# Patient Record
Sex: MALE | Race: WHITE | NOT HISPANIC OR LATINO | Employment: OTHER
[De-identification: names, ages, dates, MRNs, and addresses within clinical notes are randomized per-mention and may not be internally consistent; named-entity substitution may affect disease eponyms.]

---

## 2023-10-05 ENCOUNTER — TRANSCRIBE ORDERS (OUTPATIENT)
Dept: OTHER | Age: 78
End: 2023-10-05

## 2023-10-05 DIAGNOSIS — Z95.1 S/P CORONARY ARTERY BYPASS GRAFT X 3: Primary | ICD-10-CM

## 2023-10-20 ENCOUNTER — HOSPITAL ENCOUNTER (OUTPATIENT)
Dept: CARDIAC REHAB | Facility: HOSPITAL | Age: 78
Discharge: HOME | End: 2023-10-20
Attending: SURGERY
Payer: COMMERCIAL

## 2023-10-20 DIAGNOSIS — Z95.1 S/P CORONARY ARTERY BYPASS GRAFT X 3: ICD-10-CM

## 2023-10-20 PROCEDURE — 93797 PHYS/QHP OP CAR RHAB WO ECG: CPT

## 2023-10-20 PROCEDURE — 93798 PHYS/QHP OP CAR RHAB W/ECG: CPT

## 2023-11-02 ENCOUNTER — MEDICAL CORRESPONDENCE (OUTPATIENT)
Dept: CARDIAC REHAB | Facility: HOSPITAL | Age: 78
End: 2023-11-02

## 2023-11-02 ENCOUNTER — APPOINTMENT (OUTPATIENT)
Dept: CARDIOLOGY | Facility: HOSPITAL | Age: 78
DRG: 856 | End: 2023-11-02
Attending: INTERNAL MEDICINE
Payer: COMMERCIAL

## 2023-11-02 ENCOUNTER — APPOINTMENT (OUTPATIENT)
Dept: CT IMAGING | Facility: HOSPITAL | Age: 78
DRG: 856 | End: 2023-11-02
Attending: EMERGENCY MEDICINE
Payer: COMMERCIAL

## 2023-11-02 ENCOUNTER — APPOINTMENT (OUTPATIENT)
Dept: RADIOLOGY | Facility: HOSPITAL | Age: 78
DRG: 856 | End: 2023-11-02
Attending: EMERGENCY MEDICINE
Payer: COMMERCIAL

## 2023-11-02 ENCOUNTER — HOSPITAL ENCOUNTER (INPATIENT)
Facility: HOSPITAL | Age: 78
LOS: 12 days | Discharge: NURSING, REHAB OR LONG-TERM CARE | DRG: 856 | End: 2023-11-14
Attending: EMERGENCY MEDICINE | Admitting: INTERNAL MEDICINE
Payer: COMMERCIAL

## 2023-11-02 DIAGNOSIS — R65.20 SEVERE SEPSIS (H): ICD-10-CM

## 2023-11-02 DIAGNOSIS — I31.39 PERICARDIAL EFFUSION: ICD-10-CM

## 2023-11-02 DIAGNOSIS — E87.5 HYPERKALEMIA: ICD-10-CM

## 2023-11-02 DIAGNOSIS — I48.0 PAROXYSMAL ATRIAL FIBRILLATION (H): ICD-10-CM

## 2023-11-02 DIAGNOSIS — Z98.890 S/P PERICARDIAL WINDOW CREATION: Primary | ICD-10-CM

## 2023-11-02 DIAGNOSIS — D64.9 ANEMIA, UNSPECIFIED TYPE: ICD-10-CM

## 2023-11-02 DIAGNOSIS — F41.9 ANXIETY: ICD-10-CM

## 2023-11-02 DIAGNOSIS — N17.9 ACUTE RENAL FAILURE, UNSPECIFIED ACUTE RENAL FAILURE TYPE (H): ICD-10-CM

## 2023-11-02 DIAGNOSIS — J90 BILATERAL PLEURAL EFFUSION: ICD-10-CM

## 2023-11-02 DIAGNOSIS — A41.9 SEVERE SEPSIS (H): ICD-10-CM

## 2023-11-02 DIAGNOSIS — L03.116 LEFT LEG CELLULITIS: ICD-10-CM

## 2023-11-02 LAB
ABO/RH(D): NORMAL
ALBUMIN SERPL BCG-MCNC: 3.1 G/DL (ref 3.5–5.2)
ALBUMIN SERPL BCG-MCNC: 3.2 G/DL (ref 3.5–5.2)
ALBUMIN UR-MCNC: 300 MG/DL
ALP SERPL-CCNC: 42 U/L (ref 40–129)
ALT SERPL W P-5'-P-CCNC: 15 U/L (ref 0–70)
ANION GAP SERPL CALCULATED.3IONS-SCNC: 23 MMOL/L (ref 7–15)
ANION GAP SERPL CALCULATED.3IONS-SCNC: 24 MMOL/L (ref 7–15)
ANION GAP SERPL CALCULATED.3IONS-SCNC: 27 MMOL/L (ref 7–15)
ANTIBODY SCREEN: NEGATIVE
APPEARANCE UR: ABNORMAL
APTT PPP: 43 SECONDS (ref 22–38)
AST SERPL W P-5'-P-CCNC: 7 U/L (ref 0–45)
ATRIAL RATE - MUSE: 58 BPM
BACTERIA #/AREA URNS HPF: ABNORMAL /HPF
BASE EXCESS BLDA CALC-SCNC: -7.4 MMOL/L
BASE EXCESS BLDV CALC-SCNC: -5 MMOL/L
BASE EXCESS BLDV CALC-SCNC: -5.6 MMOL/L
BASE EXCESS BLDV CALC-SCNC: -7.9 MMOL/L
BASOPHILS # BLD AUTO: 0.1 10E3/UL (ref 0–0.2)
BASOPHILS NFR BLD AUTO: 1 %
BILIRUB SERPL-MCNC: 0.3 MG/DL
BILIRUB UR QL STRIP: NEGATIVE
BUN SERPL-MCNC: 102.6 MG/DL (ref 8–23)
BUN SERPL-MCNC: 96.6 MG/DL (ref 8–23)
BUN SERPL-MCNC: 97.2 MG/DL (ref 8–23)
BUN SERPL-MCNC: 97.6 MG/DL (ref 8–23)
BUN SERPL-MCNC: 97.6 MG/DL (ref 8–23)
CALCIUM SERPL-MCNC: 7.9 MG/DL (ref 8.8–10.2)
CALCIUM SERPL-MCNC: 8.8 MG/DL (ref 8.8–10.2)
CALCIUM SERPL-MCNC: 8.8 MG/DL (ref 8.8–10.2)
CALCIUM SERPL-MCNC: 8.9 MG/DL (ref 8.8–10.2)
CALCIUM SERPL-MCNC: 9.3 MG/DL (ref 8.8–10.2)
CALCIUM, IONIZED MEASURED: 1.06 MMOL/L (ref 1.11–1.3)
CHLORIDE SERPL-SCNC: 89 MMOL/L (ref 98–107)
CHLORIDE SERPL-SCNC: 91 MMOL/L (ref 98–107)
CHLORIDE SERPL-SCNC: 92 MMOL/L (ref 98–107)
CHLORIDE SERPL-SCNC: 93 MMOL/L (ref 98–107)
CHLORIDE SERPL-SCNC: 93 MMOL/L (ref 98–107)
COHGB MFR BLD: 97.2 % (ref 95–96)
COLOR UR AUTO: YELLOW
CREAT SERPL-MCNC: 10.02 MG/DL (ref 0.67–1.17)
CREAT SERPL-MCNC: 10.02 MG/DL (ref 0.67–1.17)
CREAT SERPL-MCNC: 10.42 MG/DL (ref 0.67–1.17)
CREAT SERPL-MCNC: 10.58 MG/DL (ref 0.67–1.17)
CREAT SERPL-MCNC: 8.4 MG/DL (ref 0.67–1.17)
DEPRECATED HCO3 PLAS-SCNC: 14 MMOL/L (ref 22–29)
DEPRECATED HCO3 PLAS-SCNC: 19 MMOL/L (ref 22–29)
DEPRECATED HCO3 PLAS-SCNC: 20 MMOL/L (ref 22–29)
DEPRECATED HCO3 PLAS-SCNC: 20 MMOL/L (ref 22–29)
DEPRECATED HCO3 PLAS-SCNC: 21 MMOL/L (ref 22–29)
DIASTOLIC BLOOD PRESSURE - MUSE: 49 MMHG
EGFRCR SERPLBLD CKD-EPI 2021: 5 ML/MIN/1.73M2
EGFRCR SERPLBLD CKD-EPI 2021: 6 ML/MIN/1.73M2
EOSINOPHIL # BLD AUTO: 0.1 10E3/UL (ref 0–0.7)
EOSINOPHIL NFR BLD AUTO: 1 %
ERYTHROCYTE [DISTWIDTH] IN BLOOD BY AUTOMATED COUNT: 14.9 % (ref 10–15)
ERYTHROCYTE [DISTWIDTH] IN BLOOD BY AUTOMATED COUNT: 15 % (ref 10–15)
FIBRINOGEN PPP-MCNC: 389 MG/DL (ref 170–490)
FLUAV RNA SPEC QL NAA+PROBE: NEGATIVE
FLUBV RNA RESP QL NAA+PROBE: NEGATIVE
GLUCOSE BLDC GLUCOMTR-MCNC: 119 MG/DL (ref 70–99)
GLUCOSE BLDC GLUCOMTR-MCNC: 146 MG/DL (ref 70–99)
GLUCOSE BLDC GLUCOMTR-MCNC: 147 MG/DL (ref 70–99)
GLUCOSE BLDC GLUCOMTR-MCNC: 154 MG/DL (ref 70–99)
GLUCOSE BLDC GLUCOMTR-MCNC: 182 MG/DL (ref 70–99)
GLUCOSE BLDC GLUCOMTR-MCNC: 203 MG/DL (ref 70–99)
GLUCOSE BLDC GLUCOMTR-MCNC: 207 MG/DL (ref 70–99)
GLUCOSE BLDC GLUCOMTR-MCNC: 231 MG/DL (ref 70–99)
GLUCOSE BLDC GLUCOMTR-MCNC: 254 MG/DL (ref 70–99)
GLUCOSE SERPL-MCNC: 128 MG/DL (ref 70–99)
GLUCOSE SERPL-MCNC: 159 MG/DL (ref 70–99)
GLUCOSE SERPL-MCNC: 188 MG/DL (ref 70–99)
GLUCOSE SERPL-MCNC: 188 MG/DL (ref 70–99)
GLUCOSE SERPL-MCNC: 304 MG/DL (ref 70–99)
GLUCOSE UR STRIP-MCNC: 30 MG/DL
HBA1C MFR BLD: 6.7 %
HCO3 BLD-SCNC: 19 MMOL/L (ref 23–29)
HCO3 BLDV-SCNC: 19 MMOL/L (ref 24–30)
HCO3 BLDV-SCNC: 20 MMOL/L (ref 24–30)
HCO3 BLDV-SCNC: 21 MMOL/L (ref 24–30)
HCT VFR BLD AUTO: 23.6 % (ref 40–53)
HCT VFR BLD AUTO: 24.1 % (ref 40–53)
HCT VFR BLD AUTO: 24.5 % (ref 40–53)
HCT VFR BLD AUTO: 27.2 % (ref 40–53)
HGB BLD-MCNC: 7.3 G/DL (ref 13.3–17.7)
HGB BLD-MCNC: 7.6 G/DL (ref 13.3–17.7)
HGB BLD-MCNC: 7.7 G/DL (ref 13.3–17.7)
HGB BLD-MCNC: 8.3 G/DL (ref 13.3–17.7)
HGB UR QL STRIP: ABNORMAL
HOLD SPECIMEN: NORMAL
IMM GRANULOCYTES # BLD: 0.1 10E3/UL
IMM GRANULOCYTES NFR BLD: 1 %
INR PPP: 2.83 (ref 0.85–1.15)
INR PPP: 2.9 (ref 0.85–1.15)
INTERPRETATION ECG - MUSE: NORMAL
ION CA PH 7.4: 0.97 MMOL/L (ref 1.11–1.3)
KETONES UR STRIP-MCNC: 10 MG/DL
LACTATE SERPL-SCNC: 1.9 MMOL/L (ref 0.7–2)
LACTATE SERPL-SCNC: 2.7 MMOL/L (ref 0.7–2)
LACTATE SERPL-SCNC: 2.9 MMOL/L (ref 0.7–2)
LACTATE SERPL-SCNC: 3.3 MMOL/L (ref 0.7–2)
LEUKOCYTE ESTERASE UR QL STRIP: NEGATIVE
LVEF ECHO: NORMAL
LYMPHOCYTES # BLD AUTO: 1.1 10E3/UL (ref 0.8–5.3)
LYMPHOCYTES NFR BLD AUTO: 12 %
MAGNESIUM SERPL-MCNC: 2.1 MG/DL (ref 1.7–2.3)
MAGNESIUM SERPL-MCNC: 2.3 MG/DL (ref 1.7–2.3)
MCH RBC QN AUTO: 28.7 PG (ref 26.5–33)
MCH RBC QN AUTO: 29.4 PG (ref 26.5–33)
MCHC RBC AUTO-ENTMCNC: 30.5 G/DL (ref 31.5–36.5)
MCHC RBC AUTO-ENTMCNC: 30.9 G/DL (ref 31.5–36.5)
MCV RBC AUTO: 93 FL (ref 78–100)
MCV RBC AUTO: 97 FL (ref 78–100)
MONOCYTES # BLD AUTO: 0.7 10E3/UL (ref 0–1.3)
MONOCYTES NFR BLD AUTO: 8 %
NEUTROPHILS # BLD AUTO: 6.9 10E3/UL (ref 1.6–8.3)
NEUTROPHILS NFR BLD AUTO: 77 %
NITRATE UR QL: NEGATIVE
NRBC # BLD AUTO: 0 10E3/UL
NRBC BLD AUTO-RTO: 0 /100
NT-PROBNP SERPL-MCNC: ABNORMAL PG/ML (ref 0–1800)
OXYHGB MFR BLD: 94.9 % (ref 95–96)
OXYHGB MFR BLDV: 53.4 % (ref 70–75)
OXYHGB MFR BLDV: 54.6 % (ref 70–75)
P AXIS - MUSE: NORMAL DEGREES
PCO2 BLD: 40 MM HG (ref 35–45)
PCO2 BLDV: 43 MM HG (ref 35–50)
PCO2 BLDV: 46 MM HG (ref 35–50)
PCO2 BLDV: 47 MM HG (ref 35–50)
PH BLD: 7.29 [PH] (ref 7.37–7.44)
PH BLDV: 7.26 [PH] (ref 7.35–7.45)
PH BLDV: 7.27 [PH] (ref 7.35–7.45)
PH BLDV: 7.28 [PH] (ref 7.35–7.45)
PH UR STRIP: 5.5 [PH] (ref 5–7)
PH: 7.25 (ref 7.35–7.45)
PHOSPHATE SERPL-MCNC: 10.9 MG/DL (ref 2.5–4.5)
PLATELET # BLD AUTO: 382 10E3/UL (ref 150–450)
PLATELET # BLD AUTO: 399 10E3/UL (ref 150–450)
PO2 BLD: 88 MM HG (ref 75–85)
PO2 BLDV: 34 MM HG (ref 25–47)
PO2 BLDV: 35 MM HG (ref 25–47)
PO2 BLDV: 36 MM HG (ref 25–47)
POTASSIUM SERPL-SCNC: 4.7 MMOL/L (ref 3.4–5.3)
POTASSIUM SERPL-SCNC: 5.1 MMOL/L (ref 3.4–5.3)
POTASSIUM SERPL-SCNC: 5.3 MMOL/L (ref 3.4–5.3)
POTASSIUM SERPL-SCNC: 5.4 MMOL/L (ref 3.4–5.3)
POTASSIUM SERPL-SCNC: 5.4 MMOL/L (ref 3.4–5.3)
POTASSIUM SERPL-SCNC: 5.9 MMOL/L (ref 3.4–5.3)
PR INTERVAL - MUSE: NORMAL MS
PROCALCITONIN SERPL IA-MCNC: 0.18 NG/ML
PROT SERPL-MCNC: 5.6 G/DL (ref 6.4–8.3)
QRS DURATION - MUSE: 92 MS
QT - MUSE: 464 MS
QTC - MUSE: 474 MS
R AXIS - MUSE: 14 DEGREES
RBC # BLD AUTO: 2.54 10E6/UL (ref 4.4–5.9)
RBC # BLD AUTO: 2.82 10E6/UL (ref 4.4–5.9)
RBC URINE: 40 /HPF
RSV RNA SPEC NAA+PROBE: NEGATIVE
SAO2 % BLDV: 54.5 % (ref 70–75)
SAO2 % BLDV: 55.5 % (ref 70–75)
SARS-COV-2 RNA RESP QL NAA+PROBE: NEGATIVE
SATV LHE POCT: 55.2 % (ref 70–75)
SODIUM SERPL-SCNC: 130 MMOL/L (ref 135–145)
SODIUM SERPL-SCNC: 134 MMOL/L (ref 135–145)
SODIUM SERPL-SCNC: 136 MMOL/L (ref 135–145)
SP GR UR STRIP: 1.02 (ref 1–1.03)
SPECIMEN EXPIRATION DATE: NORMAL
SPERM #/AREA URNS HPF: PRESENT /HPF
SYSTOLIC BLOOD PRESSURE - MUSE: 86 MMHG
T AXIS - MUSE: 86 DEGREES
TEMPERATURE: 37 DEGREES C
TROPONIN T SERPL HS-MCNC: 79 NG/L
TROPONIN T SERPL HS-MCNC: 88 NG/L
TROPONIN T SERPL HS-MCNC: 92 NG/L
TROPONIN T SERPL HS-MCNC: 98 NG/L
TSH SERPL DL<=0.005 MIU/L-ACNC: 2.85 UIU/ML (ref 0.3–4.2)
UROBILINOGEN UR STRIP-MCNC: 4 MG/DL
VENTRICULAR RATE- MUSE: 63 BPM
WBC # BLD AUTO: 8.9 10E3/UL (ref 4–11)
WBC # BLD AUTO: 9.3 10E3/UL (ref 4–11)
WBC URINE: 4 /HPF

## 2023-11-02 PROCEDURE — 87077 CULTURE AEROBIC IDENTIFY: CPT | Performed by: EMERGENCY MEDICINE

## 2023-11-02 PROCEDURE — 84132 ASSAY OF SERUM POTASSIUM: CPT | Performed by: EMERGENCY MEDICINE

## 2023-11-02 PROCEDURE — 83605 ASSAY OF LACTIC ACID: CPT | Performed by: EMERGENCY MEDICINE

## 2023-11-02 PROCEDURE — 96361 HYDRATE IV INFUSION ADD-ON: CPT

## 2023-11-02 PROCEDURE — 96368 THER/DIAG CONCURRENT INF: CPT

## 2023-11-02 PROCEDURE — 96375 TX/PRO/DX INJ NEW DRUG ADDON: CPT

## 2023-11-02 PROCEDURE — 84132 ASSAY OF SERUM POTASSIUM: CPT | Performed by: INTERNAL MEDICINE

## 2023-11-02 PROCEDURE — 93451 RIGHT HEART CATH: CPT | Performed by: INTERNAL MEDICINE

## 2023-11-02 PROCEDURE — 250N000009 HC RX 250: Performed by: INTERNAL MEDICINE

## 2023-11-02 PROCEDURE — 86901 BLOOD TYPING SEROLOGIC RH(D): CPT | Performed by: EMERGENCY MEDICINE

## 2023-11-02 PROCEDURE — 85610 PROTHROMBIN TIME: CPT | Performed by: EMERGENCY MEDICINE

## 2023-11-02 PROCEDURE — 36600 WITHDRAWAL OF ARTERIAL BLOOD: CPT

## 2023-11-02 PROCEDURE — C9113 INJ PANTOPRAZOLE SODIUM, VIA: HCPCS | Mod: JZ | Performed by: INTERNAL MEDICINE

## 2023-11-02 PROCEDURE — 82805 BLOOD GASES W/O2 SATURATION: CPT | Performed by: EMERGENCY MEDICINE

## 2023-11-02 PROCEDURE — 258N000001 HC RX 258: Performed by: EMERGENCY MEDICINE

## 2023-11-02 PROCEDURE — 4A133B3 MONITORING OF ARTERIAL PRESSURE, PULMONARY, PERCUTANEOUS APPROACH: ICD-10-PCS | Performed by: INTERNAL MEDICINE

## 2023-11-02 PROCEDURE — 99223 1ST HOSP IP/OBS HIGH 75: CPT | Performed by: INTERNAL MEDICINE

## 2023-11-02 PROCEDURE — 99152 MOD SED SAME PHYS/QHP 5/>YRS: CPT | Performed by: INTERNAL MEDICINE

## 2023-11-02 PROCEDURE — 93306 TTE W/DOPPLER COMPLETE: CPT | Mod: 26 | Performed by: INTERNAL MEDICINE

## 2023-11-02 PROCEDURE — 250N000011 HC RX IP 250 OP 636: Mod: JZ | Performed by: INTERNAL MEDICINE

## 2023-11-02 PROCEDURE — 272N000452 HC KIT SHRLOCK 5FR POWER PICC TRIPLE LUMEN

## 2023-11-02 PROCEDURE — 99222 1ST HOSP IP/OBS MODERATE 55: CPT | Performed by: SURGERY

## 2023-11-02 PROCEDURE — 71250 CT THORAX DX C-: CPT

## 2023-11-02 PROCEDURE — 272N000001 HC OR GENERAL SUPPLY STERILE: Performed by: INTERNAL MEDICINE

## 2023-11-02 PROCEDURE — 96365 THER/PROPH/DIAG IV INF INIT: CPT

## 2023-11-02 PROCEDURE — 84484 ASSAY OF TROPONIN QUANT: CPT | Performed by: EMERGENCY MEDICINE

## 2023-11-02 PROCEDURE — 82330 ASSAY OF CALCIUM: CPT | Performed by: INTERNAL MEDICINE

## 2023-11-02 PROCEDURE — 84145 PROCALCITONIN (PCT): CPT | Performed by: EMERGENCY MEDICINE

## 2023-11-02 PROCEDURE — 250N000011 HC RX IP 250 OP 636: Performed by: INTERNAL MEDICINE

## 2023-11-02 PROCEDURE — 85610 PROTHROMBIN TIME: CPT | Performed by: INTERNAL MEDICINE

## 2023-11-02 PROCEDURE — 99291 CRITICAL CARE FIRST HOUR: CPT | Mod: 25

## 2023-11-02 PROCEDURE — 84484 ASSAY OF TROPONIN QUANT: CPT | Performed by: INTERNAL MEDICINE

## 2023-11-02 PROCEDURE — 87637 SARSCOV2&INF A&B&RSV AMP PRB: CPT | Performed by: INTERNAL MEDICINE

## 2023-11-02 PROCEDURE — 70450 CT HEAD/BRAIN W/O DYE: CPT

## 2023-11-02 PROCEDURE — 93503 INSERT/PLACE HEART CATHETER: CPT | Mod: 59 | Performed by: INTERNAL MEDICINE

## 2023-11-02 PROCEDURE — 93306 TTE W/DOPPLER COMPLETE: CPT

## 2023-11-02 PROCEDURE — 82310 ASSAY OF CALCIUM: CPT | Performed by: INTERNAL MEDICINE

## 2023-11-02 PROCEDURE — 83605 ASSAY OF LACTIC ACID: CPT | Performed by: INTERNAL MEDICINE

## 2023-11-02 PROCEDURE — 83735 ASSAY OF MAGNESIUM: CPT | Performed by: INTERNAL MEDICINE

## 2023-11-02 PROCEDURE — 86850 RBC ANTIBODY SCREEN: CPT | Performed by: EMERGENCY MEDICINE

## 2023-11-02 PROCEDURE — 82805 BLOOD GASES W/O2 SATURATION: CPT

## 2023-11-02 PROCEDURE — 258N000003 HC RX IP 258 OP 636: Performed by: EMERGENCY MEDICINE

## 2023-11-02 PROCEDURE — 83036 HEMOGLOBIN GLYCOSYLATED A1C: CPT | Performed by: INTERNAL MEDICINE

## 2023-11-02 PROCEDURE — 83735 ASSAY OF MAGNESIUM: CPT | Performed by: EMERGENCY MEDICINE

## 2023-11-02 PROCEDURE — 250N000009 HC RX 250: Performed by: EMERGENCY MEDICINE

## 2023-11-02 PROCEDURE — 86923 COMPATIBILITY TEST ELECTRIC: CPT | Performed by: ANESTHESIOLOGY

## 2023-11-02 PROCEDURE — 87040 BLOOD CULTURE FOR BACTERIA: CPT | Performed by: EMERGENCY MEDICINE

## 2023-11-02 PROCEDURE — 86923 COMPATIBILITY TEST ELECTRIC: CPT | Performed by: INTERNAL MEDICINE

## 2023-11-02 PROCEDURE — 4A1239Z MONITORING OF CARDIAC OUTPUT, PERCUTANEOUS APPROACH: ICD-10-PCS | Performed by: INTERNAL MEDICINE

## 2023-11-02 PROCEDURE — 96366 THER/PROPH/DIAG IV INF ADDON: CPT

## 2023-11-02 PROCEDURE — 85014 HEMATOCRIT: CPT | Performed by: INTERNAL MEDICINE

## 2023-11-02 PROCEDURE — 999N000157 HC STATISTIC RCP TIME EA 10 MIN

## 2023-11-02 PROCEDURE — 36415 COLL VENOUS BLD VENIPUNCTURE: CPT | Performed by: EMERGENCY MEDICINE

## 2023-11-02 PROCEDURE — 84443 ASSAY THYROID STIM HORMONE: CPT | Performed by: EMERGENCY MEDICINE

## 2023-11-02 PROCEDURE — 81001 URINALYSIS AUTO W/SCOPE: CPT | Performed by: EMERGENCY MEDICINE

## 2023-11-02 PROCEDURE — 85025 COMPLETE CBC W/AUTO DIFF WBC: CPT | Performed by: EMERGENCY MEDICINE

## 2023-11-02 PROCEDURE — 3E043XZ INTRODUCTION OF VASOPRESSOR INTO CENTRAL VEIN, PERCUTANEOUS APPROACH: ICD-10-PCS | Performed by: INTERNAL MEDICINE

## 2023-11-02 PROCEDURE — 0H9LXZZ DRAINAGE OF LEFT LOWER LEG SKIN, EXTERNAL APPROACH: ICD-10-PCS | Performed by: SURGERY

## 2023-11-02 PROCEDURE — 82805 BLOOD GASES W/O2 SATURATION: CPT | Performed by: INTERNAL MEDICINE

## 2023-11-02 PROCEDURE — 99222 1ST HOSP IP/OBS MODERATE 55: CPT | Performed by: INTERNAL MEDICINE

## 2023-11-02 PROCEDURE — 51702 INSERT TEMP BLADDER CATH: CPT

## 2023-11-02 PROCEDURE — 82962 GLUCOSE BLOOD TEST: CPT

## 2023-11-02 PROCEDURE — 96374 THER/PROPH/DIAG INJ IV PUSH: CPT

## 2023-11-02 PROCEDURE — 250N000012 HC RX MED GY IP 250 OP 636 PS 637: Performed by: INTERNAL MEDICINE

## 2023-11-02 PROCEDURE — 99292 CRITICAL CARE ADDL 30 MIN: CPT

## 2023-11-02 PROCEDURE — 99207 PR NO BILLABLE SERVICE THIS VISIT: CPT | Performed by: INTERNAL MEDICINE

## 2023-11-02 PROCEDURE — 250N000011 HC RX IP 250 OP 636: Mod: JZ | Performed by: EMERGENCY MEDICINE

## 2023-11-02 PROCEDURE — 93451 RIGHT HEART CATH: CPT | Mod: 26 | Performed by: INTERNAL MEDICINE

## 2023-11-02 PROCEDURE — 83880 ASSAY OF NATRIURETIC PEPTIDE: CPT | Performed by: EMERGENCY MEDICINE

## 2023-11-02 PROCEDURE — 999N000065 XR CHEST PORT 1 VIEW

## 2023-11-02 PROCEDURE — 85384 FIBRINOGEN ACTIVITY: CPT | Performed by: INTERNAL MEDICINE

## 2023-11-02 PROCEDURE — C1751 CATH, INF, PER/CENT/MIDLINE: HCPCS | Performed by: INTERNAL MEDICINE

## 2023-11-02 PROCEDURE — 87070 CULTURE OTHR SPECIMN AEROBIC: CPT | Performed by: EMERGENCY MEDICINE

## 2023-11-02 PROCEDURE — C1894 INTRO/SHEATH, NON-LASER: HCPCS | Performed by: INTERNAL MEDICINE

## 2023-11-02 PROCEDURE — 93005 ELECTROCARDIOGRAM TRACING: CPT | Performed by: EMERGENCY MEDICINE

## 2023-11-02 PROCEDURE — 02HP32Z INSERTION OF MONITORING DEVICE INTO PULMONARY TRUNK, PERCUTANEOUS APPROACH: ICD-10-PCS | Performed by: INTERNAL MEDICINE

## 2023-11-02 PROCEDURE — 80053 COMPREHEN METABOLIC PANEL: CPT | Performed by: EMERGENCY MEDICINE

## 2023-11-02 PROCEDURE — 86923 COMPATIBILITY TEST ELECTRIC: CPT | Performed by: NURSE ANESTHETIST, CERTIFIED REGISTERED

## 2023-11-02 PROCEDURE — 200N000001 HC R&B ICU

## 2023-11-02 PROCEDURE — 85027 COMPLETE CBC AUTOMATED: CPT | Performed by: INTERNAL MEDICINE

## 2023-11-02 PROCEDURE — 4A023N6 MEASUREMENT OF CARDIAC SAMPLING AND PRESSURE, RIGHT HEART, PERCUTANEOUS APPROACH: ICD-10-PCS | Performed by: INTERNAL MEDICINE

## 2023-11-02 PROCEDURE — 99291 CRITICAL CARE FIRST HOUR: CPT | Performed by: INTERNAL MEDICINE

## 2023-11-02 PROCEDURE — 250N000012 HC RX MED GY IP 250 OP 636 PS 637: Performed by: EMERGENCY MEDICINE

## 2023-11-02 PROCEDURE — 96367 TX/PROPH/DG ADDL SEQ IV INF: CPT

## 2023-11-02 PROCEDURE — 36569 INSJ PICC 5 YR+ W/O IMAGING: CPT

## 2023-11-02 PROCEDURE — 85730 THROMBOPLASTIN TIME PARTIAL: CPT | Performed by: EMERGENCY MEDICINE

## 2023-11-02 PROCEDURE — 85018 HEMOGLOBIN: CPT | Performed by: INTERNAL MEDICINE

## 2023-11-02 RX ORDER — DEXTROSE MONOHYDRATE 25 G/50ML
25 INJECTION, SOLUTION INTRAVENOUS ONCE
Status: COMPLETED | OUTPATIENT
Start: 2023-11-02 | End: 2023-11-02

## 2023-11-02 RX ORDER — NICOTINE POLACRILEX 4 MG
15-30 LOZENGE BUCCAL
Status: DISCONTINUED | OUTPATIENT
Start: 2023-11-02 | End: 2023-11-05

## 2023-11-02 RX ORDER — FENTANYL CITRATE 50 UG/ML
INJECTION, SOLUTION INTRAMUSCULAR; INTRAVENOUS
Status: DISCONTINUED | OUTPATIENT
Start: 2023-11-02 | End: 2023-11-02 | Stop reason: HOSPADM

## 2023-11-02 RX ORDER — CALCIUM GLUCONATE 94 MG/ML
1 INJECTION, SOLUTION INTRAVENOUS ONCE
Status: COMPLETED | OUTPATIENT
Start: 2023-11-02 | End: 2023-11-02

## 2023-11-02 RX ORDER — ONDANSETRON 2 MG/ML
4 INJECTION INTRAMUSCULAR; INTRAVENOUS ONCE
Status: COMPLETED | OUTPATIENT
Start: 2023-11-02 | End: 2023-11-02

## 2023-11-02 RX ORDER — AMLODIPINE AND BENAZEPRIL HYDROCHLORIDE 5; 20 MG/1; MG/1
1 CAPSULE ORAL DAILY
Status: ON HOLD | COMMUNITY
Start: 2022-12-22 | End: 2023-11-14

## 2023-11-02 RX ORDER — PIPERACILLIN SODIUM, TAZOBACTAM SODIUM 3; .375 G/15ML; G/15ML
3.38 INJECTION, POWDER, LYOPHILIZED, FOR SOLUTION INTRAVENOUS EVERY 12 HOURS
Status: DISCONTINUED | OUTPATIENT
Start: 2023-11-02 | End: 2023-11-03

## 2023-11-02 RX ORDER — LORAZEPAM 0.5 MG/1
0.5 TABLET ORAL
COMMUNITY
Start: 2023-10-09 | End: 2023-11-14

## 2023-11-02 RX ORDER — LIDOCAINE 40 MG/G
CREAM TOPICAL
Status: DISCONTINUED | OUTPATIENT
Start: 2023-11-02 | End: 2023-11-02 | Stop reason: HOSPADM

## 2023-11-02 RX ORDER — NALOXONE HYDROCHLORIDE 0.4 MG/ML
0.4 INJECTION, SOLUTION INTRAMUSCULAR; INTRAVENOUS; SUBCUTANEOUS
Status: ACTIVE | OUTPATIENT
Start: 2023-11-02 | End: 2023-11-02

## 2023-11-02 RX ORDER — AMOXICILLIN 250 MG
2 CAPSULE ORAL 2 TIMES DAILY PRN
Status: DISCONTINUED | OUTPATIENT
Start: 2023-11-02 | End: 2023-11-14 | Stop reason: HOSPADM

## 2023-11-02 RX ORDER — BISACODYL 10 MG
10 SUPPOSITORY, RECTAL RECTAL DAILY PRN
Status: DISCONTINUED | OUTPATIENT
Start: 2023-11-02 | End: 2023-11-05

## 2023-11-02 RX ORDER — ATORVASTATIN CALCIUM 80 MG/1
1 TABLET, FILM COATED ORAL AT BEDTIME
Status: ON HOLD | COMMUNITY
Start: 2023-10-24 | End: 2023-11-14

## 2023-11-02 RX ORDER — FENTANYL CITRATE 50 UG/ML
25 INJECTION, SOLUTION INTRAMUSCULAR; INTRAVENOUS
Status: COMPLETED | OUTPATIENT
Start: 2023-11-02 | End: 2023-11-03

## 2023-11-02 RX ORDER — TORSEMIDE 20 MG/1
1 TABLET ORAL DAILY
COMMUNITY
Start: 2023-10-11 | End: 2024-01-25

## 2023-11-02 RX ORDER — METFORMIN HCL 500 MG
1000 TABLET, EXTENDED RELEASE 24 HR ORAL 2 TIMES DAILY WITH MEALS
COMMUNITY
Start: 2023-08-17 | End: 2023-11-21

## 2023-11-02 RX ORDER — NOREPINEPHRINE BITARTRATE 0.02 MG/ML
.01-.6 INJECTION, SOLUTION INTRAVENOUS CONTINUOUS
Status: DISCONTINUED | OUTPATIENT
Start: 2023-11-02 | End: 2023-11-04

## 2023-11-02 RX ORDER — PROCHLORPERAZINE MALEATE 5 MG
5 TABLET ORAL EVERY 6 HOURS PRN
Status: DISCONTINUED | OUTPATIENT
Start: 2023-11-02 | End: 2023-11-05

## 2023-11-02 RX ORDER — ATROPINE SULFATE 0.1 MG/ML
0.5 INJECTION INTRAVENOUS
Status: ACTIVE | OUTPATIENT
Start: 2023-11-02 | End: 2023-11-02

## 2023-11-02 RX ORDER — DEXTROSE MONOHYDRATE 25 G/50ML
25-50 INJECTION, SOLUTION INTRAVENOUS
Status: DISCONTINUED | OUTPATIENT
Start: 2023-11-02 | End: 2023-11-02

## 2023-11-02 RX ORDER — FLUMAZENIL 0.1 MG/ML
0.2 INJECTION, SOLUTION INTRAVENOUS
Status: ACTIVE | OUTPATIENT
Start: 2023-11-02 | End: 2023-11-02

## 2023-11-02 RX ORDER — CEFAZOLIN SODIUM 1 G/50ML
25 SOLUTION INTRAVENOUS ONCE
Status: COMPLETED | OUTPATIENT
Start: 2023-11-02 | End: 2023-11-02

## 2023-11-02 RX ORDER — ACETAMINOPHEN 325 MG/1
650 TABLET ORAL EVERY 4 HOURS PRN
Status: DISCONTINUED | OUTPATIENT
Start: 2023-11-02 | End: 2023-11-05

## 2023-11-02 RX ORDER — SERTRALINE HYDROCHLORIDE 25 MG/1
1 TABLET, FILM COATED ORAL DAILY
COMMUNITY
Start: 2023-10-25 | End: 2023-11-21

## 2023-11-02 RX ORDER — NOREPINEPHRINE BITARTRATE 0.02 MG/ML
.01-.6 INJECTION, SOLUTION INTRAVENOUS CONTINUOUS
Status: DISCONTINUED | OUTPATIENT
Start: 2023-11-02 | End: 2023-11-02

## 2023-11-02 RX ORDER — SODIUM CHLORIDE 9 MG/ML
75 INJECTION, SOLUTION INTRAVENOUS CONTINUOUS
Status: ACTIVE | OUTPATIENT
Start: 2023-11-02 | End: 2023-11-02

## 2023-11-02 RX ORDER — CEFAZOLIN SODIUM 2 G/100ML
2 INJECTION, SOLUTION INTRAVENOUS
Status: CANCELLED | OUTPATIENT
Start: 2023-11-02

## 2023-11-02 RX ORDER — ONDANSETRON 4 MG/1
4 TABLET, ORALLY DISINTEGRATING ORAL EVERY 6 HOURS PRN
Status: DISCONTINUED | OUTPATIENT
Start: 2023-11-02 | End: 2023-11-05

## 2023-11-02 RX ORDER — DOBUTAMINE HYDROCHLORIDE 200 MG/100ML
2.5 INJECTION INTRAVENOUS CONTINUOUS
Status: DISCONTINUED | OUTPATIENT
Start: 2023-11-02 | End: 2023-11-08

## 2023-11-02 RX ORDER — PROCHLORPERAZINE 25 MG
12.5 SUPPOSITORY, RECTAL RECTAL EVERY 12 HOURS PRN
Status: DISCONTINUED | OUTPATIENT
Start: 2023-11-02 | End: 2023-11-05

## 2023-11-02 RX ORDER — DEXTROSE MONOHYDRATE 25 G/50ML
25-50 INJECTION, SOLUTION INTRAVENOUS
Status: DISCONTINUED | OUTPATIENT
Start: 2023-11-02 | End: 2023-11-05

## 2023-11-02 RX ORDER — SODIUM CHLORIDE 9 MG/ML
INJECTION, SOLUTION INTRAVENOUS CONTINUOUS
Status: CANCELLED | OUTPATIENT
Start: 2023-11-02

## 2023-11-02 RX ORDER — OXYCODONE HYDROCHLORIDE 5 MG/1
10 TABLET ORAL EVERY 4 HOURS PRN
Status: DISCONTINUED | OUTPATIENT
Start: 2023-11-02 | End: 2023-11-05

## 2023-11-02 RX ORDER — CALCIUM GLUCONATE 20 MG/ML
2 INJECTION, SOLUTION INTRAVENOUS ONCE
Status: COMPLETED | OUTPATIENT
Start: 2023-11-02 | End: 2023-11-02

## 2023-11-02 RX ORDER — NALOXONE HYDROCHLORIDE 0.4 MG/ML
0.2 INJECTION, SOLUTION INTRAMUSCULAR; INTRAVENOUS; SUBCUTANEOUS
Status: ACTIVE | OUTPATIENT
Start: 2023-11-02 | End: 2023-11-02

## 2023-11-02 RX ORDER — POLYETHYLENE GLYCOL 3350 17 G/17G
17 POWDER, FOR SOLUTION ORAL DAILY PRN
Status: DISCONTINUED | OUTPATIENT
Start: 2023-11-02 | End: 2023-11-14 | Stop reason: HOSPADM

## 2023-11-02 RX ORDER — CARVEDILOL 25 MG/1
25 TABLET ORAL 2 TIMES DAILY WITH MEALS
COMMUNITY
Start: 2022-12-22 | End: 2023-11-21

## 2023-11-02 RX ORDER — AMOXICILLIN 250 MG
1 CAPSULE ORAL 2 TIMES DAILY PRN
Status: DISCONTINUED | OUTPATIENT
Start: 2023-11-02 | End: 2023-11-14 | Stop reason: HOSPADM

## 2023-11-02 RX ORDER — ONDANSETRON 2 MG/ML
4 INJECTION INTRAMUSCULAR; INTRAVENOUS EVERY 6 HOURS PRN
Status: DISCONTINUED | OUTPATIENT
Start: 2023-11-02 | End: 2023-11-05

## 2023-11-02 RX ORDER — PIPERACILLIN SODIUM, TAZOBACTAM SODIUM 3; .375 G/15ML; G/15ML
3.38 INJECTION, POWDER, LYOPHILIZED, FOR SOLUTION INTRAVENOUS ONCE
Status: COMPLETED | OUTPATIENT
Start: 2023-11-02 | End: 2023-11-02

## 2023-11-02 RX ORDER — NICOTINE POLACRILEX 4 MG
15-30 LOZENGE BUCCAL
Status: DISCONTINUED | OUTPATIENT
Start: 2023-11-02 | End: 2023-11-02

## 2023-11-02 RX ORDER — ACETAMINOPHEN 500 MG
1000 TABLET ORAL EVERY 6 HOURS PRN
COMMUNITY
Start: 2023-09-30

## 2023-11-02 RX ORDER — HYDRALAZINE HYDROCHLORIDE 20 MG/ML
10 INJECTION INTRAMUSCULAR; INTRAVENOUS
Status: DISCONTINUED | OUTPATIENT
Start: 2023-11-02 | End: 2023-11-05

## 2023-11-02 RX ORDER — ASPIRIN 81 MG/1
81 TABLET ORAL DAILY
COMMUNITY
Start: 2022-12-22 | End: 2023-11-21

## 2023-11-02 RX ORDER — OXYCODONE HYDROCHLORIDE 5 MG/1
5 TABLET ORAL EVERY 4 HOURS PRN
Status: DISCONTINUED | OUTPATIENT
Start: 2023-11-02 | End: 2023-11-05

## 2023-11-02 RX ORDER — LIDOCAINE 40 MG/G
CREAM TOPICAL
Status: DISCONTINUED | OUTPATIENT
Start: 2023-11-02 | End: 2023-11-05

## 2023-11-02 RX ADMIN — SODIUM BICARBONATE 50 MEQ: 84 INJECTION, SOLUTION INTRAVENOUS at 12:28

## 2023-11-02 RX ADMIN — PANTOPRAZOLE SODIUM 40 MG: 40 INJECTION, POWDER, FOR SOLUTION INTRAVENOUS at 11:25

## 2023-11-02 RX ADMIN — SODIUM CHLORIDE 250 ML: 9 INJECTION, SOLUTION INTRAVENOUS at 02:21

## 2023-11-02 RX ADMIN — DOBUTAMINE HYDROCHLORIDE 2.5 MCG/KG/MIN: 200 INJECTION INTRAVENOUS at 02:04

## 2023-11-02 RX ADMIN — SODIUM CHLORIDE 500 ML: 9 INJECTION, SOLUTION INTRAVENOUS at 05:31

## 2023-11-02 RX ADMIN — SODIUM CHLORIDE 250 ML: 9 INJECTION, SOLUTION INTRAVENOUS at 02:00

## 2023-11-02 RX ADMIN — NOREPINEPHRINE BITARTRATE 0.15 MCG/KG/MIN: 0.02 INJECTION, SOLUTION INTRAVENOUS at 19:30

## 2023-11-02 RX ADMIN — INSULIN ASPART 2 UNITS: 100 INJECTION, SOLUTION INTRAVENOUS; SUBCUTANEOUS at 12:02

## 2023-11-02 RX ADMIN — ONDANSETRON 4 MG: 2 INJECTION INTRAMUSCULAR; INTRAVENOUS at 03:40

## 2023-11-02 RX ADMIN — SODIUM BICARBONATE: 84 INJECTION INTRAVENOUS at 04:23

## 2023-11-02 RX ADMIN — NOREPINEPHRINE BITARTRATE 0.12 MCG/KG/MIN: 0.02 INJECTION, SOLUTION INTRAVENOUS at 11:41

## 2023-11-02 RX ADMIN — CALCIUM GLUCONATE 1 G: 98 INJECTION, SOLUTION INTRAVENOUS at 04:07

## 2023-11-02 RX ADMIN — SODIUM BICARBONATE 50 MEQ: 84 INJECTION, SOLUTION INTRAVENOUS at 04:20

## 2023-11-02 RX ADMIN — SODIUM CHLORIDE 250 ML: 9 INJECTION, SOLUTION INTRAVENOUS at 03:35

## 2023-11-02 RX ADMIN — ONDANSETRON 4 MG: 2 INJECTION INTRAMUSCULAR; INTRAVENOUS at 08:36

## 2023-11-02 RX ADMIN — DEXTROSE MONOHYDRATE 25 G: 25 INJECTION, SOLUTION INTRAVENOUS at 04:14

## 2023-11-02 RX ADMIN — PIPERACILLIN AND TAZOBACTAM 3.38 G: 3; .375 INJECTION, POWDER, LYOPHILIZED, FOR SOLUTION INTRAVENOUS at 21:30

## 2023-11-02 RX ADMIN — NOREPINEPHRINE BITARTRATE 0.16 MCG/KG/MIN: 0.02 INJECTION, SOLUTION INTRAVENOUS at 15:05

## 2023-11-02 RX ADMIN — INSULIN ASPART 1 UNITS: 100 INJECTION, SOLUTION INTRAVENOUS; SUBCUTANEOUS at 15:58

## 2023-11-02 RX ADMIN — LIDOCAINE HYDROCHLORIDE 2 ML: 10 INJECTION, SOLUTION EPIDURAL; INFILTRATION; INTRACAUDAL; PERINEURAL at 06:42

## 2023-11-02 RX ADMIN — PANTOPRAZOLE SODIUM 40 MG: 40 INJECTION, POWDER, FOR SOLUTION INTRAVENOUS at 21:31

## 2023-11-02 RX ADMIN — DEXTROSE MONOHYDRATE 300 ML: 100 INJECTION, SOLUTION INTRAVENOUS at 04:25

## 2023-11-02 RX ADMIN — VANCOMYCIN HYDROCHLORIDE 2500 MG: 500 INJECTION, POWDER, LYOPHILIZED, FOR SOLUTION INTRAVENOUS at 02:52

## 2023-11-02 RX ADMIN — SODIUM BICARBONATE 100 MEQ: 84 INJECTION, SOLUTION INTRAVENOUS at 04:35

## 2023-11-02 RX ADMIN — PIPERACILLIN AND TAZOBACTAM 3.38 G: 3; .375 INJECTION, POWDER, LYOPHILIZED, FOR SOLUTION INTRAVENOUS at 09:23

## 2023-11-02 RX ADMIN — NOREPINEPHRINE BITARTRATE 0.03 MCG/KG/MIN: 0.02 INJECTION, SOLUTION INTRAVENOUS at 04:45

## 2023-11-02 RX ADMIN — PIPERACILLIN AND TAZOBACTAM 3.38 G: 3; .375 INJECTION, POWDER, LYOPHILIZED, FOR SOLUTION INTRAVENOUS at 02:06

## 2023-11-02 RX ADMIN — CALCIUM GLUCONATE 2 G: 20 INJECTION, SOLUTION INTRAVENOUS at 09:35

## 2023-11-02 RX ADMIN — SODIUM BICARBONATE 50 MEQ: 84 INJECTION, SOLUTION INTRAVENOUS at 19:30

## 2023-11-02 RX ADMIN — SODIUM CHLORIDE 9.8 UNITS: 9 INJECTION, SOLUTION INTRAVENOUS at 04:14

## 2023-11-02 RX ADMIN — INSULIN ASPART 1 UNITS: 100 INJECTION, SOLUTION INTRAVENOUS; SUBCUTANEOUS at 19:37

## 2023-11-02 RX ADMIN — BUMETANIDE 0.25 MG/HR: 0.25 INJECTION INTRAMUSCULAR; INTRAVENOUS at 14:46

## 2023-11-02 ASSESSMENT — ENCOUNTER SYMPTOMS
TROUBLE SWALLOWING: 0
DIARRHEA: 0
SHORTNESS OF BREATH: 0
WOUND: 1
DYSURIA: 0
VOMITING: 0
COUGH: 0
NAUSEA: 0
FEVER: 0
CONFUSION: 0
ABDOMINAL PAIN: 0
FATIGUE: 1

## 2023-11-02 ASSESSMENT — ACTIVITIES OF DAILY LIVING (ADL)
ADLS_ACUITY_SCORE: 35
ADLS_ACUITY_SCORE: 37
ADLS_ACUITY_SCORE: 37
ADLS_ACUITY_SCORE: 35
ADLS_ACUITY_SCORE: 37
ADLS_ACUITY_SCORE: 20
ADLS_ACUITY_SCORE: 37

## 2023-11-02 NOTE — CONSULTS
Cardiology Consult Note    Thank you, Dr. Hill, for asking the [facility] team to see Gerardo Al in consultation at [facility] ICU to evaluate pericardial effusion.      Assessment:   1.  Pericardial effusion, predominantly posterior in location and moderate in size.  Stat echocardiogram this morning demonstrates no clear evidence of tamponade physiology although it may be contributing mildly to his hypotension.  LV and RV systolic function normal and there is no evidence of significant valve disease.  We will need to monitor closely for any hemodynamic compromise if dialysis needed to treat his acute renal failure.  Unfortunately, we will not be able to reach it with pericardiocentesis and he would need surgical evacuation of the effusion.  2.  Hypotension, possibly due to sepsis from cellulitis at his leg incision.  May be some contribution from the pericardial effusion although no evidence of RV or RA collapse.  Doubt that his recurrent atrial fibrillation is contributing as heart rates appear adequately controlled.  Patient with reasonable blood pressure support on current vasopressors.  3.  Acute renal failure, etiology unclear but may be related to hypotension.  Agree with nephrology consultation and recommendations.  4.  Recurrent atrial fibrillation, rate controlled.  Patient has been on Eliquis anticoagulation as an outpatient.  This is being held now due to his acute renal failure.     Plan:   1.  Continue to hold medications and use vasopressors as needed to maintain a MAP greater than 70  2.  No need for emergent drainage of pericardial effusion; however, if dialysis needed to treat renal failure, we will need to be very cautious with volume removal as it may precipitate tamponade physiology.  In that case, it may be prudent to drain effusion.    Primary cardiologist: [facility] cardiology     Current History:   Mr. Gerardo Al is a 78 year old male with  history of essential hypertension, hyperlipidemia, peripheral arterial disease, coronary artery disease status post CABG times 3 September 2023, paroxysmal atrial fibrillation with recurrence following surgery who presented to the ED yesterday with 1 week history of feeling chilled and malaise along with recent onset of nausea and vomiting.  Was found to be hypotensive with systolic blood pressures in the mid 70s.  Given 3 L of IV fluid with minimal improvement in blood pressure and subsequently started on vasopressors.  Examination significant for some erythema at the superior aspect of his left vein graft harvest site and concern was for possible septic shock.  Did have laboratory studies drawn demonstrating evidence of acute renal failure and a CT showing evidence of small bilateral pleural effusions.  Was subsequently admitted to the intensive care unit for further treatment.  This morning, upon review review of the chest CT, it appears that the patient has bilateral pericardial effusions along the left ventricle and right ventricle.  Stat echocardiogram ordered along with stat cardiac consult.  Patient reports no significant residual chest discomfort.  Denies any recent orthopnea or PND.  Minimal lower extremity edema.    Past Medical History:     Past Medical History:   Diagnosis Date    Hx of CABG     Sept 2023 at [facility]       Past Surgical History:     Past Surgical History:   Procedure Laterality Date    CARDIAC SURGERY      PICC TRIPLE LUMEN PLACEMENT  11/2/2023       Family History:   No family history of early CAD    Social History:    No current tobacco use    Meds:     Current Facility-Administered Medications:     bisacodyl (DULCOLAX) suppository 10 mg, 10 mg, Rectal, Daily PRN, Rona Melendrez MD    calcium gluconate 2 g in  mL intermittent infusion, 2 g, Intravenous, Once, Johny Guevara MD, 2 g at 11/02/23 0935    glucose gel 15-30 g, 15-30 g, Oral, Q15 Min PRN **OR**  dextrose 50 % injection 25-50 mL, 25-50 mL, Intravenous, Q15 Min PRN **OR** glucagon injection 1 mg, 1 mg, Subcutaneous, Q15 Min PRN, Rona Melendrez MD    DOBUTamine (DOBUTREX) 500 mg in D5W 250 mL infusion (adult std conc), 2.5 mcg/kg/min, Intravenous, Continuous, Brianna Lucio MD, Last Rate: 7.3 mL/hr at 11/02/23 0803, 2.5 mcg/kg/min at 11/02/23 0803    lidocaine (LMX4) cream, , Topical, Q1H PRN, Brianna Lucio MD    lidocaine 1 % 0.1-5 mL, 0.1-5 mL, Other, Q1H PRN, Brianna Lucio MD, 2 mL at 11/02/23 0642    norepinephrine (LEVOPHED) 4 mg in  mL infusion PREMIX, 0.01-0.6 mcg/kg/min, Intravenous, Continuous, Rona Melendrez MD, Last Rate: 36.7 mL/hr at 11/02/23 0759, 0.1 mcg/kg/min at 11/02/23 0759    ondansetron (ZOFRAN ODT) ODT tab 4 mg, 4 mg, Oral, Q6H PRN **OR** ondansetron (ZOFRAN) injection 4 mg, 4 mg, Intravenous, Q6H PRN, Rona Melendrez MD, 4 mg at 11/02/23 0836    pantoprazole (PROTONIX) IV push injection 40 mg, 40 mg, Intravenous, Q12H, Stef Hill MD    Patient is already receiving anticoagulation with heparin, enoxaparin (LOVENOX), warfarin (COUMADIN)  or other anticoagulant medication, , Does not apply, Continuous PRN, Rona Melendrez MD    piperacillin-tazobactam (ZOSYN) 3.375 g vial to attach to  mL bag, 3.375 g, Intravenous, Q12H, Rona Melendrez MD, 3.375 g at 11/02/23 0923    polyethylene glycol (MIRALAX) Packet 17 g, 17 g, Oral, Daily PRN, Rona Melendrez MD    prochlorperazine (COMPAZINE) injection 5 mg, 5 mg, Intravenous, Q6H PRN **OR** prochlorperazine (COMPAZINE) tablet 5 mg, 5 mg, Oral, Q6H PRN **OR** prochlorperazine (COMPAZINE) suppository 12.5 mg, 12.5 mg, Rectal, Q12H PRN, Rona Melendrez MD    senna-docusate (SENOKOT-S/PERICOLACE) 8.6-50 MG per tablet 1 tablet, 1 tablet, Oral, BID PRN **OR** senna-docusate (SENOKOT-S/PERICOLACE) 8.6-50 MG per tablet 2 tablet, 2 tablet, Oral, BID PRN,  "Rona Melendrez MD    sodium bicarbonate 150 mEq in D5W 1,000 mL infusion, , Intravenous, Continuous, Brianna Lucio MD, Last Rate: 125 mL/hr at 11/02/23 0803, Rate Verify at 11/02/23 0803    sodium chloride (PF) 0.9% PF flush 10-20 mL, 10-20 mL, Intracatheter, q1 min prn, Brianna Lucio MD    sodium chloride (PF) 0.9% PF flush 10-40 mL, 10-40 mL, Intracatheter, Once PRN, Brianna Lucio MD    sodium chloride (PF) 0.9% PF flush 10-40 mL, 10-40 mL, Intracatheter, Q7 Days, Brianna Lucio MD, 10 mL at 11/02/23 0759    sodium chloride (PF) 0.9% PF flush 10-40 mL, 10-40 mL, Intracatheter, Q1H PRN, Brianna Lucio MD    vancomycin place call - receiving intermittent dosing, 1 each, Intravenous, See Admin Instructions, Stef Hill MD    vasopressin (VASOSTRICT) 20 Units in sodium chloride 0.9 % 100 mL standard conc infusion, 2.4 Units/hr, Intravenous, Continuous, Rona Melendrez MD   calcium gluconate  2 g Intravenous Once    pantoprazole  40 mg Intravenous Q12H    piperacillin-tazobactam  3.375 g Intravenous Q12H    sodium chloride (PF)  10-40 mL Intracatheter Q7 Days    vancomycin place call - receiving intermittent dosing  1 each Intravenous See Admin Instructions       Allergies:   Patient has no known allergies.    Review of Systems:   A 12 point comprehensive review of systems was negative except as noted in the current history.    Objective:      Physical Exam  Body mass index is 29.27 kg/m .  BP 92/50   Pulse 77   Temp 97.6  F (36.4  C) (Oral)   Resp 19   Ht 1.829 m (6' 0.01\")   Wt 97.9 kg (215 lb 13.3 oz)   SpO2 95%   BMI 29.27 kg/m      General Appearance:   Well-developed, well-nourished, pale appearing elderly male who is in no acute distress   HEENT:  Normocephalic, atraumatic.  Sclera nonicteric.  Mucous membranes moist   Neck: No jugular venous distention or carotid bruits   Chest: Symmetric with normal AP diameter   Lungs:  " " Diminished breath sounds at both bases with the mid and upper lung fields clear   Cardiovascular:   Irregularly irregular rhythm.  S1, S2 normal.  No murmur or gallop   Abdomen:  Obese, soft, nondistended, nontender.  No organomegaly or mass   Extremities: 1+ pitting lower extremity edema.  There is an area of erythema at the superior aspect of his left leg incision.   Skin: No rash or lesions   Neurologic: Alert and oriented x3.  No gross focal deficit.             Cardiographics (personally reviewed)  ECG demonstrates atrial fibrillation, poor R wave progression.  Cannot exclude anterior infarct of unknown age.  Nonspecific ST-T wave abnormality    Imaging (personally reviewed)    Procedure  Complete Echo Adult. Adequate quality two-dimensional was performed and  interpreted. No hemodynamically significant valvular abnormalities on 2D or  color flow imaging. There is no comparison study available.  ______________________________________________________________________________  Interpretation Summary     The left ventricle is normal in size with moderate concentric left ventricular  hypertrophy.  Left ventricular function is normal.The ejection fraction is 60-65%.  Normal right ventricle size and systolic function.  The left atrium is mildly dilated.  No hemodynamically significant valvular abnormalities on 2D or color flow  imaging.  There is a moderate posterior pericardial effusion with no echocardiographic  indications of cardiac tamponade.  IVC diameter >2.1 cm collapsing <50% with sniff suggests a high RA pressure  estimated at 15 mmHg or greater.     There is no comparison study available.    Lab Review (personally reviewed all results)  No results for input(s): \"CHOL\", \"HDL\", \"LDL\", \"TRIG\", \"CHOLHDLRATIO\" in the last 50598 hours.  No results for input(s): \"LDL\" in the last 36829 hours.  Recent Labs   Lab Test 11/02/23  0816 11/02/23  0747   NA  --  134*   POTASSIUM  --  5.1   CHLORIDE  --  91*   CO2  --  " "19*   * 304*   BUN  --  96.6*   CR  --  8.40*   GFRESTIMATED  --  6*   RIKA  --  7.9*     Recent Labs   Lab Test 11/02/23 0747 11/02/23 0139   CR 8.40* 10.58*     No results for input(s): \"A1C\" in the last 70572 hours.       Recent Labs   Lab Test 11/02/23 0747   WBC 9.3   HGB 7.3*   HCT 23.6*   MCV 93        Recent Labs   Lab Test 11/02/23 0747 11/02/23 0139   HGB 7.3* 8.3*    No results for input(s): \"TROPONINI\" in the last 48429 hours.  Recent Labs   Lab Test 11/02/23 0139   NTBNPI 11,250*     Recent Labs   Lab Test 11/02/23 0139   TSH 2.85     Recent Labs   Lab Test 11/02/23 0747 11/02/23 0139   INR 2.90* 2.83*                 "

## 2023-11-02 NOTE — H&P
[facility]:  CRITICAL CARE Admission Note     Date / Time of Admission:  11/2/2023  1:27 AM            Assessment/Plan:   He was found to have hypotension felt to be related to severe sepsis possibly from left lower extremities abscess at the sign of his and acute renal failure with acidemia, hyperkalemia, anemia and bilateral pleural effusion.  Cardiology was consulted in the ED and it was felt that this was most likely a septic presentation and not cardiogenic shock . In ED due to hypotension required pressors.  Bedside ultrasound and CT sent with pericardial effusion which appeared to be localized and on both side of the heart is fixed dilated IVC concerning for tamponade, required pressors and 4 L O2 nasal cannula    The plan is as follow     Neurologic:   No acute issue.     Pulmonary:   Acute respiratory failure likely secondary to bilateral pleural effusion the setting of either heart failure or failure and atelectasis and or consolidation of the left base  -6 metabolic and respiratory acidosis (CO2 is 40 and inadequate for the metabolic acidosis)   -O2 as needed  -She is already on antibiotics      Cardiovascular:   #CAD status post CABG  #History hypertension only hypotensive on pressors Rolly from sepsis but cardiogenic cause not entirely excluded given cardiomegaly on chest x-ray.  Echo pending.  BNP elevated  #History of A-fib on Eliquis last dose yesterday  # On pocus and CT 2 large loculated appear to be pericardial effusion to be causing Vinayak tamponade physiology.  -Elevated BNP 77440  -EXTR repeat through PICC line which is as reliable as from central line  -stat formal echo to confirm effusion and assess for tamponade physiology.   - cardiology and for CVT surgery consulted  -As needed currently norepinephrine and vasopressin maintain MAP of 65  -Monitor perfusion  -Central venous O2 at which might help differentiate cardiogenic from sepsis  -EKG and troponin   -holding his  cardiac and hypertensive medication, discontinue his Eliquis    GI/:   n.p.o. except for medication    Renal:   CRISTAL likely in setting of his hypotension cardiorenal component  Monitor I/O's.  Electrolyte repletion PRN.  Avoid/limit nephrotoxic agents.  -Renal consult for status for need for dialysis renal failure has been going for a while and there is a pretty high likelihood that we will not turnaround immediately  -Hyperkalemia to be monitored and treated as needed  -bicarbonate as needed    Heme/Onc:   Mild anemia no evidence of bleeding no need for transfusion, related to acute illness and renal failure  on Eliquis in the setting of renal failure so may be over anticoagulated  Monitor CBC H&H  Monitor Coag and hold Eliquis    Endocrine:   DM type 2  -With insulin protocol per ICU  -Holding oral antidiabetics    Infectious diseases:   Sepsis possibly related to lower leg infection at the site of vein harvesting.  Patient seems to be however localized  -Follow culture results  -Continue with Zosyn and vancomycin  -We will consult gen surgery to discuss the need for an I&D    Prophylaxis:  #GI: PI  #DVT: Patient was on Eliquis being held pending possible need for procedure and CRISTAL    Other:    Risk Factors Present on Admission:  Clinically Significant Risk Factors Present on Admission        # Hyperkalemia: Highest K = 5.9 mmol/L in last 2 days, will monitor as appropriate        # Anion Gap Metabolic Acidosis: Highest Anion Gap = 27 mmol/L in last 2 days, will monitor and treat as appropriate   # Drug Induced Coagulation Defect: home medication list includes an anticoagulant medication    # Drug Induced Platelet Defect: home medication list includes an antiplatelet medication       # Hypertension: Home medication list includes antihypertensive(s)     # Circulatory Shock: currently requiring pressors for blood pressure support  # Acute Respiratory Failure: Documented O2 saturation < 91%.  Continue supplemental  "oxygen as needed     # Overweight: Estimated body mass index is 29.27 kg/m  as calculated from the following:    Height as of this encounter: 1.829 m (6' 0.01\").    Weight as of this encounter: 97.9 kg (215 lb 13.3 oz).                 Code Status:  Full Code     This patient is considered critically ill and requires ICU level of care due to hypotension, pericardial effusion, likely abscess left lower leg at the site of venous harvest, CRISTAL   Total Critical Care time, not including separate billable procedure time:90 minutes.    Stef Hill MD  [facility] Pulmonary/Critical Care   11/02/2023   7:24 AM          Chief Complaint    Nausea vomiting and fatigue        HPI:   ID: Gerardo Al is a 78 year old male with  history of CABG in Sept 2023 at [facility], hypertension, diabetes, elevated cholesterol, CAD, who presents to the ER with complaints of fatigue, nausea and vomiting.      He was found to have hypotension felt to be related to severe sepsis possibly from left lower extremities abscess at the sign of his and acute renal failure with acidemia, hyperkalemia, anemia and bilateral pleural effusion.  Cardiology was consulted in the ED and it was felt that this was most likely a septic presentation and not cardiogenic shock . In ED due to hypotension required pressors.  Bedside ultrasound and CT sent with pericardial effusion which appeared to be localized and on both side of the heart is fixed dilated IVC concerning for tamponade.    Patient has been feeling unwell for at least 3 weeks with worsening over the last maybe 3 days.  Finished yesterday course of antibiotic for the leg infection.  Nausea vomiting were his main symptoms on admission.  Denies any increase shortness of breath chest pain.  No abdominal pain.  Loose stool for about 3 days.  No Urgency frequency and actually decreased urine output.  History of fever has been feeling cold and was found to be slightly hypothermic.  No " coughing.  I have any recent blood pressure measurement unclear how long he might have been hypotensive and taking his cardiac medication and hypertension blood pressure medication as prescribed.           Review of Systems:   As above        Medical/Surgical History:     Past Medical History:   Diagnosis Date    Hx of CABG     Sept 2023 at [facility] [place]      Past Surgical History:   Procedure Laterality Date    CARDIAC SURGERY      PICC TRIPLE LUMEN PLACEMENT  11/2/2023            Allergies/Medications:   Allergies:   No Known Allergies    OUTPATIENT Medications:  Prior to Admission medications    Medication Sig Start Date End Date Taking? Authorizing Provider   acetaminophen (TYLENOL) 500 MG tablet Take 1,000 mg by mouth every 6 hours as needed for mild pain or pain 9/30/23  Yes Reported, Patient   amLODIPine-benazepril (LOTREL) 5-20 MG capsule Take 1 capsule by mouth daily 12/22/22  Yes Reported, Patient   apixaban ANTICOAGULANT (ELIQUIS) 5 MG tablet Take 5 mg by mouth 2 times daily 10/9/23  Yes Reported, Patient   aspirin 81 MG EC tablet Take 81 mg by mouth daily 12/22/22  Yes Reported, Patient   atorvastatin (LIPITOR) 80 MG tablet Take 1 tablet by mouth at bedtime 10/24/23  Yes Reported, Patient   carvedilol (COREG) 25 MG tablet Take 25 mg by mouth 2 times daily (with meals) 12/22/22  Yes Reported, Patient   LORazepam (ATIVAN) 0.5 MG tablet Take 0.5 mg by mouth nightly as needed for anxiety or muscle spasms 10/9/23  Yes Reported, Patient   metFORMIN (GLUCOPHAGE XR) 500 MG 24 hr tablet Take 1,000 mg by mouth 2 times daily (with meals) 8/17/23  Yes Reported, Patient   sertraline (ZOLOFT) 25 MG tablet Take 1 tablet by mouth daily 10/25/23  Yes Reported, Patient   torsemide (DEMADEX) 20 MG tablet Take 1 tablet by mouth daily 10/11/23  Yes Reported, Patient        INPATIENT Medications: Continuous Infusions:   DOBUTamine 7.5 mcg/kg/min (11/02/23 0656)    norepinephrine 0.1 mcg/kg/min (11/02/23 0657)     "sodium bicarbonate 150 mEq in D5W 1,000 mL infusion 125 mL/hr at 11/02/23 0657       INPATIENT Medications: Scheduled Medications:   dextrose 10%  300 mL Intravenous Once    sodium chloride (PF)  10-40 mL Intracatheter Q7 Days               Objective:   Vitals:  BP 94/52   Pulse 91   Temp (!) 96.3  F (35.7  C)   Resp 18   Ht 1.829 m (6' 0.01\")   Wt 97.9 kg (215 lb 13.3 oz)   SpO2 97%   BMI 29.27 kg/m    Vent:   Resp: 18    GEN: No acute distress  HEENT: Normocephalic, atraumatic.  Extraoccular eye movements intact, anicteric sclera. No sinus tenderness to palpation.  Moist mucous membranes  NECK: Supple.  Abdomen distended  PULM: Non-labored breathing.  No use of accessory muscles.  Clear to ausculation bilaterally.  With decreased breath sounds at the bases  CVS: A-fib normal S1, S2.  No rubs, murmurs, or gallops.    ABDOMEN: Normoactive bowel sounds.  Non-tender to palpation.  Non-distended.    EXTREMITES:  No clubbing, cyanosis, lower extremity edema.  Below the left knee at the site of vein harvesting the patient has a wound with possible localized welling and redness.  No surrounding cellulitis  NEURO:  Awake.  Oriented to person, place, time and situation.  Cranial nerves 2-12 grossly intact.  Moving all extremities.      Intake/Output: I/O last 3 completed shifts:  In: -   Out: 50 [Urine:50]        Pertinent Studies:   ------------------------- PAST 24 HR DATA REVIEWED -----------------------------------------------    I have personally reviewed the following data over the past 24 hrs:    8.9  \   8.3 (L)   / 399     130 (L) 89 (L) 102.6 (H) /  231 (H)   4.7 14 (L) 10.58 (H) \     Trop: 79 (H) BNP: 11,250 (H)     TSH: 2.85 T4: N/A A1C: N/A     Procal: 0.18 (H) CRP: N/A Lactic Acid: 2.7 (H)       INR:  2.83 (H) PTT:  43 (H)   D-dimer:  N/A Fibrinogen:  N/A       Imaging results reviewed over the past 24 hrs:   Recent Results (from the past 24 hour(s))   CT Chest Abdomen Pelvis w/o Contrast    Narrative "    EXAM: CT CHEST ABDOMEN PELVIS W/O CONTRAST  LOCATION: [facility]  DATE: 11/2/2023    INDICATION: hypotension, recent CABG, Cr 7 yesterday  COMPARISON: None.  TECHNIQUE: CT scan of the chest, abdomen, and pelvis was performed without IV contrast. Multiplanar reformats were obtained. Dose reduction techniques were used.   CONTRAST: None.    FINDINGS:   LUNGS AND PLEURA: Small bilateral pleural effusions, larger on the left. Associated dependent and basilar atelectasis bilaterally. Mild atelectasis also in the left upper lobe.    MEDIASTINUM/AXILLAE: Expected postoperative changes of sternotomy and coronary artery bypass grafting. Enlarged heart. No lymphadenopathy.    CORONARY ARTERY CALCIFICATION: Previous intervention (stents or CABG).    HEPATOBILIARY: Trace perihepatic ascites. Otherwise normal.    PANCREAS: Normal.    SPLEEN: Normal.    ADRENAL GLANDS: Normal.    KIDNEYS/BLADDER: 3 mm and 1 mm nonobstructing right intrarenal stones. Questionable mild wall thickening of the urinary bladder.    BOWEL: Colonic diverticulosis. No bowel obstruction or inflammation.    LYMPH NODES: Normal.    VASCULATURE: Moderately advanced aortoiliac atherosclerosis.    PELVIC ORGANS: Post right inguinal herniorrhaphy.    MUSCULOSKELETAL: Degenerative changes of the spine.      Impression    IMPRESSION:  1.  Expected postoperative changes of sternotomy and coronary artery bypass grafting.  2.  Enlarged heart.  3.  Small bilateral pleural effusions, larger on the left.  4.  Trace perihepatic ascites.  5.  Questionable mild wall thickening of the urinary bladder. Clinical correlation for cystitis suggested.  6.  Nonobstructive nephrolithiasis on the right.  7.  Colonic diverticulosis.   Head CT w/o contrast    Narrative    EXAM: CT HEAD W/O CONTRAST  LOCATION: [facility]  DATE: 11/2/2023    INDICATION: right hand tingling  COMPARISON: None.  TECHNIQUE: Routine CT Head without IV  contrast. Multiplanar reformats. Dose reduction techniques were used.    FINDINGS:  INTRACRANIAL CONTENTS: No intracranial hemorrhage, extraaxial collection, or mass effect.  No CT evidence of acute infarct. Mild presumed chronic small vessel ischemic changes. Mild generalized volume loss. No hydrocephalus.     VISUALIZED ORBITS/SINUSES/MASTOIDS: Prior bilateral cataract surgery. Chronic deformities of the medial wall and floor of the left orbital. Trace mucosal thickening along the floors of the maxillary sinuses. Trace left mastoid effusion.    BONES/SOFT TISSUES: No acute abnormality.      Impression    IMPRESSION:  1.  No CT evidence for acute intracranial process.  2.  Brain atrophy and presumed chronic microvascular ischemic changes as above.   XR Chest Port 1 View    Narrative    EXAM: XR CHEST PORT 1 VIEW  LOCATION: [facility]  DATE: 11/2/2023    INDICATION: RN placed PICC   verify tip placement  COMPARISON: CT from 11/02/2023      Impression    IMPRESSION: Cardiac silhouette is enlarged. Status post CABG. Right upper extremity PICC tip at cavoatrial junction. Left basilar atelectasis and adjacent pleural effusion. No visible pneumothorax.     Recent Labs   Lab 11/02/23  0714 11/02/23  0604 11/02/23  0517 11/02/23  0457 11/02/23  0143 11/02/23 0139   WBC  --   --   --   --   --  8.9   HGB  --   --   --   --   --  8.3*   MCV  --   --   --   --   --  97   PLT  --   --   --   --   --  399   INR  --   --   --   --   --  2.83*   NA  --   --   --   --   --  130*   POTASSIUM  --   --  4.7  --   --  5.9*   CHLORIDE  --   --   --   --   --  89*   CO2  --   --   --   --   --  14*   BUN  --   --   --   --   --  102.6*   CR  --   --   --   --   --  10.58*   ANIONGAP  --   --   --   --   --  27*   RIKA  --   --   --   --   --  9.3   * 182*  --  203*   < > 128*    < > = values in this interval not displayed.       Most Recent 3 CBC's:  Recent Labs   Lab Test 11/02/23 0139   WBC 8.9   HGB  8.3*   MCV 97        Most Recent 3 BMP's:  Recent Labs   Lab Test 11/02/23  0714 11/02/23  0604 11/02/23  0517 11/02/23 0457 11/02/23 0143 11/02/23 0139   NA  --   --   --   --   --  130*   POTASSIUM  --   --  4.7  --   --  5.9*   CHLORIDE  --   --   --   --   --  89*   CO2  --   --   --   --   --  14*   BUN  --   --   --   --   --  102.6*   CR  --   --   --   --   --  10.58*   ANIONGAP  --   --   --   --   --  27*   RIKA  --   --   --   --   --  9.3   * 182*  --  203*   < > 128*    < > = values in this interval not displayed.     Most Recent 2 LFT's:No lab results found.  Most Recent 3 INR's:  Recent Labs   Lab Test 11/02/23 0139   INR 2.83*     Most Recent 3 Creatinines:  Recent Labs   Lab Test 11/02/23 0139   CR 10.58*     Most Recent D-dimer:No lab results found.  Most Recent 6 Bacteria Isolates From Any Culture (See EPIC Reports for Culture Details):No lab results found.  Most Recent Urinalysis:  Recent Labs   Lab Test 11/02/23 0519   COLOR Yellow   APPEARANCE Turbid*   URINEGLC 30*   URINEBILI Negative   URINEKETONE 10*   SG 1.024   UBLD >1.0 mg/dL*   URINEPH 5.5   PROTEIN 300*   NITRITE Negative   LEUKEST Negative   RBCU 40*   WBCU 4     Most Recent ABG:  Recent Labs   Lab Test 11/02/23 0458   PH 7.29*   PO2 88*   PCO2 40   HCO3 19*   EDWIN -7.4     Most Recent ESR & CRP:No lab results found.  None    Stef Hill MD

## 2023-11-02 NOTE — PROGRESS NOTES
Care Management Follow Up    Length of Stay (days): 0    Expected Discharge Date: 11/08/2023     Concerns to be Addressed:     Care Progression  Patient plan of care discussed at interdisciplinary rounds: Yes    Anticipated Discharge Disposition:  TBD     Anticipated Discharge Services:  NA  Anticipated Discharge DME:  NA    Patient/family educated on Medicare website which has current facility and service quality ratings:  NA  Education Provided on the Discharge Plan:  NA  Patient/Family in Agreement with the Plan:  NA    Referrals Placed by CM/SW:  NA  Private pay costs discussed: Not applicable    Additional Information:  Discussed patient in ICU rounds. Plan for Echo, surgery consult. IV abx for abscess at graft site.     Social HX: Patient from home with spouse, independent. Recent surgery.     CM will continue to follow care progression and aide in discharge planning as needed.     Nancy Orozco RN

## 2023-11-02 NOTE — PRE-PROCEDURE
GENERAL PRE-PROCEDURE:   Procedure:  Tunneled hemodialysis catheter placement  Date/Time:  11/2/2023 3:20 PM    Written consent obtained?: Yes    Risks and benefits: Risks, benefits and alternatives were discussed    Consent given by:  Spouse  Patient states understanding of procedure being performed: Yes (Also discussed with patient's wife, Francine due to recent administration of moderate sedation medications for swan placement)    Patient's understanding of procedure matches consent: Yes    Procedure consent matches procedure scheduled: Yes    Expected level of sedation:  Moderate  Appropriately NPO:  Yes  ASA Class:  3  Mallampati  :  Grade 2- soft palate, base of uvula, tonsillar pillars, and portion of posterior pharyngeal wall visible  Lungs:  Lungs clear with good breath sounds bilaterally  Heart:  Normal heart sounds and rate  History & Physical reviewed:  History and physical reviewed and no updates needed  Statement of review:  I have reviewed the lab findings, diagnostic data, medications, and the plan for sedation

## 2023-11-02 NOTE — ED PROVIDER NOTES
EMERGENCY DEPARTMENT ENCOUNTER      NAME: Gerardo Al  AGE: 78 year old male  YOB: 1945  MRN: [id number]  EVALUATION DATE & TIME: 11/2/2023  1:27 AM    PCP: No Ref-Primary, Physician    ED PROVIDER: Brianna Lucio M.D.        Chief Complaint   Patient presents with    Fatigue    Nausea & Vomiting         FINAL IMPRESSION:    1. Acute renal failure, unspecified acute renal failure type (H24)    2. Hyperkalemia    3. Anemia, unspecified type    4. Left leg cellulitis    5. Severe sepsis (H)    6. 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  Chart review shows that he had a creatinine greater than 7 yesterday but patient was not made aware of this.  He has a creatinine greater than 10 here in the ER consistent with acute renal failure.  Unclear if this is due to his diuretics or other etiologies.  I think this is a huge part of why he is so critically ill today.  He also has cellulitis to the left vein harvest site.  He has been on antibiotics for several days but the wife states initially was getting much better but now seems to be slightly worsened again.  He does have purulent drainage.  I do feel that this is likely also contributing to his sepsis picture.  He certainly has extensive cardiac history though at this time I am not sure that the genic shock is the primary cause of his symptoms and cardiology agrees.  He has been on dobutamine without much improvement but just tiny amounts of norepinephrine seem to be helping significantly with his blood pressure.  Chest imaging does not show any signs for pericardial effusion, hemoglobin is stable compared to a hemoglobin of 9.0 back on September 30.  Patient also has mildly elevated potassium and this is being treated with the  hyperkalemia protocol.  He is quite acidotic likely all stemming from his renal failure.  Bicarb drip was ordered under the direction of nephrology and increased bicarb boluses by the intensivist.  Patient has been accepted to the ICU by the intensivist.  Just awaiting PICC line placement at this time.  Patient is aware of this plan.  Wife present at bedside.  He is on 4 L nasal cannula oxygen but at this time respiratory status does not appear to be worsening.  No indication for emergent intubation at this time.      ED COURSE:  1:35 AM  I met with the patient to gather history and perform my exam. ED course and treatment discussed. Dobutamine will be ordered due to risk of of cardiogenic shock given his history.  We will use IV fluids slowly and judiciously given his history.    3:55 AM  Spoke with nephrology who recommends starting him on a bicarb drip at 125 cc/h.  Confirmed with pharmacist that this has been ordered correctly.  Hyperkalemia protocol has also been ordered.     4:06 AM  Pt has gotten 1750cc of IVF so far per RN. Pt had complained of some right hand tingling and so CT head was ordered. I reviewed the CT head and I do not see any obvious large intracranial hemorrhage or large infarct.  Awaiting radiologist official reading.  Pt states that this tingling has resolved. Patient has been consented for a PICC line.  I explained the risks including vessel, cardiac, and pulmonary potential injury as well as the benefits.  He gave verbal consent and wife signed the consent on his behalf.  At this time I feel that he is appropriate to wait for a formal PICC line and set up emergent central line placement as we are still given IV fluids and that we can titrate his pressors at this time peripherally.  If we get to the point where he is on significant levels of pressors or his condition worsens and more emergent central line placement can be done at that time.  Patient is on anticoagulation.    4:29 AM  Spoke  with the intensivist and she is recommending 2 more amps of bicarb (this will have pt getting 1 amp HCO3 with hyperkalemia protocol, bicarb gtt, and now 2 more amps HCO3), check ABG, and agrees with talking with cardiology.  She is requesting that a Cano be placed.     4:50 AM  Spoke with Dr. Kiser of cardiology who agrees with adding another pressors at this time.  He is not convinced that this is a primary cardiac etiology and suspects more of a sepsis and renal failure issue. At this time I feel that we can temporize with peripheral pressors until PICC available. We are still giving IVF as he has not yet had his 30cc/kg.      4:52 AM  Pt is now on just a wiff of norepi at 0.03 mcg/kg/min and BP now 103 systolic.     5:40 AM  Pt continues to have a MAP >65 on low dose norepi (0.03 mcg/kg/min). Temp is 89.4 per RN. She has him on a raven hugger. Spoke with patient and he states that his breathing is stable. He feels better with the raven hugger. Alert and oriented x 3. Wife at bedside.  Since the patient is on pressors he will be admitted to the intensivist service and not the hospitalist service.    6:26 AM  PICC is calling now. They report that they had not gotten the pages. They will come now.    I do not think that this represents acute ACS, PE, ruptured AAA, aortic dissection, bowel obstruction, bowel ischemia, cholecystitis, pancreatitis, appendicitis, diverticulitis, kidney stone, pyelonephritis, incarcerated or strangulated hernia, testicular torsion,  viscus perforation, perforated GI ulcer,  CVA, TIA, SAH, glaucoma, temporal arteritis, sinus thrombosis, vascular dissection, pseudotumor cerebri, meningitis, enchephalitis, hypertensive urgency or emergency, or other such etiologies.    At the conclusion of the encounter I discussed the results of all of the tests and the disposition. Their questions were answered. The patient (and any family present) acknowledged understanding and were agreeable with the  care plan.      CRITICAL CARE TIME   Total critical care time: 120 minutes  Critical care time was exclusive of separately billable procedures and treating other patients.  Critical care was necessary to treat or prevent imminent or life-threatening deterioration of the following conditions: severe sepsis, acute renal failure, on pressors, discussions with multiple specialists  Critical care was time spent personally by me on the following activities: development of treatment plan with patient or surrogate, discussions with consultants, examination of patient, evaluation of patient's response to treatment, obtaining history from patient or surrogate, ordering and performing treatments and interventions, ordering and review of laboratory studies, ordering and review of radiographic studies and re-evaluation of patient's condition, this excludes any separately billable procedures.      CONSULTANTS:  Hospital pharmacist - Martine  [facility] - Dr. Claros  Intensivist - Dr. Melendrez  Cardiology - Dr. Feliciano       MEDICATIONS GIVEN IN THE EMERGENCY:  Medications   DOBUTamine (DOBUTREX) 500 mg in D5W 250 mL infusion (adult std conc) (7.5 mcg/kg/min × 97.9 kg Intravenous Rate/Dose Verify 11/2/23 0522)   glucose gel 15-30 g (has no administration in time range)     Or   dextrose 50 % injection 25-50 mL (has no administration in time range)     Or   glucagon injection 1 mg (has no administration in time range)   dextrose 10% BOLUS 300 mL ( Intravenous Rate/Dose Verify 11/2/23 0522)   lidocaine 1 % 0.1-5 mL (has no administration in time range)   lidocaine (LMX4) cream (has no administration in time range)   sodium chloride (PF) 0.9% PF flush 10-40 mL (has no administration in time range)   sodium bicarbonate 150 mEq in D5W 1,000 mL infusion ( Intravenous Rate/Dose Verify 11/2/23 0522)   norepinephrine (LEVOPHED) 4 mg in  mL infusion PREMIX (0.1 mcg/kg/min × 97.9 kg Intravenous Rate/Dose Change 11/2/23 0610)   sodium  "chloride 0.9% BOLUS 250 mL (0 mLs Intravenous Stopped 11/2/23 0243)   piperacillin-tazobactam (ZOSYN) 3.375 g vial to attach to  mL bag (0 g Intravenous Stopped 11/2/23 0313)   vancomycin (VANCOCIN) 2,500 mg in sodium chloride 0.9 % 500 mL intermittent infusion (0 mg Intravenous Stopped 11/2/23 0522)   sodium chloride 0.9% BOLUS 250 mL (0 mLs Intravenous Stopped 11/2/23 0221)   sodium chloride 0.9% BOLUS 250 mL (0 mLs Intravenous Stopped 11/2/23 0400)   ondansetron (ZOFRAN) injection 4 mg (4 mg Intravenous $Given 11/2/23 0340)   sodium bicarbonate 8.4 % injection 50 mEq (50 mEq Intravenous $Given 11/2/23 0420)   calcium gluconate 10 % injection 1 g (0 g Intravenous Stopped 11/2/23 0415)   dextrose 50 % injection 25 g (25 g Intravenous $Given 11/2/23 0414)   insulin regular 1 unit/mL injection 9.8 Units (9.8 Units Intravenous $Given 11/2/23 0414)   sodium chloride 0.9% BOLUS 500 mL (500 mLs Intravenous $New Bag 11/2/23 0531)   sodium bicarbonate 8.4 % injection 100 mEq (100 mEq Intravenous $Given 11/2/23 0435)           NEW PRESCRIPTIONS STARTED AT TODAY'S ER VISIT     Medication List      There are no discharge medications for this visit.             CONDITION:  critical        DISPOSITION:  ICU as accepted by Dr. Melendrez, intensivist         =================================================================  =================================================================  TRIAGE ASSESSMENT:  Pt arrives via E/MS for c/o N/V and lethargy.  Pt had CABG about 5 wks ago reportedly.  Pt initially had B/P 80/50, EMS gave 300ml Ivfluids.  Pt found to be in A-fib HR 50-60's.       ED Triage Vitals   Enc Vitals Group      BP 11/02/23 0141 (!) 74/53      Pulse 11/02/23 0141 65      Resp 11/02/23 0141 23      Temp 11/02/23 0141 97.5  F (36.4  C)      Temp src 11/02/23 0141 Oral      SpO2 11/02/23 0141 (!) 86 %      Weight 11/02/23 0145 97.9 kg (215 lb 13.3 oz)      Height 11/02/23 0145 1.829 m (6' 0.01\") "         ================================================================  ================================================================    HPI    Patient information was obtained from: patient and wife    Use of Intrepreter: N/A     Gerardo Al is a 78 year old male with 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    Just over 1 month ago patient had CABG at [facility].  He states he has been doing well without any complaints other than increasing fatigue over the past 1 week.  Denies actual fevers, cough, chest pain, shortness of breath, abdominal pain, vomiting, diarrhea, dysuria, hematuria, black or bloody stools.    Chart review shows that he has been on Keflex for the past couple of days for some redness at the vein harvest site.  Patient does agree with this and states he finished his last pill earlier today.  Though feels like the erythema has significantly improved.  They do admit to some drainage from this area on the left leg.    Chart review shows that he had a creatinine greater than 7 yesterday.  Patient denies being on dialysis.  He was told to stop his diuretics.  Creatinine was 1.35 on October 10, 2023 through [facility].  Globin yesterday was 8.4 and was 9.4 back on October 9, 2023.      REVIEW OF SYSTEMS  Review of Systems   Constitutional:  Positive for fatigue. Negative for fever.   HENT:  Negative for trouble swallowing.    Respiratory:  Negative for cough and shortness of breath.    Cardiovascular:  Negative for chest pain.   Gastrointestinal:  Negative for abdominal pain, diarrhea, nausea and vomiting.   Genitourinary:  Negative for dysuria.   Skin:  Positive for wound.        +left leg vein harvest site red and draining   Psychiatric/Behavioral:  Negative for confusion.    All other systems reviewed and are negative.        PAST MEDICAL HISTORY:  Past Medical  History:   Diagnosis Date    Hx of CABG     Sept 2023 at [facility]         PAST SURGICAL HISTORY:  Past Surgical History:   Procedure Laterality Date    CARDIAC SURGERY           CURRENT MEDICATIONS:    Prior to Admission medications    Not on File         ALLERGIES:  No Known Allergies      FAMILY HISTORY:  History reviewed. No pertinent family history.      SOCIAL HISTORY:  Social History     Socioeconomic History    Marital status:          VITALS:  Patient Vitals for the past 24 hrs:   BP Temp Temp src Pulse Resp SpO2 Height Weight   11/02/23 0625 91/50 (!) 95.9  F (35.5  C) -- 80 20 98 % -- --   11/02/23 0620 92/53 (!) 95.9  F (35.5  C) -- 80 23 98 % -- --   11/02/23 0615 (!) 83/46 (!) 95.7  F (35.4  C) -- 83 28 98 % -- --   11/02/23 0610 (!) 87/50 (!) 95.7  F (35.4  C) -- 82 18 98 % -- --   11/02/23 0605 (!) 75/44 (!) 95.7  F (35.4  C) -- 78 24 98 % -- --   11/02/23 0600 (!) 71/46 (!) 95.7  F (35.4  C) (P) Bladder 78 16 98 % -- --   11/02/23 0555 (!) 84/47 (!) 95.5  F (35.3  C) -- 81 19 98 % -- --   11/02/23 0550 (!) 79/44 (!) 95.5  F (35.3  C) -- 80 19 97 % -- --   11/02/23 0545 91/53 (!) 95.4  F (35.2  C) -- 91 (!) 33 93 % -- --   11/02/23 0540 98/51 (!) 95.4  F (35.2  C) -- 84 23 95 % -- --   11/02/23 0535 97/51 (!) 95  F (35  C) -- 85 22 94 % -- --   11/02/23 0530 98/53 (!) 94.8  F (34.9  C) -- 82 28 96 % -- --   11/02/23 0525 (!) 89/54 (!) 94.5  F (34.7  C) -- 83 (!) 37 97 % -- --   11/02/23 0520 98/55 (!) 93.9  F (34.4  C) -- 83 (!) 37 97 % -- --   11/02/23 0515 104/57 (!) 93  F (33.9  C) -- 80 (!) 35 97 % -- --   11/02/23 0510 105/58 (!) 89.4  F (31.9  C) -- 84 (!) 38 96 % -- --   11/02/23 0505 100/51 -- -- 82 22 96 % -- --   11/02/23 0500 100/55 -- -- 80 19 96 % -- --   11/02/23 0455 100/55 -- -- 81 (!) 32 96 % -- --   11/02/23 0450 103/63 -- -- 85 19 97 % -- --   11/02/23 0445 106/63 -- -- 73 20 98 % -- --   11/02/23 0440 -- -- -- 69 19 97 % -- --   11/02/23 0435 (!) 83/44 -- --  "69 19 97 % -- --   11/02/23 0430 -- -- -- 71 21 98 % -- --   11/02/23 0425 (!) 81/52 -- -- 70 17 97 % -- --   11/02/23 0420 (!) 74/42 -- -- 66 21 96 % -- --   11/02/23 0415 -- -- -- 76 18 96 % -- --   11/02/23 0410 -- -- -- 65 18 97 % -- --   11/02/23 0409 -- -- -- -- -- 94 % -- --   11/02/23 0405 -- -- -- 67 18 96 % -- --   11/02/23 0400 -- -- -- 65 21 96 % -- --   11/02/23 0355 (!) 79/50 -- -- 62 18 95 % -- --   11/02/23 0350 -- -- -- 69 13 -- -- --   11/02/23 0345 -- -- -- 67 21 -- -- --   11/02/23 0340 -- -- -- 76 13 -- -- --   11/02/23 0335 -- -- -- 74 22 -- -- --   11/02/23 0330 (!) 85/48 -- -- 65 19 -- -- --   11/02/23 0329 (!) 78/49 -- -- 66 21 -- -- --   11/02/23 0325 -- -- -- 62 18 -- -- --   11/02/23 0320 -- -- -- 66 18 -- -- --   11/02/23 0315 -- -- -- 64 21 -- -- --   11/02/23 0310 -- -- -- 67 19 -- -- --   11/02/23 0305 -- -- -- 69 20 -- -- --   11/02/23 0300 -- -- -- 68 18 -- -- --   11/02/23 0255 -- -- -- 60 20 -- -- --   11/02/23 0250 -- -- -- 57 18 -- -- --   11/02/23 0245 -- -- -- 64 21 -- -- --   11/02/23 0240 -- -- -- 59 21 -- -- --   11/02/23 0235 92/54 -- -- 66 18 -- -- --   11/02/23 0230 -- -- -- 57 19 -- -- --   11/02/23 0225 (!) 79/50 -- -- 72 21 -- -- --   11/02/23 0220 -- -- -- 65 20 -- -- --   11/02/23 0215 (!) 81/52 -- -- 69 20 -- -- --   11/02/23 0210 96/50 -- -- 67 20 96 % -- --   11/02/23 0205 (!) 88/52 -- -- 69 22 96 % -- --   11/02/23 0200 (!) 75/48 -- -- 59 19 95 % -- --   11/02/23 0155 (!) 74/51 -- -- 61 19 92 % -- --   11/02/23 0150 -- -- -- 63 21 92 % -- --   11/02/23 0145 (!) 84/50 -- -- 65 30 92 % 1.829 m (6' 0.01\") 97.9 kg (215 lb 13.3 oz)   11/02/23 0141 (!) 74/53 97.5  F (36.4  C) Oral 65 23 (!) 86 % -- --   11/02/23 0140 -- -- -- 65 20 (!) 84 % -- --   11/02/23 0135 -- -- -- 66 24 (!) 81 % -- --   11/02/23 0130 (!) 86/49 -- -- 106 26 -- -- --       Wt Readings from Last 3 Encounters:   11/02/23 97.9 kg (215 lb 13.3 oz)       Estimated Creatinine Clearance: 7 mL/min (A) " (based on SCr of 10.58 mg/dL (H)).    PHYSICAL EXAM    Constitutional:  Well developed, Well nourished, NAD, GCS 15  HENT:  Normocephalic, Atraumatic, Bilateral external ears normal, Nose normal. Neck- Supple, No stridor.   Eyes:  PERRL, EOMI, Conjunctiva normal, No discharge.  Respiratory:  Normal breath sounds, No respiratory distress, No wheezing, Speaks full sentences easily. No cough.   Cardiovascular:  Normal heart rate, Regular rhythm, No murmurs, No rubs, No gallops. Chest wall nontender. Midline surgical site with dermabond in place and is clean and dry without erythema.  GI:  +obesity.  Bowel sounds normal, Soft, No tenderness, No masses, No flank tenderness. No rebound or guarding.   : deferred  Musculoskeletal: 1+ DP pulses.+trace BLE edema.  No cyanosis, No clubbing. Good range of motion in all major joints. No tmajor deformities noted.   Integument:  Warm, Dry, pale. +left medial leg (just proximal to knee) with purulent drainage (wound has been swabbed by RN)  Neurologic:  Alert & oriented x 3  Psychiatric:  Affect normal, Cooperative         LAB:  All pertinent labs reviewed and interpreted.  Recent Results (from the past 24 hour(s))   Lactic acid whole blood    Collection Time: 11/02/23  1:39 AM   Result Value Ref Range    Lactic Acid 3.3 (H) 0.7 - 2.0 mmol/L   Procalcitonin    Collection Time: 11/02/23  1:39 AM   Result Value Ref Range    Procalcitonin 0.18 (H) <0.05 ng/mL   Basic metabolic panel    Collection Time: 11/02/23  1:39 AM   Result Value Ref Range    Sodium 130 (L) 135 - 145 mmol/L    Potassium 5.9 (H) 3.4 - 5.3 mmol/L    Chloride 89 (L) 98 - 107 mmol/L    Carbon Dioxide (CO2) 14 (L) 22 - 29 mmol/L    Anion Gap 27 (H) 7 - 15 mmol/L    Urea Nitrogen 102.6 (H) 8.0 - 23.0 mg/dL    Creatinine 10.58 (H) 0.67 - 1.17 mg/dL    GFR Estimate 5 (L) >60 mL/min/1.73m2    Calcium 9.3 8.8 - 10.2 mg/dL    Glucose 128 (H) 70 - 99 mg/dL   Troponin T, High Sensitivity (now)    Collection Time: 11/02/23   1:39 AM   Result Value Ref Range    Troponin T, High Sensitivity 92 (H) <=22 ng/L   Nt probnp inpatient    Collection Time: 11/02/23  1:39 AM   Result Value Ref Range    N terminal Pro BNP Inpatient 11,250 (H) 0 - 1,800 pg/mL   Extra Blue Top Tube    Collection Time: 11/02/23  1:39 AM   Result Value Ref Range    Hold Specimen JI    CBC with platelets and differential    Collection Time: 11/02/23  1:39 AM   Result Value Ref Range    WBC Count 8.9 4.0 - 11.0 10e3/uL    RBC Count 2.82 (L) 4.40 - 5.90 10e6/uL    Hemoglobin 8.3 (L) 13.3 - 17.7 g/dL    Hematocrit 27.2 (L) 40.0 - 53.0 %    MCV 97 78 - 100 fL    MCH 29.4 26.5 - 33.0 pg    MCHC 30.5 (L) 31.5 - 36.5 g/dL    RDW 15.0 10.0 - 15.0 %    Platelet Count 399 150 - 450 10e3/uL    % Neutrophils 77 %    % Lymphocytes 12 %    % Monocytes 8 %    % Eosinophils 1 %    % Basophils 1 %    % Immature Granulocytes 1 %    NRBCs per 100 WBC 0 <1 /100    Absolute Neutrophils 6.9 1.6 - 8.3 10e3/uL    Absolute Lymphocytes 1.1 0.8 - 5.3 10e3/uL    Absolute Monocytes 0.7 0.0 - 1.3 10e3/uL    Absolute Eosinophils 0.1 0.0 - 0.7 10e3/uL    Absolute Basophils 0.1 0.0 - 0.2 10e3/uL    Absolute Immature Granulocytes 0.1 <=0.4 10e3/uL    Absolute NRBCs 0.0 10e3/uL   INR    Collection Time: 11/02/23  1:39 AM   Result Value Ref Range    INR 2.83 (H) 0.85 - 1.15   PTT    Collection Time: 11/02/23  1:39 AM   Result Value Ref Range    aPTT 43 (H) 22 - 38 Seconds   Magnesium    Collection Time: 11/02/23  1:39 AM   Result Value Ref Range    Magnesium 2.3 1.7 - 2.3 mg/dL   TSH with free T4 reflex    Collection Time: 11/02/23  1:39 AM   Result Value Ref Range    TSH 2.85 0.30 - 4.20 uIU/mL   Glucose by meter    Collection Time: 11/02/23  1:43 AM   Result Value Ref Range    GLUCOSE BY METER POCT 119 (H) 70 - 99 mg/dL   Extra Red Top Tube    Collection Time: 11/02/23  1:47 AM   Result Value Ref Range    Hold Specimen [facility]    Adult Type and Screen    Collection Time: 11/02/23  1:47 AM   Result Value  Ref Range    ABO/RH(D) O POS     Antibody Screen Negative Negative    SPECIMEN EXPIRATION DATE [id number]    Glucose by meter    Collection Time: 11/02/23  4:13 AM   Result Value Ref Range    GLUCOSE BY METER POCT 147 (H) 70 - 99 mg/dL   Lactic acid whole blood    Collection Time: 11/02/23  4:19 AM   Result Value Ref Range    Lactic Acid 2.7 (H) 0.7 - 2.0 mmol/L   Glucose by meter    Collection Time: 11/02/23  4:57 AM   Result Value Ref Range    GLUCOSE BY METER POCT 203 (H) 70 - 99 mg/dL   Blood gas arterial    Collection Time: 11/02/23  4:58 AM   Result Value Ref Range    pH Arterial 7.29 (L) 7.37 - 7.44    pCO2 Arterial 40 35 - 45 mm Hg    pO2 Arterial 88 (H) 75 - 85 mm Hg    Bicarbonate Arterial 19 (L) 23 - 29 mmol/L    O2 Sat, Arterial 97.2 (H) 95.0 - 96.0 %    Oxyhemoglobin 94.9 (L) 95.0 - 96.0 %    Base Excess/Deficit -7.4   mmol/L    Sample Stabilized Temperature 37.0 degrees C   Potassium    Collection Time: 11/02/23  5:17 AM   Result Value Ref Range    Potassium 4.7 3.4 - 5.3 mmol/L   Troponin T, High Sensitivity (now)    Collection Time: 11/02/23  5:17 AM   Result Value Ref Range    Troponin T, High Sensitivity 79 (H) <=22 ng/L   UA with Microscopic reflex to Culture    Collection Time: 11/02/23  5:19 AM    Specimen: Urine, Cano Catheter   Result Value Ref Range    Color Urine Yellow Colorless, Straw, Light Yellow, Yellow    Appearance Urine Turbid (A) Clear    Glucose Urine 30 (A) Negative mg/dL    Bilirubin Urine Negative Negative    Ketones Urine 10 (A) Negative mg/dL    Specific Gravity Urine 1.024 1.001 - 1.030    Blood Urine >1.0 mg/dL (A) Negative    pH Urine 5.5 5.0 - 7.0    Protein Albumin Urine 300 (A) Negative mg/dL    Urobilinogen Urine 4.0 (A) <2.0 mg/dL    Nitrite Urine Negative Negative    Leukocyte Esterase Urine Negative Negative    Bacteria Urine Few (A) None Seen /HPF    Sperm Urine Present (A) None Seen /HPF    RBC Urine 40 (H) <=2 /HPF    WBC Urine 4 <=5 /HPF   Glucose by meter     Collection Time: 11/02/23  6:04 AM   Result Value Ref Range    GLUCOSE BY METER POCT 182 (H) 70 - 99 mg/dL       Lab Results   Component Value Date    ABORH O POS 11/02/2023           RADIOLOGY:  Reviewed all pertinent imaging. Please see official radiology report.    Head CT w/o contrast   Final Result   IMPRESSION:   1.  No CT evidence for acute intracranial process.   2.  Brain atrophy and presumed chronic microvascular ischemic changes as above.      CT Chest Abdomen Pelvis w/o Contrast   Final Result   IMPRESSION:   1.  Expected postoperative changes of sternotomy and coronary artery bypass grafting.   2.  Enlarged heart.   3.  Small bilateral pleural effusions, larger on the left.   4.  Trace perihepatic ascites.   5.  Questionable mild wall thickening of the urinary bladder. Clinical correlation for cystitis suggested.   6.  Nonobstructive nephrolithiasis on the right.   7.  Colonic diverticulosis.      XR Chest Port 1 View    (Results Pending)         EKG:    Indication: hypotensive    Performed at: 01:36am  Impression: Atrial fibrillation at 63 bpm.  Flipped T waves noted lead aVR, aVL and V1.  Slow R wave progression.  QRS 92 ms, QTc 474 ms.  No prior EKGs to compare to.      I have independently reviewed and interpreted the EKG(s) documented above.        PROCEDURES:  PICC line by PICC RN      The patient has signs of Septic Shock  The patient has signs of septic shock as evidenced by:  1. Presence of Sepsis, AND  2. Persistent hypotension defined by the last 2 BP readings within the ONE HOUR following completion of the 30mL/kg bolus being low (SBP <90 or MAP <65)    Time septic shock diagnosis confirmed = 2:30 AM  11/02/23   as this was the time when Provider determined that septic shock was present    3 Hour Septic Shock Bundle Completion:  1. Initial Lactic Acid Result:   Recent Labs   Lab Test 11/02/23  0419 11/02/23  0139   LACT 2.7* 3.3*     2. Blood Cultures before Antibiotics: Yes  3. Broad  Spectrum Antibiotics Administered:  yes       Anti-infectives (From admission through now)      Start     Dose/Rate Route Frequency Ordered Stop    11/02/23 0200  piperacillin-tazobactam (ZOSYN) 3.375 g vial to attach to  mL bag         3.375 g  over 30 Minutes Intravenous ONCE 11/02/23 0144 11/02/23 0313    11/02/23 0200  vancomycin (VANCOCIN) 2,500 mg in sodium chloride 0.9 % 500 mL intermittent infusion         25 mg/kg × 97.9 kg  over 2 Hours Intravenous ONCE 11/02/23 0150 11/02/23 0522            4. IF 30 mL/kg bolus criteria met based on:  -Lactate > 4  OR  -Initial Hypotension:  Definition:  2 low BP readings (SBP <90, MAP <65, or decrease > 40 from baseline due to infection) within 3 hrs of each other during the time period of 6 hrs before and 3 hrs  after time zero  THEN: Fluid volume administered in ED:  Full 30 mL/kg bolus intentionally NOT administered to this patient due to CHF and acute Pulmonary Edema. The target volume to infuse in this patient is 2000 ml and will add more fluids as tolerated.    BMI Readings from Last 1 Encounters:   11/02/23 29.27 kg/m      30 mL/kg fluids based on weight: 2,940 mL  30 mL/kg fluids based on IBW (must be >= 60 inches tall): 2,330 mL    Septic Shock reassessment:  1. Repeat Lactic Acid Level: 2.7  2. Vasopressors started for Persistent Hypotension defined by the last 2 BP readings within the ONE HOUR following completion of the 30mL/kg bolus being low (SBP <90 or MAP <65).    I attest to having performed a repeat sepsis exam and assessment of perfusion at 03:30 AM and the results demonstrate no change.    Medical Decision Making    History:  Supplemental history from: Documented in chart, if applicable and Family Member/Significant Other  External Record(s) reviewed: Documented in chart, if applicable.    Work Up:  Chart documentation includes differential considered and any EKGs or imaging independently interpreted by provider, where specified.  In additional  to work up documented, I considered the following work up: Documented in chart, if applicable.    External consultation:  Discussion of management with another provider: Documented in chart, if applicable    Complicating factors:  Care impacted by chronic illness: Diabetes, Heart Disease, Hyperlipidemia, and Hypertension  Care affected by social determinants of health: Access to Medical Care    Disposition considerations: Admit.        Brianna Lucio M.D. [facility]  Emergency Medicine and Medical Toxicology  [facility] EMERGENCY DEPARTMENT  [address] [place] [id number] [phone]  Dept: [phone]           Brianna Lucio MD  11/02/23 0629

## 2023-11-02 NOTE — ED TRIAGE NOTES
Pt arrives via E/MS for c/o N/V and lethargy.  Pt had CABG about 5 wks ago reportedly.  Pt initially had B/P 80/50, EMS gave 300ml Ivfluids.  Pt found to be in A-fib HR 50-60's.

## 2023-11-02 NOTE — PRE-PROCEDURE
GENERAL PRE-PROCEDURE:   Procedure:  [place] theodore catheteter insertion, right heart catheterization  Date/Time:  11/2/2023 12:43 PM    Written consent obtained?: Yes    Risks and benefits: Risks, benefits and alternatives were discussed    Consent given by:  Patient  Patient states understanding of procedure being performed: Yes    Patient's understanding of procedure matches consent: Yes    Procedure consent matches procedure scheduled: Yes    Expected level of sedation:  Moderate  Appropriately NPO:  Yes  ASA Class:  4 (acute renal failure, pericardial effusion, hypotension, recent 3vCABG, HTN, HLD)  Mallampati  :  Grade 3- soft palate visible, posterior pharyngeal wall not visible  Lungs:  Lungs clear with good breath sounds bilaterally  Heart:  Normal heart sounds and rate  History & Physical reviewed:  History and physical reviewed and updates made (see comment)  H&P Comments:  Clinically Significant Risk Factors Present on Admission    Cardiovascular :  pericardial effusion, hypotension, recent 3vCABG, HTN, HLD    Fluid & Electrolyte Disorders : Not present on admission    Gastroenterology : Not present on admission    Hematology/Oncology : Not present on admission    Nephrology : acute renal failure    Neurology : Not present on admission    Pulmonology : Not present on admission    Systemic : Not present on admission    Statement of review:  I have reviewed the lab findings, diagnostic data, medications, and the plan for sedation

## 2023-11-02 NOTE — CONSULTS
"  Interventional Radiology - Pre-Procedure Note  Inpatient - [facility] - 11/02/2023    Procedure Requested: Tunneled HD catheter placement  Requested by: Johny Guevara MD    Brief HPI: Gerardo Al is a 78 year old male with a pertinent past medical history of atrial fibrillation on eliquis, hypertension, diabetes mellitus type II and CAD s/p CABG x 3 on 09/26/2023 with Dr. Samaniego at [facility] who was admitted to [facility] on 11/02 for severe sepsis with bilateral pleural effusions, left lower extremity cellulitis and acute renal failure in oligoanuric state. Nephrology consulted. Plan is to initiate hemodialysis/CRRT.     IMAGING  11/2/2023 XR CHEST PORT 1 VIEW                                                     IMPRESSION: Cardiac silhouette is enlarged. Status post CABG. Right upper extremity PICC tip at cavoatrial junction. Left basilar atelectasis and adjacent pleural effusion. No visible pneumothorax.    NPO since Midnight  ANTICOAGULANTS: ASA 81 mg and Eliquis 5 mg  ANTIBIOTICS: Zosyn and Vancomycin      Ancef 2g IVPB pre-procedure; order signed and held.    ALLERGIES  No Known Allergies    LABORATORY VALUES  INR   Date Value Ref Range Status   11/02/2023 2.90 (H) 0.85 - 1.15 Final      Hemoglobin   Date Value Ref Range Status   11/02/2023 7.7 (L) 13.3 - 17.7 g/dL Final     Platelet Count   Date Value Ref Range Status   11/02/2023 382 150 - 450 10e3/uL Final     Creatinine   Date Value Ref Range Status   11/02/2023 10.02 (H) 0.67 - 1.17 mg/dL Final   11/02/2023 10.02 (H) 0.67 - 1.17 mg/dL Final     Potassium   Date Value Ref Range Status   11/02/2023 5.4 (H) 3.4 - 5.3 mmol/L Final   11/02/2023 5.4 (H) 3.4 - 5.3 mmol/L Final     EXAM  BP 94/59   Pulse 88   Temp 98.2  F (36.8  C)   Resp 19   Ht 1.803 m (5' 11\")   Wt 100.6 kg (221 lb 12.5 oz)   SpO2 94%   BMI 30.93 kg/m    General:  Stable.  In no acute distress.    Neuro:  A&O x 3. Moves all extremities " equally.  Resp:  Lungs clear to auscultation bilaterally.  Cardio:  S1S2 and reg, without murmur, clicks or rubs.  Skin:  Without excoriations, ecchymosis, erythema, lesions or open sores to bilateral chest wall and neck.    Pre-Sedation Assessment:  Mallampati Airway Classification:  II - Faucial pillars and soft palate may be seen, but uvula is masked by the base of the tongue  Previous reaction to anesthesia/sedation:  No  Sedation plan based on assessment: Moderate (conscious) sedation  ASA Classification: Class 3 - SEVERE SYSTEMIC DISEASE, DEFINITE FUNCTIONAL LIMITATIONS.   Code Status: FULL CODE    ASSESSMENT  78 year old male with acute renal failure in oligoanuric state following CABG x 3 on 09/26/2023. Nephrology consulted. Plan is to initiate hemodialysis/CRRT.    PLAN  # Tunneled hemodialysis catheter placement with sedation   - [place] theodore present on right.  - Procedural education reviewed with patient and patient's wife, Francine Al, in detail including, but not limited to risks, benefits and alternatives with understanding verbalized by patient and patient's wife.      Total time spent on the date of the encounter was 35 minutes.    HOWARD Cervantes CNP  Interventional Radiology

## 2023-11-02 NOTE — CONSULTS
Cardiac surgery consult  Note     Gerardo Al  Code Status: No Order  Primary Care Physician: Frank Chávez   : 1945   Age: 78 year old     Date of Service: 2023     Subjective     Chief Complaint: Malaise, pericardial effusion s/p CABG    History of present illness:     78-year-old male who underwent CABG at [facility] on 2023.  He was reportedly inpatient for about 5 days and discharged home in good condition.   Over the last week or 2 he has been feeling general fatigue.  He started having fever and nausea, vomiting more recently.  He says that he was having some redness and drainage around his left lower extremity harvest site and was given oral antibiotics.  Last night he came to the ER via EMS and was noted to to be hypotensive with severe renal failure with creatinine 10, BNP of 11,000.. elevated INR.  Is on a bicarb drip  Bedside echo demonstrated pericardial effusion we are asked to evaluate for possible tamponade  He is currently resting in the ICU.  He says he feels somewhat better.  He has not been urinating much.  Reports he is generally felt.  Denies excessive NSAID use.  He has baseline lower extremity edema but does not believe it has increased recently    He has a history of atrial fibrillation and takes Eliquis.  Last dose was yesterday    CABG, 2023, three-vessel, LIMA to LAD, SVG to OM and PDA per operative report     Subjective   Review of Systems:   12 point ROS negative except for noted above     Patient Active Problem List   Diagnosis    Hyperkalemia    Bilateral pleural effusion    Left leg cellulitis    Severe sepsis (H)    Acute renal failure, unspecified acute renal failure type (H24)    Anemia, unspecified type     Past Medical History:   Diagnosis Date    Hx of CABG     2023 at [facility]     Past Surgical History:   Procedure Laterality Date    CARDIAC SURGERY      PICC TRIPLE LUMEN PLACEMENT  2023     Social History      Socioeconomic History    Marital status:      Spouse name: Not on file    Number of children: Not on file    Years of education: Not on file    Highest education level: Not on file   Occupational History    Not on file   Tobacco Use    Smoking status: Not on file    Smokeless tobacco: Not on file   Substance and Sexual Activity    Alcohol use: Not on file    Drug use: Not on file    Sexual activity: Not on file   Other Topics Concern    Not on file   Social History Narrative    Not on file     Social Determinants of Health     Financial Resource Strain: Not on file   Food Insecurity: Not on file   Transportation Needs: Not on file   Physical Activity: Not on file   Stress: Not on file   Social Connections: Not on file   Interpersonal Safety: Not on file   Housing Stability: Not on file     History reviewed. No pertinent family history.  Medications Prior to Admission   Medication Sig Dispense Refill Last Dose    acetaminophen (TYLENOL) 500 MG tablet Take 1,000 mg by mouth every 6 hours as needed for mild pain or pain   Unknown at prn    amLODIPine-benazepril (LOTREL) 5-20 MG capsule Take 1 capsule by mouth daily   11/1/2023 at am    apixaban ANTICOAGULANT (ELIQUIS) 5 MG tablet Take 5 mg by mouth 2 times daily   11/1/2023 at pm    aspirin 81 MG EC tablet Take 81 mg by mouth daily   11/1/2023 at am    atorvastatin (LIPITOR) 80 MG tablet Take 1 tablet by mouth at bedtime   11/1/2023 at hs    carvedilol (COREG) 25 MG tablet Take 25 mg by mouth 2 times daily (with meals)   11/1/2023 at pm    LORazepam (ATIVAN) 0.5 MG tablet Take 0.5 mg by mouth nightly as needed for anxiety or muscle spasms   Unknown at prn    metFORMIN (GLUCOPHAGE XR) 500 MG 24 hr tablet Take 1,000 mg by mouth 2 times daily (with meals)   11/1/2023 at pm    sertraline (ZOLOFT) 25 MG tablet Take 1 tablet by mouth daily   11/1/2023 at am    torsemide (DEMADEX) 20 MG tablet Take 1 tablet by mouth daily   11/1/2023 at am     No Known Allergies     "   Objective   Objective   BP 92/47   Pulse 90   Temp 97.6  F (36.4  C) (Oral)   Resp 19   Ht 1.829 m (6' 0.01\")   Wt 97.9 kg (215 lb 13.3 oz)   SpO2 94%   BMI 29.27 kg/m      Temp  Min: 89.4  F (31.9  C)  Max: 97.6  F (36.4  C)  BP  Min: 71/46  Max: 106/63     Intake/Output Summary (Last 24 hours) at 11/2/2023 0903  Last data filed at 11/2/2023 0710  Gross per 24 hour   Intake --   Output 70 ml   Net -70 ml     I/O last 3 completed shifts:  In: -   Out: 50 [Urine:50]     Physical Exam:   Gen- alert and oriented x3, NAD  HEENT- NC/AT, EOMI  Cardiac- RRR, sternal incision well-healed.  Sternum appears intact on exam  Pulm- normal respiratory effort  Abd- soft, NT/ND,   - deferred  Extremities-left lower extremity incision at the knee has erythema and induration.  Purulent fluid able to be expressed  Neuro- gross motor intact  Skin- no obvious rashes, bruises, or cuts     Pertinent Labs: Reviewed.     Arterial Blood Gases   Recent Labs   Lab 11/02/23  0747 11/02/23 0458   PH 7.25* 7.29*   PCO2  --  40   PO2  --  88*   HCO3  --  19*       Complete Blood Count   Recent Labs   Lab 11/02/23  0747 11/02/23 0139   WBC 9.3 8.9   HGB 7.3* 8.3*    399       Basic Metabolic Panel  Recent Labs   Lab 11/02/23  0747 11/02/23  0517 11/02/23 0139   *  --  130*   POTASSIUM 5.1 4.7 5.9*   CHLORIDE 91*  --  89*   CO2 19*  --  14*   BUN 96.6*  --  102.6*   CR 8.40*  --  10.58*       Liver Function Tests  Recent Labs   Lab 11/02/23  0747 11/02/23 0139   AST 7  --    ALT 15  --    ALKPHOS 42  --    BILITOTAL 0.3  --    ALBUMIN 3.2*  --    INR 2.90* 2.83*       Coagulation Profile  Recent Labs   Lab 11/02/23  0747 11/02/23 0139   INR 2.90* 2.83*   PTT  --  43*          Pertinent Imaging (Last 24 hours):  Recent Results (from the past 24 hour(s))   CT Chest Abdomen Pelvis w/o Contrast    Narrative    EXAM: CT CHEST ABDOMEN PELVIS W/O CONTRAST  LOCATION: [facility]  DATE: " 11/2/2023    INDICATION: hypotension, recent CABG, Cr 7 yesterday  COMPARISON: None.  TECHNIQUE: CT scan of the chest, abdomen, and pelvis was performed without IV contrast. Multiplanar reformats were obtained. Dose reduction techniques were used.   CONTRAST: None.    FINDINGS:   LUNGS AND PLEURA: Small bilateral pleural effusions, larger on the left. Associated dependent and basilar atelectasis bilaterally. Mild atelectasis also in the left upper lobe.    MEDIASTINUM/AXILLAE: Expected postoperative changes of sternotomy and coronary artery bypass grafting. Enlarged heart. No lymphadenopathy.    CORONARY ARTERY CALCIFICATION: Previous intervention (stents or CABG).    HEPATOBILIARY: Trace perihepatic ascites. Otherwise normal.    PANCREAS: Normal.    SPLEEN: Normal.    ADRENAL GLANDS: Normal.    KIDNEYS/BLADDER: 3 mm and 1 mm nonobstructing right intrarenal stones. Questionable mild wall thickening of the urinary bladder.    BOWEL: Colonic diverticulosis. No bowel obstruction or inflammation.    LYMPH NODES: Normal.    VASCULATURE: Moderately advanced aortoiliac atherosclerosis.    PELVIC ORGANS: Post right inguinal herniorrhaphy.    MUSCULOSKELETAL: Degenerative changes of the spine.      Impression    IMPRESSION:  1.  Expected postoperative changes of sternotomy and coronary artery bypass grafting.  2.  Enlarged heart.  3.  Small bilateral pleural effusions, larger on the left.  4.  Trace perihepatic ascites.  5.  Questionable mild wall thickening of the urinary bladder. Clinical correlation for cystitis suggested.  6.  Nonobstructive nephrolithiasis on the right.  7.  Colonic diverticulosis.   Head CT w/o contrast    Narrative    EXAM: CT HEAD W/O CONTRAST  LOCATION: [facility]  DATE: 11/2/2023    INDICATION: right hand tingling  COMPARISON: None.  TECHNIQUE: Routine CT Head without IV contrast. Multiplanar reformats. Dose reduction techniques were used.    FINDINGS:  INTRACRANIAL  CONTENTS: No intracranial hemorrhage, extraaxial collection, or mass effect.  No CT evidence of acute infarct. Mild presumed chronic small vessel ischemic changes. Mild generalized volume loss. No hydrocephalus.     VISUALIZED ORBITS/SINUSES/MASTOIDS: Prior bilateral cataract surgery. Chronic deformities of the medial wall and floor of the left orbital. Trace mucosal thickening along the floors of the maxillary sinuses. Trace left mastoid effusion.    BONES/SOFT TISSUES: No acute abnormality.      Impression    IMPRESSION:  1.  No CT evidence for acute intracranial process.  2.  Brain atrophy and presumed chronic microvascular ischemic changes as above.   XR Chest Port 1 View    Narrative    EXAM: XR CHEST PORT 1 VIEW  LOCATION: [facility]  DATE: 11/2/2023    INDICATION: RN placed PICC   verify tip placement  COMPARISON: CT from 11/02/2023      Impression    IMPRESSION: Cardiac silhouette is enlarged. Status post CABG. Right upper extremity PICC tip at cavoatrial junction. Left basilar atelectasis and adjacent pleural effusion. No visible pneumothorax.       Last Echo:  No results found.           Current Medications     S  C  H  E  D  piperacillin-tazobactam  3.375 g Intravenous Q12H    sodium chloride (PF)  10-40 mL Intracatheter Q7 Days    vancomycin place call - receiving intermittent dosing  1 each Intravenous See Admin Instructions      G  T  T  DOBUTamine 2.5 mcg/kg/min (11/02/23 0803)    norepinephrine 0.1 mcg/kg/min (11/02/23 0759)    - MEDICATION INSTRUCTIONS -      sodium bicarbonate 150 mEq in D5W 1,000 mL infusion 125 mL/hr at 11/02/23 0803    vasopressin        P  R  N bisacodyl, glucose **OR** dextrose **OR** glucagon, lidocaine 4%, lidocaine (buffered or not buffered), ondansetron **OR** ondansetron, - MEDICATION INSTRUCTIONS -, polyethylene glycol, prochlorperazine **OR** prochlorperazine **OR** prochlorperazine, senna-docusate **OR** senna-docusate, sodium chloride (PF),  sodium chloride (PF), sodium chloride (PF)         Assessment     78 year old male who underwent CABG 9/26/2023.  Was having malaise presented to the ER with severe renal failure and volume overload.  Required pressor supports and noted to have pericardial effusion left lower extremity incision at the     Plan     Patient with recent CABG and presented to the hospital with severe renal failure/CRISTAL, he has moderate pericardial effusion, left lower extremity cellulitis from the harvest site.  A-fib on anticoagulation.    Formal echo demonstrates a normal EF, preserved right ventricular function.  No signs of tamponade.  He has multiple issues going on including renal failure and an infection of the left lower extremity.  He has been started on broad-spectrum antibiotics.  Hypotension is likely multifactorial.  Would consider CVP monitoring and if necessary a [place] to evaluate cardiac function.  Could also consider a Flowtrack device. BNP is >11k    There is no indication for pericardial window at this time.  No evidence of tamponade and patient has an elevated INR and took Eliquis yesterday.  Continue to medically optimize, cardiology and nephrology consulted, appreciate assistance    Plan discussed with Dr. Singleton and Dr. Wagner         Signed:    Mohan Caballero MD 11/2/2023 at 9:03 AM  Cardiothoracic Surgery Fellow

## 2023-11-02 NOTE — PROCEDURES
"PICC Line Insertion Procedure Note  Pt. Name: Gerardo Al  MRN:        [id number]    Procedure: Insertion of a  triple Lumen  5 fr  Bard SOLO (valved) Power PICC, Lot number [id number]    Indications: Vasopressor,Access    Contraindications : none    Procedure Details     Patient identified with 2 identifiers and \"Time Out\" conducted.  .     Central line insertion bundle followed: hand hygiene performed prior to procedure, site cleansed with cholraprep, hat, mask, sterile gloves, sterile gown worn, patient draped with maximum barrier head to toe drape, sterile field maintained.    The vein was assessed and found to be compressible and of adequate size.     Lidocaine 1% 2 ml administered sq to the insertion site. A 5 Fr PICC was inserted into the brachial medial vein of the right arm with ultrasound guidance. 1 attempt(s) required to access vein.   Catheter threaded without difficulty. Good blood return noted.    Modified Seldinger Technique used for insertion.    The 8 sharps that are included in the PICC insertion kit were accounted for and disposed of in the sharps container prior to breakdown of the sterile field.    Catheter secured with Statlock, biopatch and Tegaderm dressing applied.    Findings:    Total catheter length  48 cm, with 1 cm exposed. Mid upper arm circumference is 30 cm. Catheter was flushed with 30 cc NS. Patient  tolerated procedure well.    Tip placement verified by xray. Xray read by Marco Talbot . Tip placement in the SVC/RA junction.    CLABSI prevention brochure left at bedside.    Patient's primary RN notified PICC is ready for use.      Comments:        Aga CHANDLERN,RN,VA-BC  Vascular Access - [facility]      "

## 2023-11-02 NOTE — CONSULTS
NEPHROLOGY CONSULTATION      ASSESSMENT/PLAN:  78 year old male with hx of CKD 3, HTN , DM2, PAD, hx of Atrial fib on eliquis for AC, BPH , CAD with sp CABG 9/26/2023 after angiogram 9/22 with 3v disease cb mild CRISTAL at the time of discharge.  Nephrology consulted for severe CRISTAL with oligoanuric state    CRISTAL with anuria  CKD 3 baseline  Baseline creatinine 1.2-1.3, uptrending to 1.5s at the time of discharge, creatinine was 1.35 on 10/9/2023 at the time of his follow-up with mild hyperkalemia at the time he was resumed on Lasix  Patient had repeat labs with creatinine acutely elevated to 7.85 on 10/31/2023, further increased to 10.58 with significant azotemia  on 11/2/2023  UA turbid appearing likely consistent with ATN  CT imaging with evidence of atherosclerotic changes otherwise nonobstructive renal stones and some evidence of cystitis no hydronephrosis  --> With initial fluid resuscitation blood pressure improvement on pressors the patient's creatinine has come down from 10.6 to 8.4 this morning although the patient does remain anuric  --> With ongoing concern for pericardial effusion and mild hypervolemia we will stop the maintenance bicarb and use bicarb pushes to treat acidemia  --> Start on very low-dose Bumex with a goal to maintain euvolemia no plans for aggressive diuresis or ultrafiltration  --> Patient's baseline is very minimal CKD hoping to have good recovery with stable hemodynamics  --> Plan to do renal labs early morning again if downtrending creatinine will hold off dialysis otherwise plan to put tunneled line in the morning and start hemodialysis/CRRT based on cardiac status tomorrow  --> Extensive discussion with cardiology team with a high risk hemodynamic compromise with a moderate pericardial effusion likely preload dependent , would be high risk to consider hemodialysis/UF.  This would like to be addressed before any dialysis is considered unless emergent for hyperkalemia  -->  Following renal panel every 12    Hyperkalemia  Likely secondary to acidosis and CRISTAL  Currently patient is n.p.o. once able to eat can start on Lokelma  --> Low-dose Bumex initiated hoping to maintain euvolemia correction of acidosis with bicarb    Hyponatremia  Improving with IV fluids    Hypocalcemia  One-time dose of 2 g calcium gluconate given  Follow on serial calciums    HTN hx  PTA on Amlodipine Benazepril and coreg  Held HTN meds   On dobutamine and levo for BP support  Mild hypervolemia with near anuria  --> will discontinue isotonic bicarb and start on bicarb push 50meq Q6hrs and also start on low dose bumex infusion wo bolus  --> moderate pericardial effusion wo temponade physiology but likely he is preload dependant and will like to avoid excessive UF sp volume resuscitation  --> support BP with pressers  --> management per ICU/card    Anemia acute on chronic  Hemoglobin 7.7  Last hemoglobin was 9.4 on 10/9/2023  No evidence of bleed possibly inflammatory anemia/post CABG  Baseline is around 12  --> We will check iron studies would likely benefit from transfusion if hemoglobin is around 8 or lower with recent CABG  --> We will defer these plans to cardiology and ICU    Coronary disease s/p CABG x3 on 9/26/2023  History of atrial fibrillation s/p cardioversion in August 2023  On AC with Eliquis currently held  Loculated pericardial effusion concerns for tamponade physiology  Last echo with EF of 52%  Echo showing moderate-sized pericardial effusion at this time not having a tamponade physiology but does remain concerning  CT surgery has evaluated him plans to put a Gervais and monitor right heart pressures  May need possible evacuation not a good window for pericardial drain will possibly need a pericardial window  Follow on CV surgery recs  The pericardial effusion will need to be evaluated and addressed before hemodialysis or CRRT is considered with high risk of hemodynamic compromise with any UF  Meds  reviewed with cardiology and will start on low dose bumex inf to maintain euvolemia , currently he is near anuric  Bolus albumin if hypotensive    Possible septic shock with leg cellulitis/abscess  General surgery completed bedside I&D  On Abx ; Vanco and zosyn  Blood Cx pending  Adequately resuscitated , now off bicarb with risk of hypervolemia   Bolus albumin if hypotensive    Acute respiratory failure in the setting of CRISTAL and hypervolemia  Improved with oxygen    DM2  Insulin management per ICU    Thank you for the consultation  We will follow  Johny Guevara MD  [facility]  [phone]    CC: Severe CRISTAL with anuria    REASON FOR CONSULTATION: We are asked to see pt by Dr. Hill    HISTORY OF PRESENT ILLNESS:78 year old male  78 year old male with hx of CKD 3, HTN , DM2, PAD, hx of Atrial fib on eliquis for AC, BPH , CAD with sp CABG 9/26/2023 after angiogram 9/22 with 3v disease cb mild CRISTAL at the time of discharge.  Nephrology consulted for severe CRISTAL with oligoanuric state  The patient has last known kidney functions at baseline prior to his CABG post CABG he was noted to have creatinine elevation to around 1.5 although the follow-up labs did show improvement back to his prior baseline of 1.35 on 10/9/2023 the patient had a follow-up on 1031 and noted to have significantly elevated creatinine to 7.85 which on 11 2 was noted to be at 10.58 the patient's wife does report the patient has had significant weakness poor appetite and has not been doing much at home since his cardiac surgery with further decline in the past week with fevers chills and swelling in his left lower extremity  He had been on some antibiotics for possible local cellulitis without any improvement in his symptoms  The patient was admitted at [facility] for coronary artery disease requiring CABG since his primary cardiology team is with [facility] he underwent three-vessel CABG on 9/26/2023 and was  "discharged on 9/30/2023 with plans for cardiac rehab at [facility]  The wife reports that since his surgery he has been declining and has not been doing very well physically.   Currently the patient is supported by dobutamine and Levophed for possible cardiogenic and septic shock.  On antibiotics with stable hemodynamics slow downtrend in creatinine noted    REVIEW OF SYSTEMS:  ROS was completely reviewed and otherwise negative and non-contributory    Past Medical History:   Diagnosis Date    Hx of CABG     Sept 2023 at [facility]       Social History     Socioeconomic History    Marital status:      Spouse name: Not on file    Number of children: Not on file    Years of education: Not on file    Highest education level: Not on file   Occupational History    Not on file   Tobacco Use    Smoking status: Not on file    Smokeless tobacco: Not on file   Substance and Sexual Activity    Alcohol use: Not on file    Drug use: Not on file    Sexual activity: Not on file   Other Topics Concern    Not on file   Social History Narrative    Not on file     Social Determinants of Health     Financial Resource Strain: Not on file   Food Insecurity: Not on file   Transportation Needs: Not on file   Physical Activity: Not on file   Stress: Not on file   Social Connections: Not on file   Interpersonal Safety: Not on file   Housing Stability: Not on file       History reviewed. No pertinent family history.    No Known Allergies    MEDICATIONS:   insulin aspart  1-6 Units Subcutaneous Q4H    pantoprazole  40 mg Intravenous Q12H    piperacillin-tazobactam  3.375 g Intravenous Q12H    sodium chloride (PF)  10-40 mL Intracatheter Q7 Days    vancomycin place call - receiving intermittent dosing  1 each Intravenous See Admin Instructions         PHYSICAL EXAM    BP (!) 87/55   Pulse 87   Temp 97.7  F (36.5  C) (Bladder)   Resp 22   Ht 1.829 m (6' 0.01\")   Wt 97.9 kg (215 lb 13.3 oz)   SpO2 92%   BMI 29.27 kg/m  " "      Intake/Output Summary (Last 24 hours) at 11/2/2023 1130  Last data filed at 11/2/2023 1000  Gross per 24 hour   Intake 522.1 ml   Output 100 ml   Net 422.1 ml       Alert/oriented x 3; awake and NAD  HEENT NC/AT; perrla; OP clear without lesions; mmm  Neck supple without LAD, TM  CV; RRR without rub or murmur  Lung: clear and equal; no extra sounds  Ab: soft and NT; not distended; normal bs  Ext: + edema and well perfused  Skin; no rash  Neuro; grossly intact    LABORATORIES    Recent Labs   Lab 11/02/23 0747 11/02/23 0139   WBC 9.3 8.9   HGB 7.3* 8.3*   HCT 23.6* 27.2*    399     Recent Labs   Lab 11/02/23 0747 11/02/23 0139   * 130*   CO2 19* 14*   BUN 96.6* 102.6*   ALKPHOS 42  --    ALT 15  --    AST 7  --      Recent Labs   Lab 11/02/23 0747 11/02/23 0139   INR 2.90* 2.83*   PTT  --  43*     Invalid input(s): \"FERRITIN\"  No results for input(s): \"IRON\" in the last 168 hours.    Invalid input(s): \"TIBC\"    I reviewed all labs and imaging    Thank you for the consultation we will follow    Johny Guevara MD  [facility]  [phone]     "

## 2023-11-02 NOTE — Clinical Note
Prepped: right groin and right neck. Prepped with: ChloraPrep. The patient was draped. .Pre-procedure site marking:Insertion site not predetermined

## 2023-11-02 NOTE — MEDICATION SCRIBE - ADMISSION MEDICATION HISTORY
Medication Scribe Admission Medication History    Admission medication history is complete. The information provided in this note is only as accurate as the sources available at the time of the update.    Information Source(s): Patient via in-person    Pertinent Information:     Changes made to PTA medication list:  Added: Acetaminophen 500 mg tablet, Amlodipine-Benazepril 5/20 mg capsul, Apixaban 5 mg tablet, Aspirin 81 mg tablet, Atorvastatin 80 mg tablet, Carvedilol 25 mg tablet, Lorazepam 0.5 mg tablet, Metformin 500 ER tablet, Sertraline 25 mg tablet, Torsemide 20 mg tablet (per fill history and patient's spouse confirmed)  Deleted: None  Changed: None    Medication Affordability:  Not including over the counter (OTC) medications, was there a time in the past 3 months when you did not take your medications as prescribed because of cost?: No    Allergies reviewed with patient and updates made in EHR: yes    Medication History Completed By: Santi Faulkner 11/2/2023 3:15 AM    PTA Med List   Medication Sig Last Dose    acetaminophen (TYLENOL) 500 MG tablet Take 1,000 mg by mouth every 6 hours as needed for mild pain or pain Unknown at prn    amLODIPine-benazepril (LOTREL) 5-20 MG capsule Take 1 capsule by mouth daily 11/1/2023 at am    apixaban ANTICOAGULANT (ELIQUIS) 5 MG tablet Take 5 mg by mouth 2 times daily 11/1/2023 at pm    aspirin 81 MG EC tablet Take 81 mg by mouth daily 11/1/2023 at am    atorvastatin (LIPITOR) 80 MG tablet Take 1 tablet by mouth at bedtime 11/1/2023 at hs    carvedilol (COREG) 25 MG tablet Take 25 mg by mouth 2 times daily (with meals) 11/1/2023 at pm    LORazepam (ATIVAN) 0.5 MG tablet Take 0.5 mg by mouth nightly as needed for anxiety or muscle spasms Unknown at prn    metFORMIN (GLUCOPHAGE XR) 500 MG 24 hr tablet Take 1,000 mg by mouth 2 times daily (with meals) 11/1/2023 at pm    sertraline (ZOLOFT) 25 MG tablet Take 1 tablet by mouth daily 11/1/2023 at am    torsemide  (DEMADEX) 20 MG tablet Take 1 tablet by mouth daily 11/1/2023 at am

## 2023-11-02 NOTE — PHARMACY-VANCOMYCIN DOSING SERVICE
Pharmacy Vancomycin Initial Note  Date of Service 2023  Patient's  1945  78 year old, male  Indication: Sepsis  Current estimated CrCl = CrCl cannot be calculated (No successful lab value found.).  Creatinine for last 3 days  No results found for requested labs within last 3 days.    Nephrotoxins and other renal medications (From now, onward)      Start     Dose/Rate Route Frequency Ordered Stop    23 0200  piperacillin-tazobactam (ZOSYN) 3.375 g vial to attach to  mL bag         3.375 g  over 30 Minutes Intravenous ONCE 23 0144            Plan:  Give vancomycin  2500 mg IV once.   Re-consult Vanco per Rx if continued IP.      Martine Pulido, [facility]

## 2023-11-02 NOTE — PHARMACY-VANCOMYCIN DOSING SERVICE
"Pharmacy Vancomycin Initial Note  Date of Service 2023  Patient's  1945  78 year old, male    Indication: Sepsis    Current estimated CrCl = Estimated Creatinine Clearance: 8.8 mL/min (A) (based on SCr of 8.4 mg/dL (H)).    Creatinine for last 3 days  2023:  1:39 AM Creatinine 10.58 mg/dL;  7:47 AM Creatinine 8.40 mg/dL    Recent Vancomycin Level(s) for last 3 days  No results found for requested labs within last 3 days.      Vancomycin IV Administrations (past 72 hours)                     vancomycin (VANCOCIN) 2,500 mg in sodium chloride 0.9 % 500 mL intermittent infusion (mg) 2,500 mg New Bag 23 0252                    Nephrotoxins and other renal medications (From now, onward)      Start     Dose/Rate Route Frequency Ordered Stop    23 1000  piperacillin-tazobactam (ZOSYN) 3.375 g vial to attach to  mL bag        Note to Pharmacy: For [facility], [facility] and [facility]: For Zosyn-naive patients, use the \"Zosyn initial dose + extended infusion\" order panel.    Patient received a dose in the ED    3.375 g  over 240 Minutes Intravenous EVERY 12 HOURS 23 0739      23 0800  norepinephrine (LEVOPHED) 4 mg in  mL infusion PREMIX         0.01-0.6 mcg/kg/min × 97.9 kg  3.7-220.3 mL/hr  Intravenous CONTINUOUS 23 0739      23 0800  vasopressin (VASOSTRICT) 20 Units in sodium chloride 0.9 % 100 mL standard conc infusion         2.4 Units/hr  12 mL/hr  Intravenous CONTINUOUS 23 0739      23 0749  vancomycin place call - receiving intermittent dosing         1 each Intravenous SEE ADMIN INSTRUCTIONS 23 0749              Contrast Orders - past 72 hours (72h ago, onward)      None                  Plan:  Start vancomycin  by intermittent dosing  Vancomycin monitoring method: Trough (Method 2 = manual dose calculation)  Vancomycin therapeutic monitoring goal: 15-20 mg/L  Pharmacy will check vancomycin levels as appropriate in 1-3 Days.    Serum " creatinine levels will be ordered  daily for 3 days .      Vilma Gibson RPH

## 2023-11-02 NOTE — PLAN OF CARE
Goal Outcome Evaluation:       [facility] - ICU    RN Progress Note:  Pt. Presented to ICU Room 348. He is on Levophed, Dobutamine, and Bumex. He went to Cath lab with [place] on Rt. Internal jugular and urine output is low. MD notified          Pertinent Assessments:      Please refer to flowsheet rows for full assessment     Pt. Went down to Cath lab today and he is waiting for IR to perform Dialysis Catheter.           Key Events - This Shift:

## 2023-11-02 NOTE — ED NOTES
Bed: JNED-19  Expected date: 11/2/23  Expected time: 1:21 AM  Means of arrival: Ambulance  Comments:  Cecil  78 M--N/V, a-fib, SBP-80s to 90s, hx of Triple Bypass--recent

## 2023-11-02 NOTE — CONSULTS
General Surgery Consult    Gerardo Al MRN# [id number]     Date of Admission: 11/2/2023    Reason for Consult  Left leg abcess    History of Present Illness  Patient is a 78 year old man who is 1 month out from a CABG done at [facility]. He presents to the hospital overnight for complaints of fatigue and nausea/vomiting. He was found to be hypotensive and in renal failure. He was admitted to the ICU. We were consulted for a left leg wound. On the medial aspect of the knee there is an area of cellulitis at his vein harvest site. He has been on antibiotics for 2 weeks for this with minimal improvement. The cardiac surgery team expressed some purulent drainage this morning.     Past Medical History:  Past Medical History:   Diagnosis Date    Hx of CABG     Sept 2023 at [facility]       Past Surgical History:  Past Surgical History:   Procedure Laterality Date    CARDIAC SURGERY      PICC TRIPLE LUMEN PLACEMENT  11/2/2023       Allergies:   No Known Allergies  Medications:  No current facility-administered medications on file prior to encounter.  acetaminophen (TYLENOL) 500 MG tablet, Take 1,000 mg by mouth every 6 hours as needed for mild pain or pain  amLODIPine-benazepril (LOTREL) 5-20 MG capsule, Take 1 capsule by mouth daily  apixaban ANTICOAGULANT (ELIQUIS) 5 MG tablet, Take 5 mg by mouth 2 times daily  aspirin 81 MG EC tablet, Take 81 mg by mouth daily  atorvastatin (LIPITOR) 80 MG tablet, Take 1 tablet by mouth at bedtime  carvedilol (COREG) 25 MG tablet, Take 25 mg by mouth 2 times daily (with meals)  LORazepam (ATIVAN) 0.5 MG tablet, Take 0.5 mg by mouth nightly as needed for anxiety or muscle spasms  metFORMIN (GLUCOPHAGE XR) 500 MG 24 hr tablet, Take 1,000 mg by mouth 2 times daily (with meals)  sertraline (ZOLOFT) 25 MG tablet, Take 1 tablet by mouth daily  torsemide (DEMADEX) 20 MG tablet, Take 1 tablet by mouth daily      Social History:  Social History     Socioeconomic History     "Marital status:      Spouse name: Not on file    Number of children: Not on file    Years of education: Not on file    Highest education level: Not on file   Occupational History    Not on file   Tobacco Use    Smoking status: Not on file    Smokeless tobacco: Not on file   Substance and Sexual Activity    Alcohol use: Not on file    Drug use: Not on file    Sexual activity: Not on file   Other Topics Concern    Not on file   Social History Narrative    Not on file     Social Determinants of Health     Financial Resource Strain: Not on file   Food Insecurity: Not on file   Transportation Needs: Not on file   Physical Activity: Not on file   Stress: Not on file   Social Connections: Not on file   Interpersonal Safety: Not on file   Housing Stability: Not on file     Family History:  History reviewed. No pertinent family history.    ROS:  12 point review negative except as stated in H&P    Exam:  BP 92/50   Pulse 77   Temp 97.6  F (36.4  C) (Oral)   Resp 19   Ht 1.829 m (6' 0.01\")   Wt 97.9 kg (215 lb 13.3 oz)   SpO2 95%   BMI 29.27 kg/m    General: Alert, interactive, NAD  Resp: Non-labored breathing  Cardiac: RRR  Abdomen: Non distended.  Extremities: Area of erythema and induration on the medial aspect of left knee with small opening and some seropurulent drainage on gauze. I probed the wound with a cotton swab. It tracked superiorly about 2-3 cm and I expressed a bit more purulent fluid. The erythema is already improved from the marked line.     Labs:   Personally reviewed  WBC: 9.3    Assessment: 78 year old male with a wound infection from his prior vein harvest site now s/p bedside I&D. I expect this will improve significantly now that it is drained. I tucked a corner of gauze into the wound to promote drainage.     Plan:   - Change dressings as needed. Tuck corner of gauze into the wound for 48 hours.   - IV antibiotics  - Please call if additional concerns arise.     Russell Christine MD  " General Surgeon  [facility]  Surgery [address]  [place] [address]  Office: [phone]

## 2023-11-03 ENCOUNTER — ANESTHESIA (OUTPATIENT)
Dept: SURGERY | Facility: HOSPITAL | Age: 78
DRG: 856 | End: 2023-11-03
Payer: COMMERCIAL

## 2023-11-03 ENCOUNTER — ANESTHESIA EVENT (OUTPATIENT)
Dept: SURGERY | Facility: HOSPITAL | Age: 78
DRG: 856 | End: 2023-11-03
Payer: COMMERCIAL

## 2023-11-03 ENCOUNTER — APPOINTMENT (OUTPATIENT)
Dept: RADIOLOGY | Facility: HOSPITAL | Age: 78
DRG: 856 | End: 2023-11-03
Attending: INTERNAL MEDICINE
Payer: COMMERCIAL

## 2023-11-03 ENCOUNTER — APPOINTMENT (OUTPATIENT)
Dept: INTERVENTIONAL RADIOLOGY/VASCULAR | Facility: HOSPITAL | Age: 78
DRG: 856 | End: 2023-11-03
Attending: NURSE PRACTITIONER
Payer: COMMERCIAL

## 2023-11-03 ENCOUNTER — HOSPITAL ENCOUNTER (OUTPATIENT)
Facility: HOSPITAL | Age: 78
End: 2023-11-03
Attending: STUDENT IN AN ORGANIZED HEALTH CARE EDUCATION/TRAINING PROGRAM | Admitting: STUDENT IN AN ORGANIZED HEALTH CARE EDUCATION/TRAINING PROGRAM
Payer: COMMERCIAL

## 2023-11-03 LAB
ALBUMIN SERPL BCG-MCNC: 3.2 G/DL (ref 3.5–5.2)
ALBUMIN SERPL BCG-MCNC: 3.3 G/DL (ref 3.5–5.2)
ANION GAP SERPL CALCULATED.3IONS-SCNC: 20 MMOL/L (ref 7–15)
ANION GAP SERPL CALCULATED.3IONS-SCNC: 21 MMOL/L (ref 7–15)
ANION GAP SERPL CALCULATED.3IONS-SCNC: 21 MMOL/L (ref 7–15)
APTT PPP: 38 SECONDS (ref 22–38)
BASE EXCESS BLDV CALC-SCNC: -4.2 MMOL/L
BASOPHILS # BLD AUTO: 0.1 10E3/UL (ref 0–0.2)
BASOPHILS NFR BLD AUTO: 1 %
BLD PROD TYP BPU: NORMAL
BLOOD COMPONENT TYPE: NORMAL
BUN SERPL-MCNC: 68.1 MG/DL (ref 8–23)
BUN SERPL-MCNC: 86.3 MG/DL (ref 8–23)
BUN SERPL-MCNC: 94 MG/DL (ref 8–23)
CALCIUM SERPL-MCNC: 7.9 MG/DL (ref 8.8–10.2)
CALCIUM SERPL-MCNC: 8.1 MG/DL (ref 8.8–10.2)
CALCIUM SERPL-MCNC: 8.9 MG/DL (ref 8.8–10.2)
CALCIUM, IONIZED MEASURED: 1.06 MMOL/L (ref 1.11–1.3)
CALCIUM, IONIZED MEASURED: 1.14 MMOL/L (ref 1.11–1.3)
CHLORIDE SERPL-SCNC: 93 MMOL/L (ref 98–107)
CHLORIDE SERPL-SCNC: 94 MMOL/L (ref 98–107)
CHLORIDE SERPL-SCNC: 99 MMOL/L (ref 98–107)
CODING SYSTEM: NORMAL
CREAT SERPL-MCNC: 7.37 MG/DL (ref 0.67–1.17)
CREAT SERPL-MCNC: 9.62 MG/DL (ref 0.67–1.17)
CREAT SERPL-MCNC: 9.78 MG/DL (ref 0.67–1.17)
CROSSMATCH: NORMAL
CROSSMATCH: NORMAL
DEPRECATED HCO3 PLAS-SCNC: 20 MMOL/L (ref 22–29)
DEPRECATED HCO3 PLAS-SCNC: 21 MMOL/L (ref 22–29)
DEPRECATED HCO3 PLAS-SCNC: 23 MMOL/L (ref 22–29)
EGFRCR SERPLBLD CKD-EPI 2021: 5 ML/MIN/1.73M2
EGFRCR SERPLBLD CKD-EPI 2021: 5 ML/MIN/1.73M2
EGFRCR SERPLBLD CKD-EPI 2021: 7 ML/MIN/1.73M2
EOSINOPHIL # BLD AUTO: 0.2 10E3/UL (ref 0–0.7)
EOSINOPHIL NFR BLD AUTO: 2 %
ERYTHROCYTE [DISTWIDTH] IN BLOOD BY AUTOMATED COUNT: 15.3 % (ref 10–15)
ERYTHROCYTE [DISTWIDTH] IN BLOOD BY AUTOMATED COUNT: 15.4 % (ref 10–15)
GLUCOSE BLDC GLUCOMTR-MCNC: 144 MG/DL (ref 70–99)
GLUCOSE BLDC GLUCOMTR-MCNC: 145 MG/DL (ref 70–99)
GLUCOSE BLDC GLUCOMTR-MCNC: 148 MG/DL (ref 70–99)
GLUCOSE BLDC GLUCOMTR-MCNC: 150 MG/DL (ref 70–99)
GLUCOSE BLDC GLUCOMTR-MCNC: 152 MG/DL (ref 70–99)
GLUCOSE BLDC GLUCOMTR-MCNC: 170 MG/DL (ref 70–99)
GLUCOSE SERPL-MCNC: 151 MG/DL (ref 70–99)
GLUCOSE SERPL-MCNC: 156 MG/DL (ref 70–99)
GLUCOSE SERPL-MCNC: 171 MG/DL (ref 70–99)
HCO3 BLDV-SCNC: 23 MMOL/L (ref 24–30)
HCT VFR BLD AUTO: 24.3 % (ref 40–53)
HCT VFR BLD AUTO: 24.9 % (ref 40–53)
HGB BLD-MCNC: 7.6 G/DL (ref 13.3–17.7)
HGB BLD-MCNC: 7.7 G/DL (ref 13.3–17.7)
IMM GRANULOCYTES # BLD: 0.1 10E3/UL
IMM GRANULOCYTES NFR BLD: 1 %
INR PPP: 2.37 (ref 0.85–1.15)
INR PPP: 2.69 (ref 0.85–1.15)
ION CA PH 7.4: 0.98 MMOL/L (ref 1.11–1.3)
ION CA PH 7.4: 1.09 MMOL/L (ref 1.11–1.3)
ISSUE DATE AND TIME: NORMAL
ISSUE DATE AND TIME: NORMAL
LYMPHOCYTES # BLD AUTO: 0.8 10E3/UL (ref 0.8–5.3)
LYMPHOCYTES NFR BLD AUTO: 8 %
MAGNESIUM SERPL-MCNC: 1.9 MG/DL (ref 1.7–2.3)
MAGNESIUM SERPL-MCNC: 2.1 MG/DL (ref 1.7–2.3)
MAGNESIUM SERPL-MCNC: 2.1 MG/DL (ref 1.7–2.3)
MCH RBC QN AUTO: 28.6 PG (ref 26.5–33)
MCH RBC QN AUTO: 28.8 PG (ref 26.5–33)
MCHC RBC AUTO-ENTMCNC: 30.9 G/DL (ref 31.5–36.5)
MCHC RBC AUTO-ENTMCNC: 31.3 G/DL (ref 31.5–36.5)
MCV RBC AUTO: 92 FL (ref 78–100)
MCV RBC AUTO: 93 FL (ref 78–100)
MONOCYTES # BLD AUTO: 1.1 10E3/UL (ref 0–1.3)
MONOCYTES NFR BLD AUTO: 10 %
NEUTROPHILS # BLD AUTO: 8.8 10E3/UL (ref 1.6–8.3)
NEUTROPHILS NFR BLD AUTO: 78 %
NRBC # BLD AUTO: 0 10E3/UL
NRBC BLD AUTO-RTO: 0 /100
NT-PROBNP SERPL-MCNC: ABNORMAL PG/ML (ref 0–1800)
OXYHGB MFR BLDV: 67.7 % (ref 70–75)
PCO2 BLDV: 48 MM HG (ref 35–50)
PH BLDV: 7.28 [PH] (ref 7.35–7.45)
PH: 7.25 (ref 7.35–7.45)
PH: 7.32 (ref 7.35–7.45)
PHOSPHATE SERPL-MCNC: 8 MG/DL (ref 2.5–4.5)
PHOSPHATE SERPL-MCNC: 9.8 MG/DL (ref 2.5–4.5)
PLATELET # BLD AUTO: 376 10E3/UL (ref 150–450)
PLATELET # BLD AUTO: 385 10E3/UL (ref 150–450)
PO2 BLDV: 33 MM HG (ref 25–47)
POTASSIUM SERPL-SCNC: 4.8 MMOL/L (ref 3.4–5.3)
POTASSIUM SERPL-SCNC: 4.9 MMOL/L (ref 3.4–5.3)
POTASSIUM SERPL-SCNC: 4.9 MMOL/L (ref 3.4–5.3)
PROCALCITONIN SERPL IA-MCNC: 0.16 NG/ML
RBC # BLD AUTO: 2.64 10E6/UL (ref 4.4–5.9)
RBC # BLD AUTO: 2.69 10E6/UL (ref 4.4–5.9)
SAO2 % BLDV: 69.1 % (ref 70–75)
SODIUM SERPL-SCNC: 135 MMOL/L (ref 135–145)
SODIUM SERPL-SCNC: 138 MMOL/L (ref 135–145)
SODIUM SERPL-SCNC: 139 MMOL/L (ref 135–145)
TROPONIN T SERPL HS-MCNC: 103 NG/L
UNIT ABO/RH: NORMAL
UNIT NUMBER: NORMAL
UNIT STATUS: NORMAL
UNIT TYPE ISBT: 5100
UNIT TYPE ISBT: 5100
UNIT TYPE ISBT: 6200
UNIT TYPE ISBT: 6200
WBC # BLD AUTO: 11 10E3/UL (ref 4–11)
WBC # BLD AUTO: 9.4 10E3/UL (ref 4–11)

## 2023-11-03 PROCEDURE — 82805 BLOOD GASES W/O2 SATURATION: CPT | Performed by: INTERNAL MEDICINE

## 2023-11-03 PROCEDURE — 82330 ASSAY OF CALCIUM: CPT | Performed by: INTERNAL MEDICINE

## 2023-11-03 PROCEDURE — 85730 THROMBOPLASTIN TIME PARTIAL: CPT | Performed by: INTERNAL MEDICINE

## 2023-11-03 PROCEDURE — 258N000003 HC RX IP 258 OP 636: Performed by: INTERNAL MEDICINE

## 2023-11-03 PROCEDURE — 250N000011 HC RX IP 250 OP 636: Mod: JZ | Performed by: INTERNAL MEDICINE

## 2023-11-03 PROCEDURE — 85025 COMPLETE CBC W/AUTO DIFF WBC: CPT | Performed by: INTERNAL MEDICINE

## 2023-11-03 PROCEDURE — 5A1D90Z PERFORMANCE OF URINARY FILTRATION, CONTINUOUS, GREATER THAN 18 HOURS PER DAY: ICD-10-PCS | Performed by: INTERNAL MEDICINE

## 2023-11-03 PROCEDURE — 272N000117 HC CATH CR2

## 2023-11-03 PROCEDURE — 250N000011 HC RX IP 250 OP 636: Performed by: INTERNAL MEDICINE

## 2023-11-03 PROCEDURE — 83735 ASSAY OF MAGNESIUM: CPT | Performed by: INTERNAL MEDICINE

## 2023-11-03 PROCEDURE — 0JH63XZ INSERTION OF TUNNELED VASCULAR ACCESS DEVICE INTO CHEST SUBCUTANEOUS TISSUE AND FASCIA, PERCUTANEOUS APPROACH: ICD-10-PCS | Performed by: RADIOLOGY

## 2023-11-03 PROCEDURE — 99233 SBSQ HOSP IP/OBS HIGH 50: CPT | Performed by: INTERNAL MEDICINE

## 2023-11-03 PROCEDURE — 80069 RENAL FUNCTION PANEL: CPT | Performed by: INTERNAL MEDICINE

## 2023-11-03 PROCEDURE — 85610 PROTHROMBIN TIME: CPT | Performed by: STUDENT IN AN ORGANIZED HEALTH CARE EDUCATION/TRAINING PROGRAM

## 2023-11-03 PROCEDURE — 200N000001 HC R&B ICU

## 2023-11-03 PROCEDURE — 84484 ASSAY OF TROPONIN QUANT: CPT | Performed by: INTERNAL MEDICINE

## 2023-11-03 PROCEDURE — C1750 CATH, HEMODIALYSIS,LONG-TERM: HCPCS

## 2023-11-03 PROCEDURE — 02H633Z INSERTION OF INFUSION DEVICE INTO RIGHT ATRIUM, PERCUTANEOUS APPROACH: ICD-10-PCS | Performed by: RADIOLOGY

## 2023-11-03 PROCEDURE — 272N000500 HC NEEDLE CR2

## 2023-11-03 PROCEDURE — C1769 GUIDE WIRE: HCPCS

## 2023-11-03 PROCEDURE — 99232 SBSQ HOSP IP/OBS MODERATE 35: CPT | Performed by: INTERNAL MEDICINE

## 2023-11-03 PROCEDURE — 250N000013 HC RX MED GY IP 250 OP 250 PS 637: Performed by: INTERNAL MEDICINE

## 2023-11-03 PROCEDURE — 90947 DIALYSIS REPEATED EVAL: CPT

## 2023-11-03 PROCEDURE — 71045 X-RAY EXAM CHEST 1 VIEW: CPT

## 2023-11-03 PROCEDURE — 85610 PROTHROMBIN TIME: CPT | Performed by: INTERNAL MEDICINE

## 2023-11-03 PROCEDURE — 83880 ASSAY OF NATRIURETIC PEPTIDE: CPT | Performed by: INTERNAL MEDICINE

## 2023-11-03 PROCEDURE — 250N000009 HC RX 250: Performed by: ANESTHESIOLOGY

## 2023-11-03 PROCEDURE — 999N000287 HC ICU ADULT ROUNDING, EACH 10 MINS

## 2023-11-03 PROCEDURE — 250N000009 HC RX 250: Performed by: INTERNAL MEDICINE

## 2023-11-03 PROCEDURE — C9113 INJ PANTOPRAZOLE SODIUM, VIA: HCPCS | Mod: JZ | Performed by: INTERNAL MEDICINE

## 2023-11-03 PROCEDURE — 77001 FLUOROGUIDE FOR VEIN DEVICE: CPT

## 2023-11-03 PROCEDURE — 999N000157 HC STATISTIC RCP TIME EA 10 MIN

## 2023-11-03 PROCEDURE — 85027 COMPLETE CBC AUTOMATED: CPT | Performed by: STUDENT IN AN ORGANIZED HEALTH CARE EDUCATION/TRAINING PROGRAM

## 2023-11-03 PROCEDURE — 84145 PROCALCITONIN (PCT): CPT | Performed by: INTERNAL MEDICINE

## 2023-11-03 PROCEDURE — 250N000011 HC RX IP 250 OP 636: Performed by: NURSE PRACTITIONER

## 2023-11-03 PROCEDURE — 250N000011 HC RX IP 250 OP 636: Mod: JZ | Performed by: RADIOLOGY

## 2023-11-03 RX ORDER — NALOXONE HYDROCHLORIDE 0.4 MG/ML
0.4 INJECTION, SOLUTION INTRAMUSCULAR; INTRAVENOUS; SUBCUTANEOUS
Status: DISCONTINUED | OUTPATIENT
Start: 2023-11-03 | End: 2023-11-05 | Stop reason: HOSPADM

## 2023-11-03 RX ORDER — FENTANYL CITRATE 50 UG/ML
25-100 INJECTION, SOLUTION INTRAMUSCULAR; INTRAVENOUS
Status: CANCELLED | OUTPATIENT
Start: 2023-11-03

## 2023-11-03 RX ORDER — CEFAZOLIN SODIUM 1 G/50ML
750 SOLUTION INTRAVENOUS EVERY 12 HOURS
Status: DISCONTINUED | OUTPATIENT
Start: 2023-11-03 | End: 2023-11-07 | Stop reason: DRUGHIGH

## 2023-11-03 RX ORDER — FLUMAZENIL 0.1 MG/ML
0.2 INJECTION, SOLUTION INTRAVENOUS
Status: DISCONTINUED | OUTPATIENT
Start: 2023-11-03 | End: 2023-11-05 | Stop reason: HOSPADM

## 2023-11-03 RX ORDER — HEPARIN SODIUM 1000 [USP'U]/ML
4800 INJECTION, SOLUTION INTRAVENOUS; SUBCUTANEOUS ONCE
Status: COMPLETED | OUTPATIENT
Start: 2023-11-03 | End: 2023-11-03

## 2023-11-03 RX ORDER — CEFAZOLIN SODIUM 2 G/100ML
2 INJECTION, SOLUTION INTRAVENOUS
Status: CANCELLED | OUTPATIENT
Start: 2023-11-03

## 2023-11-03 RX ORDER — LIDOCAINE HYDROCHLORIDE 10 MG/ML
INJECTION, SOLUTION INFILTRATION; PERINEURAL
Status: COMPLETED | OUTPATIENT
Start: 2023-11-03 | End: 2023-11-03

## 2023-11-03 RX ORDER — NALOXONE HYDROCHLORIDE 0.4 MG/ML
0.2 INJECTION, SOLUTION INTRAMUSCULAR; INTRAVENOUS; SUBCUTANEOUS
Status: DISCONTINUED | OUTPATIENT
Start: 2023-11-03 | End: 2023-11-05 | Stop reason: HOSPADM

## 2023-11-03 RX ORDER — PIPERACILLIN SODIUM, TAZOBACTAM SODIUM 3; .375 G/15ML; G/15ML
3.38 INJECTION, POWDER, LYOPHILIZED, FOR SOLUTION INTRAVENOUS EVERY 8 HOURS
Status: DISCONTINUED | OUTPATIENT
Start: 2023-11-03 | End: 2023-11-07

## 2023-11-03 RX ORDER — CALCIUM CHLORIDE, MAGNESIUM CHLORIDE, DEXTROSE MONOHYDRATE, LACTIC ACID, SODIUM CHLORIDE, SODIUM BICARBONATE AND POTASSIUM CHLORIDE 5.15; 2.03; 22; 5.4; 6.46; 3.09; .157 G/L; G/L; G/L; G/L; G/L; G/L; G/L
12.5 INJECTION INTRAVENOUS CONTINUOUS
Status: DISCONTINUED | OUTPATIENT
Start: 2023-11-03 | End: 2023-11-05

## 2023-11-03 RX ORDER — LANOLIN ALCOHOL/MO/W.PET/CERES
3 CREAM (GRAM) TOPICAL
Status: DISCONTINUED | OUTPATIENT
Start: 2023-11-03 | End: 2023-11-07

## 2023-11-03 RX ORDER — MAGNESIUM SULFATE HEPTAHYDRATE 40 MG/ML
2 INJECTION, SOLUTION INTRAVENOUS EVERY 8 HOURS PRN
Status: DISCONTINUED | OUTPATIENT
Start: 2023-11-03 | End: 2023-11-11

## 2023-11-03 RX ORDER — FENTANYL CITRATE 50 UG/ML
25-50 INJECTION, SOLUTION INTRAMUSCULAR; INTRAVENOUS EVERY 5 MIN PRN
Status: DISCONTINUED | OUTPATIENT
Start: 2023-11-03 | End: 2023-11-05 | Stop reason: HOSPADM

## 2023-11-03 RX ORDER — POTASSIUM CHLORIDE 29.8 MG/ML
20 INJECTION INTRAVENOUS EVERY 8 HOURS PRN
Status: DISCONTINUED | OUTPATIENT
Start: 2023-11-03 | End: 2023-11-07

## 2023-11-03 RX ORDER — ONDANSETRON 2 MG/ML
4 INJECTION INTRAMUSCULAR; INTRAVENOUS
Status: DISCONTINUED | OUTPATIENT
Start: 2023-11-03 | End: 2023-11-05 | Stop reason: HOSPADM

## 2023-11-03 RX ORDER — SODIUM CHLORIDE, SODIUM LACTATE, POTASSIUM CHLORIDE, CALCIUM CHLORIDE 600; 310; 30; 20 MG/100ML; MG/100ML; MG/100ML; MG/100ML
INJECTION, SOLUTION INTRAVENOUS CONTINUOUS
Status: CANCELLED | OUTPATIENT
Start: 2023-11-03

## 2023-11-03 RX ORDER — CALCIUM CHLORIDE, MAGNESIUM CHLORIDE, SODIUM CHLORIDE, SODIUM BICARBONATE, POTASSIUM CHLORIDE AND SODIUM PHOSPHATE DIBASIC DIHYDRATE 3.68; 3.05; 6.34; 3.09; .314; .187 G/L; G/L; G/L; G/L; G/L; G/L
200 INJECTION INTRAVENOUS CONTINUOUS
Status: DISCONTINUED | OUTPATIENT
Start: 2023-11-03 | End: 2023-11-07

## 2023-11-03 RX ORDER — LIDOCAINE 40 MG/G
CREAM TOPICAL
Status: CANCELLED | OUTPATIENT
Start: 2023-11-03

## 2023-11-03 RX ORDER — QUETIAPINE FUMARATE 25 MG/1
50 TABLET, FILM COATED ORAL
Status: DISCONTINUED | OUTPATIENT
Start: 2023-11-03 | End: 2023-11-07

## 2023-11-03 RX ORDER — CALCIUM CHLORIDE, MAGNESIUM CHLORIDE, SODIUM CHLORIDE, SODIUM BICARBONATE, POTASSIUM CHLORIDE AND SODIUM PHOSPHATE DIBASIC DIHYDRATE 3.68; 3.05; 6.34; 3.09; .314; .187 G/L; G/L; G/L; G/L; G/L; G/L
12.5 INJECTION INTRAVENOUS CONTINUOUS
Status: DISCONTINUED | OUTPATIENT
Start: 2023-11-03 | End: 2023-11-07

## 2023-11-03 RX ORDER — CEFAZOLIN SODIUM 2 G/100ML
2 INJECTION, SOLUTION INTRAVENOUS SEE ADMIN INSTRUCTIONS
Status: CANCELLED | OUTPATIENT
Start: 2023-11-03

## 2023-11-03 RX ORDER — CALCIUM GLUCONATE 20 MG/ML
2 INJECTION, SOLUTION INTRAVENOUS EVERY 8 HOURS PRN
Status: DISCONTINUED | OUTPATIENT
Start: 2023-11-03 | End: 2023-11-05

## 2023-11-03 RX ADMIN — CALCIUM CHLORIDE, MAGNESIUM CHLORIDE, DEXTROSE MONOHYDRATE, LACTIC ACID, SODIUM CHLORIDE, SODIUM BICARBONATE AND POTASSIUM CHLORIDE 12.5 ML/KG/HR: 5.15; 2.03; 22; 5.4; 6.46; 3.09; .157 INJECTION INTRAVENOUS at 11:17

## 2023-11-03 RX ADMIN — NOREPINEPHRINE BITARTRATE 0.19 MCG/KG/MIN: 0.02 INJECTION, SOLUTION INTRAVENOUS at 22:25

## 2023-11-03 RX ADMIN — CALCIUM CHLORIDE, MAGNESIUM CHLORIDE, SODIUM CHLORIDE, SODIUM BICARBONATE, POTASSIUM CHLORIDE AND SODIUM PHOSPHATE DIBASIC DIHYDRATE 12.5 ML/KG/HR: 3.68; 3.05; 6.34; 3.09; .314; .187 INJECTION INTRAVENOUS at 19:59

## 2023-11-03 RX ADMIN — QUETIAPINE FUMARATE 50 MG: 25 TABLET ORAL at 21:17

## 2023-11-03 RX ADMIN — PIPERACILLIN AND TAZOBACTAM 3.38 G: 3; .375 INJECTION, POWDER, LYOPHILIZED, FOR SOLUTION INTRAVENOUS at 18:35

## 2023-11-03 RX ADMIN — CALCIUM GLUCONATE 2 G: 20 INJECTION, SOLUTION INTRAVENOUS at 14:28

## 2023-11-03 RX ADMIN — HEPARIN SODIUM 4800 UNITS: 1000 INJECTION INTRAVENOUS; SUBCUTANEOUS at 09:12

## 2023-11-03 RX ADMIN — SODIUM BICARBONATE 50 MEQ: 84 INJECTION, SOLUTION INTRAVENOUS at 00:06

## 2023-11-03 RX ADMIN — CALCIUM CHLORIDE, MAGNESIUM CHLORIDE, DEXTROSE MONOHYDRATE, LACTIC ACID, SODIUM CHLORIDE, SODIUM BICARBONATE AND POTASSIUM CHLORIDE 12.5 ML/KG/HR: 5.15; 2.03; 22; 5.4; 6.46; 3.09; .157 INJECTION INTRAVENOUS at 15:47

## 2023-11-03 RX ADMIN — CALCIUM CHLORIDE, MAGNESIUM CHLORIDE, DEXTROSE MONOHYDRATE, LACTIC ACID, SODIUM CHLORIDE, SODIUM BICARBONATE AND POTASSIUM CHLORIDE 12.5 ML/KG/HR: 5.15; 2.03; 22; 5.4; 6.46; 3.09; .157 INJECTION INTRAVENOUS at 20:00

## 2023-11-03 RX ADMIN — Medication 3 MG: at 21:17

## 2023-11-03 RX ADMIN — NOREPINEPHRINE BITARTRATE 0.12 MCG/KG/MIN: 0.02 INJECTION, SOLUTION INTRAVENOUS at 05:59

## 2023-11-03 RX ADMIN — FENTANYL CITRATE 25 MCG: 50 INJECTION, SOLUTION INTRAMUSCULAR; INTRAVENOUS at 08:53

## 2023-11-03 RX ADMIN — CALCIUM CHLORIDE, MAGNESIUM CHLORIDE, SODIUM CHLORIDE, SODIUM BICARBONATE, POTASSIUM CHLORIDE AND SODIUM PHOSPHATE DIBASIC DIHYDRATE 12.5 ML/KG/HR: 3.68; 3.05; 6.34; 3.09; .314; .187 INJECTION INTRAVENOUS at 11:15

## 2023-11-03 RX ADMIN — INSULIN ASPART 1 UNITS: 100 INJECTION, SOLUTION INTRAVENOUS; SUBCUTANEOUS at 20:37

## 2023-11-03 RX ADMIN — DOBUTAMINE HYDROCHLORIDE 2.5 MCG/KG/MIN: 200 INJECTION INTRAVENOUS at 00:04

## 2023-11-03 RX ADMIN — NOREPINEPHRINE BITARTRATE 0.15 MCG/KG/MIN: 0.02 INJECTION, SOLUTION INTRAVENOUS at 18:30

## 2023-11-03 RX ADMIN — LIDOCAINE HYDROCHLORIDE 2 ML: 10 INJECTION, SOLUTION INFILTRATION; PERINEURAL at 17:52

## 2023-11-03 RX ADMIN — PANTOPRAZOLE SODIUM 40 MG: 40 INJECTION, POWDER, FOR SOLUTION INTRAVENOUS at 10:19

## 2023-11-03 RX ADMIN — PANTOPRAZOLE SODIUM 40 MG: 40 INJECTION, POWDER, FOR SOLUTION INTRAVENOUS at 21:30

## 2023-11-03 RX ADMIN — CALCIUM CHLORIDE, MAGNESIUM CHLORIDE, SODIUM CHLORIDE, SODIUM BICARBONATE, POTASSIUM CHLORIDE AND SODIUM PHOSPHATE DIBASIC DIHYDRATE 12.5 ML/KG/HR: 3.68; 3.05; 6.34; 3.09; .314; .187 INJECTION INTRAVENOUS at 15:47

## 2023-11-03 RX ADMIN — INSULIN ASPART 1 UNITS: 100 INJECTION, SOLUTION INTRAVENOUS; SUBCUTANEOUS at 07:55

## 2023-11-03 RX ADMIN — SODIUM BICARBONATE 50 MEQ: 84 INJECTION, SOLUTION INTRAVENOUS at 11:30

## 2023-11-03 RX ADMIN — VASOPRESSIN 2.4 UNITS/HR: 20 INJECTION, SOLUTION INTRAMUSCULAR; SUBCUTANEOUS at 21:12

## 2023-11-03 RX ADMIN — SODIUM BICARBONATE 50 MEQ: 84 INJECTION, SOLUTION INTRAVENOUS at 05:59

## 2023-11-03 RX ADMIN — PIPERACILLIN AND TAZOBACTAM 3.38 G: 3; .375 INJECTION, POWDER, LYOPHILIZED, FOR SOLUTION INTRAVENOUS at 10:19

## 2023-11-03 RX ADMIN — FENTANYL CITRATE 25 MCG: 50 INJECTION, SOLUTION INTRAMUSCULAR; INTRAVENOUS at 08:57

## 2023-11-03 RX ADMIN — VANCOMYCIN HYDROCHLORIDE 750 MG: 500 INJECTION, POWDER, LYOPHILIZED, FOR SOLUTION INTRAVENOUS at 20:37

## 2023-11-03 RX ADMIN — NOREPINEPHRINE BITARTRATE 0.15 MCG/KG/MIN: 0.02 INJECTION, SOLUTION INTRAVENOUS at 14:23

## 2023-11-03 RX ADMIN — ACETAMINOPHEN 650 MG: 325 TABLET ORAL at 13:23

## 2023-11-03 RX ADMIN — OXYCODONE HYDROCHLORIDE 5 MG: 5 TABLET ORAL at 13:23

## 2023-11-03 RX ADMIN — INSULIN ASPART 1 UNITS: 100 INJECTION, SOLUTION INTRAVENOUS; SUBCUTANEOUS at 04:21

## 2023-11-03 RX ADMIN — FENTANYL CITRATE 25 MCG: 50 INJECTION, SOLUTION INTRAMUSCULAR; INTRAVENOUS at 09:04

## 2023-11-03 RX ADMIN — CALCIUM CHLORIDE, MAGNESIUM CHLORIDE, SODIUM CHLORIDE, SODIUM BICARBONATE, POTASSIUM CHLORIDE AND SODIUM PHOSPHATE DIBASIC DIHYDRATE 200 ML/HR: 3.68; 3.05; 6.34; 3.09; .314; .187 INJECTION INTRAVENOUS at 11:18

## 2023-11-03 RX ADMIN — NOREPINEPHRINE BITARTRATE 0.13 MCG/KG/MIN: 0.02 INJECTION, SOLUTION INTRAVENOUS at 00:02

## 2023-11-03 RX ADMIN — SODIUM BICARBONATE 50 MEQ: 84 INJECTION, SOLUTION INTRAVENOUS at 18:26

## 2023-11-03 RX ADMIN — INSULIN ASPART 1 UNITS: 100 INJECTION, SOLUTION INTRAVENOUS; SUBCUTANEOUS at 00:13

## 2023-11-03 RX ADMIN — INSULIN ASPART 1 UNITS: 100 INJECTION, SOLUTION INTRAVENOUS; SUBCUTANEOUS at 13:20

## 2023-11-03 RX ADMIN — NOREPINEPHRINE BITARTRATE 0.15 MCG/KG/MIN: 0.02 INJECTION, SOLUTION INTRAVENOUS at 10:19

## 2023-11-03 RX ADMIN — DESMOPRESSIN ACETATE 29.72 MCG: 4 INJECTION, SOLUTION INTRAVENOUS; SUBCUTANEOUS at 11:59

## 2023-11-03 ASSESSMENT — ACTIVITIES OF DAILY LIVING (ADL)
ADLS_ACUITY_SCORE: 32
ADLS_ACUITY_SCORE: 22
ADLS_ACUITY_SCORE: 33
ADLS_ACUITY_SCORE: 32
DEPENDENT_IADLS:: INDEPENDENT
ADLS_ACUITY_SCORE: 32

## 2023-11-03 NOTE — CONSULTS
Care Management Initial Consult    General Information  Assessment completed with: Spouse or significant other,    Type of CM/SW Visit: Initial Assessment    Primary Care Provider verified and updated as needed:     Readmission within the last 30 days:        Reason for Consult: discharge planning  Advance Care Planning:            Communication Assessment  Patient's communication style: spoken language (English or Bilingual)    Hearing Difficulty or Deaf: no   Wear Glasses or Blind: no    Cognitive  Cognitive/Neuro/Behavioral: WDL                      Living Environment:   People in home: spouse     Current living Arrangements: house      Able to return to prior arrangements:         Family/Social Support:  Care provided by: self  Provides care for: no one  Marital Status:              Description of Support System:           Current Resources:   Patient receiving home care services:       Community Resources:    Equipment currently used at home: none  Supplies currently used at home:      Employment/Financial:  Employment Status: retired        Financial Concerns:             Does the patient's insurance plan have a 3 day qualifying hospital stay waiver?  No    Lifestyle & Psychosocial Needs:  Social Determinants of Health     Food Insecurity: Not on file   Depression: Not on file   Housing Stability: Not on file   Tobacco Use: Low Risk  (11/3/2023)    Patient History     Smoking Tobacco Use: Never     Smokeless Tobacco Use: Never     Passive Exposure: Not on file   Financial Resource Strain: Not on file   Alcohol Use: Not on file   Transportation Needs: Not on file   Physical Activity: Not on file   Interpersonal Safety: Not on file   Stress: Not on file   Social Connections: Not on file       Functional Status:  Prior to admission patient needed assistance:   Dependent ADLs:: Independent  Dependent IADLs:: Independent       Mental Health Status:          Chemical Dependency Status:                 Values/Beliefs:  Spiritual, Cultural Beliefs, Denominational Practices, Values that affect care:                 Additional Information:  Patient from home with spouse. Independent at baseline, no Services, no DME. Recently hospitalized for a cardiac surgery.     Discussed patient in ICU rounds, plan for patient to obtain dialysis catheter today and initiate CRRT.     Goal home, family transport.     Nancy Orozco RN

## 2023-11-03 NOTE — PROGRESS NOTES
Continues on BiPAP ST 14/6, 12, .30. SpO2 91-92 %. BS clear bilat, pt to go to IR for a new dialysis catheter, Charge RT to assist.

## 2023-11-03 NOTE — PROGRESS NOTES
Renal progress note  CC:CRISTAL, anuria  Assessment and Plan:  78 year old male with hx of CKD 3, HTN , DM2, PAD, hx of Atrial fib on eliquis for AC, BPH , CAD with sp CABG 9/26/2023 after angiogram 9/22 with 3v disease cb mild CRISTAL at the time of discharge.  Nephrology consulted for severe CRISTAL with oligoanuric state     CRISTAL with anuria  CKD 3 baseline  Baseline creatinine 1.2-1.3, uptrending to 1.5s at the time of discharge, creatinine was 1.35 on 10/9/2023 at the time of his follow-up with mild hyperkalemia at the time he was resumed on Lasix  Patient had repeat labs with creatinine acutely elevated to 7.85 on 10/31/2023, further increased to 10.58 with significant azotemia  on 11/2/2023  UA turbid appearing likely consistent with ATN  CT imaging with evidence of atherosclerotic changes otherwise nonobstructive renal stones and some evidence of cystitis no hydronephrosis  --> With initial fluid resuscitation blood pressure improvement on pressors the patient's creatinine has come down from 10.6 to 9 although the patient does remain anuric  --> With ongoing concern for pericardial effusion and mild hypervolemia we will stop the maintenance bicarb and use bicarb pushes to treat acidemia  --> no response to bumex inf , discontinue  --> start on CRRT dose 25ml/kg/hr with slow UF goal ~1-1.5 in 24hr  --> Patient's baseline is very minimal CKD hoping to have good recovery with stable hemodynamics, hard to give prognosis with likely >4wks of low BP and ACEi use  --> follow labs per CRRT protocols  --> Extensive discussion with cardiology team with a high risk hemodynamic compromise with a moderate pericardial effusion likely preload dependent , would be high risk to consider hemodialysis/UF. will follow on UF tolerance    Hyperkalemia  Likely secondary to acidosis and CRISTAL  Currently patient is n.p.o. once able to eat can start on Lokelma  --> Low-dose Bumex inf wo response , now discontinue  --> UF goals as  above with CRRT     Hyponatremia  Improving with IV fluids, stopped     Hypocalcemia  One-time dose of 2 g calcium gluconate given  Follow on serial calciums     HTN hx  PTA on Amlodipine Benazepril and coreg  Held HTN meds   On dobutamine and levo for BP support  hypervolemia with anuria  --> will discontinue bicarb with CRRT initiation  --> moderate pericardial effusion wo temponade physiology but likely he is preload dependant and will like to avoid excessive UF sp volume resuscitation  --> support BP with pressers  --> management per ICU/card     Anemia acute on chronic  Hemoglobin 7s  Last hemoglobin was 9.4 on 10/9/2023  No evidence of bleed possibly inflammatory anemia/post CABG  Baseline is around 12  --> We will check iron studies would likely benefit from transfusion if hemoglobin is around 8 or lower with recent CABG  --> We will defer these plans to cardiology and ICU     Coronary disease s/p CABG x3 on 9/26/2023  History of atrial fibrillation s/p cardioversion in August 2023  On AC with Eliquis currently held  Loculated pericardial effusion concerns for tamponade physiology  Last echo with EF of 52%  Echo showing moderate-sized pericardial effusion at this time not having a tamponade physiology but does remain concerning  CT surgery has evaluated him plans to put a [place] and monitor right heart pressures  May need possible evacuation not a good window for pericardial drain will possibly need a pericardial window  Follow on CV surgery recs  Bolus albumin if hypotensive     Possible septic shock with leg cellulitis/abscess  General surgery completed bedside I&D  On Abx ; Vanco and zosyn  Blood Cx pending  Adequately resuscitated , now off bicarb with risk of hypervolemia   Bolus albumin if hypotensive     Acute respiratory failure in the setting of CRISTAL and hypervolemia  Improved with oxygen     DM2  Insulin management per ICU       Thank you for the consultation we will follow  Johny Guevara MD  [facility]  [phone]      Subjective  Seen at bedside , now on high flow   Appears more fatigued , oozing from swan  Tunneled line placed  Will start CRRT with gentle UF goals  DDAVP one time for the likely uremic plt dysfunction  Will follow on hemodynamics with UF    Objective    Vital signs in last 24 hours  Temp:  [97.3  F (36.3  C)-98.2  F (36.8  C)] 97.3  F (36.3  C)  Pulse:  [] 93  Resp:  [12-25] 18  BP: ()/(50-75) 100/55  FiO2 (%):  [30 %-60 %] 53 %  SpO2:  [83 %-100 %] 92 %  Weight:   [unfilled]    Intake/Output last 3 shifts  I/O last 3 completed shifts:  In: 2713.02 [I.V.:2713.02]  Out: 115 [Urine:115]  Intake/Output this shift:  I/O this shift:  In: 91.71 [I.V.:91.71]  Out: 5 [Urine:5]    Physical Exam  Alert/awake, NAD  CV: RRR without murmur or rub  Lung: clear and equal; no extra sounds  Ab: soft and NT; not distended; normal bs  Ext: no edema and well perfused  Skin; no rash    Pertinent Labs   Lab Results   Component Value Date    WBC 9.4 11/03/2023    HGB 7.6 (L) 11/03/2023    HCT 24.3 (L) 11/03/2023    MCV 92 11/03/2023     11/03/2023     Lab Results   Component Value Date    BUN 94.0 (H) 11/03/2023     11/03/2023    CO2 21 (L) 11/03/2023       Lab Results   Component Value Date    ALBUMIN 3.1 (L) 11/02/2023     Lab Results   Component Value Date    PHOS 10.9 (H) 11/02/2023     I reviewed all lab results  Johny Guevara MD    [facility]

## 2023-11-03 NOTE — PROGRESS NOTES
PT was transported on BiPAP to mask to interventional radiology for dialysis catheter placement. Transport and procedure was tolerated well by PT. After transport back to ICU, care was handed over to ICU team. RT will continue to follow.    Misha King, RT

## 2023-11-03 NOTE — PROGRESS NOTES
[facility]:  CRITICAL PROGRESS NOTE     Date / Time of Admission:  11/2/2023  1:27 AM    Gerardo Al  Male, 78 year old, 1945  MRN: [id number]         Key event since admission   11-2 [place] gamz placement. Transient  overnight  11-3 HD catheter placed to initiate CRRT. HFNC today             Assessment/Plan:   Gerardo Al is a 78 year old male with  history of CABG in Sept 2023 at [facility], hypertension, diabetes, elevated cholesterol, CAD, who presents to the ER with complaints of fatigue, nausea and vomiting.   He was found to have hypotension felt to be related to severe sepsis possibly from left lower extremities abscess at the sign of enous graft harvest and acute renal failure with acidemia, hyperkalemia, anemia and bilateral pleural effusion.  Cardiology was consulted in the ED and it was felt that this was most likely a septic presentation and not cardiogenic shock . In ED due to hypotension pressors were initiated patient was admitted to the ICU where a Point-of-care ultrasound was performed and CT of the chest done in the ED was reviewed.  Patient was diagnosed with cardial effusion which appeared to be localized and on both side of the heart with fixed dilated IVC concerning for tamponade.  Formal echo was obtained which could not confirm the presence of the pericardial effusion with no clear-cut sign of tamponade however.  [place] was subsequently placed which was not able to rule out some tamponade physiology.  Overnight the patient continued to need some norepinephrine and required mental oxygen and AVAPS.  This morning the patient is hemodynamically stable but still requiring pressors still in A-fib.  He was easily switched to high flow had hemodialysis catheter placed to initiate CRRT.  Neurology and CV surgery are following along and note from [facility] was contacted to let them know about the presence of the patient here and to ask them if they want the patient  transferred.      The plan is as follow     NNeurologic:   No acute issue.     Pulmonary:   Acute respiratory failure with hypoxic and and hypercapnic in the setting of decreased mixed venous O2 and limited cardiac output and shunt at the left base due to lung compression/at ectasis from the weight of the heart/pericardium structure  - HFNC in the day and AVAPS as needed   -O2 as needed  -Broad-spectrum antibiotic  -Chest x-ray today chart review showed no pulmonary edema some consolidation atelectasis at the left base trace pleural effusion     Cardiovascular:   #CAD status post CABG  #History hypertension   # Hypotension requiring norepinephrine relatively low mixed venous O2 suggestive of CO limitation possibly related to localized pericardial effusion best appreciated on CT. On echo described as moderate and posterior sign of tamponade currently no evidence of collapse of the RA or RV  but BP is BNP 17812 and CVP is high which is just volume overload which might to some extent offset the effect of the pericardial effusion on the RA and RV  # localized pericardial effusion with clear-cut evidence of tamponade physiology.  No other source of sepsis is identified have to consider the possibility that this collection might be infected  # A-fib on Eliquis admission last dose 11/1.  Rate is in the high 90s low 100.  Need for rate control at this point  -norepinephrine as needed to maintain adequate perfusion pressure  -Hemodynamic monitoring with [place]el catheter  -holding his cardiac and hypertensive medication, discontinue his Eliquis  - restart anticoagulation for A-fib when the effect of Eliquis wears off if no the procedure is needed  -Monitor heart rate.  For now rate control for his A-fib no need to cardiovert  -Care coordination with cardiology and cardiothoracic surgery, appreciated    Celeste/TELLY:   n.p.o. except for medication for his dialysis catheter placement.  Probably start renal diet today     Renal:    CRISTAL likely in setting of his hypotension cardiorenal component  Hyperkalemia  Monitor I/O's.  Electrolyte repletion PRN.  Avoid/limit nephrotoxic agents.  -Renal consult ed and managing.  -And is start CRRT today     Heme/Onc:   Mild anemia no evidence of Bleeding site of his [place] catheter  Monitor CBC H&H  Monitor Coag and continue to hold Eliquis.      Endocrine:   DM type 2  -With insulin protocol per ICU  -Holding oral antidiabetics     Infectious diseases:   Sepsis ? related to lower leg infection at the site of vein harvesting.  Growing gram-negative.  Clear if this is the source however.  Has consolidation on the chest x-ray no symptoms of pneumonia other possible source would be an infection of the pericardial fluid   -Follow culture results  -Continue with Zosyn and vancomycin  -Consulted general surgery for the wound infection.  No need for an I&D.  Input appreciated     Prophylaxis:  #GI: PI  #DVT: Patient was on Eliquis being held pending possible need for procedure and CRISTAL     Other: I contacted and spoke with his cardiac surgeon at [facility].  He was very appreciative of us calling and of the tendon care we were providing to the patient.  She felt sorry for the patient and did not feel it would need to transfer as they would not offer anything different at [facility]    Restrain: none    Risk Factors Present on Admission:  Clinically Significant Risk Factors        # Hyperkalemia: Highest K = 5.9 mmol/L in last 2 days, will monitor as appropriate        # Anion Gap Metabolic Acidosis: Highest Anion Gap = 27 mmol/L in last 2 days, will monitor and treat as appropriate  # Hypoalbuminemia: Lowest albumin = 3.1 g/dL at 11/2/2023 12:55 PM, will monitor as appropriate  # Coagulation Defect: INR = 2.69 (Ref range: 0.85 - 1.15) and/or PTT = 38 Seconds (Ref range: 22 - 38 Seconds), will monitor for bleeding          # DMII: A1C = 6.7 % (Ref range: <5.7 %) within past 6 months, PRESENT ON  "ADMISSION  # Obesity: Estimated body mass index is 30.44 kg/m  as calculated from the following:    Height as of this encounter: 1.803 m (5' 11\").    Weight as of this encounter: 99 kg (218 lb 4.1 oz)., PRESENT ON ADMISSION               Code Status:  Full Code     This patient is considered critically ill and requires ICU level of care due respiratory failure and hypotension requiring pressors   Total Critical Care time, not including separate billable procedure time: 55minutes.    Stef Hill MD  [facility] Pulmonary/Critical Care   11/03/2023   10:15 AM             Allergies/Medications:   Allergies:   No Known Allergies    OUTPATIENT Medications:  Prior to Admission medications    Medication Sig Start Date End Date Taking? Authorizing Provider   acetaminophen (TYLENOL) 500 MG tablet Take 1,000 mg by mouth every 6 hours as needed for mild pain or pain 9/30/23  Yes Reported, Patient   amLODIPine-benazepril (LOTREL) 5-20 MG capsule Take 1 capsule by mouth daily 12/22/22  Yes Reported, Patient   apixaban ANTICOAGULANT (ELIQUIS) 5 MG tablet Take 5 mg by mouth 2 times daily 10/9/23  Yes Reported, Patient   aspirin 81 MG EC tablet Take 81 mg by mouth daily 12/22/22  Yes Reported, Patient   atorvastatin (LIPITOR) 80 MG tablet Take 1 tablet by mouth at bedtime 10/24/23  Yes Reported, Patient   carvedilol (COREG) 25 MG tablet Take 25 mg by mouth 2 times daily (with meals) 12/22/22  Yes Reported, Patient   LORazepam (ATIVAN) 0.5 MG tablet Take 0.5 mg by mouth nightly as needed for anxiety or muscle spasms 10/9/23  Yes Reported, Patient   metFORMIN (GLUCOPHAGE XR) 500 MG 24 hr tablet Take 1,000 mg by mouth 2 times daily (with meals) 8/17/23  Yes Reported, Patient   sertraline (ZOLOFT) 25 MG tablet Take 1 tablet by mouth daily 10/25/23  Yes Reported, Patient   torsemide (DEMADEX) 20 MG tablet Take 1 tablet by mouth daily 10/11/23  Yes Reported, Patient        INPATIENT Medications: Continuous Infusions:   " "bumetanide 0.25 mg/hr (11/03/23 0400)    CRRT replacement solution      CRRT replacement solution      CRRT replacement solution      DOBUTamine 2.5 mcg/kg/min (11/03/23 0400)    - MEDICATION INSTRUCTIONS -      norepinephrine 0.12 mcg/kg/min (11/03/23 0559)    - MEDICATION INSTRUCTIONS -      vasopressin         INPATIENT Medications: Scheduled Medications:   desmopressin (DDAVP) 29.72 mcg in sodium chloride 0.9 % 50 mL intermittent infusion  0.3 mcg/kg Intravenous Once    sodium chloride (PF) 0.9%  10 mL Intracatheter Once in dialysis/CRRT    Followed by    heparin  1.3-2.6 mL Intracatheter Once in dialysis/CRRT    sodium chloride (PF) 0.9%  10 mL Intracatheter Once in dialysis/CRRT    Followed by    heparin  1.3-2.6 mL Intracatheter Once in dialysis/CRRT    insulin aspart  1-6 Units Subcutaneous Q4H    pantoprazole  40 mg Intravenous Q12H    piperacillin-tazobactam  3.375 g Intravenous Q12H    sodium bicarbonate  50 mEq Intravenous Q6H    sodium chloride (PF)  10-40 mL Intracatheter Q7 Days    vancomycin place call - receiving intermittent dosing  1 each Intravenous See Admin Instructions           Objective:   She denies shortness of breath chest pain and no symptoms overnight          Objective:   Vitals:  /61   Pulse 100   Temp 97.3  F (36.3  C)   Resp 19   Ht 1.803 m (5' 11\")   Wt 99 kg (218 lb 4.1 oz)   SpO2 97%   BMI 30.44 kg/m    Vent: HHFNC  FiO2 (%): 40 %  Resp: 19      Intake/Output Summary (Last 24 hours) at 11/3/2023 1051  Last data filed at 11/3/2023 0800  Gross per 24 hour   Intake 2282.63 ml   Output 70 ml   Net 2212.63 ml     GEN: No acute distress  HEENT: Normocephalic, atraumatic.  Extraoccular eye movements intact, anicteric sclera. No sinus tenderness to palpation.  Moist mucous membranes  NECK: Supple.  Abdomen distended  PULM: Non-labored breathing.  No use of accessory muscles.  Clear to ausculation clearly with decreased breath sounds at the bases  CVS: A-fib normal S1, S2.  " No rubs, murmurs, or gallops.    ABDOMEN: Normoactive bowel sounds.  Non-tender to palpation.  Non-distended.    EXTREMITES:  No clubbing, cyanosis, lower extremity edema.  Below the left knee at the site of vein harvesting the patient has a wound with e localized welling and redness.  No surrounding cellulitis  NEURO:  Awake.  Oriented to person, nonfocal.    Intake/Output: I/O last 3 completed shifts:  In: 2713.02 [I.V.:2713.02]  Out: 115 [Urine:115]        Pertinent Studies:     I have personally reviewed the following data over the past 24 hrs:    9.4  \   7.6 (L)   / 376     135 93 (L) 94.0 (H) /  170 (H)   4.9 21 (L) 9.78 (H) \     ALT: N/A AST: N/A AP: N/A TBILI: N/A   ALB: 3.1 (L) TOT PROTEIN: N/A LIPASE: N/A     Trop: 103 (HH) BNP: 14,005 (H)     TSH: N/A T4: N/A A1C: 6.7 (H)     Procal: 0.16 (H) CRP: N/A Lactic Acid: 1.9       INR:  2.69 (H) PTT:  38   D-dimer:  N/A Fibrinogen:  N/A       Imaging results reviewed over the past 24 hrs:   Recent Results (from the past 24 hour(s))   Cardiac Catheterization    Narrative    Images from the original result were not included.  Gerardo Al is a 78 year old old male with CAD s/p CABG 1 mos ago at   [facility], HTN, HL who is here for CRISTAL, shock, new pericardial effusion.    - elevated R- and L-sided filling pressures, moderate pHTN c/w pulmonary   venous (due to L-sided HF); normal CO/CI by Cristofer  - rapid y descents may be seen w/ extremely elevated filling pressures,   but can't definitively exclude constriction or tamponade by hemodynamics   alone          Findings:  RHC:  RA:20   RV:49/11  PA:45/16(31)  PCWP:25, V waves to 33  SvO2:55.2  Ao Sat: 91    CO/CI:  Cristofer:6.9/3.16      Access:  R IJ vein    Closure:   SG catheter sutured in place         IR CVC Tunnel Placement > 5 Yrs of Age    Narrative    [facility]  LOCATION: [facility]  DATE: 11/3/2023    PROCEDURE:TUNNELED DIALYSIS CATHETER PLACEMENT    INTERVENTIONAL RADIOLOGIST: Sebastian  MD Abdulaziz.    INDICATION: 78-year-old male with acute renal failure following CABG presents for tunneled dialysis catheter placement.    CONSENT: The risks, benefits and alternatives of tunneled dialysis catheter placement were discussed with the patient  in detail. All questions were answered. Informed consent was given to proceed with the procedure.    SEDATION: None. IV fentanyl was administered for patient comfort.    CONTRAST: None  ANTIBIOTICS: None. Antibiotics per ICU.  ADDITIONAL MEDICATIONS: None.    FLUOROSCOPIC TIME: 4.8 minutes.  RADIATION DOSE: Air Kerma: 62 mGy.    COMPLICATIONS: No immediate complications.    STERILE BARRIER TECHNIQUE: Maximum sterile barrier technique was used. Cutaneous antisepsis was performed at the operative site with application of 2% chlorhexidine and large sterile drape. Prior to the procedure, the  and assistant performed   hand hygiene and wore hat, mask, sterile gown, and sterile gloves during the entire procedure.    PROCEDURE:     Using real-time ultrasound guidance, the left internal jugular vein was punctured. A subcutaneous tunnel was created requiring a second incision. Using this access, a 15.5 Kisw[place]ili, 27 cm tip to cuff Duraflow dialysis catheter was advanced until the tip   was in the proximal cavoatrial junction.  The catheter was tested and found to flush and aspirate appropriately.  The catheter was heparinized and secured to the skin.    FINDINGS:  Ultrasound shows an anechoic and compressible jugular vein. A permanent image was stored.      At the completion of the study, the new catheter tip lies in the proximal right atrium.      Impression    IMPRESSION:    Tunneled dialysis catheter placement, as discussed above.     Echo 11/2/2023  Interpretation Summary  The left ventricle is normal in size with moderate concentric left ventricular  hypertrophy.  Left ventricular function is normal.The ejection fraction is 60-65%.  Normal right ventricle size  and systolic function.  The left atrium is mildly dilated.  No hemodynamically significant valvular abnormalities on 2D or color flow  imaging.  There is a moderate posterior pericardial effusion with no echocardiographic  indications of cardiac tamponade.  IVC diameter >2.1 cm collapsing <50% with sniff suggests a high RA pressure  estimated at 15 mmHg or greater.     There is no comparison study available.    Recent Labs   Lab 11/03/23  0746 11/03/23  0526 11/03/23  0420 11/02/23  2358 11/02/23  2150 11/02/23  1554 11/02/23  1255 11/02/23  0816 11/02/23  0747 11/02/23  0143 11/02/23  0139   WBC  --  9.4  --   --   --   --   --   --  9.3  --  8.9   HGB  --  7.6*  --   --  7.6*  --  7.7*  --  7.3*  --  8.3*   MCV  --  92  --   --   --   --   --   --  93  --  97   PLT  --  376  --   --   --   --   --   --  382  --  399   INR  --  2.69*  --   --   --   --   --   --  2.90*  --  2.83*   NA  --  135  --   --  136  --  136  136  --  134*  --  130*   POTASSIUM  --  4.9  --   --  5.3  --  5.4*  5.4*  --  5.1   < > 5.9*   CHLORIDE  --  93*  --   --  92*  --  93*  93*  --  91*  --  89*   CO2  --  21*  --   --  21*  --  20*  20*  --  19*  --  14*   BUN  --  94.0*  --   --  97.2*  --  97.6*  97.6*  --  96.6*  --  102.6*   CR  --  9.78*  --   --  10.42*  --  10.02*  10.02*  --  8.40*  --  10.58*   ANIONGAP  --  21*  --   --  23*  --  23*  23*  --  24*  --  27*   RIKA  --  7.9*  --   --  8.9  --  8.8  8.8  --  7.9*  --  9.3   * 171* 145*   < > 159*   < > 188*  188*   < > 304*   < > 128*   ALBUMIN  --   --   --   --   --   --  3.1*  --  3.2*  --   --    PROTTOTAL  --   --   --   --   --   --   --   --  5.6*  --   --    BILITOTAL  --   --   --   --   --   --   --   --  0.3  --   --    ALKPHOS  --   --   --   --   --   --   --   --  42  --   --    ALT  --   --   --   --   --   --   --   --  15  --   --    AST  --   --   --   --   --   --   --   --  7  --   --     < > = values in this interval not displayed.       Most  Recent 3 CBC's:  Recent Labs   Lab Test 11/03/23  0526 11/02/23 2150 11/02/23  1255 11/02/23  0747 11/02/23  0139   WBC 9.4  --   --  9.3 8.9   HGB 7.6* 7.6* 7.7* 7.3* 8.3*   MCV 92  --   --  93 97     --   --  382 399     Most Recent 3 BMP's:  Recent Labs   Lab Test 11/03/23  0746 11/03/23  0526 11/03/23  0420 11/02/23  2358 11/02/23 2150 11/02/23  1554 11/02/23  1255   NA  --  135  --   --  136  --  136  136   POTASSIUM  --  4.9  --   --  5.3  --  5.4*  5.4*   CHLORIDE  --  93*  --   --  92*  --  93*  93*   CO2  --  21*  --   --  21*  --  20*  20*   BUN  --  94.0*  --   --  97.2*  --  97.6*  97.6*   CR  --  9.78*  --   --  10.42*  --  10.02*  10.02*   ANIONGAP  --  21*  --   --  23*  --  23*  23*   RIKA  --  7.9*  --   --  8.9  --  8.8  8.8   * 171* 145*   < > 159*   < > 188*  188*    < > = values in this interval not displayed.     Most Recent 2 LFT's:  Recent Labs   Lab Test 11/02/23  0747   AST 7   ALT 15   ALKPHOS 42   BILITOTAL 0.3     Anticoagulation Dose History          Latest Ref Rng & Units 11/2/2023 11/3/2023   Recent Dosing and Labs   INR 0.85 - 1.15 2.90  2.83  2.69      Most Recent 3 Creatinines:  Recent Labs   Lab Test 11/03/23  0526 11/02/23 2150 11/02/23  1255   CR 9.78* 10.42* 10.02*  10.02*     Most Recent 6 Bacteria Isolates From Any Culture (See EPIC Reports for Culture Details):No lab results found.  Most Recent ABG:  Recent Labs   Lab Test 11/02/23 0747 11/02/23  0458   PH 7.25* 7.29*   PO2  --  88*   PCO2  --  40   HCO3  --  19*   EDWIN  --  -7.4     Most Recent Anemia Panel:  Recent Labs   Lab Test 11/03/23  0526   WBC 9.4   HGB 7.6*   HCT 24.3*   MCV 92        NOTE: Data reviewed over the past 24 hrs contributes toward MDM complexity      Stef Hill MD

## 2023-11-03 NOTE — PROGRESS NOTES
Patient placed on AVAPS for increase WOB and O2 needs.   RT will continue to follow.     Irene Manzo, RT

## 2023-11-03 NOTE — PROGRESS NOTES
"HFNC 40 lpm/60 %.  BS clear bilat, diminished bases. BiPAP standby at bedside for PRN use.    /62   Pulse 97   Temp 98.1  F (36.7  C)   Resp 16   Ht 1.803 m (5' 11\")   Wt 99 kg (218 lb 4.1 oz)   SpO2 96%   BMI 30.44 kg/m      RT to monitor.   "

## 2023-11-03 NOTE — PROGRESS NOTES
Cardiology Progress Note    Assessment/Plan:  1.  Moderate size pericardial effusion, predominantly posterior in location.  No evidence of hemodynamic compromise based on echo findings yesterday although right heart filling pressure is markedly elevated likely in part due to IV fluid resuscitation in the ED.  Will need to be cautious with fluid removal as we begin CRRT today.  2.  Hypotension, likely due to a combination of infection from his leg incision although cannot exclude some compromise from pericardial effusion.  Had [place] catheter placed yesterday although cardiac outputs have not been performed.  We will ask for those to be done.  If this suggests poor cardiac output, may need to consider removal of pericardial fluid.  3.  Acute renal failure, etiology unclear but may be related to prolonged hypotension.  Appreciate nephrology recommendations.  4.  Recurrent atrial fibrillation postoperatively, rate previously controlled.  We will discontinue dobutamine which may be driving heart rate today.    Primary cardiologist: [facility] cardiology    Subjective:  Patient without any specific complaints today.  Denies for feeling short of breath although required initiation of BiPAP overnight.     desmopressin (DDAVP) 29.72 mcg in sodium chloride 0.9 % 50 mL intermittent infusion  0.3 mcg/kg Intravenous Once    sodium chloride (PF) 0.9%  10 mL Intracatheter Once in dialysis/CRRT    Followed by    heparin  1.3-2.6 mL Intracatheter Once in dialysis/CRRT    sodium chloride (PF) 0.9%  10 mL Intracatheter Once in dialysis/CRRT    Followed by    heparin  1.3-2.6 mL Intracatheter Once in dialysis/CRRT    insulin aspart  1-6 Units Subcutaneous Q4H    pantoprazole  40 mg Intravenous Q12H    piperacillin-tazobactam  3.375 g Intravenous Q12H    sodium bicarbonate  50 mEq Intravenous Q6H    sodium chloride (PF)  10-40 mL Intracatheter Q7 Days    vancomycin place call - receiving intermittent dosing  1 each  "Intravenous See Admin Instructions         Objective:   Vital signs in last 24 hours:  Temp:  [97.3  F (36.3  C)-98.2  F (36.8  C)] 97.3  F (36.3  C)  Pulse:  [] 100  Resp:  [12-25] 19  BP: ()/(50-75) 100/61  FiO2 (%):  [30 %-60 %] 40 %  SpO2:  [83 %-100 %] 97 %  Weight:        Review of Systems:  Negative    Physical Exam:  General appearance: alert, cooperative, no distress   Head: Normocephalic, without obvious abnormality, atraumatic  Neck: no JVD   Lungs: clear to auscultation bilaterally anteriorly  Heart: Irregularly irregular rhythm which is rapid.  S1, S2 normal.  No murmur or gallop  Extremities: Trace to 1+ lower extremity edema.  Upper portion of leg incision covered with dressing.    Cardiographics (personally reviewed):   Telemetry demonstrates atrial fibrillation with RVR    Imaging (personally reviewed):   No additional cardiac imaging    Lab Results (personally reviewed):     No results for input(s): \"CHOL\", \"HDL\", \"LDL\", \"TRIG\", \"CHOLHDLRATIO\" in the last 85880 hours.  No results for input(s): \"LDL\" in the last 45032 hours.  Recent Labs   Lab Test 11/03/23 0746 11/03/23 0526   NA  --  135   POTASSIUM  --  4.9   CHLORIDE  --  93*   CO2  --  21*   * 171*   BUN  --  94.0*   CR  --  9.78*   GFRESTIMATED  --  5*   RIKA  --  7.9*     Recent Labs   Lab Test 11/03/23 0526 11/02/23 2150 11/02/23  1255   CR 9.78* 10.42* 10.02*  10.02*     Recent Labs   Lab Test 11/02/23  1255   A1C 6.7*          Recent Labs   Lab Test 11/03/23 0526   WBC 9.4   HGB 7.6*   HCT 24.3*   MCV 92        Recent Labs   Lab Test 11/03/23 0526 11/02/23 2150 11/02/23  1255   HGB 7.6* 7.6* 7.7*    No results for input(s): \"TROPONINI\" in the last 52712 hours.  Recent Labs   Lab Test 11/03/23  0526 11/02/23 0139   NTBNPI 14,005* 11,250*     Recent Labs   Lab Test 11/02/23 0139   TSH 2.85     Recent Labs   Lab Test 11/03/23  0526 11/02/23  0747 11/02/23 0139   INR 2.69* 2.90* 2.83*          Adry Hansen, " MD

## 2023-11-03 NOTE — PROGRESS NOTES
Patient Name: Gerardo Al  Medical Record Number: [id number]  Today's Date: 11/3/2023          Sedation medications administered: 0 mg midazolam and 75 mcg fentanyl   Sedation time: 15 minutes    Report given to: Adriane RN      Other Notes: Pt returned to room 348.  Accompanied by RN and RT.  Bedside handoff given to Adriane RN.  Left tunneled site dry and intact.

## 2023-11-03 NOTE — PLAN OF CARE
[facility] - ICU    RN Progress Note:            Pertinent Assessments:      Please refer to flowsheet rows for full assessment     - Alert and orientedx4.        Key Events - This Shift:     - Remains on levophed drip and dobutamine drip to keep MAP >65.  - NPO at midnigt for possible procedure today.  - On acute renal failure. On bumex drip. Remains Low urine output and  Bloody urine in barragan catheter. M.D aware. Possible for dialysis or CRRT today.  - Nasal cannula at 7L/min. 02 sats of 92%. M.D notified. BIPAP ordered. Fi02 of 30%. 02 Sats of >95%.         Barriers to Discharge / Downgrade:     - On pressors. ICU level of treatment.

## 2023-11-03 NOTE — PHARMACY-VANCOMYCIN DOSING SERVICE
"Pharmacy Vancomycin Initial Note  Date of Service November 3, 2023  Patient's  1945  78 year old, male    Indication: Sepsis    Current estimated CrCl = Estimated Creatinine Clearance: 7.5 mL/min (A) (based on SCr of 9.78 mg/dL (H)).    Creatinine for last 3 days  2023:  1:39 AM Creatinine 10.58 mg/dL;  7:47 AM Creatinine 8.40 mg/dL; 12:55 PM Creatinine 10.02 mg/dL; 12:55 PM Creatinine 10.02 mg/dL;  9:50 PM Creatinine 10.42 mg/dL  11/3/2023:  5:26 AM Creatinine 9.78 mg/dL    Recent Vancomycin Level(s) for last 3 days  No results found for requested labs within last 3 days.      Vancomycin IV Administrations (past 72 hours)                     vancomycin (VANCOCIN) 2,500 mg in sodium chloride 0.9 % 500 mL intermittent infusion (mg) 2,500 mg New Bag 23 0252                    Nephrotoxins and other renal medications (From now, onward)      Start     Dose/Rate Route Frequency Ordered Stop    23 2000  vancomycin (VANCOCIN) 750 mg in sodium chloride 0.9 % 250 mL intermittent infusion         750 mg  over 60 Minutes Intravenous EVERY 12 HOURS 23 1157      23 1230  bumetanide (BUMEX) 0.25 mg/mL infusion         0.25 mg/hr  1 mL/hr  Intravenous CONTINUOUS 23 1208      23 1000  piperacillin-tazobactam (ZOSYN) 3.375 g vial to attach to  mL bag        Note to Pharmacy: For [facility], [facility] and [facility]: For Zosyn-naive patients, use the \"Zosyn initial dose + extended infusion\" order panel.    Patient received a dose in the ED    3.375 g  over 240 Minutes Intravenous EVERY 12 HOURS 23 0739      23 0800  norepinephrine (LEVOPHED) 4 mg in  mL infusion PREMIX         0.01-0.6 mcg/kg/min × 97.9 kg  3.7-220.3 mL/hr  Intravenous CONTINUOUS 23 0739      23 0800  vasopressin (VASOSTRICT) 20 Units in sodium chloride 0.9 % 100 mL standard conc infusion         2.4 Units/hr  12 mL/hr  Intravenous CONTINUOUS 23 0739              Contrast Orders - past 72 hours " (72h ago, onward)      None                  Plan:  Change vancomycin to 750 mg IV q12h upon initiation of CRRT.   Vancomycin monitoring method: Renal Replacement Therapy  Vancomycin therapeutic monitoring goal: 15-20 mg/L  Pharmacy will check vancomycin levels as appropriate in 1-3 Days.    Serum creatinine levels will be not ordered  as patient is undergoing CRRT .      Roosevelt Santiago RPH

## 2023-11-03 NOTE — PROGRESS NOTES
"Pt returned from IR on BiPAP AVAPS 16, EPAP 5, Vt 600, min/max 10-35. Changed to HFNC 40 lpm/ 40 % per MD request. RT to monitor    /61   Pulse 100   Temp 97.3  F (36.3  C)   Resp 19   Ht 1.803 m (5' 11\")   Wt 99 kg (218 lb 4.1 oz)   SpO2 97%   BMI 30.44 kg/m      Will titrate HFNC as tolerated.   "

## 2023-11-04 ENCOUNTER — APPOINTMENT (OUTPATIENT)
Dept: CARDIOLOGY | Facility: HOSPITAL | Age: 78
DRG: 856 | End: 2023-11-04
Attending: INTERNAL MEDICINE
Payer: COMMERCIAL

## 2023-11-04 ENCOUNTER — APPOINTMENT (OUTPATIENT)
Dept: RADIOLOGY | Facility: HOSPITAL | Age: 78
DRG: 856 | End: 2023-11-04
Attending: INTERNAL MEDICINE
Payer: COMMERCIAL

## 2023-11-04 LAB
ALBUMIN SERPL BCG-MCNC: 3.3 G/DL (ref 3.5–5.2)
ALBUMIN SERPL BCG-MCNC: 3.6 G/DL (ref 3.5–5.2)
ALBUMIN SERPL BCG-MCNC: 3.6 G/DL (ref 3.5–5.2)
ALLEN'S TEST: NO
ALP SERPL-CCNC: 35 U/L (ref 40–129)
ALT SERPL W P-5'-P-CCNC: 13 U/L (ref 0–70)
ANION GAP SERPL CALCULATED.3IONS-SCNC: 19 MMOL/L (ref 7–15)
ANION GAP SERPL CALCULATED.3IONS-SCNC: 21 MMOL/L (ref 7–15)
ANION GAP SERPL CALCULATED.3IONS-SCNC: 21 MMOL/L (ref 7–15)
APTT PPP: 24 SECONDS (ref 22–38)
AST SERPL W P-5'-P-CCNC: 11 U/L (ref 0–45)
BACTERIA ABSC ANAEROBE+AEROBE CULT: ABNORMAL
BASE EXCESS BLDA CALC-SCNC: -11.2 MMOL/L
BASE EXCESS BLDA CALC-SCNC: -7 MMOL/L
BASE EXCESS BLDA CALC-SCNC: -7.8 MMOL/L
BASE EXCESS BLDV CALC-SCNC: -6.2 MMOL/L
BASOPHILS # BLD AUTO: 0.1 10E3/UL (ref 0–0.2)
BASOPHILS NFR BLD AUTO: 1 %
BILIRUB DIRECT SERPL-MCNC: <0.2 MG/DL (ref 0–0.3)
BILIRUB SERPL-MCNC: 0.3 MG/DL
BUN SERPL-MCNC: 33.8 MG/DL (ref 8–23)
BUN SERPL-MCNC: 40.8 MG/DL (ref 8–23)
BUN SERPL-MCNC: 50.1 MG/DL (ref 8–23)
CALCIUM SERPL-MCNC: 8.6 MG/DL (ref 8.8–10.2)
CALCIUM SERPL-MCNC: 9 MG/DL (ref 8.8–10.2)
CALCIUM SERPL-MCNC: 9.3 MG/DL (ref 8.8–10.2)
CALCIUM, IONIZED MEASURED: 1.16 MMOL/L (ref 1.11–1.3)
CALCIUM, IONIZED MEASURED: 1.18 MMOL/L (ref 1.11–1.3)
CALCIUM, IONIZED MEASURED: 1.2 MMOL/L (ref 1.11–1.3)
CHLORIDE SERPL-SCNC: 100 MMOL/L (ref 98–107)
COHGB MFR BLD: 95 % (ref 95–96)
COHGB MFR BLD: 96.9 % (ref 95–96)
COHGB MFR BLD: 98.5 % (ref 95–96)
CREAT SERPL-MCNC: 3.88 MG/DL (ref 0.67–1.17)
CREAT SERPL-MCNC: 4.64 MG/DL (ref 0.67–1.17)
CREAT SERPL-MCNC: 5.75 MG/DL (ref 0.67–1.17)
DEPRECATED HCO3 PLAS-SCNC: 16 MMOL/L (ref 22–29)
DEPRECATED HCO3 PLAS-SCNC: 18 MMOL/L (ref 22–29)
DEPRECATED HCO3 PLAS-SCNC: 20 MMOL/L (ref 22–29)
EGFRCR SERPLBLD CKD-EPI 2021: 12 ML/MIN/1.73M2
EGFRCR SERPLBLD CKD-EPI 2021: 15 ML/MIN/1.73M2
EGFRCR SERPLBLD CKD-EPI 2021: 9 ML/MIN/1.73M2
EOSINOPHIL # BLD AUTO: 0.3 10E3/UL (ref 0–0.7)
EOSINOPHIL NFR BLD AUTO: 3 %
ERYTHROCYTE [DISTWIDTH] IN BLOOD BY AUTOMATED COUNT: 15.4 % (ref 10–15)
ERYTHROCYTE [DISTWIDTH] IN BLOOD BY AUTOMATED COUNT: 15.6 % (ref 10–15)
ERYTHROCYTE [DISTWIDTH] IN BLOOD BY AUTOMATED COUNT: 15.8 % (ref 10–15)
GLUCOSE BLDC GLUCOMTR-MCNC: 127 MG/DL (ref 70–99)
GLUCOSE BLDC GLUCOMTR-MCNC: 132 MG/DL (ref 70–99)
GLUCOSE BLDC GLUCOMTR-MCNC: 133 MG/DL (ref 70–99)
GLUCOSE BLDC GLUCOMTR-MCNC: 136 MG/DL (ref 70–99)
GLUCOSE BLDC GLUCOMTR-MCNC: 141 MG/DL (ref 70–99)
GLUCOSE BLDC GLUCOMTR-MCNC: 144 MG/DL (ref 70–99)
GLUCOSE SERPL-MCNC: 132 MG/DL (ref 70–99)
GLUCOSE SERPL-MCNC: 133 MG/DL (ref 70–99)
GLUCOSE SERPL-MCNC: 138 MG/DL (ref 70–99)
GRAM STAIN RESULT: ABNORMAL
GRAM STAIN RESULT: ABNORMAL
HCO3 BLD-SCNC: 16 MMOL/L (ref 23–29)
HCO3 BLD-SCNC: 19 MMOL/L (ref 23–29)
HCO3 BLD-SCNC: 20 MMOL/L (ref 23–29)
HCO3 BLDV-SCNC: 21 MMOL/L (ref 24–30)
HCT VFR BLD AUTO: 24.4 % (ref 40–53)
HCT VFR BLD AUTO: 24.5 % (ref 40–53)
HCT VFR BLD AUTO: 25.1 % (ref 40–53)
HGB BLD-MCNC: 7.5 G/DL (ref 13.3–17.7)
HGB BLD-MCNC: 7.6 G/DL (ref 13.3–17.7)
HGB BLD-MCNC: 7.8 G/DL (ref 13.3–17.7)
HGB BLD-MCNC: 7.9 G/DL (ref 13.3–17.7)
IMM GRANULOCYTES # BLD: 0.1 10E3/UL
IMM GRANULOCYTES NFR BLD: 1 %
INR PPP: 2.08 (ref 0.85–1.15)
ION CA PH 7.4: 1.11 MMOL/L (ref 1.11–1.3)
ION CA PH 7.4: 1.11 MMOL/L (ref 1.11–1.3)
ION CA PH 7.4: 1.12 MMOL/L (ref 1.11–1.3)
IRON BINDING CAPACITY (ROCHE): 250 UG/DL (ref 240–430)
IRON SATN MFR SERPL: 10 % (ref 15–46)
IRON SERPL-MCNC: 26 UG/DL (ref 61–157)
LYMPHOCYTES # BLD AUTO: 1 10E3/UL (ref 0.8–5.3)
LYMPHOCYTES NFR BLD AUTO: 11 %
MAGNESIUM SERPL-MCNC: 2 MG/DL (ref 1.7–2.3)
MAGNESIUM SERPL-MCNC: 2 MG/DL (ref 1.7–2.3)
MAGNESIUM SERPL-MCNC: 2.1 MG/DL (ref 1.7–2.3)
MCH RBC QN AUTO: 28.8 PG (ref 26.5–33)
MCH RBC QN AUTO: 28.8 PG (ref 26.5–33)
MCH RBC QN AUTO: 29.1 PG (ref 26.5–33)
MCHC RBC AUTO-ENTMCNC: 30.6 G/DL (ref 31.5–36.5)
MCHC RBC AUTO-ENTMCNC: 31.1 G/DL (ref 31.5–36.5)
MCHC RBC AUTO-ENTMCNC: 31.1 G/DL (ref 31.5–36.5)
MCV RBC AUTO: 93 FL (ref 78–100)
MCV RBC AUTO: 94 FL (ref 78–100)
MCV RBC AUTO: 94 FL (ref 78–100)
MONOCYTES # BLD AUTO: 0.9 10E3/UL (ref 0–1.3)
MONOCYTES NFR BLD AUTO: 10 %
NEUTROPHILS # BLD AUTO: 6.6 10E3/UL (ref 1.6–8.3)
NEUTROPHILS NFR BLD AUTO: 74 %
NRBC # BLD AUTO: 0 10E3/UL
NRBC BLD AUTO-RTO: 0 /100
O2/TOTAL GAS SETTING VFR VENT: 40 %
OXYHGB MFR BLD: 93.1 % (ref 95–96)
OXYHGB MFR BLD: 95 % (ref 95–96)
OXYHGB MFR BLD: 97.3 % (ref 95–96)
OXYHGB MFR BLDV: 44.4 % (ref 70–75)
PCO2 BLD: 37 MM HG (ref 35–45)
PCO2 BLD: 40 MM HG (ref 35–45)
PCO2 BLD: 42 MM HG (ref 35–45)
PCO2 BLDV: 47 MM HG (ref 35–50)
PH BLD: 7.24 [PH] (ref 7.37–7.44)
PH BLD: 7.28 [PH] (ref 7.37–7.44)
PH BLD: 7.29 [PH] (ref 7.37–7.44)
PH BLDV: 7.26 [PH] (ref 7.35–7.45)
PH: 7.27 (ref 7.35–7.45)
PH: 7.28 (ref 7.35–7.45)
PH: 7.32 (ref 7.35–7.45)
PHOSPHATE SERPL-MCNC: 6.1 MG/DL (ref 2.5–4.5)
PHOSPHATE SERPL-MCNC: 6.1 MG/DL (ref 2.5–4.5)
PHOSPHATE SERPL-MCNC: 6.9 MG/DL (ref 2.5–4.5)
PLATELET # BLD AUTO: 287 10E3/UL (ref 150–450)
PLATELET # BLD AUTO: 288 10E3/UL (ref 150–450)
PLATELET # BLD AUTO: 334 10E3/UL (ref 150–450)
PO2 BLD: 102 MM HG (ref 75–85)
PO2 BLD: 78 MM HG (ref 75–85)
PO2 BLD: 92 MM HG (ref 75–85)
PO2 BLDV: 31 MM HG (ref 25–47)
POTASSIUM SERPL-SCNC: 4.6 MMOL/L (ref 3.4–5.3)
POTASSIUM SERPL-SCNC: 4.6 MMOL/L (ref 3.4–5.3)
POTASSIUM SERPL-SCNC: 4.8 MMOL/L (ref 3.4–5.3)
PROT SERPL-MCNC: 5.8 G/DL (ref 6.4–8.3)
RBC # BLD AUTO: 2.6 10E6/UL (ref 4.4–5.9)
RBC # BLD AUTO: 2.61 10E6/UL (ref 4.4–5.9)
RBC # BLD AUTO: 2.71 10E6/UL (ref 4.4–5.9)
SAO2 % BLDV: 45.4 % (ref 70–75)
SODIUM SERPL-SCNC: 137 MMOL/L (ref 135–145)
SODIUM SERPL-SCNC: 139 MMOL/L (ref 135–145)
SODIUM SERPL-SCNC: 139 MMOL/L (ref 135–145)
TEMPERATURE: 37 DEGREES C
WBC # BLD AUTO: 10.8 10E3/UL (ref 4–11)
WBC # BLD AUTO: 8.9 10E3/UL (ref 4–11)
WBC # BLD AUTO: 9.9 10E3/UL (ref 4–11)

## 2023-11-04 PROCEDURE — 250N000011 HC RX IP 250 OP 636: Mod: JZ | Performed by: INTERNAL MEDICINE

## 2023-11-04 PROCEDURE — 93308 TTE F-UP OR LMTD: CPT | Mod: 26 | Performed by: INTERNAL MEDICINE

## 2023-11-04 PROCEDURE — 85027 COMPLETE CBC AUTOMATED: CPT | Performed by: INTERNAL MEDICINE

## 2023-11-04 PROCEDURE — 99291 CRITICAL CARE FIRST HOUR: CPT | Performed by: INTERNAL MEDICINE

## 2023-11-04 PROCEDURE — 99232 SBSQ HOSP IP/OBS MODERATE 35: CPT | Performed by: INTERNAL MEDICINE

## 2023-11-04 PROCEDURE — C9113 INJ PANTOPRAZOLE SODIUM, VIA: HCPCS | Mod: JZ | Performed by: INTERNAL MEDICINE

## 2023-11-04 PROCEDURE — 250N000009 HC RX 250: Performed by: INTERNAL MEDICINE

## 2023-11-04 PROCEDURE — 85025 COMPLETE CBC W/AUTO DIFF WBC: CPT | Performed by: INTERNAL MEDICINE

## 2023-11-04 PROCEDURE — 999N000287 HC ICU ADULT ROUNDING, EACH 10 MINS

## 2023-11-04 PROCEDURE — 85730 THROMBOPLASTIN TIME PARTIAL: CPT | Performed by: INTERNAL MEDICINE

## 2023-11-04 PROCEDURE — P9047 ALBUMIN (HUMAN), 25%, 50ML: HCPCS | Performed by: INTERNAL MEDICINE

## 2023-11-04 PROCEDURE — 258N000003 HC RX IP 258 OP 636: Performed by: INTERNAL MEDICINE

## 2023-11-04 PROCEDURE — 250N000011 HC RX IP 250 OP 636: Performed by: INTERNAL MEDICINE

## 2023-11-04 PROCEDURE — 82330 ASSAY OF CALCIUM: CPT | Performed by: INTERNAL MEDICINE

## 2023-11-04 PROCEDURE — 83735 ASSAY OF MAGNESIUM: CPT | Performed by: INTERNAL MEDICINE

## 2023-11-04 PROCEDURE — 82805 BLOOD GASES W/O2 SATURATION: CPT | Performed by: INTERNAL MEDICINE

## 2023-11-04 PROCEDURE — 90947 DIALYSIS REPEATED EVAL: CPT

## 2023-11-04 PROCEDURE — 85014 HEMATOCRIT: CPT | Performed by: INTERNAL MEDICINE

## 2023-11-04 PROCEDURE — 83550 IRON BINDING TEST: CPT | Performed by: INTERNAL MEDICINE

## 2023-11-04 PROCEDURE — 84100 ASSAY OF PHOSPHORUS: CPT | Performed by: INTERNAL MEDICINE

## 2023-11-04 PROCEDURE — 82248 BILIRUBIN DIRECT: CPT | Performed by: INTERNAL MEDICINE

## 2023-11-04 PROCEDURE — 85610 PROTHROMBIN TIME: CPT | Performed by: INTERNAL MEDICINE

## 2023-11-04 PROCEDURE — 999N000215 HC STATISTIC HFNC ADULT NON-CPAP

## 2023-11-04 PROCEDURE — 250N000013 HC RX MED GY IP 250 OP 250 PS 637: Performed by: INTERNAL MEDICINE

## 2023-11-04 PROCEDURE — 71045 X-RAY EXAM CHEST 1 VIEW: CPT

## 2023-11-04 PROCEDURE — 93321 DOPPLER ECHO F-UP/LMTD STD: CPT

## 2023-11-04 PROCEDURE — 93321 DOPPLER ECHO F-UP/LMTD STD: CPT | Mod: 26 | Performed by: INTERNAL MEDICINE

## 2023-11-04 PROCEDURE — 200N000001 HC R&B ICU

## 2023-11-04 PROCEDURE — 93325 DOPPLER ECHO COLOR FLOW MAPG: CPT

## 2023-11-04 PROCEDURE — 999N000157 HC STATISTIC RCP TIME EA 10 MIN

## 2023-11-04 PROCEDURE — 93325 DOPPLER ECHO COLOR FLOW MAPG: CPT | Mod: 26 | Performed by: INTERNAL MEDICINE

## 2023-11-04 RX ORDER — ALBUMIN (HUMAN) 12.5 G/50ML
25 SOLUTION INTRAVENOUS ONCE
Status: COMPLETED | OUTPATIENT
Start: 2023-11-04 | End: 2023-11-04

## 2023-11-04 RX ADMIN — CALCIUM CHLORIDE, MAGNESIUM CHLORIDE, SODIUM CHLORIDE, SODIUM BICARBONATE, POTASSIUM CHLORIDE AND SODIUM PHOSPHATE DIBASIC DIHYDRATE 200 ML/HR: 3.68; 3.05; 6.34; 3.09; .314; .187 INJECTION INTRAVENOUS at 13:33

## 2023-11-04 RX ADMIN — CALCIUM CHLORIDE, MAGNESIUM CHLORIDE, DEXTROSE MONOHYDRATE, LACTIC ACID, SODIUM CHLORIDE, SODIUM BICARBONATE AND POTASSIUM CHLORIDE 12.5 ML/KG/HR: 5.15; 2.03; 22; 5.4; 6.46; 3.09; .157 INJECTION INTRAVENOUS at 17:51

## 2023-11-04 RX ADMIN — CALCIUM CHLORIDE, MAGNESIUM CHLORIDE, DEXTROSE MONOHYDRATE, LACTIC ACID, SODIUM CHLORIDE, SODIUM BICARBONATE AND POTASSIUM CHLORIDE 12.5 ML/KG/HR: 5.15; 2.03; 22; 5.4; 6.46; 3.09; .157 INJECTION INTRAVENOUS at 22:18

## 2023-11-04 RX ADMIN — Medication 3 MG: at 21:00

## 2023-11-04 RX ADMIN — ACETAMINOPHEN 650 MG: 325 TABLET ORAL at 18:00

## 2023-11-04 RX ADMIN — CALCIUM CHLORIDE, MAGNESIUM CHLORIDE, SODIUM CHLORIDE, SODIUM BICARBONATE, POTASSIUM CHLORIDE AND SODIUM PHOSPHATE DIBASIC DIHYDRATE 12.5 ML/KG/HR: 3.68; 3.05; 6.34; 3.09; .314; .187 INJECTION INTRAVENOUS at 13:37

## 2023-11-04 RX ADMIN — VASOPRESSIN 2.4 UNITS/HR: 20 INJECTION, SOLUTION INTRAMUSCULAR; SUBCUTANEOUS at 21:31

## 2023-11-04 RX ADMIN — VASOPRESSIN 2.4 UNITS/HR: 20 INJECTION, SOLUTION INTRAMUSCULAR; SUBCUTANEOUS at 13:10

## 2023-11-04 RX ADMIN — AMIODARONE HYDROCHLORIDE 0.5 MG/MIN: 1.8 INJECTION, SOLUTION INTRAVENOUS at 20:37

## 2023-11-04 RX ADMIN — CALCIUM CHLORIDE, MAGNESIUM CHLORIDE, SODIUM CHLORIDE, SODIUM BICARBONATE, POTASSIUM CHLORIDE AND SODIUM PHOSPHATE DIBASIC DIHYDRATE 12.5 ML/KG/HR: 3.68; 3.05; 6.34; 3.09; .314; .187 INJECTION INTRAVENOUS at 04:26

## 2023-11-04 RX ADMIN — ALBUMIN HUMAN 25 G: 0.25 SOLUTION INTRAVENOUS at 08:00

## 2023-11-04 RX ADMIN — PIPERACILLIN AND TAZOBACTAM 3.38 G: 3; .375 INJECTION, POWDER, LYOPHILIZED, FOR SOLUTION INTRAVENOUS at 18:01

## 2023-11-04 RX ADMIN — CALCIUM CHLORIDE, MAGNESIUM CHLORIDE, DEXTROSE MONOHYDRATE, LACTIC ACID, SODIUM CHLORIDE, SODIUM BICARBONATE AND POTASSIUM CHLORIDE 12.5 ML/KG/HR: 5.15; 2.03; 22; 5.4; 6.46; 3.09; .157 INJECTION INTRAVENOUS at 00:14

## 2023-11-04 RX ADMIN — PANTOPRAZOLE SODIUM 40 MG: 40 INJECTION, POWDER, FOR SOLUTION INTRAVENOUS at 22:18

## 2023-11-04 RX ADMIN — VASOPRESSIN 2.4 UNITS/HR: 20 INJECTION, SOLUTION INTRAMUSCULAR; SUBCUTANEOUS at 04:47

## 2023-11-04 RX ADMIN — SODIUM BICARBONATE 50 MEQ: 84 INJECTION, SOLUTION INTRAVENOUS at 00:14

## 2023-11-04 RX ADMIN — CALCIUM CHLORIDE, MAGNESIUM CHLORIDE, DEXTROSE MONOHYDRATE, LACTIC ACID, SODIUM CHLORIDE, SODIUM BICARBONATE AND POTASSIUM CHLORIDE 12.5 ML/KG/HR: 5.15; 2.03; 22; 5.4; 6.46; 3.09; .157 INJECTION INTRAVENOUS at 04:24

## 2023-11-04 RX ADMIN — VANCOMYCIN HYDROCHLORIDE 750 MG: 500 INJECTION, POWDER, LYOPHILIZED, FOR SOLUTION INTRAVENOUS at 08:00

## 2023-11-04 RX ADMIN — SODIUM BICARBONATE 50 MEQ: 84 INJECTION, SOLUTION INTRAVENOUS at 06:53

## 2023-11-04 RX ADMIN — PIPERACILLIN AND TAZOBACTAM 3.38 G: 3; .375 INJECTION, POWDER, LYOPHILIZED, FOR SOLUTION INTRAVENOUS at 10:11

## 2023-11-04 RX ADMIN — SODIUM BICARBONATE 50 MEQ: 84 INJECTION, SOLUTION INTRAVENOUS at 22:51

## 2023-11-04 RX ADMIN — PANTOPRAZOLE SODIUM 40 MG: 40 INJECTION, POWDER, FOR SOLUTION INTRAVENOUS at 10:11

## 2023-11-04 RX ADMIN — CALCIUM CHLORIDE, MAGNESIUM CHLORIDE, DEXTROSE MONOHYDRATE, LACTIC ACID, SODIUM CHLORIDE, SODIUM BICARBONATE AND POTASSIUM CHLORIDE 12.5 ML/KG/HR: 5.15; 2.03; 22; 5.4; 6.46; 3.09; .157 INJECTION INTRAVENOUS at 13:34

## 2023-11-04 RX ADMIN — CALCIUM CHLORIDE, MAGNESIUM CHLORIDE, SODIUM CHLORIDE, SODIUM BICARBONATE, POTASSIUM CHLORIDE AND SODIUM PHOSPHATE DIBASIC DIHYDRATE 12.5 ML/KG/HR: 3.68; 3.05; 6.34; 3.09; .314; .187 INJECTION INTRAVENOUS at 09:34

## 2023-11-04 RX ADMIN — AMIODARONE HYDROCHLORIDE 0.5 MG/MIN: 1.8 INJECTION, SOLUTION INTRAVENOUS at 10:11

## 2023-11-04 RX ADMIN — CALCIUM CHLORIDE, MAGNESIUM CHLORIDE, SODIUM CHLORIDE, SODIUM BICARBONATE, POTASSIUM CHLORIDE AND SODIUM PHOSPHATE DIBASIC DIHYDRATE 200 ML/HR: 3.68; 3.05; 6.34; 3.09; .314; .187 INJECTION INTRAVENOUS at 22:18

## 2023-11-04 RX ADMIN — CALCIUM CHLORIDE, MAGNESIUM CHLORIDE, SODIUM CHLORIDE, SODIUM BICARBONATE, POTASSIUM CHLORIDE AND SODIUM PHOSPHATE DIBASIC DIHYDRATE 12.5 ML/KG/HR: 3.68; 3.05; 6.34; 3.09; .314; .187 INJECTION INTRAVENOUS at 00:14

## 2023-11-04 RX ADMIN — QUETIAPINE FUMARATE 50 MG: 25 TABLET ORAL at 21:00

## 2023-11-04 RX ADMIN — CALCIUM CHLORIDE, MAGNESIUM CHLORIDE, DEXTROSE MONOHYDRATE, LACTIC ACID, SODIUM CHLORIDE, SODIUM BICARBONATE AND POTASSIUM CHLORIDE 12.5 ML/KG/HR: 5.15; 2.03; 22; 5.4; 6.46; 3.09; .157 INJECTION INTRAVENOUS at 09:34

## 2023-11-04 RX ADMIN — NOREPINEPHRINE BITARTRATE 0.18 MCG/KG/MIN: 1 INJECTION, SOLUTION, CONCENTRATE INTRAVENOUS at 14:39

## 2023-11-04 RX ADMIN — VANCOMYCIN HYDROCHLORIDE 750 MG: 500 INJECTION, POWDER, LYOPHILIZED, FOR SOLUTION INTRAVENOUS at 20:09

## 2023-11-04 RX ADMIN — INSULIN ASPART 1 UNITS: 100 INJECTION, SOLUTION INTRAVENOUS; SUBCUTANEOUS at 12:17

## 2023-11-04 RX ADMIN — NOREPINEPHRINE BITARTRATE 0.19 MCG/KG/MIN: 1 INJECTION, SOLUTION, CONCENTRATE INTRAVENOUS at 01:29

## 2023-11-04 RX ADMIN — CALCIUM CHLORIDE, MAGNESIUM CHLORIDE, SODIUM CHLORIDE, SODIUM BICARBONATE, POTASSIUM CHLORIDE AND SODIUM PHOSPHATE DIBASIC DIHYDRATE 12.5 ML/KG/HR: 3.68; 3.05; 6.34; 3.09; .314; .187 INJECTION INTRAVENOUS at 17:54

## 2023-11-04 RX ADMIN — PIPERACILLIN AND TAZOBACTAM 3.38 G: 3; .375 INJECTION, POWDER, LYOPHILIZED, FOR SOLUTION INTRAVENOUS at 03:05

## 2023-11-04 RX ADMIN — INSULIN ASPART 1 UNITS: 100 INJECTION, SOLUTION INTRAVENOUS; SUBCUTANEOUS at 00:14

## 2023-11-04 ASSESSMENT — ACTIVITIES OF DAILY LIVING (ADL)
ADLS_ACUITY_SCORE: 33
ADLS_ACUITY_SCORE: 33
ADLS_ACUITY_SCORE: 32
ADLS_ACUITY_SCORE: 33
ADLS_ACUITY_SCORE: 32
ADLS_ACUITY_SCORE: 33
ADLS_ACUITY_SCORE: 32
ADLS_ACUITY_SCORE: 32

## 2023-11-04 NOTE — PROGRESS NOTES
Pt has remained on HFNC 40 lpm 60%  for this shift tolerating well. Sats mid 90's  BiPAP on S/B  Vishal Ocampo  RT

## 2023-11-04 NOTE — PROGRESS NOTES
[facility]:  CRITICAL PROGRESS NOTE     Date / Time of Admission:  11/2/2023  1:27 AM    Gerardo Al  Male, 78 year old, 1945  MRN: [id number]         Key event since admission   11-2 Clio gamz placement. Transient  overnight  11-3 HD catheter placed to initiate CRRT. HFNC today             Assessment/Plan:   Gerardo Al is a 78 year old male with  history of CABG in Sept 2023 at [facility], hypertension, diabetes, elevated cholesterol, CAD, who presents to the ER with complaints of fatigue, nausea and vomiting.   He was found to have hypotension felt to be related to severe sepsis possibly from left lower extremities abscess at the sign of enous graft harvest and acute renal failure with acidemia, hyperkalemia, anemia and bilateral pleural effusion.  Cardiology was consulted in the ED and it was felt that this was most likely a septic presentation and not cardiogenic shock . In ED due to hypotension pressors were initiated patient was admitted to the ICU where a Point-of-care ultrasound was performed and CT of the chest done in the ED was reviewed.  Patient was diagnosed with cardial effusion which appeared to be localized and on both side of the heart with fixed dilated IVC concerning for tamponade.  Formal echo was obtained which could not confirm the presence of the pericardial effusion with no clear-cut sign of tamponade however.  Clio was subsequently placed which was not able to rule out some tamponade physiology.  Overnight the patient continued to need some norepinephrine and required mental oxygen and AVAPS.  This morning the patient is hemodynamically stable but still requiring pressors still in A-fib.  He was easily switched to high flow had hemodialysis catheter placed to initiate CRRT.  Neurology and CV surgery are following along and note from [facility] was contacted to let them know about the presence of the patient here and to ask them if they want the patient  transferred.      The plan is as follow     NNeurologic:   No acute issue.     Pulmonary:   Acute respiratory failure with hypoxic and and hypercapnic in the setting of decreased mixed venous O2 and limited cardiac output and shunt at the left base due to lung compression/at ectasis from the weight of the heart/pericardium structure  - HFNC in the day and AVAPS as needed   -O2 as needed  -Broad-spectrum antibiotic  -Chest x-ray today chart review showed no pulmonary edema some consolidation atelectasis at the left base trace pleural effusion     Cardiovascular:   #CAD status post CABG  #History hypertension   # Hypotension requiring norepinephrine relatively low mixed venous O2 suggestive of CO limitation possibly related to localized pericardial effusion best appreciated on CT. On echo described as moderate and posterior sign of tamponade currently no evidence of collapse of the RA or RV  but BP is BNP 58729 and CVP is high which is just volume overload which might to some extent offset the effect of the pericardial effusion on the RA and RV  # localized pericardial effusion with clear-cut evidence of tamponade physiology.  No other source of sepsis is identified have to consider the possibility that this collection might be infected  # A-fib on [facility] admission last dose 11/1.  Rate is in the high 90s low 100.  Need for rate control at this point                  (mmHg)  17 15 15 12 12 11 12 18 17  21    Pulm Artery Pressure (PAP)  39/17 39/15 39/18 38/17 40/18 38/18 40/18 47/24 45/23 48/25    Pulm Artery Pressure (PAP) Mean  26 23 25 24 25 25 25 31 30 32    Cardiac Output (CO)  9.6         5.7    Cardiac Index (CI)  4.3         2.5    Stroke Volume (SV)  96                  -norepinephrine and vasopressineas needed to maintain adequate perfusion pressure  -Hemodynamic monitoring with [place]-Anel catheter, CO lower than yesterday, pressors needs up  -holding his cardiac and hypertensive medication, discontinued  Eliquis  - restart anticoagulation for A-fib when the effect of Eliquis wears off  when no the procedure is needed  -Monitor heart rate.  For now rate control for his A-fib   -Care coordination with cardiology and cardiothoracic surgery, given increase pressors needs and lower CO repeat echo to see if more evidence for tamponade and need to drain.    heGI/:   n.p.o. in case need surgery.      Renal:   CRISTAL likely in setting of his hypotension cardiorenal component  Hyperkalemia  Monitor I/O's.  Electrolyte repletion PRN.  Avoid/limit nephrotoxic agents.  -Renal consult ED and managing CRRT     Heme/Onc:   Mild anemia no evidence of Bleeding exccept site of his [place]-Anel catheter  Monitor CBC H&H  Monitor Coag and continue to hold Eliquis.      Endocrine:   DM type 2  -With insulin protocol per ICU  -Holding oral antidiabetics     Infectious diseases:   Sepsis ? related to lower leg infection at the site of vein harvesting.  Growing gram-negative.  Clear if this is the source however.  Has consolidation on the chest x-ray no symptoms of pneumonia other possible source would be an infection of the pericardial fluid   -Follow culture results  -Continue with Zosyn and vancomycin  -Consulted general surgery for the wound infection.  No need for an I&D.  Input appreciated     Prophylaxis:  #GI: PI  #DVT: Patient was on Eliquis being held pending possible need for procedure and CRISTAL     Other: I contacted and spoke with his cardiac surgeon at [facility].  He was very appreciative of us calling and of the tendon care we were providing to the patient.  She felt sorry for the patient and did not feel it would need to transfer as they would not offer anything different at [facility]    Restrain: none    Risk Factors Present on Admission:  Clinically Significant Risk Factors        # Hyperkalemia: Highest K = 5.4 mmol/L in last 2 days, will monitor as appropriate      # Anion Gap Metabolic Acidosis: Highest Anion Gap =  "23 mmol/L in last 2 days, will monitor and treat as appropriate  # Hypoalbuminemia: Lowest albumin = 3.1 g/dL at 11/2/2023 12:55 PM, will monitor as appropriate    # Coagulation Defect: INR = 2.08 (Ref range: 0.85 - 1.15) and/or PTT = 24 Seconds (Ref range: 22 - 38 Seconds), will monitor for bleeding          # DMII: A1C = 6.7 % (Ref range: <5.7 %) within past 6 months, PRESENT ON ADMISSION  # Obesity: Estimated body mass index is 31.67 kg/m  as calculated from the following:    Height as of this encounter: 1.803 m (5' 11\").    Weight as of this encounter: 103 kg (227 lb 1.2 oz)., PRESENT ON ADMISSION               Code Status:  Full Code     This patient is considered critically ill and requires ICU level of care due respiratory failure and hypotension requiring pressors   Total Critical Care time, not including separate billable procedure time: 60 minutes.    Stef Hill MD  [facility]/Critical Care   11/04/2023   7:51 AM             Allergies/Medications:   Allergies:   No Known Allergies    OUTPATIENT Medications:  Prior to Admission medications    Medication Sig Start Date End Date Taking? Authorizing Provider   acetaminophen (TYLENOL) 500 MG tablet Take 1,000 mg by mouth every 6 hours as needed for mild pain or pain 9/30/23  Yes Reported, Patient   amLODIPine-benazepril (LOTREL) 5-20 MG capsule Take 1 capsule by mouth daily 12/22/22  Yes Reported, Patient   apixaban ANTICOAGULANT (ELIQUIS) 5 MG tablet Take 5 mg by mouth 2 times daily 10/9/23  Yes Reported, Patient   aspirin 81 MG EC tablet Take 81 mg by mouth daily 12/22/22  Yes Reported, Patient   atorvastatin (LIPITOR) 80 MG tablet Take 1 tablet by mouth at bedtime 10/24/23  Yes Reported, Patient   carvedilol (COREG) 25 MG tablet Take 25 mg by mouth 2 times daily (with meals) 12/22/22  Yes Reported, Patient   LORazepam (ATIVAN) 0.5 MG tablet Take 0.5 mg by mouth nightly as needed for anxiety or muscle spasms 10/9/23  Yes Reported, Patient " "  metFORMIN (GLUCOPHAGE XR) 500 MG 24 hr tablet Take 1,000 mg by mouth 2 times daily (with meals) 8/17/23  Yes Reported, Patient   sertraline (ZOLOFT) 25 MG tablet Take 1 tablet by mouth daily 10/25/23  Yes Reported, Patient   torsemide (DEMADEX) 20 MG tablet Take 1 tablet by mouth daily 10/11/23  Yes Reported, Patient        INPATIENT Medications: Continuous Infusions:   [Held by provider] bumetanide Stopped (11/03/23 1213)    CRRT replacement solution 12.5 mL/kg/hr (11/04/23 0426)    CRRT replacement solution 200 mL/hr (11/03/23 1118)    CRRT replacement solution 12.5 mL/kg/hr (11/04/23 0424)    DOBUTamine Stopped (11/03/23 0900)    EPINEPHrine      - MEDICATION INSTRUCTIONS -      norepinephrine 0.22 mcg/kg/min (11/04/23 0604)    - MEDICATION INSTRUCTIONS -      vasopressin 2.4 Units/hr (11/04/23 0447)       INPATIENT Medications: Scheduled Medications:   albumin human  25 g Intravenous Once    [START ON 11/7/2023] influenza vac high-dose quad  0.7 mL Intramuscular Prior to discharge    insulin aspart  1-6 Units Subcutaneous Q4H    pantoprazole  40 mg Intravenous Q12H    piperacillin-tazobactam  3.375 g Intravenous Q8H    sodium chloride (PF)  10-40 mL Intracatheter Q7 Days    vancomycin  750 mg Intravenous Q12H        Subjective and events   Increase norepinephrine needs overnight. On CRRT. Was supposed to go surgery yesterday but did not go. Did not get any communication from surgery so unclear what is the plan.            Objective:   Vitals:  /64   Pulse 110   Temp 98  F (36.7  C) (Oral)   Resp 14   Ht 1.803 m (5' 11\")   Wt 103 kg (227 lb 1.2 oz)   SpO2 95%   BMI 31.67 kg/m    Vent: HHFNC  FiO2 (%): 60 %  Resp: 14        GEN: No acute distress,   HEENT: on AVAPS  NECK: Supple.  NV distended  PULM: Non-labored breathing.  No use of accessory muscles.  Clear to ausculation clearly with decreased breath sounds at the L base  CVS: A-fib normal S1, S2.  No rubs, murmurs, or gallops.    ABDOMEN: " Normoactive bowel sounds.  Non-tender to palpation.  Non-distended.    EXTREMITES:  No clubbing, cyanosis, lower extremity edema.  Below the left knee at the site of vein harvesting the patient has a wound with e localized welling and redness.  No surrounding cellulitis  NEURO:  Awake.  Oriented to person, nonfocal.    Intake/Output: I/O last 3 completed shifts:  In: 2982.76 [P.O.:360; I.V.:2622.76]  Out: 3171.6 [Urine:162; Other:2999.6; Blood:10]        Pertinent Studies:     I have personally reviewed the following data over the past 24 hrs:    8.9  \   7.9 (L)   / 334     139 100 50.1 (H) /  127 (H)   4.6 20 (L) 5.75 (H) \     ALT: 13 AST: 11 AP: 35 (L) TBILI: 0.3   ALB: 3.3 (L) TOT PROTEIN: 5.8 (L) LIPASE: N/A     INR:  2.08 (H) PTT:  24   D-dimer:  N/A Fibrinogen:  N/A       Imaging results reviewed over the past 24 hrs:   Recent Results (from the past 24 hour(s))   IR CVC Tunnel Placement > 5 Yrs of Age    Narrative    [place] [facility]  LOCATION: [facility]  DATE: 11/3/2023    PROCEDURE:TUNNELED DIALYSIS CATHETER PLACEMENT    INTERVENTIONAL RADIOLOGIST: Sebastian Cintron MD.    INDICATION: 78-year-old male with acute renal failure following CABG presents for tunneled dialysis catheter placement.    CONSENT: The risks, benefits and alternatives of tunneled dialysis catheter placement were discussed with the patient  in detail. All questions were answered. Informed consent was given to proceed with the procedure.    SEDATION: None. IV fentanyl was administered for patient comfort.    CONTRAST: None  ANTIBIOTICS: None. Antibiotics per ICU.  ADDITIONAL MEDICATIONS: None.    FLUOROSCOPIC TIME: 4.8 minutes.  RADIATION DOSE: Air Kerma: 62 mGy.    COMPLICATIONS: No immediate complications.    STERILE BARRIER TECHNIQUE: Maximum sterile barrier technique was used. Cutaneous antisepsis was performed at the operative site with application of 2% chlorhexidine and large sterile drape. Prior to the  procedure, the  and assistant performed   hand hygiene and wore hat, mask, sterile gown, and sterile gloves during the entire procedure.    PROCEDURE:     Using real-time ultrasound guidance, the left internal jugular vein was punctured. A subcutaneous tunnel was created requiring a second incision. Using this access, a 15.5 Argentine, 27 cm tip to cuff Duraflow dialysis catheter was advanced until the tip   was in the proximal cavoatrial junction.  The catheter was tested and found to flush and aspirate appropriately.  The catheter was heparinized and secured to the skin.    FINDINGS:  Ultrasound shows an anechoic and compressible jugular vein. A permanent image was stored.      At the completion of the study, the new catheter tip lies in the proximal right atrium.      Impression    IMPRESSION:    Tunneled dialysis catheter placement, as discussed above.   XR Chest Port 1 View    Narrative    EXAM: XR CHEST PORT 1 VIEW  LOCATION: [facility]  DATE: 11/3/2023    INDICATION: increased o2 needs  COMPARISON: Chest x-ray and CT CAP 11/02/2023      Impression    IMPRESSION: Right costophrenic angle is clipped.    Poststernotomy changes.     There is a new right IJ [place]-Anel catheter with its tip in the right main pulmonary artery.    There is a new left IJ dialysis catheter with its tip in the upper right atrium.    Right upper extremity PICC line catheter tip is at adjacent to the dialysis catheter. Poststernotomy changes. No pneumothorax. Cardiac silhouette remains enlarged (known, large hemorrhagic pericardial effusion). Small, left pleural effusion. No signs of   failure.     Echo 11/2/2023  Interpretation Summary  The left ventricle is normal in size with moderate concentric left ventricular  hypertrophy.  Left ventricular function is normal.The ejection fraction is 60-65%.  Normal right ventricle size and systolic function.  The left atrium is mildly dilated.  No hemodynamically  significant valvular abnormalities on 2D or color flow  imaging.  There is a moderate posterior pericardial effusion with no echocardiographic  indications of cardiac tamponade.  IVC diameter >2.1 cm collapsing <50% with sniff suggests a high RA pressure  estimated at 15 mmHg or greater.     There is no comparison study available.    Recent Labs   Lab 11/04/23  0652 11/04/23  0400 11/04/23  0324 11/04/23  0005 11/03/23 2011 11/03/23 2010 11/03/23  1601 11/03/23  1550 11/03/23  1322 11/03/23  0746 11/03/23  0526 11/02/23  0816 11/02/23  0747   WBC  --   --  8.9  --   --   --  11.0  --   --   --  9.4  --  9.3   HGB 7.9*  --  7.8*  --   --   --  7.7*  --   --   --  7.6*   < > 7.3*   MCV  --   --  93  --   --   --  93  --   --   --  92  --  93   PLT  --   --  334  --   --   --  385  --   --   --  376  --  382   INR  --   --  2.08*  --   --   --  2.37*  --   --   --  2.69*  --  2.90*   NA  --   --  139  --  139  --   --   --  138  --  135   < > 134*   POTASSIUM  --   --  4.6  --  4.8  --   --   --  4.9  --  4.9   < > 5.1   CHLORIDE  --   --  100  --  99  --   --   --  94*  --  93*   < > 91*   CO2  --   --  20*  --  20*  --   --   --  23  --  21*   < > 19*   BUN  --   --  50.1*  --  68.1*  --   --   --  86.3*  --  94.0*   < > 96.6*   CR  --   --  5.75*  --  7.37*  --   --   --  9.62*  --  9.78*   < > 8.40*   ANIONGAP  --   --  19*  --  20*  --   --   --  21*  --  21*   < > 24*   RIKA  --   --  9.0  --  8.9  --   --   --  8.1*  --  7.9*   < > 7.9*   GLC  --  127* 132* 144* 156*   < >  --    < > 151*   < > 171*   < > 304*   ALBUMIN  --   --  3.3*  --  3.3*  --   --   --  3.2*  --   --    < > 3.2*   PROTTOTAL  --   --  5.8*  --   --   --   --   --   --   --   --   --  5.6*   BILITOTAL  --   --  0.3  --   --   --   --   --   --   --   --   --  0.3   ALKPHOS  --   --  35*  --   --   --   --   --   --   --   --   --  42   ALT  --   --  13  --   --   --   --   --   --   --   --   --  15   AST  --   --  11  --   --   --   --    --   --   --   --   --  7    < > = values in this interval not displayed.       Most Recent 3 CBC's:  Recent Labs   Lab Test 11/04/23  0652 11/04/23 0324 11/03/23  1601 11/03/23  0526   WBC  --  8.9 11.0 9.4   HGB 7.9* 7.8* 7.7* 7.6*   MCV  --  93 93 92   PLT  --  334 385 376     Most Recent 3 BMP's:  Recent Labs   Lab Test 11/04/23  0400 11/04/23 0324 11/04/23  0005 11/03/23 2011 11/03/23  1550 11/03/23  1322   NA  --  139  --  139  --  138   POTASSIUM  --  4.6  --  4.8  --  4.9   CHLORIDE  --  100  --  99  --  94*   CO2  --  20*  --  20*  --  23   BUN  --  50.1*  --  68.1*  --  86.3*   CR  --  5.75*  --  7.37*  --  9.62*   ANIONGAP  --  19*  --  20*  --  21*   RIKA  --  9.0  --  8.9  --  8.1*   * 132* 144* 156*   < > 151*    < > = values in this interval not displayed.     Most Recent 2 LFT's:  Recent Labs   Lab Test 11/04/23 0324 11/02/23  0747   AST 11 7   ALT 13 15   ALKPHOS 35* 42   BILITOTAL 0.3 0.3     Anticoagulation Dose History          Latest Ref Rng & Units 11/2/2023 11/3/2023   Recent Dosing and Labs   INR 0.85 - 1.15 2.90  2.83  2.69      Most Recent 3 Creatinines:  Recent Labs   Lab Test 11/04/23 0324 11/03/23 2011 11/03/23  1322   CR 5.75* 7.37* 9.62*     Most Recent 6 Bacteria Isolates From Any Culture (See EPIC Reports for Culture Details):No lab results found.  Most Recent ABG:  Recent Labs   Lab Test 11/04/23  0653   PH 7.29*   PO2 102*   PCO2 40   HCO3 19*   EDWIN -7.8     Most Recent Anemia Panel:  Recent Labs   Lab Test 11/04/23  0652 11/04/23  0324   WBC  --  8.9   HGB 7.9* 7.8*   HCT  --  25.1*   MCV  --  93   PLT  --  334     NOTE: Data reviewed over the past 24 hrs contributes toward MDM complexity      Stef Hill MD

## 2023-11-04 NOTE — PLAN OF CARE
[facility] - ICU    RN Progress Note:            Pertinent Assessments:      Please refer to flowsheet rows for full assessment     Patient remains alert and oriented. Requested sleep med and PRN was given. This morning went in to wash patient and he seemed very drowsy. Patient's sats also drop with repositioning and large turns. Continued to go up on vasopressors through out the night to maintain MAP goal.            Key Events - This Shift:   Increasing vasopressor needs   Changed CRRT filter due to clotting at 0500.  Patient seems more drowsy and deconditioned.   Pulled 50cc off with CRRT most of the shift but with the increase in pressors ICU MD wanted to stop pulling and just go back to net zero. One dose of albumin was also ordered.  Kidney function tests are improving and no electrolyte replacement was needed.                  Barriers to Discharge / Downgrade:     Vasopressors, CRRT, and increased oxygenation needs.         Point of Contact Update Yes, Francine wife with plan of care.      Tamika Albrecht RN

## 2023-11-04 NOTE — ANESTHESIA PROCEDURE NOTES
Arterial Line Procedure Note    Pre-Procedure   Staff -        Anesthesiologist:  Eilas Shay MD       Performed By: anesthesiologist       Location: ICU       Pre-Anesthestic Checklist: patient identified, IV checked, risks and benefits discussed, informed consent, monitors and equipment checked, pre-op evaluation and at physician/surgeon's request  Timeout:       Correct Patient: Yes        Correct Procedure: Yes        Correct Site: Yes        Correct Position: Yes   Line Placement:   This line was placed Pre Induction starting at 11/3/2023 5:52 PM and ending at 11/3/2023 5:55 PM  Procedure   Procedure: arterial line       Diagnosis: Atherosclerosis       Laterality: left       Insertion Site: radial.  Sterile Prep        Standard elements of sterile barrier followed       Skin prep: Chloraprep  Insertion/Injection        Technique: ultrasound guided and Seldinger Technique        1. Ultrasound was used to evaluate the access site.       2. Artery evaluated via ultrasound for patency/adequacy.       3. Using real-time ultrasound the needle/catheter was observed entering the artery/vein.       Catheter Type/Size: 20 G, 12 cm  Narrative         Secured by: suture       Tegaderm dressing used.       Complications: None apparent,        Arterial waveform: Yes    Comments:  US used. No image saved

## 2023-11-04 NOTE — PROGRESS NOTES
Renal progress note  CC:CRISTAL, anuria  Assessment and Plan:  78 year old male with hx of CKD 3, HTN , DM2, PAD, hx of Atrial fib on eliquis for AC, BPH , CAD with sp CABG 9/26/2023 after angiogram 9/22 with 3v disease cb mild CRISTAL at the time of discharge.  Nephrology consulted for severe CRISTAL with oligoanuric state     CRISTAL with anuria  CKD 3 baseline  Baseline creatinine 1.2-1.3, uptrending to 1.5s at the time of discharge, creatinine was 1.35 on 10/9/2023 at the time of his follow-up with mild hyperkalemia at the time he was resumed on Lasix  Patient had repeat labs with creatinine acutely elevated to 7.85 on 10/31/2023, further increased to 10.58 with significant azotemia  on 11/2/2023  UA turbid appearing likely consistent with ATN  CT imaging with evidence of atherosclerotic changes otherwise nonobstructive renal stones and some evidence of cystitis no hydronephrosis  --> With initial fluid resuscitation blood pressure improvement on pressors the patient's creatinine has come down from 10.6 to 9 although the patient does remain anuric  --> With ongoing concern for pericardial effusion and mild hypervolemia stopped the maintenance bicarb and use bicarb pushes to treat acidemia  --> no response to bumex inf , discontinued  --> 11/3 started on CRRT dose 25ml/kg/hr with slow UF goal ~1-1.5 in 24hr, mild hypoTN this am and recieved albumin , plan to resume gentle UF as able  --> Patient's baseline is very minimal CKD hoping to have good recovery with stable hemodynamics, hard to give prognosis with likely >4wks of low BP and ACEi use  --> follow labs per CRRT protocols  --> still with oozing , will follow with ongoing clearance , with full dose eliquis and likely uremic plt dysfunction , hoping to settle in 24-48hrs  --> will likely need Sx for the pericardial effusion as well, high risk for bleed with ongoing issues , will defer timing to CTSx/Cardiology    Hyperkalemia, stable ~4s  Likely secondary to  acidosis and CRISTAL  --> Low-dose Bumex inf wo response , now discontinue  On K2 for dialysate and K 4 for replacement fluids  --> UF goals as above with CRRT     Hyponatremia  Improving with IV fluids, stopped     Hypocalcemia  One-time dose of 2 g calcium gluconate given  Follow on serial calciums  Replace per CRRT protocols     HTN hx  PTA on Amlodipine Benazepril and coreg  Held HTN meds   On vaso and levo for BP support  hypervolemia with anuria  --> will discontinue bicarb with CRRT initiation  --> moderate pericardial effusion wo temponade physiology but likely he is preload dependant and will like to avoid excessive UF sp volume resuscitation  --> support BP with pressers  --> management per ICU/card     Anemia acute on chronic  Hemoglobin 7s  Last hemoglobin was 9.4 on 10/9/2023  No evidence of bleed possibly inflammatory anemia/post CABG  Baseline is around 12  --> We will check iron studies would likely benefit from transfusion if hemoglobin is around 8 or lower with recent CABG  --> We will defer these plans to cardiology and ICU     Coronary disease s/p CABG x3 on 9/26/2023  History of atrial fibrillation s/p cardioversion in August 2023  On AC with Eliquis currently held  Loculated pericardial effusion concerns for tamponade physiology  Last echo with EF of 52%  Echo showing moderate-sized pericardial effusion at this time not having a tamponade physiology but does remain concerning  CT surgery following  May need possible evacuation not a good window for pericardial drain will possibly need a pericardial window, once hemodynamics /bleed stablizes  Follow on CV surgery recs  Bolus albumin if hypotensive     Possible septic shock with leg cellulitis/abscess  General surgery completed bedside I&D  On Abx ; Vanco and zosyn  Blood Cx pending  Adequately resuscitated , now off bicarb with risk of hypervolemia   Bolus albumin if hypotensive     Acute respiratory failure in the setting of CRISTAL and  hypervolemia  Improved with oxygen     DM2  Insulin management per ICU       Thank you for the consultation we will follow  Johny Guevara MD  [facility]  [phone]      Subjective  Seen at bedside , now on high flow   CRRT tolerated ok , BP was lower so had UF held and now at I=O   No new events wrt pericardial effusion , hemodynamics stabilized with albumin  Remains on vaso and levo  Will follow on hemodynamics with UF  OK to remove barragan    Objective    Vital signs in last 24 hours  Temp:  [97.3  F (36.3  C)-98.6  F (37  C)] 97.4  F (36.3  C)  Pulse:  [] 97  Resp:  [12-29] 16  BP: ()/(51-67) 107/64  MAP:  [55 mmHg-74 mmHg] 63 mmHg  Arterial Line BP: ()/(43-60) 98/49  FiO2 (%):  [40 %-60 %] 60 %  SpO2:  [88 %-100 %] 93 %  Weight:   [unfilled]    Intake/Output last 3 shifts  I/O last 3 completed shifts:  In: 2982.76 [P.O.:360; I.V.:2622.76]  Out: 3171.6 [Urine:162; Other:2999.6; Blood:10]  Intake/Output this shift:  I/O this shift:  In: 965.8 [P.O.:100; I.V.:865.8]  Out: 959.1 [Urine:43; Other:916.1]    Physical Exam  Alert/awake, NAD  CV: RRR without murmur or rub  Lung: clear and equal; no extra sounds  Ab: soft and NT; not distended; normal bs  Ext: + edema and well perfused  Skin; no rash  Barragan +    Pertinent Labs   Lab Results   Component Value Date    WBC 8.9 11/04/2023    HGB 7.9 (L) 11/04/2023    HCT 25.1 (L) 11/04/2023    MCV 93 11/04/2023     11/04/2023     Lab Results   Component Value Date    BUN 50.1 (H) 11/04/2023     11/04/2023    CO2 20 (L) 11/04/2023       Lab Results   Component Value Date    ALBUMIN 3.3 (L) 11/04/2023     Lab Results   Component Value Date    PHOS 6.9 (H) 11/04/2023     I reviewed all lab results  Johny Guevara MD

## 2023-11-04 NOTE — PROGRESS NOTES
Respiratory Care Note:    Pt. used BiPAP intermittently during this shift, (AVAPS 16/600/ +5/ 40%), breaks on HFNC 40L 60-65% sats 92-95%, BS diminished, RR 18-22. RT following    Kandice Batista RT

## 2023-11-04 NOTE — PROGRESS NOTES
Cardiology Progress Note    Assessment/Plan:  1.  Moderate-sized pericardial effusion, predominantly posterior in location.  No evidence of hemodynamic compromise based on echo findings 2 days ago as well as right-sided pressures and cardiac output which suggest cardiac output with low SVR.  Recommended holding on surgery for pericardial effusion last night due to stable hemodynamics and ongoing oozing likely due to residual effects of Eliquis as well as poor platelet function due to uremia.  2.  Hypotension, likely due to sepsis given high cardiac outputs and low SVR.  Thus far, no evidence of bacteremia.  Culture from leg wound growing Klebsiella pneumonia.  Currently on appropriate antibiotic therapy.  3.  Acute renal failure, etiology unclear but may be related to prolonged hypotension.  Started on CRRT yesterday which he appears to be tolerating well.  4.  Recurrent atrial fibrillation with mildly rapid ventricular response, currently untreated.  We will begin low-dose IV amiodarone drip to help with rate control.  5.  CAD, status post CABG September 2023    Primary cardiologist: [facility] cardiology    Subjective:  Patient currently resting comfortably on BiPAP.  Denies any discomfort or feeling of shortness of breath.     [START ON 11/7/2023] influenza vac high-dose quad  0.7 mL Intramuscular Prior to discharge    insulin aspart  1-6 Units Subcutaneous Q4H    pantoprazole  40 mg Intravenous Q12H    piperacillin-tazobactam  3.375 g Intravenous Q8H    sodium chloride (PF)  10-40 mL Intracatheter Q7 Days    vancomycin  750 mg Intravenous Q12H         Objective:   Vital signs in last 24 hours:  Temp:  [97.2  F (36.2  C)-98.6  F (37  C)] 97.4  F (36.3  C)  Pulse:  [] 115  Resp:  [12-29] 16  BP: ()/(42-67) 107/64  MAP:  [55 mmHg-74 mmHg] 70 mmHg  Arterial Line BP: ()/(43-60) 108/55  FiO2 (%):  [40 %-60 %] 60 %  SpO2:  [88 %-100 %] 95 %  Weight:        Review of  "Systems:  Negative    Physical Exam:  General appearance: alert, cooperative, no distress   Head: Normocephalic, without obvious abnormality, atraumatic  Neck: no JVD   Lungs: clear to auscultation anteriorly bilaterally.  Diminished breath sounds at the bases  Heart: Rapid, irregularly irregular rhythm.  S1, S2 normal.  No murmur or gallop  Extremities: Trace edema in the lower extremities    Cardiographics (personally reviewed):   Telemetry demonstrates atrial fibrillation with rapid ventricular response    Imaging (personally reviewed):   Limited echo pending.    Lab Results (personally reviewed):     No results for input(s): \"CHOL\", \"HDL\", \"LDL\", \"TRIG\", \"CHOLHDLRATIO\" in the last 48325 hours.  No results for input(s): \"LDL\" in the last 06529 hours.  Recent Labs   Lab Test 11/04/23  0754 11/04/23  0400 11/04/23 0324   NA  --   --  139   POTASSIUM  --   --  4.6   CHLORIDE  --   --  100   CO2  --   --  20*   *   < > 132*   BUN  --   --  50.1*   CR  --   --  5.75*   GFRESTIMATED  --   --  9*   RIKA  --   --  9.0    < > = values in this interval not displayed.     Recent Labs   Lab Test 11/04/23 0324 11/03/23 2011 11/03/23  1322   CR 5.75* 7.37* 9.62*     Recent Labs   Lab Test 11/02/23  1255   A1C 6.7*          Recent Labs   Lab Test 11/04/23 0652 11/04/23 0324   WBC  --  8.9   HGB 7.9* 7.8*   HCT  --  25.1*   MCV  --  93   PLT  --  334     Recent Labs   Lab Test 11/04/23  0652 11/04/23 0324 11/03/23  1601   HGB 7.9* 7.8* 7.7*    No results for input(s): \"TROPONINI\" in the last 20686 hours.  Recent Labs   Lab Test 11/03/23  0526 11/02/23 0139   NTBNPI 14,005* 11,250*     Recent Labs   Lab Test 11/02/23 0139   TSH 2.85     Recent Labs   Lab Test 11/04/23 0324 11/03/23  1601 11/03/23  0526   INR 2.08* 2.37* 2.69*          Adry Hansen MD          "

## 2023-11-04 NOTE — PLAN OF CARE
[facility] - ICU    RN Progress Note:            Pertinent Assessments:      Please refer to flowsheet rows for full assessment     Pt remains alert and oriented. Switched between hiflo and bipap throughout the day dependent on comfort and desire to rest. Pt remains on pressors and able to wean down.            Key Events - This Shift:       CRRT continues and gradually moved up to pulling 75cc off an hour without increasing pressors. Pt continues to ooze from internal jugular and subclavian line sites. Dressings changed multiple times and MD's aware. Cano pulled and will  bladder scan q shift.                      Barriers to Discharge / Downgrade:     Pt remains on CRRT, pressors, and increased oxygen needs.         Point of Contact Update YES-OR-NO: Yes  If No, reason: NA  Name:Wife Francine  Phone Number:see chart  Summary of Conversation: updated about plans for the day and pt status.

## 2023-11-04 NOTE — PROGRESS NOTES
Care Management Follow Up    Length of Stay (days): 2    Expected Discharge Date: 11/08/2023     Concerns to be Addressed:     Care Progression  Patient plan of care discussed at interdisciplinary rounds: Yes    Anticipated Discharge Disposition:  TBD     Anticipated Discharge Services:  NA  Anticipated Discharge DME:  NA    Patient/family educated on Medicare website which has current facility and service quality ratings:  NA  Education Provided on the Discharge Plan:  NA  Patient/Family in Agreement with the Plan:  NA    Referrals Placed by CM/SW:  NA  Private pay costs discussed: Not applicable    Additional Information:  Discussed patient in ICU rounds. Per RN patient weak. Was on high flow transitioned to Bipap. On pressors.     Social HX: Patient from home with spouse. Independent at baseline, no Services, no DME. Recently hospitalized for a cardiac surgery.     CM will continue to follow care progression and aide in discharge planning as needed.     Nancy Orozco RN

## 2023-11-05 ENCOUNTER — APPOINTMENT (OUTPATIENT)
Dept: RADIOLOGY | Facility: HOSPITAL | Age: 78
DRG: 856 | End: 2023-11-05
Attending: INTERNAL MEDICINE
Payer: COMMERCIAL

## 2023-11-05 ENCOUNTER — APPOINTMENT (OUTPATIENT)
Dept: CARDIOLOGY | Facility: HOSPITAL | Age: 78
DRG: 856 | End: 2023-11-05
Attending: INTERNAL MEDICINE
Payer: COMMERCIAL

## 2023-11-05 PROBLEM — Z98.890 S/P PERICARDIAL WINDOW CREATION: Status: ACTIVE | Noted: 2023-11-05

## 2023-11-05 LAB
ALBUMIN SERPL BCG-MCNC: 2.9 G/DL (ref 3.5–5.2)
ALBUMIN SERPL BCG-MCNC: 3 G/DL (ref 3.5–5.2)
ALBUMIN SERPL BCG-MCNC: 3 G/DL (ref 3.5–5.2)
ALBUMIN SERPL BCG-MCNC: 3.1 G/DL (ref 3.5–5.2)
ALBUMIN SERPL BCG-MCNC: 3.2 G/DL (ref 3.5–5.2)
ALBUMIN SERPL BCG-MCNC: 3.3 G/DL (ref 3.5–5.2)
ALBUMIN SERPL BCG-MCNC: 3.4 G/DL (ref 3.5–5.2)
ALBUMIN SERPL BCG-MCNC: 3.4 G/DL (ref 3.5–5.2)
ALLEN'S TEST: ABNORMAL
ALP SERPL-CCNC: 29 U/L (ref 40–129)
ALP SERPL-CCNC: 30 U/L (ref 40–129)
ALP SERPL-CCNC: 32 U/L (ref 40–129)
ALP SERPL-CCNC: 33 U/L (ref 40–129)
ALT SERPL W P-5'-P-CCNC: 174 U/L (ref 0–70)
ALT SERPL W P-5'-P-CCNC: 216 U/L (ref 0–70)
ALT SERPL W P-5'-P-CCNC: 216 U/L (ref 0–70)
ALT SERPL W P-5'-P-CCNC: 219 U/L (ref 0–70)
ALT SERPL W P-5'-P-CCNC: 231 U/L (ref 0–70)
ALT SERPL W P-5'-P-CCNC: 241 U/L (ref 0–70)
ANION GAP SERPL CALCULATED.3IONS-SCNC: 22 MMOL/L (ref 7–15)
ANION GAP SERPL CALCULATED.3IONS-SCNC: 23 MMOL/L (ref 7–15)
ANION GAP SERPL CALCULATED.3IONS-SCNC: 24 MMOL/L (ref 7–15)
ANION GAP SERPL CALCULATED.3IONS-SCNC: 25 MMOL/L (ref 7–15)
APTT PPP: 33 SECONDS (ref 22–38)
APTT PPP: 37 SECONDS (ref 22–38)
APTT PPP: 73 SECONDS (ref 22–38)
AST SERPL W P-5'-P-CCNC: 186 U/L (ref 0–45)
AST SERPL W P-5'-P-CCNC: 186 U/L (ref 0–45)
AST SERPL W P-5'-P-CCNC: 225 U/L (ref 0–45)
AST SERPL W P-5'-P-CCNC: 258 U/L (ref 0–45)
AST SERPL W P-5'-P-CCNC: 275 U/L (ref 0–45)
AST SERPL W P-5'-P-CCNC: 343 U/L (ref 0–45)
BASE EXCESS BLDA CALC-SCNC: -10 MMOL/L
BASE EXCESS BLDA CALC-SCNC: -10.4 MMOL/L
BASE EXCESS BLDA CALC-SCNC: -10.5 MMOL/L
BASE EXCESS BLDA CALC-SCNC: -13 MMOL/L
BASE EXCESS BLDA CALC-SCNC: -7.8 MMOL/L
BASE EXCESS BLDA CALC-SCNC: -9.8 MMOL/L
BASE EXCESS BLDV CALC-SCNC: -8.9 MMOL/L
BASOPHILS # BLD AUTO: 0 10E3/UL (ref 0–0.2)
BASOPHILS NFR BLD AUTO: 0 %
BILIRUB DIRECT SERPL-MCNC: 0.26 MG/DL (ref 0–0.3)
BILIRUB DIRECT SERPL-MCNC: 0.28 MG/DL (ref 0–0.3)
BILIRUB DIRECT SERPL-MCNC: 0.44 MG/DL (ref 0–0.3)
BILIRUB SERPL-MCNC: 0.5 MG/DL
BILIRUB SERPL-MCNC: 0.5 MG/DL
BILIRUB SERPL-MCNC: 0.6 MG/DL
BILIRUB SERPL-MCNC: 0.9 MG/DL
BILIRUB SERPL-MCNC: 0.9 MG/DL
BILIRUB SERPL-MCNC: 1 MG/DL
BLD PROD TYP BPU: NORMAL
BLOOD COMPONENT TYPE: NORMAL
BUN SERPL-MCNC: 17.5 MG/DL (ref 8–23)
BUN SERPL-MCNC: 17.5 MG/DL (ref 8–23)
BUN SERPL-MCNC: 20.3 MG/DL (ref 8–23)
BUN SERPL-MCNC: 20.8 MG/DL (ref 8–23)
BUN SERPL-MCNC: 22.6 MG/DL (ref 8–23)
BUN SERPL-MCNC: 22.7 MG/DL (ref 8–23)
BUN SERPL-MCNC: 25.6 MG/DL (ref 8–23)
BUN SERPL-MCNC: 29.3 MG/DL (ref 8–23)
CA-I BLD-MCNC: 1.19 MMOL/L (ref 1.11–1.3)
CALCIUM SERPL-MCNC: 8.5 MG/DL (ref 8.8–10.2)
CALCIUM SERPL-MCNC: 8.7 MG/DL (ref 8.8–10.2)
CALCIUM SERPL-MCNC: 8.7 MG/DL (ref 8.8–10.2)
CALCIUM SERPL-MCNC: 8.9 MG/DL (ref 8.8–10.2)
CALCIUM SERPL-MCNC: 9.3 MG/DL (ref 8.8–10.2)
CALCIUM SERPL-MCNC: 9.3 MG/DL (ref 8.8–10.2)
CALCIUM, IONIZED MEASURED: 1.11 MMOL/L (ref 1.11–1.3)
CALCIUM, IONIZED MEASURED: 1.2 MMOL/L (ref 1.11–1.3)
CALCIUM, IONIZED MEASURED: 1.23 MMOL/L (ref 1.11–1.3)
CALCIUM, IONIZED MEASURED: 1.23 MMOL/L (ref 1.11–1.3)
CHLORIDE SERPL-SCNC: 100 MMOL/L (ref 98–107)
CHLORIDE SERPL-SCNC: 101 MMOL/L (ref 98–107)
CHLORIDE SERPL-SCNC: 102 MMOL/L (ref 98–107)
CHLORIDE SERPL-SCNC: 103 MMOL/L (ref 98–107)
CODING SYSTEM: NORMAL
COHGB MFR BLD: 100 % (ref 95–96)
COHGB MFR BLD: 100 % (ref 95–96)
COHGB MFR BLD: 96.7 % (ref 95–96)
COHGB MFR BLD: 97.6 % (ref 95–96)
COHGB MFR BLD: 98.4 % (ref 95–96)
COHGB MFR BLD: 99.2 % (ref 95–96)
CREAT SERPL-MCNC: 1.97 MG/DL (ref 0.67–1.17)
CREAT SERPL-MCNC: 1.97 MG/DL (ref 0.67–1.17)
CREAT SERPL-MCNC: 2.25 MG/DL (ref 0.67–1.17)
CREAT SERPL-MCNC: 2.32 MG/DL (ref 0.67–1.17)
CREAT SERPL-MCNC: 2.61 MG/DL (ref 0.67–1.17)
CREAT SERPL-MCNC: 2.68 MG/DL (ref 0.67–1.17)
CREAT SERPL-MCNC: 2.88 MG/DL (ref 0.67–1.17)
CREAT SERPL-MCNC: 3.38 MG/DL (ref 0.67–1.17)
CROSSMATCH: NORMAL
DEPRECATED HCO3 PLAS-SCNC: 14 MMOL/L (ref 22–29)
DEPRECATED HCO3 PLAS-SCNC: 14 MMOL/L (ref 22–29)
DEPRECATED HCO3 PLAS-SCNC: 15 MMOL/L (ref 22–29)
DEPRECATED HCO3 PLAS-SCNC: 15 MMOL/L (ref 22–29)
DEPRECATED HCO3 PLAS-SCNC: 16 MMOL/L (ref 22–29)
DEPRECATED HCO3 PLAS-SCNC: 17 MMOL/L (ref 22–29)
EGFRCR SERPLBLD CKD-EPI 2021: 18 ML/MIN/1.73M2
EGFRCR SERPLBLD CKD-EPI 2021: 22 ML/MIN/1.73M2
EGFRCR SERPLBLD CKD-EPI 2021: 24 ML/MIN/1.73M2
EGFRCR SERPLBLD CKD-EPI 2021: 24 ML/MIN/1.73M2
EGFRCR SERPLBLD CKD-EPI 2021: 28 ML/MIN/1.73M2
EGFRCR SERPLBLD CKD-EPI 2021: 29 ML/MIN/1.73M2
EGFRCR SERPLBLD CKD-EPI 2021: 34 ML/MIN/1.73M2
EGFRCR SERPLBLD CKD-EPI 2021: 34 ML/MIN/1.73M2
EOSINOPHIL # BLD AUTO: 0.2 10E3/UL (ref 0–0.7)
EOSINOPHIL NFR BLD AUTO: 2 %
ERYTHROCYTE [DISTWIDTH] IN BLOOD BY AUTOMATED COUNT: 15.8 % (ref 10–15)
ERYTHROCYTE [DISTWIDTH] IN BLOOD BY AUTOMATED COUNT: 15.9 % (ref 10–15)
ERYTHROCYTE [DISTWIDTH] IN BLOOD BY AUTOMATED COUNT: 15.9 % (ref 10–15)
ERYTHROCYTE [DISTWIDTH] IN BLOOD BY AUTOMATED COUNT: 16 % (ref 10–15)
ERYTHROCYTE [DISTWIDTH] IN BLOOD BY AUTOMATED COUNT: 16 % (ref 10–15)
ERYTHROCYTE [DISTWIDTH] IN BLOOD BY AUTOMATED COUNT: 16.3 % (ref 10–15)
FIBRINOGEN PPP-MCNC: 419 MG/DL (ref 170–490)
GLUCOSE BLD-MCNC: 150 MG/DL (ref 70–125)
GLUCOSE BLDC GLUCOMTR-MCNC: 136 MG/DL (ref 70–99)
GLUCOSE BLDC GLUCOMTR-MCNC: 137 MG/DL (ref 70–99)
GLUCOSE BLDC GLUCOMTR-MCNC: 139 MG/DL (ref 70–99)
GLUCOSE BLDC GLUCOMTR-MCNC: 149 MG/DL (ref 70–99)
GLUCOSE BLDC GLUCOMTR-MCNC: 157 MG/DL (ref 70–99)
GLUCOSE SERPL-MCNC: 125 MG/DL (ref 70–99)
GLUCOSE SERPL-MCNC: 125 MG/DL (ref 70–99)
GLUCOSE SERPL-MCNC: 139 MG/DL (ref 70–99)
GLUCOSE SERPL-MCNC: 144 MG/DL (ref 70–99)
GLUCOSE SERPL-MCNC: 147 MG/DL (ref 70–99)
GLUCOSE SERPL-MCNC: 148 MG/DL (ref 70–99)
GLUCOSE SERPL-MCNC: 150 MG/DL (ref 70–99)
GLUCOSE SERPL-MCNC: 154 MG/DL (ref 70–99)
HCO3 BLD-SCNC: 13 MMOL/L (ref 23–29)
HCO3 BLD-SCNC: 14 MMOL/L (ref 23–29)
HCO3 BLD-SCNC: 15 MMOL/L (ref 23–29)
HCO3 BLD-SCNC: 16 MMOL/L (ref 23–29)
HCO3 BLD-SCNC: 16 MMOL/L (ref 23–29)
HCO3 BLDA-SCNC: 17 MMOL/L (ref 23–29)
HCO3 BLDV-SCNC: 18 MMOL/L (ref 24–30)
HCT VFR BLD AUTO: 23.7 % (ref 40–53)
HCT VFR BLD AUTO: 26.2 % (ref 40–53)
HCT VFR BLD AUTO: 26.4 % (ref 40–53)
HCT VFR BLD AUTO: 26.7 % (ref 40–53)
HCT VFR BLD AUTO: 28 % (ref 40–53)
HCT VFR BLD AUTO: 28.4 % (ref 40–53)
HGB BLD-MCNC: 7.3 G/DL (ref 13.3–17.7)
HGB BLD-MCNC: 7.9 G/DL (ref 13.3–17.7)
HGB BLD-MCNC: 8 G/DL (ref 13.3–17.7)
HGB BLD-MCNC: 8.3 G/DL (ref 13.3–17.7)
HGB BLD-MCNC: 8.4 G/DL (ref 13.3–17.7)
HGB BLD-MCNC: 9.1 G/DL (ref 13.3–17.7)
HGB BLD-MCNC: 9.1 G/DL (ref 13.3–17.7)
IMM GRANULOCYTES # BLD: 0.1 10E3/UL
IMM GRANULOCYTES NFR BLD: 1 %
INR PPP: 1.63 (ref 0.85–1.15)
INR PPP: 1.64 (ref 0.85–1.15)
INR PPP: 2.53 (ref 0.85–1.15)
INR PPP: 2.63 (ref 0.85–1.15)
ION CA PH 7.4: 1.13 MMOL/L (ref 1.11–1.3)
ION CA PH 7.4: 1.15 MMOL/L (ref 1.11–1.3)
ION CA PH 7.4: 1.16 MMOL/L (ref 1.11–1.3)
ION CA PH 7.4: 1.31 MMOL/L (ref 1.11–1.3)
ISSUE DATE AND TIME: NORMAL
LACTATE BLD-SCNC: 1.5 MMOL/L (ref 0.7–2)
LACTATE SERPL-SCNC: 1.2 MMOL/L (ref 0.7–2)
LACTATE SERPL-SCNC: 1.6 MMOL/L (ref 0.7–2)
LVEF ECHO: NORMAL
LYMPHOCYTES # BLD AUTO: 1.1 10E3/UL (ref 0.8–5.3)
LYMPHOCYTES NFR BLD AUTO: 12 %
MAGNESIUM SERPL-MCNC: 1.8 MG/DL (ref 1.7–2.3)
MAGNESIUM SERPL-MCNC: 1.9 MG/DL (ref 1.7–2.3)
MAGNESIUM SERPL-MCNC: 2 MG/DL (ref 1.7–2.3)
MAGNESIUM SERPL-MCNC: 2.1 MG/DL (ref 1.7–2.3)
MAGNESIUM SERPL-MCNC: 2.1 MG/DL (ref 1.7–2.3)
MCH RBC QN AUTO: 28.2 PG (ref 26.5–33)
MCH RBC QN AUTO: 28.6 PG (ref 26.5–33)
MCH RBC QN AUTO: 28.8 PG (ref 26.5–33)
MCH RBC QN AUTO: 28.9 PG (ref 26.5–33)
MCH RBC QN AUTO: 29.1 PG (ref 26.5–33)
MCH RBC QN AUTO: 29.3 PG (ref 26.5–33)
MCHC RBC AUTO-ENTMCNC: 29.9 G/DL (ref 31.5–36.5)
MCHC RBC AUTO-ENTMCNC: 30.5 G/DL (ref 31.5–36.5)
MCHC RBC AUTO-ENTMCNC: 30.8 G/DL (ref 31.5–36.5)
MCHC RBC AUTO-ENTMCNC: 31.1 G/DL (ref 31.5–36.5)
MCHC RBC AUTO-ENTMCNC: 32 G/DL (ref 31.5–36.5)
MCHC RBC AUTO-ENTMCNC: 32.5 G/DL (ref 31.5–36.5)
MCV RBC AUTO: 90 FL (ref 78–100)
MCV RBC AUTO: 90 FL (ref 78–100)
MCV RBC AUTO: 93 FL (ref 78–100)
MCV RBC AUTO: 94 FL (ref 78–100)
MONOCYTES # BLD AUTO: 0.8 10E3/UL (ref 0–1.3)
MONOCYTES NFR BLD AUTO: 8 %
NEUTROPHILS # BLD AUTO: 7.4 10E3/UL (ref 1.6–8.3)
NEUTROPHILS NFR BLD AUTO: 77 %
NRBC # BLD AUTO: 0 10E3/UL
NRBC BLD AUTO-RTO: 0 /100
O2/TOTAL GAS SETTING VFR VENT: 45 %
OXYHGB MFR BLD: 95.5 % (ref 95–96)
OXYHGB MFR BLD: 96.6 % (ref 95–96)
OXYHGB MFR BLD: 98 % (ref 95–96)
OXYHGB MFR BLD: 98.4 % (ref 95–96)
OXYHGB MFR BLD: 98.5 % (ref 95–96)
OXYHGB MFR BLDV: 47.1 % (ref 70–75)
PCO2 BLD: 19 MM HG (ref 35–45)
PCO2 BLD: 20 MM HG (ref 35–45)
PCO2 BLD: 27 MM HG (ref 35–45)
PCO2 BLD: 34 MM HG (ref 35–45)
PCO2 BLD: 34 MM HG (ref 35–45)
PCO2 BLDA: 40 MM HG (ref 35–45)
PCO2 BLDV: 39 MM HG (ref 35–50)
PEEP: 5 CM H2O
PH BLD: 7.29 [PH] (ref 7.37–7.44)
PH BLD: 7.3 [PH] (ref 7.37–7.44)
PH BLD: 7.3 [PH] (ref 7.37–7.44)
PH BLD: 7.47 [PH] (ref 7.37–7.44)
PH BLD: 7.51 [PH] (ref 7.37–7.44)
PH BLDA: 7.24 [PH] (ref 7.37–7.44)
PH BLDV: 7.27 [PH] (ref 7.35–7.45)
PH: 7.29 (ref 7.35–7.45)
PH: 7.29 (ref 7.35–7.45)
PH: 7.47 (ref 7.35–7.45)
PH: 7.51 (ref 7.35–7.45)
PHOSPHATE SERPL-MCNC: 2.6 MG/DL (ref 2.5–4.5)
PHOSPHATE SERPL-MCNC: 3.3 MG/DL (ref 2.5–4.5)
PHOSPHATE SERPL-MCNC: 3.4 MG/DL (ref 2.5–4.5)
PHOSPHATE SERPL-MCNC: 4.6 MG/DL (ref 2.5–4.5)
PHOSPHATE SERPL-MCNC: 5.7 MG/DL (ref 2.5–4.5)
PLATELET # BLD AUTO: 163 10E3/UL (ref 150–450)
PLATELET # BLD AUTO: 173 10E3/UL (ref 150–450)
PLATELET # BLD AUTO: 209 10E3/UL (ref 150–450)
PLATELET # BLD AUTO: 228 10E3/UL (ref 150–450)
PLATELET # BLD AUTO: 230 10E3/UL (ref 150–450)
PLATELET # BLD AUTO: 255 10E3/UL (ref 150–450)
PO2 BLD: 103 MM HG (ref 75–85)
PO2 BLD: 127 MM HG (ref 75–85)
PO2 BLD: 134 MM HG (ref 75–85)
PO2 BLD: 154 MM HG (ref 75–85)
PO2 BLD: 92 MM HG (ref 75–85)
PO2 BLDA: 93 MM HG (ref 75–85)
PO2 BLDV: 31 MM HG (ref 25–47)
POTASSIUM BLD-SCNC: 4.1 MMOL/L (ref 3.5–5)
POTASSIUM SERPL-SCNC: 3.4 MMOL/L (ref 3.4–5.3)
POTASSIUM SERPL-SCNC: 3.4 MMOL/L (ref 3.4–5.3)
POTASSIUM SERPL-SCNC: 3.6 MMOL/L (ref 3.4–5.3)
POTASSIUM SERPL-SCNC: 3.6 MMOL/L (ref 3.4–5.3)
POTASSIUM SERPL-SCNC: 3.7 MMOL/L (ref 3.4–5.3)
POTASSIUM SERPL-SCNC: 4.2 MMOL/L (ref 3.4–5.3)
POTASSIUM SERPL-SCNC: 4.2 MMOL/L (ref 3.4–5.3)
POTASSIUM SERPL-SCNC: 4.3 MMOL/L (ref 3.4–5.3)
POTASSIUM SERPL-SCNC: 4.6 MMOL/L (ref 3.4–5.3)
PROT SERPL-MCNC: 4.9 G/DL (ref 6.4–8.3)
PROT SERPL-MCNC: 5 G/DL (ref 6.4–8.3)
PROT SERPL-MCNC: 5 G/DL (ref 6.4–8.3)
PROT SERPL-MCNC: 5.4 G/DL (ref 6.4–8.3)
PROT SERPL-MCNC: 5.7 G/DL (ref 6.4–8.3)
PROT SERPL-MCNC: 5.8 G/DL (ref 6.4–8.3)
RATE: 24 RR/MIN
RBC # BLD AUTO: 2.51 10E6/UL (ref 4.4–5.9)
RBC # BLD AUTO: 2.8 10E6/UL (ref 4.4–5.9)
RBC # BLD AUTO: 2.8 10E6/UL (ref 4.4–5.9)
RBC # BLD AUTO: 2.87 10E6/UL (ref 4.4–5.9)
RBC # BLD AUTO: 3.11 10E6/UL (ref 4.4–5.9)
RBC # BLD AUTO: 3.16 10E6/UL (ref 4.4–5.9)
SAO2 % BLDV: 47.8 % (ref 70–75)
SODIUM BLD-SCNC: 143 MMOL/L (ref 135–145)
SODIUM SERPL-SCNC: 140 MMOL/L (ref 135–145)
SODIUM SERPL-SCNC: 141 MMOL/L (ref 135–145)
SODIUM SERPL-SCNC: 142 MMOL/L (ref 135–145)
SODIUM SERPL-SCNC: 142 MMOL/L (ref 135–145)
TEMPERATURE: 37 DEGREES C
UNIT ABO/RH: NORMAL
UNIT NUMBER: NORMAL
UNIT STATUS: NORMAL
UNIT TYPE ISBT: 5100
UNIT TYPE ISBT: 6200
UNIT TYPE ISBT: 6200
VANCOMYCIN SERPL-MCNC: 15.9 UG/ML
VENTILATION MODE: AC
VENTILATOR TIDAL VOLUME: 500 ML
WBC # BLD AUTO: 10.8 10E3/UL (ref 4–11)
WBC # BLD AUTO: 11 10E3/UL (ref 4–11)
WBC # BLD AUTO: 11.2 10E3/UL (ref 4–11)
WBC # BLD AUTO: 7.4 10E3/UL (ref 4–11)
WBC # BLD AUTO: 9.1 10E3/UL (ref 4–11)
WBC # BLD AUTO: 9.6 10E3/UL (ref 4–11)

## 2023-11-05 PROCEDURE — 250N000009 HC RX 250: Performed by: INTERNAL MEDICINE

## 2023-11-05 PROCEDURE — 84450 TRANSFERASE (AST) (SGOT): CPT | Performed by: INTERNAL MEDICINE

## 2023-11-05 PROCEDURE — 80202 ASSAY OF VANCOMYCIN: CPT | Performed by: INTERNAL MEDICINE

## 2023-11-05 PROCEDURE — 82330 ASSAY OF CALCIUM: CPT | Performed by: INTERNAL MEDICINE

## 2023-11-05 PROCEDURE — 258N000003 HC RX IP 258 OP 636: Performed by: INTERNAL MEDICINE

## 2023-11-05 PROCEDURE — 250N000011 HC RX IP 250 OP 636: Performed by: STUDENT IN AN ORGANIZED HEALTH CARE EDUCATION/TRAINING PROGRAM

## 2023-11-05 PROCEDURE — 94002 VENT MGMT INPAT INIT DAY: CPT

## 2023-11-05 PROCEDURE — 258N000003 HC RX IP 258 OP 636: Performed by: STUDENT IN AN ORGANIZED HEALTH CARE EDUCATION/TRAINING PROGRAM

## 2023-11-05 PROCEDURE — 84100 ASSAY OF PHOSPHORUS: CPT | Performed by: INTERNAL MEDICINE

## 2023-11-05 PROCEDURE — 85730 THROMBOPLASTIN TIME PARTIAL: CPT | Performed by: STUDENT IN AN ORGANIZED HEALTH CARE EDUCATION/TRAINING PROGRAM

## 2023-11-05 PROCEDURE — 250N000011 HC RX IP 250 OP 636: Performed by: INTERNAL MEDICINE

## 2023-11-05 PROCEDURE — 85027 COMPLETE CBC AUTOMATED: CPT | Performed by: INTERNAL MEDICINE

## 2023-11-05 PROCEDURE — 93005 ELECTROCARDIOGRAM TRACING: CPT | Performed by: NURSE PRACTITIONER

## 2023-11-05 PROCEDURE — C9113 INJ PANTOPRAZOLE SODIUM, VIA: HCPCS | Mod: JZ | Performed by: INTERNAL MEDICINE

## 2023-11-05 PROCEDURE — 250N000011 HC RX IP 250 OP 636: Mod: JZ | Performed by: INTERNAL MEDICINE

## 2023-11-05 PROCEDURE — 5A1935Z RESPIRATORY VENTILATION, LESS THAN 24 CONSECUTIVE HOURS: ICD-10-PCS | Performed by: INTERNAL MEDICINE

## 2023-11-05 PROCEDURE — 83605 ASSAY OF LACTIC ACID: CPT | Performed by: INTERNAL MEDICINE

## 2023-11-05 PROCEDURE — 272N000001 HC OR GENERAL SUPPLY STERILE: Performed by: STUDENT IN AN ORGANIZED HEALTH CARE EDUCATION/TRAINING PROGRAM

## 2023-11-05 PROCEDURE — 84295 ASSAY OF SERUM SODIUM: CPT | Performed by: INTERNAL MEDICINE

## 2023-11-05 PROCEDURE — 360N000077 HC SURGERY LEVEL 4, PER MIN: Performed by: STUDENT IN AN ORGANIZED HEALTH CARE EDUCATION/TRAINING PROGRAM

## 2023-11-05 PROCEDURE — 99232 SBSQ HOSP IP/OBS MODERATE 35: CPT | Performed by: INTERNAL MEDICINE

## 2023-11-05 PROCEDURE — 82805 BLOOD GASES W/O2 SATURATION: CPT | Performed by: INTERNAL MEDICINE

## 2023-11-05 PROCEDURE — 85027 COMPLETE CBC AUTOMATED: CPT | Performed by: NURSE PRACTITIONER

## 2023-11-05 PROCEDURE — 82330 ASSAY OF CALCIUM: CPT

## 2023-11-05 PROCEDURE — 84100 ASSAY OF PHOSPHORUS: CPT | Performed by: NURSE PRACTITIONER

## 2023-11-05 PROCEDURE — 83735 ASSAY OF MAGNESIUM: CPT | Performed by: NURSE PRACTITIONER

## 2023-11-05 PROCEDURE — 250N000009 HC RX 250: Performed by: NURSE PRACTITIONER

## 2023-11-05 PROCEDURE — 85610 PROTHROMBIN TIME: CPT | Performed by: STUDENT IN AN ORGANIZED HEALTH CARE EDUCATION/TRAINING PROGRAM

## 2023-11-05 PROCEDURE — 250N000013 HC RX MED GY IP 250 OP 250 PS 637: Performed by: INTERNAL MEDICINE

## 2023-11-05 PROCEDURE — 93321 DOPPLER ECHO F-UP/LMTD STD: CPT

## 2023-11-05 PROCEDURE — 85610 PROTHROMBIN TIME: CPT | Performed by: INTERNAL MEDICINE

## 2023-11-05 PROCEDURE — 83605 ASSAY OF LACTIC ACID: CPT | Performed by: NURSE PRACTITIONER

## 2023-11-05 PROCEDURE — 200N000001 HC R&B ICU

## 2023-11-05 PROCEDURE — 999N000065 XR CHEST PORT 1 VIEW

## 2023-11-05 PROCEDURE — 83735 ASSAY OF MAGNESIUM: CPT | Performed by: INTERNAL MEDICINE

## 2023-11-05 PROCEDURE — 94660 CPAP INITIATION&MGMT: CPT

## 2023-11-05 PROCEDURE — 250N000011 HC RX IP 250 OP 636: Mod: JZ | Performed by: NURSE PRACTITIONER

## 2023-11-05 PROCEDURE — 370N000017 HC ANESTHESIA TECHNICAL FEE, PER MIN: Performed by: STUDENT IN AN ORGANIZED HEALTH CARE EDUCATION/TRAINING PROGRAM

## 2023-11-05 PROCEDURE — 85027 COMPLETE CBC AUTOMATED: CPT | Performed by: STUDENT IN AN ORGANIZED HEALTH CARE EDUCATION/TRAINING PROGRAM

## 2023-11-05 PROCEDURE — 0W9D0ZZ DRAINAGE OF PERICARDIAL CAVITY, OPEN APPROACH: ICD-10-PCS | Performed by: STUDENT IN AN ORGANIZED HEALTH CARE EDUCATION/TRAINING PROGRAM

## 2023-11-05 PROCEDURE — 93321 DOPPLER ECHO F-UP/LMTD STD: CPT | Mod: 26 | Performed by: INTERNAL MEDICINE

## 2023-11-05 PROCEDURE — 84460 ALANINE AMINO (ALT) (SGPT): CPT | Performed by: INTERNAL MEDICINE

## 2023-11-05 PROCEDURE — 82248 BILIRUBIN DIRECT: CPT | Performed by: INTERNAL MEDICINE

## 2023-11-05 PROCEDURE — 33025 INCISION OF HEART SAC: CPT | Performed by: STUDENT IN AN ORGANIZED HEALTH CARE EDUCATION/TRAINING PROGRAM

## 2023-11-05 PROCEDURE — 90947 DIALYSIS REPEATED EVAL: CPT

## 2023-11-05 PROCEDURE — 99232 SBSQ HOSP IP/OBS MODERATE 35: CPT | Mod: 25 | Performed by: INTERNAL MEDICINE

## 2023-11-05 PROCEDURE — 82803 BLOOD GASES ANY COMBINATION: CPT

## 2023-11-05 PROCEDURE — 85610 PROTHROMBIN TIME: CPT | Performed by: NURSE PRACTITIONER

## 2023-11-05 PROCEDURE — 250N000009 HC RX 250: Performed by: STUDENT IN AN ORGANIZED HEALTH CARE EDUCATION/TRAINING PROGRAM

## 2023-11-05 PROCEDURE — 93325 DOPPLER ECHO COLOR FLOW MAPG: CPT | Mod: 26 | Performed by: INTERNAL MEDICINE

## 2023-11-05 PROCEDURE — 85730 THROMBOPLASTIN TIME PARTIAL: CPT | Performed by: NURSE PRACTITIONER

## 2023-11-05 PROCEDURE — 82330 ASSAY OF CALCIUM: CPT | Performed by: NURSE PRACTITIONER

## 2023-11-05 PROCEDURE — P9016 RBC LEUKOCYTES REDUCED: HCPCS | Performed by: INTERNAL MEDICINE

## 2023-11-05 PROCEDURE — 93325 DOPPLER ECHO COLOR FLOW MAPG: CPT

## 2023-11-05 PROCEDURE — 82805 BLOOD GASES W/O2 SATURATION: CPT | Performed by: NURSE PRACTITIONER

## 2023-11-05 PROCEDURE — 250N000011 HC RX IP 250 OP 636: Performed by: NURSE PRACTITIONER

## 2023-11-05 PROCEDURE — 93010 ELECTROCARDIOGRAM REPORT: CPT | Performed by: INTERNAL MEDICINE

## 2023-11-05 PROCEDURE — 85384 FIBRINOGEN ACTIVITY: CPT | Performed by: STUDENT IN AN ORGANIZED HEALTH CARE EDUCATION/TRAINING PROGRAM

## 2023-11-05 PROCEDURE — 84132 ASSAY OF SERUM POTASSIUM: CPT | Performed by: NURSE PRACTITIONER

## 2023-11-05 PROCEDURE — 93005 ELECTROCARDIOGRAM TRACING: CPT

## 2023-11-05 PROCEDURE — P9016 RBC LEUKOCYTES REDUCED: HCPCS | Performed by: NURSE ANESTHETIST, CERTIFIED REGISTERED

## 2023-11-05 PROCEDURE — 250N000025 HC SEVOFLURANE, PER MIN: Performed by: STUDENT IN AN ORGANIZED HEALTH CARE EDUCATION/TRAINING PROGRAM

## 2023-11-05 PROCEDURE — 250N000013 HC RX MED GY IP 250 OP 250 PS 637: Performed by: NURSE PRACTITIONER

## 2023-11-05 PROCEDURE — 85730 THROMBOPLASTIN TIME PARTIAL: CPT | Performed by: INTERNAL MEDICINE

## 2023-11-05 PROCEDURE — 71045 X-RAY EXAM CHEST 1 VIEW: CPT

## 2023-11-05 PROCEDURE — 84132 ASSAY OF SERUM POTASSIUM: CPT | Performed by: STUDENT IN AN ORGANIZED HEALTH CARE EDUCATION/TRAINING PROGRAM

## 2023-11-05 PROCEDURE — 999N000157 HC STATISTIC RCP TIME EA 10 MIN

## 2023-11-05 PROCEDURE — P9047 ALBUMIN (HUMAN), 25%, 50ML: HCPCS | Performed by: INTERNAL MEDICINE

## 2023-11-05 PROCEDURE — 93308 TTE F-UP OR LMTD: CPT | Mod: 26 | Performed by: INTERNAL MEDICINE

## 2023-11-05 PROCEDURE — 84075 ASSAY ALKALINE PHOSPHATASE: CPT | Performed by: INTERNAL MEDICINE

## 2023-11-05 PROCEDURE — 36415 COLL VENOUS BLD VENIPUNCTURE: CPT | Performed by: STUDENT IN AN ORGANIZED HEALTH CARE EDUCATION/TRAINING PROGRAM

## 2023-11-05 PROCEDURE — 999N000287 HC ICU ADULT ROUNDING, EACH 10 MINS

## 2023-11-05 RX ORDER — SODIUM CHLORIDE 9 MG/ML
INJECTION, SOLUTION INTRAVENOUS CONTINUOUS PRN
Status: DISCONTINUED | OUTPATIENT
Start: 2023-11-05 | End: 2023-11-05

## 2023-11-05 RX ORDER — NALOXONE HYDROCHLORIDE 0.4 MG/ML
0.2 INJECTION, SOLUTION INTRAMUSCULAR; INTRAVENOUS; SUBCUTANEOUS
Status: DISCONTINUED | OUTPATIENT
Start: 2023-11-05 | End: 2023-11-14 | Stop reason: HOSPADM

## 2023-11-05 RX ORDER — POLYETHYLENE GLYCOL 3350 17 G/17G
17 POWDER, FOR SOLUTION ORAL DAILY
Status: DISCONTINUED | OUTPATIENT
Start: 2023-11-06 | End: 2023-11-14 | Stop reason: HOSPADM

## 2023-11-05 RX ORDER — HYDRALAZINE HYDROCHLORIDE 20 MG/ML
10 INJECTION INTRAMUSCULAR; INTRAVENOUS EVERY 30 MIN PRN
Status: DISCONTINUED | OUTPATIENT
Start: 2023-11-05 | End: 2023-11-14 | Stop reason: HOSPADM

## 2023-11-05 RX ORDER — DEXMEDETOMIDINE HYDROCHLORIDE 4 UG/ML
.1-1.2 INJECTION, SOLUTION INTRAVENOUS CONTINUOUS
Status: DISCONTINUED | OUTPATIENT
Start: 2023-11-05 | End: 2023-11-06

## 2023-11-05 RX ORDER — PROCHLORPERAZINE MALEATE 5 MG
5 TABLET ORAL EVERY 6 HOURS PRN
Status: DISCONTINUED | OUTPATIENT
Start: 2023-11-05 | End: 2023-11-14 | Stop reason: HOSPADM

## 2023-11-05 RX ORDER — PANTOPRAZOLE SODIUM 40 MG/1
40 TABLET, DELAYED RELEASE ORAL DAILY
Status: DISCONTINUED | OUTPATIENT
Start: 2023-11-05 | End: 2023-11-05

## 2023-11-05 RX ORDER — FAMOTIDINE 20 MG/1
20 TABLET, FILM COATED ORAL
Status: DISCONTINUED | OUTPATIENT
Start: 2023-11-05 | End: 2023-11-05 | Stop reason: HOSPADM

## 2023-11-05 RX ORDER — CEFAZOLIN SODIUM/WATER 2 G/20 ML
2 SYRINGE (ML) INTRAVENOUS
Status: DISCONTINUED | OUTPATIENT
Start: 2023-11-05 | End: 2023-11-05 | Stop reason: HOSPADM

## 2023-11-05 RX ORDER — PROPOFOL 10 MG/ML
INJECTION, EMULSION INTRAVENOUS PRN
Status: DISCONTINUED | OUTPATIENT
Start: 2023-11-05 | End: 2023-11-05

## 2023-11-05 RX ORDER — LIDOCAINE 4 G/G
1-2 PATCH TOPICAL EVERY 24 HOURS
Status: DISCONTINUED | OUTPATIENT
Start: 2023-11-05 | End: 2023-11-14 | Stop reason: HOSPADM

## 2023-11-05 RX ORDER — CALCIUM GLUCONATE 20 MG/ML
2 INJECTION, SOLUTION INTRAVENOUS
Status: ACTIVE | OUTPATIENT
Start: 2023-11-05 | End: 2023-11-11

## 2023-11-05 RX ORDER — AMOXICILLIN 250 MG
1 CAPSULE ORAL 2 TIMES DAILY
Status: DISCONTINUED | OUTPATIENT
Start: 2023-11-05 | End: 2023-11-14 | Stop reason: HOSPADM

## 2023-11-05 RX ORDER — PHENYLEPHRINE HCL IN 0.9% NACL 50MG/250ML
.1-4 PLASTIC BAG, INJECTION (ML) INTRAVENOUS CONTINUOUS
Status: DISCONTINUED | OUTPATIENT
Start: 2023-11-05 | End: 2023-11-08

## 2023-11-05 RX ORDER — ONDANSETRON 4 MG/1
4 TABLET, ORALLY DISINTEGRATING ORAL EVERY 6 HOURS PRN
Status: DISCONTINUED | OUTPATIENT
Start: 2023-11-05 | End: 2023-11-14 | Stop reason: HOSPADM

## 2023-11-05 RX ORDER — HEPARIN SODIUM 5000 [USP'U]/.5ML
5000 INJECTION, SOLUTION INTRAVENOUS; SUBCUTANEOUS EVERY 8 HOURS
Status: DISCONTINUED | OUTPATIENT
Start: 2023-11-06 | End: 2023-11-08

## 2023-11-05 RX ORDER — CEFAZOLIN SODIUM 2 G/100ML
2 INJECTION, SOLUTION INTRAVENOUS EVERY 8 HOURS
Status: DISCONTINUED | OUTPATIENT
Start: 2023-11-05 | End: 2023-11-05

## 2023-11-05 RX ORDER — OXYCODONE HYDROCHLORIDE 5 MG/1
5 TABLET ORAL EVERY 4 HOURS PRN
Status: DISCONTINUED | OUTPATIENT
Start: 2023-11-05 | End: 2023-11-14 | Stop reason: HOSPADM

## 2023-11-05 RX ORDER — NALOXONE HYDROCHLORIDE 0.4 MG/ML
0.4 INJECTION, SOLUTION INTRAMUSCULAR; INTRAVENOUS; SUBCUTANEOUS
Status: DISCONTINUED | OUTPATIENT
Start: 2023-11-05 | End: 2023-11-14 | Stop reason: HOSPADM

## 2023-11-05 RX ORDER — ALBUMIN (HUMAN) 12.5 G/50ML
25 SOLUTION INTRAVENOUS ONCE
Status: COMPLETED | OUTPATIENT
Start: 2023-11-05 | End: 2023-11-05

## 2023-11-05 RX ORDER — CALCIUM CHLORIDE, MAGNESIUM CHLORIDE, DEXTROSE MONOHYDRATE, LACTIC ACID, SODIUM CHLORIDE, SODIUM BICARBONATE AND POTASSIUM CHLORIDE 5.15; 2.03; 22; 5.4; 6.46; 3.09; .157 G/L; G/L; G/L; G/L; G/L; G/L; G/L
22.5 INJECTION INTRAVENOUS CONTINUOUS
Status: DISCONTINUED | OUTPATIENT
Start: 2023-11-05 | End: 2023-11-06

## 2023-11-05 RX ORDER — NOREPINEPHRINE BITARTRATE 0.02 MG/ML
.01-.1 INJECTION, SOLUTION INTRAVENOUS CONTINUOUS
Status: DISCONTINUED | OUTPATIENT
Start: 2023-11-05 | End: 2023-11-05 | Stop reason: HOSPADM

## 2023-11-05 RX ORDER — ACETAMINOPHEN 325 MG/1
975 TABLET ORAL ONCE
Status: DISCONTINUED | OUTPATIENT
Start: 2023-11-05 | End: 2023-11-05 | Stop reason: HOSPADM

## 2023-11-05 RX ORDER — ONDANSETRON 2 MG/ML
4 INJECTION INTRAMUSCULAR; INTRAVENOUS EVERY 6 HOURS PRN
Status: DISCONTINUED | OUTPATIENT
Start: 2023-11-05 | End: 2023-11-14 | Stop reason: HOSPADM

## 2023-11-05 RX ORDER — PROPOFOL 10 MG/ML
5-75 INJECTION, EMULSION INTRAVENOUS CONTINUOUS
Status: DISCONTINUED | OUTPATIENT
Start: 2023-11-05 | End: 2023-11-06

## 2023-11-05 RX ORDER — LIDOCAINE HYDROCHLORIDE AND EPINEPHRINE 10; 10 MG/ML; UG/ML
INJECTION, SOLUTION INFILTRATION; PERINEURAL PRN
Status: DISCONTINUED | OUTPATIENT
Start: 2023-11-05 | End: 2023-11-05 | Stop reason: HOSPADM

## 2023-11-05 RX ORDER — ACETAMINOPHEN 325 MG/1
650 TABLET ORAL EVERY 4 HOURS PRN
Status: DISCONTINUED | OUTPATIENT
Start: 2023-11-08 | End: 2023-11-14 | Stop reason: HOSPADM

## 2023-11-05 RX ORDER — CHLORHEXIDINE GLUCONATE ORAL RINSE 1.2 MG/ML
10 SOLUTION DENTAL ONCE
Status: DISCONTINUED | OUTPATIENT
Start: 2023-11-05 | End: 2023-11-05 | Stop reason: HOSPADM

## 2023-11-05 RX ORDER — BISACODYL 10 MG
10 SUPPOSITORY, RECTAL RECTAL DAILY PRN
Status: DISCONTINUED | OUTPATIENT
Start: 2023-11-05 | End: 2023-11-14 | Stop reason: HOSPADM

## 2023-11-05 RX ORDER — SODIUM CHLORIDE, SODIUM GLUCONATE, SODIUM ACETATE, POTASSIUM CHLORIDE AND MAGNESIUM CHLORIDE 526; 502; 368; 37; 30 MG/100ML; MG/100ML; MG/100ML; MG/100ML; MG/100ML
1000 INJECTION, SOLUTION INTRAVENOUS
Status: DISCONTINUED | OUTPATIENT
Start: 2023-11-05 | End: 2023-11-05

## 2023-11-05 RX ORDER — PROPOFOL 10 MG/ML
INJECTION, EMULSION INTRAVENOUS
Status: COMPLETED
Start: 2023-11-05 | End: 2023-11-05

## 2023-11-05 RX ORDER — ACETAMINOPHEN 325 MG/1
975 TABLET ORAL EVERY 8 HOURS
Status: DISCONTINUED | OUTPATIENT
Start: 2023-11-05 | End: 2023-11-05

## 2023-11-05 RX ORDER — NICOTINE POLACRILEX 4 MG
15-30 LOZENGE BUCCAL
Status: DISCONTINUED | OUTPATIENT
Start: 2023-11-05 | End: 2023-11-14 | Stop reason: HOSPADM

## 2023-11-05 RX ORDER — DEXMEDETOMIDINE HYDROCHLORIDE 4 UG/ML
.1-1.2 INJECTION, SOLUTION INTRAVENOUS CONTINUOUS
Status: DISCONTINUED | OUTPATIENT
Start: 2023-11-05 | End: 2023-11-05 | Stop reason: HOSPADM

## 2023-11-05 RX ORDER — DEXMEDETOMIDINE HYDROCHLORIDE 4 UG/ML
.2-.7 INJECTION, SOLUTION INTRAVENOUS CONTINUOUS
Status: DISCONTINUED | OUTPATIENT
Start: 2023-11-05 | End: 2023-11-05

## 2023-11-05 RX ORDER — CALCIUM GLUCONATE 20 MG/ML
1 INJECTION, SOLUTION INTRAVENOUS
Status: DISPENSED | OUTPATIENT
Start: 2023-11-05 | End: 2023-11-11

## 2023-11-05 RX ORDER — OXYCODONE HYDROCHLORIDE 5 MG/1
10 TABLET ORAL EVERY 4 HOURS PRN
Status: DISCONTINUED | OUTPATIENT
Start: 2023-11-05 | End: 2023-11-14 | Stop reason: HOSPADM

## 2023-11-05 RX ORDER — PROPOFOL 10 MG/ML
INJECTION, EMULSION INTRAVENOUS CONTINUOUS PRN
Status: DISCONTINUED | OUTPATIENT
Start: 2023-11-05 | End: 2023-11-05

## 2023-11-05 RX ORDER — IPRATROPIUM BROMIDE AND ALBUTEROL SULFATE 2.5; .5 MG/3ML; MG/3ML
3 SOLUTION RESPIRATORY (INHALATION) EVERY 4 HOURS PRN
Status: DISCONTINUED | OUTPATIENT
Start: 2023-11-05 | End: 2023-11-14 | Stop reason: HOSPADM

## 2023-11-05 RX ORDER — PHENYLEPHRINE HCL IN 0.9% NACL 50MG/250ML
.1-6 PLASTIC BAG, INJECTION (ML) INTRAVENOUS CONTINUOUS
Status: DISCONTINUED | OUTPATIENT
Start: 2023-11-05 | End: 2023-11-05 | Stop reason: HOSPADM

## 2023-11-05 RX ORDER — ASPIRIN 81 MG/1
162 TABLET, CHEWABLE ORAL
Status: DISCONTINUED | OUTPATIENT
Start: 2023-11-05 | End: 2023-11-05 | Stop reason: HOSPADM

## 2023-11-05 RX ORDER — CHLORHEXIDINE GLUCONATE ORAL RINSE 1.2 MG/ML
15 SOLUTION DENTAL EVERY 12 HOURS
Status: DISCONTINUED | OUTPATIENT
Start: 2023-11-05 | End: 2023-11-06

## 2023-11-05 RX ORDER — CEFAZOLIN SODIUM/WATER 2 G/20 ML
2 SYRINGE (ML) INTRAVENOUS SEE ADMIN INSTRUCTIONS
Status: DISCONTINUED | OUTPATIENT
Start: 2023-11-05 | End: 2023-11-05 | Stop reason: HOSPADM

## 2023-11-05 RX ORDER — KETAMINE HYDROCHLORIDE 10 MG/ML
INJECTION INTRAMUSCULAR; INTRAVENOUS PRN
Status: DISCONTINUED | OUTPATIENT
Start: 2023-11-05 | End: 2023-11-05

## 2023-11-05 RX ORDER — ASPIRIN 81 MG/1
162 TABLET, CHEWABLE ORAL DAILY
Status: DISCONTINUED | OUTPATIENT
Start: 2023-11-05 | End: 2023-11-07

## 2023-11-05 RX ORDER — LIDOCAINE 40 MG/G
CREAM TOPICAL
Status: DISCONTINUED | OUTPATIENT
Start: 2023-11-05 | End: 2023-11-05 | Stop reason: HOSPADM

## 2023-11-05 RX ORDER — ASPIRIN 81 MG/1
81 TABLET, CHEWABLE ORAL
Status: DISCONTINUED | OUTPATIENT
Start: 2023-11-05 | End: 2023-11-05 | Stop reason: HOSPADM

## 2023-11-05 RX ORDER — DEXTROSE MONOHYDRATE 25 G/50ML
25-50 INJECTION, SOLUTION INTRAVENOUS
Status: DISCONTINUED | OUTPATIENT
Start: 2023-11-05 | End: 2023-11-14 | Stop reason: HOSPADM

## 2023-11-05 RX ADMIN — PROPOFOL 50 MCG/KG/MIN: 10 INJECTION, EMULSION INTRAVENOUS at 17:52

## 2023-11-05 RX ADMIN — SODIUM CHLORIDE, SODIUM GLUCONATE, SODIUM ACETATE, POTASSIUM CHLORIDE AND MAGNESIUM CHLORIDE 1000 ML: 526; 502; 368; 37; 30 INJECTION, SOLUTION INTRAVENOUS at 16:40

## 2023-11-05 RX ADMIN — CALCIUM CHLORIDE, MAGNESIUM CHLORIDE, DEXTROSE MONOHYDRATE, LACTIC ACID, SODIUM CHLORIDE, SODIUM BICARBONATE AND POTASSIUM CHLORIDE 12.5 ML/KG/HR: 5.15; 2.03; 22; 5.4; 6.46; 3.09; .157 INJECTION INTRAVENOUS at 01:28

## 2023-11-05 RX ADMIN — INSULIN ASPART 1 UNITS: 100 INJECTION, SOLUTION INTRAVENOUS; SUBCUTANEOUS at 00:25

## 2023-11-05 RX ADMIN — MIDAZOLAM 1 MG: 1 INJECTION INTRAMUSCULAR; INTRAVENOUS at 12:30

## 2023-11-05 RX ADMIN — PHYTONADIONE 5 MG: 10 INJECTION, EMULSION INTRAMUSCULAR; INTRAVENOUS; SUBCUTANEOUS at 08:49

## 2023-11-05 RX ADMIN — AMIODARONE HYDROCHLORIDE 0.5 MG/MIN: 1.8 INJECTION, SOLUTION INTRAVENOUS at 05:54

## 2023-11-05 RX ADMIN — SODIUM BICARBONATE 50 MEQ: 84 INJECTION, SOLUTION INTRAVENOUS at 16:36

## 2023-11-05 RX ADMIN — PIPERACILLIN AND TAZOBACTAM 3.38 G: 3; .375 INJECTION, POWDER, LYOPHILIZED, FOR SOLUTION INTRAVENOUS at 20:26

## 2023-11-05 RX ADMIN — CALCIUM CHLORIDE, MAGNESIUM CHLORIDE, DEXTROSE MONOHYDRATE, LACTIC ACID, SODIUM CHLORIDE, SODIUM BICARBONATE AND POTASSIUM CHLORIDE 22.5 ML/KG/HR: 5.15; 2.03; 22; 5.4; 6.46; 3.09; .157 INJECTION INTRAVENOUS at 19:07

## 2023-11-05 RX ADMIN — SODIUM BICARBONATE 50 MEQ: 84 INJECTION, SOLUTION INTRAVENOUS at 01:33

## 2023-11-05 RX ADMIN — CALCIUM CHLORIDE, MAGNESIUM CHLORIDE, SODIUM CHLORIDE, SODIUM BICARBONATE, POTASSIUM CHLORIDE AND SODIUM PHOSPHATE DIBASIC DIHYDRATE 12.5 ML/KG/HR: 3.68; 3.05; 6.34; 3.09; .314; .187 INJECTION INTRAVENOUS at 05:43

## 2023-11-05 RX ADMIN — DEXMEDETOMIDINE HYDROCHLORIDE 0.5 MCG/KG/HR: 400 INJECTION INTRAVENOUS at 22:16

## 2023-11-05 RX ADMIN — SODIUM CHLORIDE: 9 INJECTION, SOLUTION INTRAVENOUS at 12:06

## 2023-11-05 RX ADMIN — SODIUM BICARBONATE 100 MEQ: 84 INJECTION, SOLUTION INTRAVENOUS at 10:59

## 2023-11-05 RX ADMIN — PROPOFOL 25 MG: 10 INJECTION, EMULSION INTRAVENOUS at 12:38

## 2023-11-05 RX ADMIN — PROPOFOL 40 MCG/KG/MIN: 10 INJECTION, EMULSION INTRAVENOUS at 20:46

## 2023-11-05 RX ADMIN — CALCIUM CHLORIDE, MAGNESIUM CHLORIDE, DEXTROSE MONOHYDRATE, LACTIC ACID, SODIUM CHLORIDE, SODIUM BICARBONATE AND POTASSIUM CHLORIDE 22.5 ML/KG/HR: 5.15; 2.03; 22; 5.4; 6.46; 3.09; .157 INJECTION INTRAVENOUS at 21:25

## 2023-11-05 RX ADMIN — CALCIUM CHLORIDE, MAGNESIUM CHLORIDE, SODIUM CHLORIDE, SODIUM BICARBONATE, POTASSIUM CHLORIDE AND SODIUM PHOSPHATE DIBASIC DIHYDRATE 12.5 ML/KG/HR: 3.68; 3.05; 6.34; 3.09; .314; .187 INJECTION INTRAVENOUS at 21:58

## 2023-11-05 RX ADMIN — PROTHROMBIN, COAGULATION FACTOR VII HUMAN, COAGULATION FACTOR IX HUMAN, COAGULATION FACTOR X HUMAN, PROTEIN C, PROTEIN S HUMAN, AND WATER 4931 UNITS: KIT at 11:21

## 2023-11-05 RX ADMIN — KETAMINE HYDROCHLORIDE 20 MG: 10 INJECTION INTRAMUSCULAR; INTRAVENOUS at 12:21

## 2023-11-05 RX ADMIN — MIDAZOLAM 1 MG: 1 INJECTION INTRAMUSCULAR; INTRAVENOUS at 12:11

## 2023-11-05 RX ADMIN — LIDOCAINE 1 PATCH: 4 PATCH TOPICAL at 20:26

## 2023-11-05 RX ADMIN — SODIUM CHLORIDE, SODIUM GLUCONATE, SODIUM ACETATE, POTASSIUM CHLORIDE AND MAGNESIUM CHLORIDE 1000 ML: 526; 502; 368; 37; 30 INJECTION, SOLUTION INTRAVENOUS at 16:39

## 2023-11-05 RX ADMIN — DEXMEDETOMIDINE HYDROCHLORIDE 0.2 MCG/KG/HR: 400 INJECTION INTRAVENOUS at 15:02

## 2023-11-05 RX ADMIN — CALCIUM CHLORIDE, MAGNESIUM CHLORIDE, DEXTROSE MONOHYDRATE, LACTIC ACID, SODIUM CHLORIDE, SODIUM BICARBONATE AND POTASSIUM CHLORIDE 22.5 ML/KG/HR: 5.15; 2.03; 22; 5.4; 6.46; 3.09; .157 INJECTION INTRAVENOUS at 08:44

## 2023-11-05 RX ADMIN — CALCIUM GLUCONATE 1 G: 20 INJECTION, SOLUTION INTRAVENOUS at 17:49

## 2023-11-05 RX ADMIN — CALCIUM CHLORIDE, MAGNESIUM CHLORIDE, SODIUM CHLORIDE, SODIUM BICARBONATE, POTASSIUM CHLORIDE AND SODIUM PHOSPHATE DIBASIC DIHYDRATE 12.5 ML/KG/HR: 3.68; 3.05; 6.34; 3.09; .314; .187 INJECTION INTRAVENOUS at 17:46

## 2023-11-05 RX ADMIN — VASOPRESSIN 2.4 UNITS/HR: 20 INJECTION, SOLUTION INTRAMUSCULAR; SUBCUTANEOUS at 03:56

## 2023-11-05 RX ADMIN — PANTOPRAZOLE SODIUM 40 MG: 40 INJECTION, POWDER, FOR SOLUTION INTRAVENOUS at 09:47

## 2023-11-05 RX ADMIN — PIPERACILLIN AND TAZOBACTAM 3.38 G: 3; .375 INJECTION, POWDER, LYOPHILIZED, FOR SOLUTION INTRAVENOUS at 01:49

## 2023-11-05 RX ADMIN — CALCIUM CHLORIDE, MAGNESIUM CHLORIDE, DEXTROSE MONOHYDRATE, LACTIC ACID, SODIUM CHLORIDE, SODIUM BICARBONATE AND POTASSIUM CHLORIDE 22.5 ML/KG/HR: 5.15; 2.03; 22; 5.4; 6.46; 3.09; .157 INJECTION INTRAVENOUS at 23:49

## 2023-11-05 RX ADMIN — KETAMINE HYDROCHLORIDE 50 MG: 10 INJECTION INTRAMUSCULAR; INTRAVENOUS at 12:38

## 2023-11-05 RX ADMIN — ALBUMIN HUMAN 25 G: 0.25 SOLUTION INTRAVENOUS at 22:16

## 2023-11-05 RX ADMIN — PROPOFOL 50 MCG/KG/MIN: 10 INJECTION, EMULSION INTRAVENOUS at 13:46

## 2023-11-05 RX ADMIN — VANCOMYCIN HYDROCHLORIDE 750 MG: 500 INJECTION, POWDER, LYOPHILIZED, FOR SOLUTION INTRAVENOUS at 09:20

## 2023-11-05 RX ADMIN — SODIUM CHLORIDE: 9 INJECTION, SOLUTION INTRAVENOUS at 12:52

## 2023-11-05 RX ADMIN — ROCURONIUM BROMIDE 50 MG: 50 INJECTION, SOLUTION INTRAVENOUS at 12:38

## 2023-11-05 RX ADMIN — SODIUM CHLORIDE, SODIUM GLUCONATE, SODIUM ACETATE, POTASSIUM CHLORIDE AND MAGNESIUM CHLORIDE 1000 ML: 526; 502; 368; 37; 30 INJECTION, SOLUTION INTRAVENOUS at 16:38

## 2023-11-05 RX ADMIN — CALCIUM CHLORIDE, MAGNESIUM CHLORIDE, DEXTROSE MONOHYDRATE, LACTIC ACID, SODIUM CHLORIDE, SODIUM BICARBONATE AND POTASSIUM CHLORIDE 22.5 ML/KG/HR: 5.15; 2.03; 22; 5.4; 6.46; 3.09; .157 INJECTION INTRAVENOUS at 06:23

## 2023-11-05 RX ADMIN — NOREPINEPHRINE BITARTRATE 0.16 MCG/KG/MIN: 1 INJECTION, SOLUTION, CONCENTRATE INTRAVENOUS at 03:55

## 2023-11-05 RX ADMIN — PIPERACILLIN AND TAZOBACTAM 3.38 G: 3; .375 INJECTION, POWDER, LYOPHILIZED, FOR SOLUTION INTRAVENOUS at 09:47

## 2023-11-05 RX ADMIN — KETAMINE HYDROCHLORIDE 10 MG: 10 INJECTION INTRAMUSCULAR; INTRAVENOUS at 12:30

## 2023-11-05 RX ADMIN — CALCIUM CHLORIDE, MAGNESIUM CHLORIDE, SODIUM CHLORIDE, SODIUM BICARBONATE, POTASSIUM CHLORIDE AND SODIUM PHOSPHATE DIBASIC DIHYDRATE 12.5 ML/KG/HR: 3.68; 3.05; 6.34; 3.09; .314; .187 INJECTION INTRAVENOUS at 10:05

## 2023-11-05 RX ADMIN — CHLORHEXIDINE GLUCONATE 15 ML: 1.2 SOLUTION ORAL at 20:26

## 2023-11-05 RX ADMIN — AMIODARONE HYDROCHLORIDE 0.5 MG/MIN: 1.8 INJECTION, SOLUTION INTRAVENOUS at 15:46

## 2023-11-05 RX ADMIN — CALCIUM CHLORIDE, MAGNESIUM CHLORIDE, DEXTROSE MONOHYDRATE, LACTIC ACID, SODIUM CHLORIDE, SODIUM BICARBONATE AND POTASSIUM CHLORIDE 22.5 ML/KG/HR: 5.15; 2.03; 22; 5.4; 6.46; 3.09; .157 INJECTION INTRAVENOUS at 09:01

## 2023-11-05 RX ADMIN — KETAMINE HYDROCHLORIDE 20 MG: 10 INJECTION INTRAMUSCULAR; INTRAVENOUS at 12:25

## 2023-11-05 RX ADMIN — CALCIUM CHLORIDE, MAGNESIUM CHLORIDE, DEXTROSE MONOHYDRATE, LACTIC ACID, SODIUM CHLORIDE, SODIUM BICARBONATE AND POTASSIUM CHLORIDE 22.5 ML/KG/HR: 5.15; 2.03; 22; 5.4; 6.46; 3.09; .157 INJECTION INTRAVENOUS at 03:57

## 2023-11-05 RX ADMIN — CALCIUM CHLORIDE, MAGNESIUM CHLORIDE, SODIUM CHLORIDE, SODIUM BICARBONATE, POTASSIUM CHLORIDE AND SODIUM PHOSPHATE DIBASIC DIHYDRATE 200 ML/HR: 3.68; 3.05; 6.34; 3.09; .314; .187 INJECTION INTRAVENOUS at 01:26

## 2023-11-05 RX ADMIN — PROPOFOL 50 MCG/KG/MIN: 10 INJECTION, EMULSION INTRAVENOUS at 15:47

## 2023-11-05 ASSESSMENT — ACTIVITIES OF DAILY LIVING (ADL)
ADLS_ACUITY_SCORE: 34
ADLS_ACUITY_SCORE: 32

## 2023-11-05 NOTE — PROGRESS NOTES
[facility]:  CRITICAL PROGRESS NOTE     Date / Time of Admission:  11/2/2023  1:27 AM    Gerardo Al  Male, 78 year old, 1945  MRN: [id number]       Key event since admission   11-2 Courtland gamz placement. Transient  overnight  11-3 HD catheter placed to initiate CRRT. HFNC today  11-5 went to OR for pericardial window , drained 900 ml of old blood        Assessment/Plan:   Gerardo Al is a 78 year old male with  history of CABG in Sept 2023 at [facility], hypertension, diabetes, elevated cholesterol, CAD, who presents to the ER with complaints of fatigue, nausea and vomiting.   He was found to have hypotension felt to be related to severe sepsis possibly from left lower extremities abscess at the sign of enous graft harvest and acute renal failure with acidemia, hyperkalemia, anemia and bilateral pleural effusion.  Cardiology was consulted in the ED and it was felt that this was most likely a septic presentation and not cardiogenic shock . In ED due to hypotension pressors were initiated patient was admitted to the ICU where a Point-of-care ultrasound was performed and CT of the chest done in the ED was reviewed.  Patient was diagnosed with cardial effusion which appeared to be localized and on both side of the heart with fixed dilated IVC concerning for tamponade.  Formal echo was obtained which could not confirm the presence of the pericardial effusion with no clear-cut sign of tamponade however.  [place] was subsequently placed which was not able to rule out some tamponade physiology.  Overnight the patient continued to need some norepinephrine and required mental oxygen and AVAPS.  This morning the patient is hemodynamically stable but still requiring pressors still in A-fib.  He was easily switched to high flow had hemodialysis catheter placed to initiate CRRT.  Neurology and CV surgery are following along and note from [facility] was contacted to let them know about the  presence of the patient here and to ask them if they want the patient transferred.    The plan is as follow     NNeurologic:   No acute issue.     Pulmonary:   Acute respiratory failure with hypoxic and and hypercapnic in the setting of decreased mixed venous O2 due to limited cardiac output and shunt at the left base due to lung compression/at ectasis from the weight of the heart/pericardial effusion  - come back from OR intubated  - lung protective strategy, position on R to help.   - Has not slept much last few nights and if extubated may need BIPAP so plan to keep him intubated overnight     Cardiovascular:   #CAD status post CABG  #History hypertension   # Hypotension requiring norepinephrine and vasopressin relatively low mixed venous O2 suggestive of CO limitation likely related to localized pericardial effusion best appreciated on CT. On echo described as moderate and posterior sign of tamponade currently no evidence of collapse of the RA or RV  but BP is BNP 23878 and CVP is high which is just volume overload which might have to some extent offset the effect of the pericardial effusion on the RA and RV. Per my pocus this am seems larger. Discussed with cardiology  and CV surgery. Went to OR.   # localized pericardial effusion with clear-cut evidence of tamponade physiology.  No other source of sepsis is identified have to consider the possibility that this collection might be infected  # A-fib on Eliquis admission last dose 11/1.  Rate is in the high 90s low 100.  Need for rate control at this point                                                                                                             -norepinephrine and vasopressin as needed to maintain adequate perfusion pressure prior to surgery, weaning post surgery.   -Hemodynamic monitoring with [place] catheter, CO lower than yesterday prior to surgery, up post surgery   -holding his cardiac and hypertensive medication, discontinued Eliquis for  now  -Monitor heart rate.  For now rate control for his A-fib with amiodarone by cardiology  -Went to OR for pericardial window:  1 PRBC given, 1 window and come back with 1 chest tube, intubated.   -Minimal blood loss, drained 900 ml of old blood. Able to decrease pressors.     heGI/:   n.p.o. post surgery      Renal:   CRISTAL likely in setting of his hypotension cardiorenal component  Monitor I/O's.  Electrolyte repletion PRN.  Avoid/limit nephrotoxic agents.  - Adjust vent to compensated some metabolic acidosis pending resumption/effect of CRRT.   - Renal consult ED and managing CRRT     Heme/Onc:   Mild anemia no evidence of Bleeding   Monitor CBC H&H  Monitor Coag and continue to hold Eliquis.   Got K-centra perioperatively for his surgery     Endocrine:   DM type 2  -With insulin protocol per ICU  -Holding oral antidiabetics     Infectious diseases:   Sepsis ? Related to lower leg infection at the site of vein harvesting.  Growing gram-negative.  Clear if this is the source however.  Has consolidation on the chest x-ray no symptoms of pneumonia other possible source would be an infection of the pericardial fluid but cultures not sent and did not look infected per surgery.  -Follow culture results  -Continue with Zosyn and vancomycin  -Consulted general surgery for the wound infection.  No need for an I&D.  Input appreciated     Prophylaxis:  #GI: PI  #DVT: Patient was on Eliquis being held pending possible need for procedure and CRISTAL     Other: I contacted and spoke with his cardiac surgeon at [facility].  He was very appreciative of us calling and of the tendon care we were providing to the patient.  She felt sorry for the patient and did not feel it would need to transfer as they would not offer anything different at [facility]    Restrain: none    Risk Factors Present on Admission:  Clinically Significant Risk Factors          # Hypocalcemia: Lowest Ca = 8.1 mg/dL in last 2 days, will monitor and  "replace as appropriate    # Anion Gap Metabolic Acidosis: Highest Anion Gap = 23 mmol/L in last 2 days, will monitor and treat as appropriate  # Hypoalbuminemia: Lowest albumin = 3.1 g/dL at 11/2/2023 12:55 PM, will monitor as appropriate    # Coagulation Defect: INR = 2.63 (Ref range: 0.85 - 1.15) and/or PTT = 37 Seconds (Ref range: 22 - 38 Seconds), will monitor for bleeding          # DMII: A1C = 6.7 % (Ref range: <5.7 %) within past 6 months, PRESENT ON ADMISSION  # Obesity: Estimated body mass index is 31.7 kg/m  as calculated from the following:    Height as of this encounter: 1.803 m (5' 11\").    Weight as of this encounter: 103.1 kg (227 lb 4.7 oz)., PRESENT ON ADMISSION               Code Status:  Full Code     This patient is considered critically ill and requires ICU level of care due respiratory failure and hypotension requiring pressors and intubated.    Total Critical Care time, not including separate billable procedure time: 60 minutes.    Stef Hill MD  [facility] Pulmonary/Critical Care   11/05/2023   7:45 AM             Allergies/Medications:   Allergies:   No Known Allergies    OUTPATIENT Medications:  Prior to Admission medications    Medication Sig Start Date End Date Taking? Authorizing Provider   acetaminophen (TYLENOL) 500 MG tablet Take 1,000 mg by mouth every 6 hours as needed for mild pain or pain 9/30/23  Yes Reported, Patient   amLODIPine-benazepril (LOTREL) 5-20 MG capsule Take 1 capsule by mouth daily 12/22/22  Yes Reported, Patient   apixaban ANTICOAGULANT (ELIQUIS) 5 MG tablet Take 5 mg by mouth 2 times daily 10/9/23  Yes Reported, Patient   aspirin 81 MG EC tablet Take 81 mg by mouth daily 12/22/22  Yes Reported, Patient   atorvastatin (LIPITOR) 80 MG tablet Take 1 tablet by mouth at bedtime 10/24/23  Yes Reported, Patient   carvedilol (COREG) 25 MG tablet Take 25 mg by mouth 2 times daily (with meals) 12/22/22  Yes Reported, Patient   LORazepam (ATIVAN) 0.5 MG tablet " "Take 0.5 mg by mouth nightly as needed for anxiety or muscle spasms 10/9/23  Yes Reported, Patient   metFORMIN (GLUCOPHAGE XR) 500 MG 24 hr tablet Take 1,000 mg by mouth 2 times daily (with meals) 8/17/23  Yes Reported, Patient   sertraline (ZOLOFT) 25 MG tablet Take 1 tablet by mouth daily 10/25/23  Yes Reported, Patient   torsemide (DEMADEX) 20 MG tablet Take 1 tablet by mouth daily 10/11/23  Yes Reported, Patient        INPATIENT Medications: Continuous Infusions:   amiodarone 0.5 mg/min (11/05/23 0554)    [Held by provider] bumetanide Stopped (11/03/23 1213)    CRRT replacement solution 12.5 mL/kg/hr (11/05/23 0543)    CRRT replacement solution 200 mL/hr (11/05/23 0126)    CRRT replacement solution 22.5 mL/kg/hr (11/05/23 0623)    DOBUTamine Stopped (11/03/23 0900)    EPINEPHrine      - MEDICATION INSTRUCTIONS -      norepinephrine 0.12 mcg/kg/min (11/05/23 0701)    - MEDICATION INSTRUCTIONS -      vasopressin 2.4 Units/hr (11/05/23 0356)       INPATIENT Medications: Scheduled Medications:   [START ON 11/7/2023] influenza vac high-dose quad  0.7 mL Intramuscular Prior to discharge    insulin aspart  1-6 Units Subcutaneous Q4H    pantoprazole  40 mg Intravenous Q12H    piperacillin-tazobactam  3.375 g Intravenous Q8H    sodium chloride (PF)  10-40 mL Intracatheter Q7 Days    vancomycin  750 mg Intravenous Q12H        Subjective and events   Increase norepinephrine needs overnight. On CRRT. Was supposed to go surgery yesterday but did not go. Did not get any communication from surgery so unclear what is the plan.            Objective:   Vitals:  /64   Pulse 92   Temp 97.5  F (36.4  C) (Axillary)   Resp 22   Ht 1.803 m (5' 11\")   Wt 103.1 kg (227 lb 4.7 oz)   SpO2 99%   BMI 31.70 kg/m    Vent: HHFNC  FiO2 (%): 40 %  Resp: 22        GEN: No acute distress,   HEENT: on AVAPS  NECK: Supple.  NV distended  PULM: Non-labored breathing.  No use of accessory muscles.  Clear to ausculation clearly with " decreased breath sounds at the L base  CVS: A-fib normal S1, S2.  No rubs, murmurs, or gallops.    ABDOMEN: Normoactive bowel sounds.  Non-tender to palpation.  Non-distended.    EXTREMITES:  No clubbing, cyanosis, lower extremity edema.  Below the left knee at the site of vein harvesting the patient has a wound with e localized welling and redness.  No surrounding cellulitis  NEURO:  Awake.  Oriented to person, nonfocal.    Intake/Output: I/O last 3 completed shifts:  In: 3263.6 [P.O.:200; I.V.:2816.3; Other:247.3]  Out: 4571.1 [Urine:43; Other:4518.1; Stool:10]        Pertinent Studies:     I have personally reviewed the following data over the past 24 hrs:    9.6  \   8.0 (L)   / 228     140 100 25.6 (H) /  144 (H)   4.3 17 (L) 2.88 (H) \     ALT: 231 (H) AST: 343 (H) AP: 33 (L) TBILI: 0.5   ALB: 3.4 (L) TOT PROTEIN: 5.8 (L) LIPASE: N/A     Procal: N/A CRP: N/A Lactic Acid: 1.6       INR:  2.63 (H) PTT:  37   D-dimer:  N/A Fibrinogen:  N/A       Imaging results reviewed over the past 24 hrs:   Recent Results (from the past 24 hour(s))   Echocardiogram Limited    Narrative    [id number]  [id number]  [id number]  [id number]^MEGHAN^SAHRA^[place] [address]     Name: JONAH ARAUJO  MRN: [id number]  : 1945  Study Date: 2023 09:36 AM  Age: 78 yrs  Gender: Male  Patient Location: [facility]  Reason For Study: Tamponade  Ordering Physician: SAHRA CHRISTIANSON  Referring Physician: CHERYL VARELA  Performed By: ACE     BSA: 2.2 m2  Height: 71 in  Weight: 227 lb  HR: 102  BP: 106/51 mmHg  ______________________________________________________________________________  Procedure  Limited Portable Echo Adult.  ______________________________________________________________________________  Interpretation Summary     1. Normal left ventricular size and systolic performance with a visually  estimated ejection fraction of 55-60%.  2. There is mild  "concentric increase in left ventricular wall thickness.  3. There is trace-mild aortic insufficiency.  4. Normal right ventricular size and systolic performance.  5. There is mild left atrial enlargement.  6. There is a medium to large sized pericardial effusion. The cumulative  findings are not suggestive of significant intraventricular  dependence/tamponade physiology.     When compared to the prior real-time echocardiogram dated 2 November 2023, the  findings are felt to be fairly similar on both examinations with the  pericardial effusion appearing fairly similar in size on both studies.  ______________________________________________________________________________  Left ventricle:  Normal left ventricular size and systolic performance with a visually  estimated ejection fraction of 55-60%. There is normal regional wall motion.  There is mild concentric increase in left ventricular wall thickness.     Assessment of LV Diastolic Function: Examination was performed as a \"limited  study\". Assessment of diastolic filling consequently not performed.     Right ventricle:  Normal right ventricular size and systolic performance.     Left atrium:  There is mild left atrial enlargement.     Right atrium:  The right atrium is of probable normal size (not well-visualized).     IVC:  The IVC is moderately dilated.     Aortic valve:  The aortic valve is not well visualized, but suspected to be comprised of  three cusps. There is mild aortic valve sclerosis without significant  stenosis. There is trace to mild aortic insufficiency.     Mitral valve:  The mitral valve appears morphologically normal. There is mild mitral  insufficiency.     Tricuspid valve:  The tricuspid valve is grossly morphologically normal. There is trace  tricuspid insufficiency.     Pulmonic valve:  The pulmonic valve is grossly morphologically normal.     Thoracic aorta:  The aortic root and proximal ascending aorta are of normal dimension.   "   Pericardium:  There is a medium to large sized pericardial effusion. There is mild  respiratory variation in tricuspid & mitral inflow, but does not appear to be  inordinate. There does not appear to be significant right ventricular or right  atrial inversion. The cumulative findings are not suggestive of significant  intraventricular dependence/tamponade physiology.  ______________________________________________________________________________  ______________________________________________________________________________  MMode/2D Measurements & Calculations  IVSd: 0.82 cm  LVIDd: 3.3 cm  LVIDs: 2.6 cm  LVPWd: 1.2 cm  FS: 19.8 %  LV mass(C)d: 93.2 grams  LV mass(C)dI: 41.9 grams/m2  RWT: 0.71     Time Measurements  MM HR: 103.0 BPM     Doppler Measurements & Calculations  TR max karolina: 214.0 cm/sec  TR max P.3 mmHg     ______________________________________________________________________________  Report approved by: Zuleyka Molina 2023 12:30 PM           Echo 2023  Interpretation Summary  The left ventricle is normal in size with moderate concentric left ventricular  hypertrophy.  Left ventricular function is normal.The ejection fraction is 60-65%.  Normal right ventricle size and systolic function.  The left atrium is mildly dilated.  No hemodynamically significant valvular abnormalities on 2D or color flow  imaging.  There is a moderate posterior pericardial effusion with no echocardiographic  indications of cardiac tamponade.  IVC diameter >2.1 cm collapsing <50% with sniff suggests a high RA pressure  estimated at 15 mmHg or greater.     There is no comparison study available.    Recent Labs   Lab 23  0512 23  0411 23  0120 23  0024 23  0400 23  0324 23  1601   WBC 9.6  --  11.2*  --   --  10.8   < > 8.9  --  11.0   HGB 8.0*  --  7.3*  --   --  7.5*   < > 7.8*  --  7.7*   MCV 94  --  94  --   --  94    < > 93  --  93     --  255  --   --  288   < > 334  --  385   INR 2.63*  --   --   --   --   --   --  2.08*  --  2.37*     --  140  --  137  --    < > 139   < >  --    POTASSIUM 4.3  --  4.6  --  4.8  --    < > 4.6   < >  --    CHLORIDE 100  --  101  --  100  --    < > 100   < >  --    CO2 17*  --  17*  --  16*  --    < > 20*   < >  --    BUN 25.6*  --  29.3*  --  33.8*  --    < > 50.1*   < >  --    CR 2.88*  --  3.38*  --  3.88*  --    < > 5.75*   < >  --    ANIONGAP 23*  --  22*  --  21*  --    < > 19*   < >  --    RIKA 8.9  --  8.7*  --  9.3  --    < > 9.0   < >  --    * 136* 147*   < > 138*  --    < > 132*   < >  --    ALBUMIN 3.4*  --  3.4*  --  3.6  --    < > 3.3*   < >  --    PROTTOTAL 5.8*  --  5.7*  --   --   --   --  5.8*  --   --    BILITOTAL 0.5  --  0.5  --   --   --   --  0.3  --   --    ALKPHOS 33*  --  32*  --   --   --   --  35*  --   --    *  --  174*  --   --   --   --  13  --   --    *  --  258*  --   --   --   --  11  --   --     < > = values in this interval not displayed.       Most Recent 3 CBC's:  Recent Labs   Lab Test 11/05/23 0512 11/05/23 0120 11/04/23 2001   WBC 9.6 11.2* 10.8   HGB 8.0* 7.3* 7.5*   MCV 94 94 94    255 288     Most Recent 3 BMP's:  Recent Labs   Lab Test 11/05/23 0512 11/05/23 0411 11/05/23 0120 11/05/23  0024 11/04/23 2002     --  140  --  137   POTASSIUM 4.3  --  4.6  --  4.8   CHLORIDE 100  --  101  --  100   CO2 17*  --  17*  --  16*   BUN 25.6*  --  29.3*  --  33.8*   CR 2.88*  --  3.38*  --  3.88*   ANIONGAP 23*  --  22*  --  21*   RIKA 8.9  --  8.7*  --  9.3   * 136* 147*   < > 138*    < > = values in this interval not displayed.     Most Recent 2 LFT's:  Recent Labs   Lab Test 11/05/23  0512 11/05/23  0120   * 258*   * 174*   ALKPHOS 33* 32*   BILITOTAL 0.5 0.5     Anticoagulation Dose History          Latest Ref Rng & Units 11/2/2023 11/3/2023   Recent Dosing and Labs   INR 0.85 - 1.15  2.90  2.83  2.69      Most Recent 3 Creatinines:  Recent Labs   Lab Test 11/05/23  0512 11/05/23  0120 11/04/23 2002   CR 2.88* 3.38* 3.88*     Most Recent 6 Bacteria Isolates From Any Culture (See EPIC Reports for Culture Details):No lab results found.  Most Recent ABG:  Recent Labs   Lab Test 11/05/23 0513   PH 7.30*   PO2 103*   PCO2 34*   HCO3 16*   EDWIN -10.0     Most Recent Anemia Panel:  Recent Labs   Lab Test 11/05/23 0512 11/04/23 2001 11/04/23  1210   WBC 9.6   < > 9.9   HGB 8.0*   < > 7.6*   HCT 26.2*   < > 24.4*   MCV 94   < > 94      < > 287   IRON  --   --  26*   IRONSAT  --   --  10*   FEB  --   --  250    < > = values in this interval not displayed.     NOTE: Data reviewed over the past 24 hrs contributes toward MDM complexity    Stef Hill MD

## 2023-11-05 NOTE — PROGRESS NOTES
RT PROGRESS NOTE    VENT DAY# 1    CURRENT SETTINGS:   Vent Mode: CMV/AC  (Continuous Mandatory Ventilation/ Assist Control)  FiO2 (%): 45 %  Resp Rate (Set): (S) 24 breaths/min (per MD order)  Tidal Volume (Set, mL): 500 mL  PEEP (cm H2O): 5 cmH2O  Resp: 17      PATIENT PARAMETERS:  PIP 25  Pplat:  21  Pmean:  12  Compliance: 41  SBT: No      02 Sats:  99%  BS: coarse/ diminished     ETT SIZE 7.5 Secured at 22 cm at teeth/gums    NOTE / SHIFT SUMMARY:   Pt. arrived from OR intubated, ETT 7.5, 22@ teeth, RR increased to 24 per MD order. Titrate oxygen as tolerated, ETT repositioned for skin integrity. Rt following     Kandice Batista RT

## 2023-11-05 NOTE — PROGRESS NOTES
Renal progress note  CC:CRISTAL, anuria  Assessment and Plan:  78 year old male with hx of CKD 3, HTN , DM2, PAD, hx of Atrial fib on eliquis for AC, BPH , CAD with sp CABG 9/26/2023 after angiogram 9/22 with 3v disease cb mild CRISTAL at the time of discharge.  Nephrology consulted for severe CRISTAL with oligoanuric state     CRISTAL with anuria  CKD 3 baseline  Baseline creatinine 1.2-1.3, uptrending to 1.5s at the time of discharge, creatinine was 1.35 on 10/9/2023 at the time of his follow-up with mild hyperkalemia at the time he was resumed on Lasix  Patient had repeat labs with creatinine acutely elevated to 7.85 on 10/31/2023, further increased to 10.58 with significant azotemia  on 11/2/2023  UA turbid appearing likely consistent with ATN  CT imaging with evidence of atherosclerotic changes otherwise nonobstructive renal stones and some evidence of cystitis no hydronephrosis  --> With initial fluid resuscitation blood pressure improvement on pressors the patient's creatinine has come down from 10.6 to 9 although the patient does remain anuric  --> With ongoing concern for pericardial effusion and mild hypervolemia stopped the maintenance bicarb and use bicarb pushes to treat acidemia  --> no response to bumex inf , discontinued  --> 11/3 started on CRRT dose 25ml/kg/hr with slow UF goal ~1-1.5 in 24hr,increased prescription 11/5 for ongoing acidosis, tolerating UF  --> Patient's baseline is very minimal CKD hoping to have good recovery with stable hemodynamics, hard to give prognosis with likely >4wks of low BP and ACEi use  --> follow labs per CRRT protocols  --> oozing improving; will follow with ongoing clearance , with full dose eliquis and likely uremic plt dysfunction , hoping to settle in 24-48hrs  --> Plan for pericardial window today would likely be of CRRT for a few hours we will give 100 mEq bicarb and resume CRRT when back to ICU    Hyperkalemia, stable ~4s  Likely secondary to acidosis and  CRISTAL  --> Low-dose Bumex inf wo response , now discontinue  On K2 for dialysate and K 4 for replacement fluids  --> UF goals as above with CRRT     Hyponatremia  Improving with IV fluids, stopped     Hypocalcemia  One-time dose of 2 g calcium gluconate given  Follow on serial calciums  Replace per CRRT protocols    Metabolic acidosis slow to correct  Has received multiple amps of bicarb prescription increased now to 35 mL/kg/h for CRRT 22.5 by dialysate and 12.5 mL/kg/h and replacement fluids  Continue to monitor labs per CRRT protocol  Additional 100 mEq was given prior to taking off CRRT for the surgery today     HTN hx  PTA on Amlodipine Benazepril and coreg  Held HTN meds   On vaso and levo for BP support  hypervolemia with anuria  --> will discontinue bicarb with CRRT initiation  --> moderate pericardial effusion wo temponade physiology tolerating gentle UF  --> support BP with pressers  --> management per ICU/card     Anemia acute on chronic  Hemoglobin 7s  Last hemoglobin was 9.4 on 10/9/2023  No evidence of bleed possibly inflammatory anemia/post CABG  Baseline is around 12  Iron sats low normal 26%  Did receive one-time PRBC overnight 11/5/23  Transfuse if less than 8 with recent cardiac surgery  --> We will defer these plans to cardiology and ICU     Coronary disease s/p CABG x3 on 9/26/2023  History of atrial fibrillation s/p cardioversion in August 2023  On AC with Eliquis currently held  Loculated pericardial effusion concerns for tamponade physiology  Last echo with EF of 52%  Echo showing large-sized pericardial effusion with repeat echo 11/5/2023 showing some septal abnormality and large effusion plans for pericardial window today   CT surgery following  Follow on CV surgery recs  Bolus albumin if hypotensive     Possible septic shock with leg cellulitis/abscess  General surgery completed bedside I&D  On Abx ; Vanco and cefazolin  Blood Cx pending, wound culture growing Klebsiella  Adequately  resuscitated , now off bicarb with risk of hypervolemia   Bolus albumin if hypotensive     Acute respiratory failure in the setting of CRISTAL and hypervolemia  Stable with oxygen high flow nasal cannula oxygen/BiPAP  Intubated post pericardial window     DM2  Insulin management per ICU       Thank you for the consultation we will follow  Johny Guevara MD  [facility]  [phone]      Subjective  Seen at bedside , was on high flow nasal cannula this morning with brief need for AVAPS/BiPAP  Intubate post pericardial window  CRRT tolerated ok , blood pressure tolerating with pressor support  No new events plans for pericardial window today  remains on vaso and levo  Will follow on hemodynamics with UF  OK to remove barragan    Objective    Vital signs in last 24 hours  Temp:  [97.1  F (36.2  C)-97.8  F (36.6  C)] 97.3  F (36.3  C)  Pulse:  [] 95  Resp:  [11-28] 17  MAP:  [56 mmHg-80 mmHg] 79 mmHg  Arterial Line BP: ()/(42-62) 128/60  FiO2 (%):  [35 %-70 %] 70 %  SpO2:  [87 %-100 %] 100 %  Weight:   [unfilled]    Intake/Output last 3 shifts  I/O last 3 completed shifts:  In: 3263.6 [P.O.:200; I.V.:2816.3; Other:247.3]  Out: 4571.1 [Urine:43; Other:4518.1; Stool:10]  Intake/Output this shift:  I/O this shift:  In: 715.4 [I.V.:365.4]  Out: 519 [Other:519]    Physical Exam  Alert/awake, NAD  CV: RRR without murmur or rub  Lung: clear and equal; no extra sounds  Ab: soft and NT; not distended; normal bs  Ext: + edema and well perfused  Skin; no rash  Barragan +    Pertinent Labs   Lab Results   Component Value Date    WBC 11.0 11/05/2023    HGB 8.3 (L) 11/05/2023    HCT 26.7 (L) 11/05/2023    MCV 93 11/05/2023     11/05/2023     Lab Results   Component Value Date    BUN 22.6 11/05/2023     11/05/2023    CO2 16 (L) 11/05/2023       Lab Results   Component Value Date    ALBUMIN 3.3 (L) 11/05/2023     Lab Results   Component Value Date    PHOS 4.6 (H) 11/05/2023     I reviewed all lab  results  Johny Guevara MD

## 2023-11-05 NOTE — PROGRESS NOTES
Care Management Follow Up    Length of Stay (days): 3    Expected Discharge Date: 11/10/2023     Concerns to be Addressed:     Care Progression  Patient plan of care discussed at interdisciplinary rounds: Yes    Anticipated Discharge Disposition:  TBD     Anticipated Discharge Services:  NA  Anticipated Discharge DME:  NA    Patient/family educated on Medicare website which has current facility and service quality ratings:  NA  Education Provided on the Discharge Plan:  NA  Patient/Family in Agreement with the Plan:  NA    Referrals Placed by CM/SW:  NA  Private pay costs discussed: Not applicable    Additional Information:  Discussed patient in ICU rounds. Per RN patient more hypoxic. Plan for surgery to evaluate.     Social HX: Patient from home with spouse. Independent at baseline, no Services, no DME. Recently hospitalized for a cardiac surgery.     CM will continue to follow care progression and aide in discharge planning as needed.     Nancy Orozco RN

## 2023-11-05 NOTE — PHARMACY-VANCOMYCIN DOSING SERVICE
Pharmacy Vancomycin Note  Date of Service 2023  Patient's  1945   78 year old, male    Indication: Sepsis  Day of Therapy: 3  Current vancomycin regimen:  750 mg IV q12h  Current vancomycin monitoring method: Renal Replacement Therapy  Current vancomycin therapeutic monitoring goal: 15-20 mg/L      Current estimated CrCl = Estimated Creatinine Clearance: 28.5 mL/min (A) (based on SCr of 2.61 mg/dL (H)).    Creatinine for last 3 days  2023:  9:50 PM Creatinine 10.42 mg/dL  11/3/2023:  5:26 AM Creatinine 9.78 mg/dL;  1:22 PM Creatinine 9.62 mg/dL;  8:11 PM Creatinine 7.37 mg/dL  2023:  3:24 AM Creatinine 5.75 mg/dL; 12:10 PM Creatinine 4.64 mg/dL;  8:02 PM Creatinine 3.88 mg/dL  2023:  1:20 AM Creatinine 3.38 mg/dL;  5:12 AM Creatinine 2.88 mg/dL;  8:20 AM Creatinine 2.61 mg/dL    Recent Vancomycin Levels (past 3 days)  2023:  8:20 AM Vancomycin 15.9 ug/mL    Vancomycin IV Administrations (past 72 hours)                     vancomycin (VANCOCIN) 750 mg in sodium chloride 0.9 % 250 mL intermittent infusion (mg) 750 mg New Bag 23 0920     750 mg New Bag 23     750 mg New Bag  08     750 mg New Bag 23                    Nephrotoxins and other renal medications (From now, onward)      Start     Dose/Rate Route Frequency Ordered Stop    23 1130  norepinephrine (LEVOPHED) 4 mg in  mL infusion PREMIX         0.01-0.1 mcg/kg/min × 103.1 kg  3.9-38.7 mL/hr  Intravenous CONTINUOUS 23 1107      23 0100  norepinephrine (LEVOPHED) 16 mg in sodium chloride 0.9 % 250 mL infusion CENTRAL         0.01-0.6 mcg/kg/min × 97.9 kg  0.9-55.1 mL/hr  Intravenous CONTINUOUS 23 0021      23  [Auto Hold]  vancomycin (VANCOCIN) 750 mg in sodium chloride 0.9 % 250 mL intermittent infusion        (Auto Hold since Sun 2023 at 1141.Hold Reason: Transfer to a procedural area)    750 mg  over 60 Minutes Intravenous EVERY 12 HOURS  "11/03/23 1157      11/03/23 1819  [Auto Hold]  piperacillin-tazobactam (ZOSYN) 3.375 g vial to attach to  mL bag        (Auto Hold since Sun 11/5/2023 at 1141.Hold Reason: Transfer to a procedural area)   Note to Pharmacy: For [facility], [facility] and [facility]: For Zosyn-naive patients, use the \"Zosyn initial dose + extended infusion\" order panel.    Patient received a dose in the ED    3.375 g  over 240 Minutes Intravenous EVERY 8 HOURS 11/03/23 1425      11/02/23 1230  [Held by provider]  bumetanide (BUMEX) 0.25 mg/mL infusion        (On hold since Fri 11/3/2023 at 1336 until manually unheld; held by Johny Guevara MDHold Reason: Other)    0.25 mg/hr  1 mL/hr  Intravenous CONTINUOUS 11/02/23 1208      11/02/23 0800  vasopressin (VASOSTRICT) 20 Units in sodium chloride 0.9 % 100 mL standard conc infusion         2.4 Units/hr  12 mL/hr  Intravenous CONTINUOUS 11/02/23 0739                 Contrast Orders - past 72 hours (72h ago, onward)      None            Interpretation of levels and current regimen:  Vancomycin level is reflective of therapeutic level    Has serum creatinine changed greater than 50% in last 72 hours: N/A (on CRRT)    Renal Function:  CRRT      Plan:  Continue current dose.  Will push out next dose until 11/6/23 at 0000 due to patient being off CRRT for cardiac surgery.  Vancomycin monitoring method: Renal Replacement Therapy  Vancomycin therapeutic monitoring goal: 15-20 mg/L  Pharmacy will check vancomycin levels as appropriate.  Serum creatinine levels will not be ordered  as patient is undergoing CRRT .    Roosevelt Santiago [facility]    "

## 2023-11-05 NOTE — ANESTHESIA PROCEDURE NOTES
Airway       Patient location during procedure: OR       Procedure Start/Stop Times: 11/5/2023 12:42 PM  Staff -        Performed By: CRNAIndications and Patient Condition       Indications for airway management: jennifer-procedural         Mask difficulty assessment: 0 - not attempted    Final Airway Details       Final airway type: endotracheal airway       Successful airway: ETT - single  Endotracheal Airway Details        ETT size (mm): 7.5       Cuffed: yes       Successful intubation technique: direct laryngoscopy and video laryngoscopy       VL Blade Size: Glidescope 4       Grade View of Cords: 1       Adjucts: stylet       Position: Right       Measured from: lips       Secured at (cm): 23       Bite block used: None    Post intubation assessment        Placement verified by: capnometry        Number of attempts at approach: 1       Number of other approaches attempted: 0       Secured with: silk tape       Ease of procedure: easy       Dentition: Intact    Medication(s) Administered   Medication Administration Time: 11/5/2023 12:42 PM

## 2023-11-05 NOTE — PROGRESS NOTES
Pt has spent most of this shift on the BiPAP in AVAPS 22 600 +5 min/max pressure 10/35 40%. With a few short breaks on HFNC 40 lpm 70%. Pt feels breath is easier on the BiPAP and asks to go back on BiPAP when he gets tired. Breath sounds diminished clear.  Rt continue to monitor.    Vishal Ocampo,  RT

## 2023-11-05 NOTE — ANESTHESIA CARE TRANSFER NOTE
Patient: Gerardo Al    Procedure: Procedure(s):  CREATION, PERICARDIAL WINDOW       Diagnosis: Pericardial effusion [I31.39]  Diagnosis Additional Information: No value filed.    Anesthesia Type:   General     Note:    Oropharynx: endotracheal tube in place        Independent Airway: airway patency not satisfactory and stable  Dentition: dentition unchanged    Report to RN Given: handoff report given  Patient transferred to: ICU  Comments: Patient transferred to bed.  Transport monitor on.  O2 at 10L via ambu bag.  Accompanied to ICU with anesthesiologist.  In ICU vital signs stable.  SBAR report to RN per institutional policy and procedure.  Transfer of care.    ICU Handoff: Call for PAUSE to initiate/utilize ICU HANDOFF, Identified Patient, Identified Responsible Provider, Reviewed the Pertinent Medical History, Discussed Surgical Course, Reviewed Intra-OP Anesthesia Management and Issues during Anesthesia, Set Expectations for Post Procedure Period and Allowed Opportunity for Questions and Acknowledgement of Understanding      Vitals:  Vitals Value Taken Time   BP     Temp     Pulse 90 11/05/23 1407   Resp 21 11/05/23 1407   SpO2 99 % 11/05/23 1407   Vitals shown include unfiled device data.    Electronically Signed By: HOWARD KELLER CRNA  November 5, 2023  2:09 PM

## 2023-11-05 NOTE — PLAN OF CARE
"[facility] - ICU    RN Progress Note:            Pertinent Assessments:      Please refer to flowsheet rows for full assessment     Patient is extremely weak and deconditioned. Patient looks more pale including the oral mucosa this shift. Patient is still A&Ox4 but seems much more drowsy than at the beginning of the shift. Patient's work of breathing is increasing as well. Multiple points through the shift he has stated that his breathing feels \"heavy.\" Patient is also breathing with the use of his abdominal muscles. Patient continues to have oozing especially from his SWAN site. Patient remains anuric and continues CRRT. Writer has been able to pull 75cc/hr and titrated pressors as able. Hgb check was 7.3 and bicarb continues to be out of range so nephrology was called to obtain orders. Patient's spirits are down this shift as well. When asked if writer could bring him anything he stated \"More time\". Patient also has not been getting good rest despite PRN sleep meds.            Key Events - This Shift:   Patient drops saturations into the mid 80s with repositioning. Patient cannot tolerate being off of the bi-pap for longer than a few seconds. He has been bi-pap dependant at 40% this whole shift.   Bicarb remains out of normal limits so ICU MD ordered 2 amps of bicarb. Then obtained order from Nephrology to increase the dialysate rate.   Writer calculated DORYS score and it was very low. So to verify and keep a closer eye on cardiac function a flow-track was applied for continuous monitoring of index and output.   Writer updated ICU MD with concern for possible intubation given that the patient is more lethargic, having trouble maintaining oxygen levels, and use of abdominal muscles. At this time provider just wanted to continue monitoring.  AST and ALT are trending up. INR is 2.63 up from yesterday and PLTs are trending down. ICU MD notified.   Shaquille paged out to come and visit the " patient today.              Barriers to Discharge / Downgrade:     CRRT, Bi-Pap, Pressors         Point of Contact Update Francine: Updated her on the patient receiving a unit of blood last night and how his lab work this morning looked. She will be in later to visit.     Tamika Albrecht RN

## 2023-11-05 NOTE — ANESTHESIA PREPROCEDURE EVALUATION
Anesthesia Pre-Procedure Evaluation    Patient: Gerardo Al   MRN: [id number] : 1945        Procedure : Procedure(s):  CREATION, PERICARDIAL WINDOW          Past Medical History:   Diagnosis Date    Hx of CABG     2023 at [facility]      Past Surgical History:   Procedure Laterality Date    CARDIAC SURGERY      CV RIGHT HEART CATH MEASUREMENTS RECORDED N/A 2023    Procedure: Right Heart Catheterization;  Surgeon: Adam Busby MD;  Location: [facility] CATH LAB CV    CV SWAN DIALLO PROCEDURE N/A 2023    Procedure: [place] Diallo Procedure;  Surgeon: Adam Busby MD;  Location: [facility] CATH LAB CV    IR CVC TUNNEL PLACEMENT > 5 YRS OF AGE  11/3/2023    PICC TRIPLE LUMEN PLACEMENT  2023      No Known Allergies   Social History     Tobacco Use    Smoking status: Never    Smokeless tobacco: Never   Substance Use Topics    Alcohol use: Not on file      Wt Readings from Last 1 Encounters:   23 103.1 kg (227 lb 4.7 oz)        Anesthesia Evaluation            ROS/MED HX  ENT/Pulmonary:       Neurologic:       Cardiovascular: Comment: The visual ejection fraction is 50-55%.  There is mild to moderate septal hypokinesis.  Basal lateral wall appars dyskinetic.  The right ventricle is normal in size and function.  Large pericardial effusion  The echo/Doppler findings are inconclusive for cardiac tamponade.  Compared to the prior study dated 2023, there are changes as noted.  Septal wall motion abnormality is new; the lateral wall motion abnormality is  more prominent. Overall LV function has decreased mildly.      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       Malignancy:       Other:            Physical Exam    Airway   unable to assess          Respiratory Devices and Support         Dental    unable to assess        Cardiovascular          Rhythm and rate: regular     Pulmonary   pulmonary exam normal    Comment: On BiPap            OUTSIDE LABS:  CBC:   Lab Results   Component Value Date    WBC 11.0 11/05/2023    WBC 9.6 11/05/2023    HGB 8.3 (L) 11/05/2023    HGB 8.0 (L) 11/05/2023    HCT 26.7 (L) 11/05/2023    HCT 26.2 (L) 11/05/2023     11/05/2023     11/05/2023     BMP:   Lab Results   Component Value Date     11/05/2023     11/05/2023    POTASSIUM 4.2 11/05/2023    POTASSIUM 4.3 11/05/2023    CHLORIDE 101 11/05/2023    CHLORIDE 100 11/05/2023    CO2 16 (L) 11/05/2023    CO2 17 (L) 11/05/2023    BUN 22.6 11/05/2023    BUN 25.6 (H) 11/05/2023    CR 2.61 (H) 11/05/2023    CR 2.88 (H) 11/05/2023     (H) 11/05/2023     (H) 11/05/2023     COAGS:   Lab Results   Component Value Date    PTT 37 11/05/2023    INR 2.53 (H) 11/05/2023    FIBR 389 11/02/2023     POC: No results found for: \"BGM\", \"HCG\", \"HCGS\"  HEPATIC:   Lab Results   Component Value Date    ALBUMIN 3.3 (L) 11/05/2023    PROTTOTAL 5.8 (L) 11/05/2023     (H) 11/05/2023     (H) 11/05/2023    ALKPHOS 33 (L) 11/05/2023    BILITOTAL 0.5 11/05/2023     OTHER:   Lab Results   Component Value Date    PH 7.29 (L) 11/05/2023    LACT 1.6 11/05/2023    A1C 6.7 (H) 11/02/2023    RIKA 8.9 11/05/2023    PHOS 4.6 (H) 11/05/2023    MAG 2.0 11/05/2023    TSH 2.85 11/02/2023       Anesthesia Plan    ASA Status:  5, emergent    NPO Status:  NPO Appropriate    Anesthesia Type: General.     - Airway: ETT   Induction: Intravenous.   Maintenance: Balanced.   Techniques and Equipment:     - Airway: Video-Laryngoscope     - Lines/Monitors: 2nd IV, Arterial Line, Central Line, " PAC, CVP, LAURI            LAURI Absolute Contra-indication: NONE            LAURI Relative Contra-indication: NONE     - Blood: Blood in Room, PRBC, Cell Saver     - Drips/Meds: Ketamine, Norepi, Vasopressin, Epinephrine     Consents    Anesthesia Plan(s) and associated risks, benefits, and realistic alternatives discussed. Questions answered and patient/representative(s) expressed understanding.     - Discussed:     - Discussed with:  Spouse, Patient      - Extended Intubation/Ventilatory Support Discussed: Yes.      - Patient is DNR/DNI Status: No     Use of blood products discussed: Yes.     - Discussed with: Patient.     - Consented: consented to blood products     Postoperative Care    Pain management: Multi-modal analgesia.   PONV prophylaxis: Ondansetron (or other 5HT-3), Dexamethasone or Solumedrol     Comments:    Other Comments: Plan for sedation with ketamine for pericardial window with conversion to GETA for LAURI and supportive cares once effusion has been drained.   PCC given in ICU  Blood in room with additional products prepared in blood bank  Pressors ongoing   LAURI            Sushma Montano MD

## 2023-11-05 NOTE — ANESTHESIA POSTPROCEDURE EVALUATION
Patient: Gerardo Al    Procedure: Procedure(s):  CREATION, PERICARDIAL WINDOW       Anesthesia Type:  General    Note:  Disposition: ICU            ICU Sign Out: Anesthesiologist/ICU physician sign out WAS performed   Postop Pain Control: Uneventful            Sign Out: Well controlled pain   PONV: No   Neuro/Psych: Uneventful            Sign Out: PLANNED postop sedation   Airway/Respiratory:             Sign Out: AIRWAY IN SITU/Resp. Support               Airway in situ/Resp. Support: ETT                 Reason: Planned Pre-op   CV/Hemodynamics:             Sign Out: Detailed CV status               Blood Pressure: Pressors               Rate/Rhythm: Normal HR               Perfusion:  Adequate perfusion indices   Other NRE: NONE   DID A NON-ROUTINE EVENT OCCUR? No    Event details/Postop Comments:  Patient transported to ICU intubated and sedated. Pressors ongoing. Handoff given at bedside.            Last vitals:  Vitals:    11/05/23 0915 11/05/23 0945 11/05/23 1000   BP:      Pulse: 98 99 95   Resp: 20 16 17   Temp:      SpO2: 100% 99% 100%       Electronically Signed By: Sushma Montano MD  November 5, 2023  2:17 PM

## 2023-11-05 NOTE — PROGRESS NOTES
[facility]:  CRITICAL PROGRESS NOTE     Date / Time of Admission:  11/2/2023  1:27 AM    Gerardo Al.  Male, 78 year old, 1945  MRN: [id number]         Key event since admission   11-2 Moore gamz placement. Transient  overnight  11-3 HD catheter placed to initiate CRRT. HFNC   11-4 D on pressors and requiring AVAPS  11-5 With lower, more AVAPS dependent, requiring norepinephrine and vasopressin.  Postoperatively stable.  Limited echo pending.  Discussed with cardiothoracic surgery and cardiology believe the patient would be any benefit from having his pericardial effusion drained           Assessment/Plan:   Gerardo Al is a 78 year old male with  history of CABG in Sept 2023 at [facility], hypertension, diabetes, elevated cholesterol, CAD, who presents to the ER with complaints of fatigue, nausea and vomiting.   He was found to have hypotension felt to be related to severe sepsis possibly from left lower extremities abscess at the sign of enous graft harvest and acute renal failure with acidemia, hyperkalemia, anemia and bilateral pleural effusion.  Cardiology was consulted in the ED and it was felt that this was most likely a septic presentation and not cardiogenic shock . In ED due to hypotension pressors were initiated patient was admitted to the ICU where a Point-of-care ultrasound was performed and CT of the chest done in the ED was reviewed.  Patient was diagnosed with cardial effusion which appeared to be localized and on both side of the heart with fixed dilated IVC concerning for tamponade.  Formal echo was obtained which could not confirm the presence of the pericardial effusion with no clear-cut sign of tamponade however.  Moore was subsequently placed which was not able to rule out some tamponade physiology.  Overnight the patient continued to need some norepinephrine and required mental oxygen and AVAPS.  This morning the patient is hemodynamically stable but  still requiring pressors still in A-fib.  He was easily switched to high flow had hemodialysis catheter placed to initiate CRRT.  Neurology and CV surgery are following along and note from [facility] was contacted to let them know about the presence of the patient here and to ask them if they want the patient transferred.      The plan is as follow     NNeurologic:   No acute issue.     Pulmonary:   Acute respiratory failure with hypoxic and and hypercapnic in the setting of decreased mixed venous O2 and limited cardiac output and shunt at the left base due to lung compression/at ectasis from the weight of the heart/pericardium structure  - HFNC in the day and AVAPS as needed   -O2 as needed  -Broad-spectrum antibiotic  -Chest x-ray today chart review showed no pulmonary edema some consolidation atelectasis at the left base trace pleural effusion     Cardiovascular:   #CAD status post CABG  #History hypertension   # Hypotension requiring norepinephrine vasopressin low mixed venous O2 suggestive of CO limitation likely related to localized pericardial effusion best appreciated on CT. On echo described as moderate and posterior sign of tamponade currently no evidence of collapse of the RA or RV  but BP is BNP 70231 and CVP is high which is just volume overload which might to some extent offset the effect of the pericardial effusion on the RA and RV  # localized pericardial effusion with clear-cut evidence of tamponade physiology.  No other source of sepsis is identified have to consider the possibility that this collection might be infected.  Repeat limited echo today to assess worsening size of the pericardial effusion and or signs of tamponade.  # A-fib on Eliquis admission last dose 11/1.  Rate is in the high 90s low 100.  Need for rate control at this point                   (mmHg)  17 15 15 12 12 11 12 18 17  21     Pulm Artery Pressure (PAP)  39/17 39/15 39/18 38/17 40/18 38/18 40/18 47/24 45/23 48/25     Pulm  Artery Pressure (PAP) Mean  26 23 25 24 25 25 25 31 30 32     Cardiac Output (CO)  9.6         5.7     Cardiac Index (CI)  4.3         2.5     Stroke Volume (SV)  96               Note some of the cardiac output recorded likely incorrect as they were obtained through the A-line and the Flowtrack is unreliable in the setting of A-fib    -norepinephrine and vasopressineas needed to maintain adequate perfusion pressure  -Hemodynamic monitoring with Deland-Anel catheter, CO lower than yesterday with a Cristofer equation  -holding his cardiac and hypertensive medication, discontinued Eliquis  -1 dose of vitamin K today for INR and possible surgery as discussed with cardiac surgery  -Monitor heart rate.  For now rate control for his A-fib with amiodarone per cardiology  -Care coordination with cardiology and cardiothoracic surgery, I believe this patient needs to have his pericardial effusion drained sooner than later    heGI/:   n.p.o. in case need surgery.      Renal:   CRISTAL likely in setting of his hypotension cardiorenal component  Hyperkalemia  Monitor I/O's.  Electrolyte repletion PRN.  Avoid/limit nephrotoxic agents.  -Renal consulted and managing CRRT     Heme/Onc:   Mild anemia no evidence of Bleeding exccept site of his Deland-Anel catheter  Monitor CBC H&H  Monitor Coag and continue to hold Eliquis.   INR still slightly elevated given 1 dose of vitamin K     Endocrine:   - DM type 2  -With insulin protocol per ICU  -Holding oral antidiabetics     Infectious diseases:   Sepsis ? related to lower leg infection at the site of vein harvesting.  Growing gram-negative.  Clear if this is the source however.  Has consolidation on the chest x-ray no symptoms of pneumonia other possible source would be an infection of the pericardial fluid   -Follow culture results  -Continue with Zosyn and vancomycin  -Consulted general surgery for the wound infection.  No need for an I&D.  Input appreciated     Prophylaxis:  #GI: PI  #DVT:  "Patient was on Eliquis being held pending possible need for procedure and CRISTAL     Other: I contacted and spoke with his cardiac surgeon at [facility].  He was very appreciative of us calling and of the tendon care we were providing to the patient.  She felt sorry for the patient and did not feel it would need to transfer as they would not offer anything different at [facility]    Restrain: none    Risk Factors Present on Admission:  Clinically Significant Risk Factors          # Hypocalcemia: Lowest Ca = 8.1 mg/dL in last 2 days, will monitor and replace as appropriate    # Anion Gap Metabolic Acidosis: Highest Anion Gap = 23 mmol/L in last 2 days, will monitor and treat as appropriate  # Hypoalbuminemia: Lowest albumin = 3.1 g/dL at 11/2/2023 12:55 PM, will monitor as appropriate    # Coagulation Defect: INR = 2.63 (Ref range: 0.85 - 1.15) and/or PTT = 37 Seconds (Ref range: 22 - 38 Seconds), will monitor for bleeding          # DMII: A1C = 6.7 % (Ref range: <5.7 %) within past 6 months, PRESENT ON ADMISSION  # Obesity: Estimated body mass index is 31.7 kg/m  as calculated from the following:    Height as of this encounter: 1.803 m (5' 11\").    Weight as of this encounter: 103.1 kg (227 lb 4.7 oz)., PRESENT ON ADMISSION               Code Status:  Full Code     This patient is considered critically ill and requires ICU level of care due respiratory failure and hypotension requiring pressors   Total Critical Care time, not including separate billable procedure time: 60 minutes.    Stef Hill MD  [facility] Pulmonary/Critical Care   11/05/2023   9:54 AM             Allergies/Medications:   Allergies:   No Known Allergies    OUTPATIENT Medications:  Prior to Admission medications    Medication Sig Start Date End Date Taking? Authorizing Provider   acetaminophen (TYLENOL) 500 MG tablet Take 1,000 mg by mouth every 6 hours as needed for mild pain or pain 9/30/23  Yes Reported, Patient "   amLODIPine-benazepril (LOTREL) 5-20 MG capsule Take 1 capsule by mouth daily 12/22/22  Yes Reported, Patient   apixaban ANTICOAGULANT (ELIQUIS) 5 MG tablet Take 5 mg by mouth 2 times daily 10/9/23  Yes Reported, Patient   aspirin 81 MG EC tablet Take 81 mg by mouth daily 12/22/22  Yes Reported, Patient   atorvastatin (LIPITOR) 80 MG tablet Take 1 tablet by mouth at bedtime 10/24/23  Yes Reported, Patient   carvedilol (COREG) 25 MG tablet Take 25 mg by mouth 2 times daily (with meals) 12/22/22  Yes Reported, Patient   LORazepam (ATIVAN) 0.5 MG tablet Take 0.5 mg by mouth nightly as needed for anxiety or muscle spasms 10/9/23  Yes Reported, Patient   metFORMIN (GLUCOPHAGE XR) 500 MG 24 hr tablet Take 1,000 mg by mouth 2 times daily (with meals) 8/17/23  Yes Reported, Patient   sertraline (ZOLOFT) 25 MG tablet Take 1 tablet by mouth daily 10/25/23  Yes Reported, Patient   torsemide (DEMADEX) 20 MG tablet Take 1 tablet by mouth daily 10/11/23  Yes Reported, Patient        INPATIENT Medications: Continuous Infusions:   amiodarone 0.5 mg/min (11/05/23 0554)    [Held by provider] bumetanide Stopped (11/03/23 1213)    CRRT replacement solution 12.5 mL/kg/hr (11/05/23 0543)    CRRT replacement solution 200 mL/hr (11/05/23 0126)    CRRT replacement solution 22.5 mL/kg/hr (11/05/23 0901)    DOBUTamine Stopped (11/03/23 0900)    EPINEPHrine      - MEDICATION INSTRUCTIONS -      norepinephrine 0.12 mcg/kg/min (11/05/23 0701)    - MEDICATION INSTRUCTIONS -      vasopressin 2.4 Units/hr (11/05/23 0356)       INPATIENT Medications: Scheduled Medications:   [START ON 11/7/2023] influenza vac high-dose quad  0.7 mL Intramuscular Prior to discharge    insulin aspart  1-6 Units Subcutaneous Q4H    pantoprazole  40 mg Intravenous Q12H    piperacillin-tazobactam  3.375 g Intravenous Q8H    sodium chloride (PF)  10-40 mL Intracatheter Q7 Days    vancomycin  750 mg Intravenous Q12H        Subjective and events   Increase norepinephrine  "needs overnight. On CRRT. Was supposed to go surgery yesterday but did not go. Did not get any communication from surgery so unclear what is the plan.            Objective:   Vitals:  /64   Pulse 98   Temp 97.3  F (36.3  C) (Axillary)   Resp 20   Ht 1.803 m (5' 11\")   Wt 103.1 kg (227 lb 4.7 oz)   SpO2 100%   BMI 31.70 kg/m    Vent: HHFNC  FiO2 (%): 70 %  Resp: 20        GEN: No acute distress,   HEENT: on AVAPS  NECK: Supple.  NV distended  PULM: Non-labored breathing.  No use of accessory muscles.  Clear to ausculation R clearly with decreased breath sounds at the L base  CVS: A-fib normal S1, S2.  No rubs, murmurs, or gallops.    ABDOMEN: Normoactive bowel sounds.  Non-tender to palpation.  Non-distended.    EXTREMITES:  No clubbing, cyanosis, lower extremity edema.  Below the left knee at the site of vein harvesting the patient has a wound with e localized welling and redness.  No surrounding cellulitis. Capillary refill prolonged  NEURO:  Awake.  Oriented to person, nonfocal.    Intake/Output: I/O last 3 completed shifts:  In: 3263.6 [P.O.:200; I.V.:2816.3; Other:247.3]  Out: 4571.1 [Urine:43; Other:4518.1; Stool:10]        Pertinent Studies:     I have personally reviewed the following data over the past 24 hrs:    11.0  \   8.3 (L)   / 230     140 101 22.6 /  139 (H)   4.2 16 (L) 2.61 (H) \     ALT: 231 (H) AST: 343 (H) AP: 33 (L) TBILI: 0.5   ALB: 3.3 (L) TOT PROTEIN: 5.8 (L) LIPASE: N/A     Procal: N/A CRP: N/A Lactic Acid: 1.6       INR:  2.63 (H) PTT:  37   D-dimer:  N/A Fibrinogen:  N/A       Imaging results reviewed over the past 24 hrs:   Recent Results (from the past 24 hour(s))   XR Chest Port 1 View    Narrative    EXAM: XR CHEST PORT 1 VIEW  LOCATION: [facility]  DATE: 11/5/2023    INDICATION: resp failure  COMPARISON: 11/04/2023      Impression    IMPRESSION: Multiple lines and tubes unchanged. Previous sternotomy. Stable cardiomegaly. Pulmonary venous " congestion and left pleural effusion have improved. Persistent marked cardiac left basilar atelectasis/consolidation. No pneumothorax.     Echo 11/2/2023  Interpretation Summary  The left ventricle is normal in size with moderate concentric left ventricular  hypertrophy.  Left ventricular function is normal.The ejection fraction is 60-65%.  Normal right ventricle size and systolic function.  The left atrium is mildly dilated.  No hemodynamically significant valvular abnormalities on 2D or color flow  imaging.  There is a moderate posterior pericardial effusion with no echocardiographic  indications of cardiac tamponade.  IVC diameter >2.1 cm collapsing <50% with sniff suggests a high RA pressure  estimated at 15 mmHg or greater.     There is no comparison study available.    Recent Labs   Lab 11/05/23  0820 11/05/23  0816 11/05/23  0512 11/05/23  0411 11/05/23  0120 11/04/23  0400 11/04/23  0324 11/03/23 2010 11/03/23  1601   WBC 11.0  --  9.6  --  11.2*   < > 8.9  --  11.0   HGB 8.3*  --  8.0*  --  7.3*   < > 7.8*  --  7.7*   MCV 93  --  94  --  94   < > 93  --  93     --  228  --  255   < > 334  --  385   INR  --   --  2.63*  --   --   --  2.08*  --  2.37*     --  140  --  140   < > 139   < >  --    POTASSIUM 4.2  --  4.3  --  4.6   < > 4.6   < >  --    CHLORIDE 101  --  100  --  101   < > 100   < >  --    CO2 16*  --  17*  --  17*   < > 20*   < >  --    BUN 22.6  --  25.6*  --  29.3*   < > 50.1*   < >  --    CR 2.61*  --  2.88*  --  3.38*   < > 5.75*   < >  --    ANIONGAP 23*  --  23*  --  22*   < > 19*   < >  --    RIKA 8.9  --  8.9  --  8.7*   < > 9.0   < >  --    * 139* 144*   < > 147*   < > 132*   < >  --    ALBUMIN 3.3*  --  3.4*  --  3.4*   < > 3.3*   < >  --    PROTTOTAL  --   --  5.8*  --  5.7*  --  5.8*  --   --    BILITOTAL  --   --  0.5  --  0.5  --  0.3  --   --    ALKPHOS  --   --  33*  --  32*  --  35*  --   --    ALT  --   --  231*  --  174*  --  13  --   --    AST  --   --   343*  --  258*  --  11  --   --     < > = values in this interval not displayed.       Most Recent 3 CBC's:  Recent Labs   Lab Test 11/05/23  0820 11/05/23 0512 11/05/23  0120   WBC 11.0 9.6 11.2*   HGB 8.3* 8.0* 7.3*   MCV 93 94 94    228 255     Most Recent 3 BMP's:  Recent Labs   Lab Test 11/05/23  0820 11/05/23  0816 11/05/23 0512 11/05/23  0411 11/05/23  0120     --  140  --  140   POTASSIUM 4.2  --  4.3  --  4.6   CHLORIDE 101  --  100  --  101   CO2 16*  --  17*  --  17*   BUN 22.6  --  25.6*  --  29.3*   CR 2.61*  --  2.88*  --  3.38*   ANIONGAP 23*  --  23*  --  22*   RIKA 8.9  --  8.9  --  8.7*   * 139* 144*   < > 147*    < > = values in this interval not displayed.     Most Recent 2 LFT's:  Recent Labs   Lab Test 11/05/23 0512 11/05/23  0120   * 258*   * 174*   ALKPHOS 33* 32*   BILITOTAL 0.5 0.5     Anticoagulation Dose History          Latest Ref Rng & Units 11/2/2023 11/3/2023      11/4 11/5.2023      Recent Dosing and Labs       INR 0.85 - 1.15 2.90  2.83  2.69           2.08 2.63        Most Recent 3 Creatinines:  Recent Labs   Lab Test 11/05/23 0820 11/05/23 0512 11/05/23  0120   CR 2.61* 2.88* 3.38*     Most Recent 6 Bacteria Isolates From Any Culture (See EPIC Reports for Culture Details):No lab results found.  Most Recent ABG:  Recent Labs   Lab Test 11/05/23 0513   PH 7.30*   PO2 103*   PCO2 34*   HCO3 16*   EDWIN -10.0     Most Recent Anemia Panel:  Recent Labs   Lab Test 11/05/23 0820 11/04/23 2001 11/04/23  1210   WBC 11.0   < > 9.9   HGB 8.3*   < > 7.6*   HCT 26.7*   < > 24.4*   MCV 93   < > 94      < > 287   IRON  --   --  26*   IRONSAT  --   --  10*   FEB  --   --  250    < > = values in this interval not displayed.     NOTE: Data reviewed over the past 24 hrs contributes toward MDM complexity      Stef Hill MD

## 2023-11-05 NOTE — PROGRESS NOTES
Cardiology Progress Note    Assessment/Plan:  1.  Moderate to large pericardial effusion with bedside point-of-care ultrasound suggesting some right ventricular impingement as well as questionable impingement of the left ventricle.  We will get a limited echo to reassess.  There now appears to be fluid at the tip of the heart which may be reachable with pericardiocentesis although not clear this communicates with the entire effusion which may only give us partial relief.  Await CV surgery's opinion once they see him.  2.  Hypotension, continuing to require hemodynamic support with vasopressors.  3.  Hypoxemia, likely due to compression atelectasis from large pericardial effusion.  4.  Acute renal failure requiring CRRT therapy  5.  Transaminitis, possibly related to hepatic congestion from mild volume overload although again likely related to pericardial effusion.  6.  Persistent atrial fibrillation with improved rate response following initiation of amiodarone infusion.  We will continue amiodarone for now.  Suspect heart rates will improve with removal of the pericardial effusion.    Primary cardiologist: [facility] cardiology    Subjective:  Patient continues to report no complaints of chest discomfort, worsening shortness of breath.  Requiring BiPAP continuously to help with hypoxemia and respirations.  Does not feel like things have declined over the last 24 hours.     [START ON 11/7/2023] influenza vac high-dose quad  0.7 mL Intramuscular Prior to discharge    insulin aspart  1-6 Units Subcutaneous Q4H    pantoprazole  40 mg Intravenous Q12H    phytonadione  5 mg Intravenous Once    piperacillin-tazobactam  3.375 g Intravenous Q8H    sodium chloride (PF)  10-40 mL Intracatheter Q7 Days    vancomycin  750 mg Intravenous Q12H         Objective:   Vital signs in last 24 hours:  Temp:  [97.1  F (36.2  C)-97.8  F (36.6  C)] 97.5  F (36.4  C)  Pulse:  [] 97  Resp:  [11-28] 20  MAP:  [56 mmHg-78 mmHg]  "71 mmHg  Arterial Line BP: ()/(42-58) 110/55  FiO2 (%):  [40 %-70 %] 40 %  SpO2:  [87 %-99 %] 97 %  Weight:        Review of Systems:  Negative    Physical Exam:  General appearance: alert, cooperative, no distress   Head: Normocephalic, without obvious abnormality, atraumatic  Neck: no JVD   Lungs: clear to auscultation bilaterally   Heart: Irregularly irregular rhythm.  S1, S2 normal.  No murmur or gallop  Extremities: Trace peripheral edema bilaterally    Cardiographics (personally reviewed):   Telemetry demonstrates atrial fibrillation with controlled ventricular response    Imaging (personally reviewed):   Limited echo pending    Narrative & Impression   EXAM: XR CHEST PORT 1 VIEW  LOCATION: [facility]  DATE: 11/5/2023     INDICATION: resp failure  COMPARISON: 11/04/2023                                                                      IMPRESSION: Multiple lines and tubes unchanged. Previous sternotomy. Stable cardiomegaly. Pulmonary venous congestion and left pleural effusion have improved. Persistent marked cardiac left basilar atelectasis/consolidation. No pneumothorax.       Lab Results (personally reviewed):     No results for input(s): \"CHOL\", \"HDL\", \"LDL\", \"TRIG\", \"CHOLHDLRATIO\" in the last 64562 hours.  No results for input(s): \"LDL\" in the last 12938 hours.  Recent Labs   Lab Test 11/05/23 0816 11/05/23 0512   NA  --  140   POTASSIUM  --  4.3   CHLORIDE  --  100   CO2  --  17*   * 144*   BUN  --  25.6*   CR  --  2.88*   GFRESTIMATED  --  22*   RIKA  --  8.9     Recent Labs   Lab Test 11/05/23 0512 11/05/23 0120 11/04/23 2002   CR 2.88* 3.38* 3.88*     Recent Labs   Lab Test 11/02/23  1255   A1C 6.7*          Recent Labs   Lab Test 11/05/23 0512   WBC 9.6   HGB 8.0*   HCT 26.2*   MCV 94        Recent Labs   Lab Test 11/05/23 0512 11/05/23 0120 11/04/23 2001   HGB 8.0* 7.3* 7.5*    No results for input(s): \"TROPONINI\" in the last 72000 " hours.  Recent Labs   Lab Test 11/03/23  0526 11/02/23  0139   NTBNPI 14,005* 11,250*     Recent Labs   Lab Test 11/02/23  0139   TSH 2.85     Recent Labs   Lab Test 11/05/23  0512 11/04/23  0324 11/03/23  1601   INR 2.63* 2.08* 2.37*          Adry Hansen MD

## 2023-11-06 ENCOUNTER — APPOINTMENT (OUTPATIENT)
Dept: RADIOLOGY | Facility: HOSPITAL | Age: 78
DRG: 856 | End: 2023-11-06
Attending: NURSE PRACTITIONER
Payer: COMMERCIAL

## 2023-11-06 LAB
ALBUMIN SERPL BCG-MCNC: 3 G/DL (ref 3.5–5.2)
ALP SERPL-CCNC: 27 U/L (ref 40–129)
ALT SERPL W P-5'-P-CCNC: 192 U/L (ref 0–70)
ANION GAP SERPL CALCULATED.3IONS-SCNC: 16 MMOL/L (ref 7–15)
ANION GAP SERPL CALCULATED.3IONS-SCNC: 17 MMOL/L (ref 7–15)
ANION GAP SERPL CALCULATED.3IONS-SCNC: 21 MMOL/L (ref 7–15)
AST SERPL W P-5'-P-CCNC: 141 U/L (ref 0–45)
ATRIAL RATE - MUSE: 234 BPM
BASE EXCESS BLDA CALC-SCNC: -6.8 MMOL/L
BILIRUB DIRECT SERPL-MCNC: 0.42 MG/DL (ref 0–0.3)
BILIRUB SERPL-MCNC: 0.8 MG/DL
BUN SERPL-MCNC: 10.7 MG/DL (ref 8–23)
BUN SERPL-MCNC: 13.4 MG/DL (ref 8–23)
BUN SERPL-MCNC: 9.1 MG/DL (ref 8–23)
CALCIUM SERPL-MCNC: 8 MG/DL (ref 8.8–10.2)
CALCIUM SERPL-MCNC: 8.4 MG/DL (ref 8.8–10.2)
CALCIUM SERPL-MCNC: 8.8 MG/DL (ref 8.8–10.2)
CALCIUM, IONIZED MEASURED: 1.14 MMOL/L (ref 1.11–1.3)
CALCIUM, IONIZED MEASURED: 1.23 MMOL/L (ref 1.11–1.3)
CALCIUM, IONIZED MEASURED: 1.24 MMOL/L (ref 1.11–1.3)
CHLORIDE SERPL-SCNC: 102 MMOL/L (ref 98–107)
CHLORIDE SERPL-SCNC: 104 MMOL/L (ref 98–107)
CHLORIDE SERPL-SCNC: 104 MMOL/L (ref 98–107)
COHGB MFR BLD: 98.5 % (ref 95–96)
CREAT SERPL-MCNC: 1.33 MG/DL (ref 0.67–1.17)
CREAT SERPL-MCNC: 1.42 MG/DL (ref 0.67–1.17)
CREAT SERPL-MCNC: 1.68 MG/DL (ref 0.67–1.17)
DEPRECATED HCO3 PLAS-SCNC: 18 MMOL/L (ref 22–29)
DEPRECATED HCO3 PLAS-SCNC: 20 MMOL/L (ref 22–29)
DEPRECATED HCO3 PLAS-SCNC: 20 MMOL/L (ref 22–29)
DIASTOLIC BLOOD PRESSURE - MUSE: NORMAL MMHG
EGFRCR SERPLBLD CKD-EPI 2021: 41 ML/MIN/1.73M2
EGFRCR SERPLBLD CKD-EPI 2021: 51 ML/MIN/1.73M2
EGFRCR SERPLBLD CKD-EPI 2021: 55 ML/MIN/1.73M2
ERYTHROCYTE [DISTWIDTH] IN BLOOD BY AUTOMATED COUNT: 16.2 % (ref 10–15)
ERYTHROCYTE [DISTWIDTH] IN BLOOD BY AUTOMATED COUNT: 16.2 % (ref 10–15)
ERYTHROCYTE [DISTWIDTH] IN BLOOD BY AUTOMATED COUNT: 16.3 % (ref 10–15)
GLUCOSE BLDC GLUCOMTR-MCNC: 104 MG/DL (ref 70–99)
GLUCOSE BLDC GLUCOMTR-MCNC: 107 MG/DL (ref 70–99)
GLUCOSE BLDC GLUCOMTR-MCNC: 110 MG/DL (ref 70–99)
GLUCOSE BLDC GLUCOMTR-MCNC: 74 MG/DL (ref 70–99)
GLUCOSE BLDC GLUCOMTR-MCNC: 88 MG/DL (ref 70–99)
GLUCOSE BLDC GLUCOMTR-MCNC: 94 MG/DL (ref 70–99)
GLUCOSE SERPL-MCNC: 107 MG/DL (ref 70–99)
GLUCOSE SERPL-MCNC: 120 MG/DL (ref 70–99)
GLUCOSE SERPL-MCNC: 79 MG/DL (ref 70–99)
HCO3 BLD-SCNC: 18 MMOL/L (ref 23–29)
HCT VFR BLD AUTO: 27.7 % (ref 40–53)
HCT VFR BLD AUTO: 28.4 % (ref 40–53)
HCT VFR BLD AUTO: 28.5 % (ref 40–53)
HGB BLD-MCNC: 9 G/DL (ref 13.3–17.7)
INR PPP: 1.49 (ref 0.85–1.15)
INTERPRETATION ECG - MUSE: NORMAL
ION CA PH 7.4: 1.12 MMOL/L (ref 1.11–1.3)
ION CA PH 7.4: 1.2 MMOL/L (ref 1.11–1.3)
ION CA PH 7.4: 1.26 MMOL/L (ref 1.11–1.3)
MAGNESIUM SERPL-MCNC: 2 MG/DL (ref 1.7–2.3)
MAGNESIUM SERPL-MCNC: 2 MG/DL (ref 1.7–2.3)
MAGNESIUM SERPL-MCNC: 2.1 MG/DL (ref 1.7–2.3)
MCH RBC QN AUTO: 28.7 PG (ref 26.5–33)
MCH RBC QN AUTO: 28.7 PG (ref 26.5–33)
MCH RBC QN AUTO: 29.1 PG (ref 26.5–33)
MCHC RBC AUTO-ENTMCNC: 31.6 G/DL (ref 31.5–36.5)
MCHC RBC AUTO-ENTMCNC: 31.7 G/DL (ref 31.5–36.5)
MCHC RBC AUTO-ENTMCNC: 32.5 G/DL (ref 31.5–36.5)
MCV RBC AUTO: 90 FL (ref 78–100)
MCV RBC AUTO: 90 FL (ref 78–100)
MCV RBC AUTO: 91 FL (ref 78–100)
MRSA DNA SPEC QL NAA+PROBE: NEGATIVE
OXYHGB MFR BLD: 96.8 % (ref 95–96)
P AXIS - MUSE: NORMAL DEGREES
PCO2 BLD: 27 MM HG (ref 35–45)
PH BLD: 7.42 [PH] (ref 7.37–7.44)
PH: 7.36 (ref 7.35–7.45)
PH: 7.38 (ref 7.35–7.45)
PH: 7.43 (ref 7.35–7.45)
PHOSPHATE SERPL-MCNC: 2.4 MG/DL (ref 2.5–4.5)
PHOSPHATE SERPL-MCNC: 3.1 MG/DL (ref 2.5–4.5)
PHOSPHATE SERPL-MCNC: 3.1 MG/DL (ref 2.5–4.5)
PLATELET # BLD AUTO: 136 10E3/UL (ref 150–450)
PLATELET # BLD AUTO: 148 10E3/UL (ref 150–450)
PLATELET # BLD AUTO: 149 10E3/UL (ref 150–450)
PO2 BLD: 89 MM HG (ref 75–85)
POTASSIUM SERPL-SCNC: 3.2 MMOL/L (ref 3.4–5.3)
POTASSIUM SERPL-SCNC: 3.3 MMOL/L (ref 3.4–5.3)
POTASSIUM SERPL-SCNC: 3.5 MMOL/L (ref 3.4–5.3)
PR INTERVAL - MUSE: NORMAL MS
PROT SERPL-MCNC: 5 G/DL (ref 6.4–8.3)
QRS DURATION - MUSE: 100 MS
QT - MUSE: 438 MS
QTC - MUSE: 495 MS
R AXIS - MUSE: 1 DEGREES
RBC # BLD AUTO: 3.09 10E6/UL (ref 4.4–5.9)
RBC # BLD AUTO: 3.14 10E6/UL (ref 4.4–5.9)
RBC # BLD AUTO: 3.14 10E6/UL (ref 4.4–5.9)
SA TARGET DNA: NEGATIVE
SODIUM SERPL-SCNC: 140 MMOL/L (ref 135–145)
SODIUM SERPL-SCNC: 141 MMOL/L (ref 135–145)
SODIUM SERPL-SCNC: 141 MMOL/L (ref 135–145)
SYSTOLIC BLOOD PRESSURE - MUSE: NORMAL MMHG
T AXIS - MUSE: 177 DEGREES
TEMPERATURE: 37 DEGREES C
VENTRICULAR RATE- MUSE: 77 BPM
WBC # BLD AUTO: 7 10E3/UL (ref 4–11)
WBC # BLD AUTO: 7.5 10E3/UL (ref 4–11)
WBC # BLD AUTO: 7.9 10E3/UL (ref 4–11)

## 2023-11-06 PROCEDURE — 85027 COMPLETE CBC AUTOMATED: CPT | Performed by: NURSE PRACTITIONER

## 2023-11-06 PROCEDURE — 83735 ASSAY OF MAGNESIUM: CPT | Performed by: NURSE PRACTITIONER

## 2023-11-06 PROCEDURE — 99232 SBSQ HOSP IP/OBS MODERATE 35: CPT | Performed by: INTERNAL MEDICINE

## 2023-11-06 PROCEDURE — 250N000013 HC RX MED GY IP 250 OP 250 PS 637: Performed by: INTERNAL MEDICINE

## 2023-11-06 PROCEDURE — 999N000259 HC STATISTIC EXTUBATION

## 2023-11-06 PROCEDURE — 250N000009 HC RX 250: Performed by: INTERNAL MEDICINE

## 2023-11-06 PROCEDURE — 250N000011 HC RX IP 250 OP 636: Mod: JZ | Performed by: NURSE PRACTITIONER

## 2023-11-06 PROCEDURE — 250N000013 HC RX MED GY IP 250 OP 250 PS 637: Performed by: NURSE PRACTITIONER

## 2023-11-06 PROCEDURE — 82040 ASSAY OF SERUM ALBUMIN: CPT | Performed by: NURSE PRACTITIONER

## 2023-11-06 PROCEDURE — 80053 COMPREHEN METABOLIC PANEL: CPT | Performed by: NURSE PRACTITIONER

## 2023-11-06 PROCEDURE — 999N000157 HC STATISTIC RCP TIME EA 10 MIN

## 2023-11-06 PROCEDURE — 93005 ELECTROCARDIOGRAM TRACING: CPT | Performed by: NURSE PRACTITIONER

## 2023-11-06 PROCEDURE — 85610 PROTHROMBIN TIME: CPT | Performed by: NURSE PRACTITIONER

## 2023-11-06 PROCEDURE — 250N000009 HC RX 250: Performed by: NURSE PRACTITIONER

## 2023-11-06 PROCEDURE — 258N000003 HC RX IP 258 OP 636: Performed by: INTERNAL MEDICINE

## 2023-11-06 PROCEDURE — 200N000001 HC R&B ICU

## 2023-11-06 PROCEDURE — 999N000009 HC STATISTIC AIRWAY CARE

## 2023-11-06 PROCEDURE — 999N000253 HC STATISTIC WEANING TRIALS

## 2023-11-06 PROCEDURE — 87641 MR-STAPH DNA AMP PROBE: CPT | Performed by: STUDENT IN AN ORGANIZED HEALTH CARE EDUCATION/TRAINING PROGRAM

## 2023-11-06 PROCEDURE — C9113 INJ PANTOPRAZOLE SODIUM, VIA: HCPCS | Mod: JZ | Performed by: NURSE PRACTITIONER

## 2023-11-06 PROCEDURE — 87205 SMEAR GRAM STAIN: CPT | Performed by: STUDENT IN AN ORGANIZED HEALTH CARE EDUCATION/TRAINING PROGRAM

## 2023-11-06 PROCEDURE — 82248 BILIRUBIN DIRECT: CPT | Performed by: NURSE PRACTITIONER

## 2023-11-06 PROCEDURE — 94003 VENT MGMT INPAT SUBQ DAY: CPT

## 2023-11-06 PROCEDURE — 999N000055 HC STATISTIC END TITIAL CO2 MONITORING

## 2023-11-06 PROCEDURE — 82805 BLOOD GASES W/O2 SATURATION: CPT | Performed by: NURSE PRACTITIONER

## 2023-11-06 PROCEDURE — 82330 ASSAY OF CALCIUM: CPT | Performed by: NURSE PRACTITIONER

## 2023-11-06 PROCEDURE — 999N000287 HC ICU ADULT ROUNDING, EACH 10 MINS

## 2023-11-06 PROCEDURE — 250N000011 HC RX IP 250 OP 636: Performed by: INTERNAL MEDICINE

## 2023-11-06 PROCEDURE — 94799 UNLISTED PULMONARY SVC/PX: CPT

## 2023-11-06 PROCEDURE — 71045 X-RAY EXAM CHEST 1 VIEW: CPT

## 2023-11-06 RX ORDER — CALCIUM CHLORIDE, MAGNESIUM CHLORIDE, SODIUM CHLORIDE, SODIUM BICARBONATE, POTASSIUM CHLORIDE AND SODIUM PHOSPHATE DIBASIC DIHYDRATE 3.68; 3.05; 6.34; 3.09; .314; .187 G/L; G/L; G/L; G/L; G/L; G/L
22.5 INJECTION INTRAVENOUS CONTINUOUS
Status: DISCONTINUED | OUTPATIENT
Start: 2023-11-06 | End: 2023-11-07

## 2023-11-06 RX ADMIN — SODIUM PHOSPHATE, MONOBASIC, MONOHYDRATE AND SODIUM PHOSPHATE, DIBASIC, ANHYDROUS 15 MMOL: 142; 276 INJECTION, SOLUTION INTRAVENOUS at 08:26

## 2023-11-06 RX ADMIN — AMIODARONE HYDROCHLORIDE 0.5 MG/MIN: 1.8 INJECTION, SOLUTION INTRAVENOUS at 15:06

## 2023-11-06 RX ADMIN — POTASSIUM CHLORIDE 20 MEQ: 29.8 INJECTION, SOLUTION INTRAVENOUS at 07:31

## 2023-11-06 RX ADMIN — SENNOSIDES AND DOCUSATE SODIUM 1 TABLET: 8.6; 5 TABLET ORAL at 20:24

## 2023-11-06 RX ADMIN — CALCIUM CHLORIDE, MAGNESIUM CHLORIDE, SODIUM CHLORIDE, SODIUM BICARBONATE, POTASSIUM CHLORIDE AND SODIUM PHOSPHATE DIBASIC DIHYDRATE 12.5 ML/KG/HR: 3.68; 3.05; 6.34; 3.09; .314; .187 INJECTION INTRAVENOUS at 22:51

## 2023-11-06 RX ADMIN — HEPARIN SODIUM 5000 UNITS: 10000 INJECTION, SOLUTION INTRAVENOUS; SUBCUTANEOUS at 22:22

## 2023-11-06 RX ADMIN — CALCIUM CHLORIDE, MAGNESIUM CHLORIDE, SODIUM CHLORIDE, SODIUM BICARBONATE, POTASSIUM CHLORIDE AND SODIUM PHOSPHATE DIBASIC DIHYDRATE 22.5 ML/KG/HR: 3.68; 3.05; 6.34; 3.09; .314; .187 INJECTION INTRAVENOUS at 13:28

## 2023-11-06 RX ADMIN — CALCIUM CHLORIDE, MAGNESIUM CHLORIDE, SODIUM CHLORIDE, SODIUM BICARBONATE, POTASSIUM CHLORIDE AND SODIUM PHOSPHATE DIBASIC DIHYDRATE 12.5 ML/KG/HR: 3.68; 3.05; 6.34; 3.09; .314; .187 INJECTION INTRAVENOUS at 02:06

## 2023-11-06 RX ADMIN — CALCIUM CHLORIDE, MAGNESIUM CHLORIDE, SODIUM CHLORIDE, SODIUM BICARBONATE, POTASSIUM CHLORIDE AND SODIUM PHOSPHATE DIBASIC DIHYDRATE 22.5 ML/KG/HR: 3.68; 3.05; 6.34; 3.09; .314; .187 INJECTION INTRAVENOUS at 22:50

## 2023-11-06 RX ADMIN — CALCIUM CHLORIDE, MAGNESIUM CHLORIDE, DEXTROSE MONOHYDRATE, LACTIC ACID, SODIUM CHLORIDE, SODIUM BICARBONATE AND POTASSIUM CHLORIDE 22.5 ML/KG/HR: 5.15; 2.03; 22; 5.4; 6.46; 3.09; .157 INJECTION INTRAVENOUS at 04:20

## 2023-11-06 RX ADMIN — CALCIUM CHLORIDE, MAGNESIUM CHLORIDE, DEXTROSE MONOHYDRATE, LACTIC ACID, SODIUM CHLORIDE, SODIUM BICARBONATE AND POTASSIUM CHLORIDE 22.5 ML/KG/HR: 5.15; 2.03; 22; 5.4; 6.46; 3.09; .157 INJECTION INTRAVENOUS at 02:03

## 2023-11-06 RX ADMIN — POTASSIUM CHLORIDE 20 MEQ: 29.8 INJECTION, SOLUTION INTRAVENOUS at 14:36

## 2023-11-06 RX ADMIN — PANTOPRAZOLE SODIUM 40 MG: 40 INJECTION, POWDER, FOR SOLUTION INTRAVENOUS at 07:31

## 2023-11-06 RX ADMIN — PIPERACILLIN AND TAZOBACTAM 3.38 G: 3; .375 INJECTION, POWDER, LYOPHILIZED, FOR SOLUTION INTRAVENOUS at 20:24

## 2023-11-06 RX ADMIN — CALCIUM CHLORIDE, MAGNESIUM CHLORIDE, SODIUM CHLORIDE, SODIUM BICARBONATE, POTASSIUM CHLORIDE AND SODIUM PHOSPHATE DIBASIC DIHYDRATE 22.5 ML/KG/HR: 3.68; 3.05; 6.34; 3.09; .314; .187 INJECTION INTRAVENOUS at 16:18

## 2023-11-06 RX ADMIN — LIDOCAINE 1 PATCH: 4 PATCH TOPICAL at 20:24

## 2023-11-06 RX ADMIN — CALCIUM CHLORIDE, MAGNESIUM CHLORIDE, SODIUM CHLORIDE, SODIUM BICARBONATE, POTASSIUM CHLORIDE AND SODIUM PHOSPHATE DIBASIC DIHYDRATE 22.5 ML/KG/HR: 3.68; 3.05; 6.34; 3.09; .314; .187 INJECTION INTRAVENOUS at 20:29

## 2023-11-06 RX ADMIN — PIPERACILLIN AND TAZOBACTAM 3.38 G: 3; .375 INJECTION, POWDER, LYOPHILIZED, FOR SOLUTION INTRAVENOUS at 04:14

## 2023-11-06 RX ADMIN — CALCIUM CHLORIDE, MAGNESIUM CHLORIDE, SODIUM CHLORIDE, SODIUM BICARBONATE, POTASSIUM CHLORIDE AND SODIUM PHOSPHATE DIBASIC DIHYDRATE 200 ML/HR: 3.68; 3.05; 6.34; 3.09; .314; .187 INJECTION INTRAVENOUS at 16:20

## 2023-11-06 RX ADMIN — CALCIUM CHLORIDE, MAGNESIUM CHLORIDE, SODIUM CHLORIDE, SODIUM BICARBONATE, POTASSIUM CHLORIDE AND SODIUM PHOSPHATE DIBASIC DIHYDRATE 12.5 ML/KG/HR: 3.68; 3.05; 6.34; 3.09; .314; .187 INJECTION INTRAVENOUS at 06:14

## 2023-11-06 RX ADMIN — PIPERACILLIN AND TAZOBACTAM 3.38 G: 3; .375 INJECTION, POWDER, LYOPHILIZED, FOR SOLUTION INTRAVENOUS at 12:53

## 2023-11-06 RX ADMIN — Medication 3 MG: at 23:12

## 2023-11-06 RX ADMIN — DEXMEDETOMIDINE HYDROCHLORIDE 0.3 MCG/KG/HR: 400 INJECTION INTRAVENOUS at 05:06

## 2023-11-06 RX ADMIN — CALCIUM CHLORIDE, MAGNESIUM CHLORIDE, DEXTROSE MONOHYDRATE, LACTIC ACID, SODIUM CHLORIDE, SODIUM BICARBONATE AND POTASSIUM CHLORIDE 22.5 ML/KG/HR: 5.15; 2.03; 22; 5.4; 6.46; 3.09; .157 INJECTION INTRAVENOUS at 06:44

## 2023-11-06 RX ADMIN — VANCOMYCIN HYDROCHLORIDE 750 MG: 500 INJECTION, POWDER, LYOPHILIZED, FOR SOLUTION INTRAVENOUS at 12:50

## 2023-11-06 RX ADMIN — CHLORHEXIDINE GLUCONATE 15 ML: 1.2 SOLUTION ORAL at 08:25

## 2023-11-06 RX ADMIN — QUETIAPINE FUMARATE 50 MG: 25 TABLET ORAL at 23:12

## 2023-11-06 RX ADMIN — VANCOMYCIN HYDROCHLORIDE 750 MG: 500 INJECTION, POWDER, LYOPHILIZED, FOR SOLUTION INTRAVENOUS at 01:08

## 2023-11-06 RX ADMIN — ASPIRIN 81 MG CHEWABLE TABLET 162 MG: 81 TABLET CHEWABLE at 14:36

## 2023-11-06 RX ADMIN — AMIODARONE HYDROCHLORIDE 0.5 MG/MIN: 1.8 INJECTION, SOLUTION INTRAVENOUS at 04:13

## 2023-11-06 RX ADMIN — CALCIUM CHLORIDE, MAGNESIUM CHLORIDE, SODIUM CHLORIDE, SODIUM BICARBONATE, POTASSIUM CHLORIDE AND SODIUM PHOSPHATE DIBASIC DIHYDRATE 22.5 ML/KG/HR: 3.68; 3.05; 6.34; 3.09; .314; .187 INJECTION INTRAVENOUS at 11:19

## 2023-11-06 RX ADMIN — PROPOFOL 25 MCG/KG/MIN: 10 INJECTION, EMULSION INTRAVENOUS at 01:18

## 2023-11-06 ASSESSMENT — ACTIVITIES OF DAILY LIVING (ADL)
ADLS_ACUITY_SCORE: 34
ADLS_ACUITY_SCORE: 32
ADLS_ACUITY_SCORE: 34

## 2023-11-06 NOTE — PLAN OF CARE
Goal Outcome Evaluation:      Plan of Care Reviewed With: patient    [facility] - ICU    RN Progress Note:            Pertinent Assessments:      Please refer to flowsheet rows for full assessment     Pt awake, MCALLISTER this am and able to pass SBT so extubated. CRRT continues, pulling 50cc/hr, Levo and Amio remain. CO/CI stable t/o shift, only small amt out of CT.            Key Events - This Shift:   See above      SJN SAT (Sedation Awakening Trial): For use ONLY if intubated    SAT Safety Screen PASSED   If FAILED why?    SAT Performed PASSED   If FAILED why?               Barriers to Discharge / Downgrade:     CRRT, pressors         Point of Contact Update YES-OR-NO: Yes  Name: Francine    Summary of Conversation: updated with POC at bedside

## 2023-11-06 NOTE — PROGRESS NOTES
"[facility]  Critical Care Progress Note    Summary:   Gerardo Al 77 yo M PMH multivessel CAD (9/26/23 s/p three vessel CABG), atrial fibrillation, T2DM, HTN, HLD, CKD, & BPH. Recent diagnosis 10/25/23 of left lower extremity harvest site erythema & pain concerning for soft tissue infection; he was started on Cephalexin    Presented 11/2/23 for progressive fatigue, nausea & vomiting. Found to have suspected septic shock secondary to purulent left lower extremity soft tissue infection (11/2 s/p bedside I&D), acute hypoxemic respiratory failure requiring intubation (11/5-current), oliguric CRISTAL requiring renal replacement therapy, & large posteriorly located loculated pericardial effusion with suspected tamponade physiology requiring pericardial window (11/5)    Interval Events:  Taken to OR in the afternoon/evening for pericardial window. No procedure note describing features of the fluid, Stef reported 900 mL \"old blood\" retrieved. Returned intubated. FiO2 40%. NE 0.03. Sedated on precedex. Remains on CRRT with stable BMP, improved hepatobiliary indices. ABG acceptable. CBC stable Hgb, normal WC, plts down to 149.      Wean to extubation, collect sputum before extubation, MRSA nares, consider narrowing antimicrobials      Assessment & Plan:   Goals of Care:  Life prolonging without limits      Neurology, Psychiatry, Sedation, Analgesia:  Neurologically intact, altert & oriented     Cardiovascular:  Septic shock   Evinced by purulent soft tissue infection & low measured SVR with preserved cardiac output. Low suspicion for obstructive or neurologic phenomena   - NE goal MAP >65    Moderate sized posteriorly located pericardial effusion   Identified on 11/2 CT imaging & serial echocardiography. PA catheter was placed without convincing evidence of tamponade physiology initially. 11/5 TTE \"inconclusive for tamponade\" with mild-moderate septal hypokinesis, basal lateral wall dyskinesia, LVEF " 50-55%, normal RV size & function, IVC >2.1cm with minimal respiratory variation   - cardiology & cardiothoracic surgery was consulted    Multivessel CAD s/p recent three vessel CABG    - PTA ASA & atorvastatin     Atrial fibrillation  - amiodarone infusion (new this admission)   - hold PTA apixaban     Primary HTN  - hold PTA amlodipine   - hold PTA carvedilol     Respiratory, Airway:  Acute hypoxemic-hypercapnic respiratory failure   Compression atelectasis left lower lobe with shunt physiology   Suspect hypoxemia multifactorial secondary to decreased mixed venous O2 / limited cardiac output, left basilar shunting due to lung compression/atelectasis from the weight of the heart/pericardial structure  - supplemental O2 to maintain spO2 >= 90-96% & PaO2 >= 60 mmHg  - pulmonary hygiene: acapella QID, IS Q2H while awake, out of bed to chair, PT/OT as able     Small left pleural effusion   Appeared simple on 11/2 CT images. Presumed transudative however cannot rule out adjacent infectious process & parapneumonic effusion     Gastrointestinal:  Elevated transaminases - improved   Suspected congestive hepatopathy     Renal, Acid-base, Electrolytes, Volume:  Oliguric CRISTAL on CKD secondary to ATN  - consulted nephrology  - CRRT (11/3-current)     Infectious Disease:  Left lower extremity purulent soft tissue surgical site infection   Purulence expressed from extremity wound; 11/2/23 surgery performed a bedside I&D. 11/2 pus culture grew Klebsiella pneumoniae (R-ampicillin). Cannot rule out pneumonia in the setting of L-lower lobe atelectasis or consolidation however no symptoms of pneumonia prior to admission. Cannot rule out pericardial infection.   - ordered MRSA nares & sputum culture to evaluate alternative sources   - vanc & pip-tazo     Hematology, Oncology:  Normocytic anemia   Multifactorial secondary acute/chronic inflammation/infection, CKD, phlebotomy, at least. No signs of bleeding  - monitor      Endocrine:  T2DM   - MDSSI     Checklist:  FEN: advance as tolerated   VTE ppx: SQH  GI ppx: PPI  Bowel regimen: senna/doc BID, PEG QD  Lines/tube-size: L-IJ tunneled HD catheter (11/3), RUE PICC (11/2), radial art line (11/3), mediastinal tube (11/5)  Skin: L-lower extremity medial wound POA   PT/OT/SLP, early mobility: cardiac rehab, PT, OT  Code Status: full     Physical Exam:  Neurologic: Off sedation. Drowsy. Opens eyes, tracks, & follows commands in all extremities.   HEENT: Head and face normal. No nasal discharge. Oropharynx normal. Eyelids, conjunctiva, & sclera normal.   Neck: Neck appearance normal. No neck masses.   Respiratory: Lungs clear bilaterally. No wheezes, crackles, or rhonchi.   Cardiovascular: Regular rate & rhythm No murmurs  Gastrointestinal: Bowel sounds present. No tenderness. Obese.Soft     Musculoskeletal: Skeletal configuration normal and muscle mass normal for age. Joint appearance overall normal.  Skin, Hair, & Nails: Skin color normal. No skin lesions.  Hair & nails normal.  Extremities: 1+ lower extremity pitting edema. LLE medial wound, clean, dry, minimal erythema, no purulence or tenderness, no warmth     All pertinent vital signs, ventilator settings, I&Os, laboratory, microbiology, ECGs, & imaging data has been personally reviewed. Total Critical Care time, excluding procedures, was 50 minutes     ALLY Villeda MD  Critical Care Medicine

## 2023-11-06 NOTE — PROGRESS NOTES
Provider Restraint for Non-violent Behavior Face to Face Evaluation     Patient's Immediate Situation:  Patient demonstrated the following behaviors: Pulling at lines & tubes putting safety at risk     Patient's Reaction to the intervention:  Does patient understand the reason for restraint/seclusion? No, the patient is unable to express     Medical Condition:  Is there any evidence of compromise of Skin integrity, Respiratory, Cardiovascular, Musculoskeletal, Hydration?   No    Behavioral Condition:  In consultation with the RN, is there a need to continue this restraint or seclusion? No    See Restraint Flowsheet for complete restraint documentation and assessment.    ALLY Villeda MD  Critical Care Medicine

## 2023-11-06 NOTE — PROGRESS NOTES
Renal progress note  CC:CRISTAL, anuria  Assessment and Plan:  78 year old male with hx of CKD 3, HTN , DM2, PAD, hx of Atrial fib on eliquis for AC, BPH , CAD with sp CABG 9/26/2023 after angiogram 9/22 with 3v disease cb mild CRISTAL at the time of discharge.  Nephrology consulted for severe CRISTAL with oligoanuric state     CRISTAL with anuria Baseline creatinine 1.2-1.3, uptrending to 1.5s at the time of discharge, creatinine was 1.35 on 10/9/2023.  Patient had repeat labs with creatinine acutely elevated to 7.85 on 10/31/2023, further increased to 10.58 with significant azotemia  on 11/2/2023  UA turbid appearing likely consistent with ATN  CT imaging with evidence of atherosclerotic changes otherwise nonobstructive renal stones and some evidence of cystitis no hydronephrosis  Recs:  - 11/3 started on CRRT, dose increased with persistent acidosis  - has cut up on volume since starting CRRT  - reduce net UF to 50cc/hr as some more variability in BP this morning  - likely hemodynamically stable enough to change to IHD in the next day or so    Hyperkalemia --> hypokalemia  - changed all solution to 4K.       Hypocalcemia  Follow on serial calciums  Replace per CRRT protocols    Metabolic acidosis - slow to correct, has received multiple amps of bicarb and dialysis Rx increased.  Continues to trend towards improvement     HTN hx  PTA on Amlodipine Benazepril and coreg  Held HTN meds   On vaso and levo for BP support  hypervolemia with anuria  --> moderate pericardial effusion wo temponade physiology tolerating gentle UF  --> management per ICU/card  -->back off UF slightly today     Anemia acute on chronic  Hemoglobin 7s  Last hemoglobin was 9.4 on 10/9/2023  No evidence of bleed possibly inflammatory anemia/post CABG  Baseline is around 12  Iron sats low normal 26%  Did receive one-time PRBC overnight 11/5/23  Transfuse if less than 8 with recent cardiac surgery  --> We will defer these plans to cardiology and  ICU     Coronary disease s/p CABG x3 on 9/26/2023  History of atrial fibrillation s/p cardioversion in August 2023  On AC with Eliquis currently held  Loculated pericardial effusion concerns for tamponade physiology  Last echo with EF of 52%  Echo showing large-sized pericardial effusion with repeat echo 11/5/2023 showing some septal abnormality and large effusion plans for pericardial window today   CT surgery following  Follow on CV surgery recs     Possible septic shock with leg cellulitis/abscess  General surgery completed bedside I&D  On Abx ; Vanco and cefazolin  Blood Cx negative, wound culture growing Klebsiella     Acute respiratory failure in the setting of CRISTAL and hypervolemia  Intubated post pericardial window     DM2  Insulin management per ICU       Thank you for the consultation we will follow    Isreal Alexis  [facility]  [phone]        Subjective  Patient new to me.  Reviewed chart at length as well as CRRT prescription.  I/Os trending towards even and BP more variable this morning, changed to net 50cc/hr    Reviewed with ICU team    Objective    Vital signs in last 24 hours  Temp:  [97.5  F (36.4  C)-98.7  F (37.1  C)] 97.6  F (36.4  C)  Pulse:  [63-99] 67  Resp:  [10-24] 16  MAP:  [48 mmHg-117 mmHg] 65 mmHg  Arterial Line BP: ()/(36-77) 106/49  FiO2 (%):  [30 %-50 %] 30 %  SpO2:  [92 %-100 %] 97 %  Weight:   [unfilled]    Intake/Output last 3 shifts  I/O last 3 completed shifts:  In: 4331.47 [I.V.:2851.47; Other:300]  Out: 4485.8 [Other:4440.8; Chest Tube:45]  Intake/Output this shift:  I/O this shift:  In: 248.37 [I.V.:221.27; Other:27.1]  Out: 425.6 [Other:425.6]    Physical Exam  Intubated and sedated  CV: RRR without murmur or rub  Lung: clear and equal; no extra sounds  Ab: soft and NT; not distended; normal bs  Ext: minimal edema and well perfused  Skin; no rash  Cano +    Pertinent Labs   Lab Results   Component Value Date    WBC 7.0 11/06/2023    HGB 9.0  (L) 11/06/2023    HCT 27.7 (L) 11/06/2023    MCV 90 11/06/2023     (L) 11/06/2023     Lab Results   Component Value Date    BUN 13.4 11/06/2023     11/06/2023    CO2 18 (L) 11/06/2023       Lab Results   Component Value Date    ALBUMIN 3.0 (L) 11/06/2023     Lab Results   Component Value Date    PHOS 2.4 (L) 11/06/2023     I reviewed all lab results

## 2023-11-06 NOTE — PROGRESS NOTES
PROVIDER RESTRAINT FOR NON-VIOLENT BEHAVIOR FACE TO FACE EVALUATION     Patient's Immediate Situation:  Patient demonstrated the following behaviors: pulling at/on lines/tubes/equipment, unable to redirect from harmful/self-injurious behavior     Patient's Reaction to the intervention:  Does patient understand the reason for restraint/seclusion? Unable to express     Medical Condition:  Is there any evidence of compromise of Skin integrity, Respiratory, Cardiovascular, Musculoskeletal, Hydration? No     Behavioral Condition:  In consultation with the RN, is there a need to continue this restraint or seclusion? Yes     See Restraint Flowsheet for complete restraint documentation and assessment.     Stef Hill MD

## 2023-11-06 NOTE — PROGRESS NOTES
Care Management Follow Up    Length of Stay (days): 4    Expected Discharge Date: 11/13/2023     Concerns to be Addressed:  cardio, pulm and renal status       Patient plan of care discussed at interdisciplinary rounds: Yes    Anticipated Discharge Disposition:  TBD     Anticipated Discharge Services:  TBD    Anticipated Discharge DME:  TBD      Additional Information:  Patient with history of CABG in Sept 2023 at [facility] . Presented here 11/2/23 for progressive fatigue, nausea & vomiting. Found to have suspected septic shock secondary to purulent left lower extremity soft tissue infection (11/2 s/p bedside I&D), acute hypoxemic respiratory failure requiring intubation (11/5-11/6), oliguric CRISTAL requiring renal replacement therapy, & large posteriorly located loculated pericardial effusion with suspected tamponade physiology requiring pericardial window (11/5)   CT Surgery following for pericardial effusion s/p pericardial window 11/5. Chest tubes in place.  Cardiology following for pericardial effusion.   Nephrology following CRISTAL with anuria, started hemodialysis/CRRT   Surgery following for left leg abscess, wound care and IV antibiotics.      Will need therapy eval and recs when appropriate.      Social History:  Patient from home with spouse. Independent at baseline, no Services, no DME. Spouse is primary family contact. Transportation at discharge TBD.       11/6/23:  No medically ready for discharge planning.         Mayte Chang RN

## 2023-11-06 NOTE — CONSULTS
CV Surgery Consult Note      Patient with pericardial effusion s/p pericardial window with our team 11/5--reportedly 900cc out in OR. Was on and off pressors yesterday/overnight but now off. Extubated yesterday although remains in bed. States that he is feeling better since the procedure. Chest tube with 35cc out over 24 hours and less than 100cc total since OR. Labs stable.      - Chest tube removed this AM without immediate complications.  - Plan per cardiology to re-start Eliquis today or tomorrow (a-fib). Recommend limited repeat echo prior to discharge  - We will follow peripherally at this time  - Remainder of cares per primary team, appreciate.      _______  Antionette Burrell PA-C  Cardiothoracic Surgery  [phone]

## 2023-11-06 NOTE — PROGRESS NOTES
RT PROGRESS NOTE    VENT DAY# 2    CURRENT SETTINGS:   Vent Mode: CMV/AC  (Continuous Mandatory Ventilation/ Assist Control)  FiO2 (%): 40 %  Resp Rate (Set): 16 breaths/min  Tidal Volume (Set, mL): 500 mL  PEEP (cm H2O): 5 cmH2O  Resp: 16      PATIENT PARAMETERS:  PIP 24  Pplat:  19  Pmean:  9.1  Compliance: 39.5  SBT: no   Secretions:  none, no suction needed  02 Sats:  97%  BS: clear diminished    ETT SIZE 7.5 Secured at 22 cm at teeth/gums    Respiratory Medications: Duo Q4 prn     ABG: @2214 pH 7.51; pCO2 20; pO2 127; HCO3 15, %O2 Sat 99.2 , BE -7.8 on 45%    NOTE / SHIFT SUMMARY:   pt has remained on full vent support on above settings, no weaning this shift, vent changes, RR decreased to 16 per MD order and Fi02 decreased to 40%. ETT repositioned with vent checks.     Vishal Ocampo, RT

## 2023-11-06 NOTE — PLAN OF CARE
[facility] - ICU    RN Progress Note:            Pertinent Assessments:      Please refer to flowsheet rows for full assessment     Was able to keep levophed at a very minimal dose all shift. Patient seemed to sedated so prop and dex were titrated down through out the shift. Patient is waking and nods appropriately. Patient remains afebrile. Lung sounds are clear with minimal secretions and was able to titrate FiO2 down to 30%. CVP was around 2 at the beginning of the jennifer           Key Events - This Shift:   Pulled 100cc on CRRT all shift.  Titrated sedation off to meet RASS goal  Titrated pressors to maintain MAP.  Chest tube dressing change done. Incision washed with antimicrobial soap and water.   Potassium being replaced.           SJN SAT (Sedation Awakening Trial): For use ONLY if intubated    SAT Safety Screen PASSED   If FAILED why?    SAT Performed Not Applicable   If FAILED why?  Not weaning at this time               Barriers to Discharge / Downgrade:     CRRT, Ventilator, pressors.          Point of Contact Update Left wife Francine a message with update on current condition.            Tamika Albrecht, RN

## 2023-11-06 NOTE — CONSULTS
CARDIAC SURGERY NUTRITION CONSULT     Received standing order to educate patient.     Will follow and complete diet education after patient is extubated and/or is transferred to medical unit.     Patient will receive additional nutrition education during the Outpatient Cardiac Rehab Program (nutrition classes/dietitian counseling).

## 2023-11-06 NOTE — PROGRESS NOTES
Imp/plan:  CAD s/p CABG 9/23 complicated by loculated effusion s/p pericardial window  Afib - on iv amiodarone  Hypotension in setting of infection - on low dose pressor, CO 5.5    Followed by Dr. Garcia at [facility]    Current Facility-Administered Medications   Medication    [Held by provider] acetaminophen (TYLENOL) tablet 650 mg    amiodarone (NEXTERONE) 1.8 mg/mL in dextrose 5% 200 mL ADULT STANDARD infusion    aspirin (ASA) chewable tablet 162 mg    bisacodyl (DULCOLAX) suppository 10 mg    calcium gluconate 1 g in 50 mL in sodium chloride intermittent infusion    calcium gluconate 2 g in  mL intermittent infusion    calcium gluconate 3 g in sodium chloride 0.9 % 100 mL intermittent infusion    chlorhexidine (PERIDEX) 0.12 % solution 15 mL    CRRT Pre-Filter replacement solution for CVVHD & CVVHDF (Phoxillum BK4/2.5)    CRRT Replacement solution for CVVHD and CVVHDF premixed replacement solution (Phoxillum 4/2.5)    dexmedeTOMIDine (PRECEDEX) 4 mcg/mL in sodium chloride 0.9 % 100 mL infusion    glucose gel 15-30 g    Or    dextrose 50 % injection 25-50 mL    Or    glucagon injection 1 mg    dialysate for CVVHD & CVVHDF premixed replacement solution (Phoxillum 4/2.5) - total potassium 4 mEq/L    DOBUTamine (DOBUTREX) 500 mg in D5W 250 mL infusion (adult std conc)    EPINEPHrine (ADRENALIN) 5 mg in sodium chloride 0.9 % 250 mL infusion CENTRAL    heparin ANTICOAGULANT injection 5,000 Units    HOLD:  Metformin and metformin containing medications if patient received IV contrast with acute kidney injury or severe chronic kidney disease (stage IV or stage V; i.e., eGFR less than 30)    hydrALAZINE (APRESOLINE) injection 10 mg    [START ON 11/7/2023] influenza vac high-dose quad (FLUZONE HD) injection MARGARET 0.7 mL    ipratropium - albuterol 0.5 mg/2.5 mg/3 mL (DUONEB) neb solution 3 mL    lactated ringers BOLUS 250 mL    Lidocaine (LIDOCARE) 4 % Patch 1-2 patch    magnesium hydroxide (MILK OF  MAGNESIA) suspension 30 mL    magnesium sulfate 2 g in 50 mL sterile water intermittent infusion    melatonin tablet 3 mg    naloxone (NARCAN) injection 0.2 mg    Or    naloxone (NARCAN) injection 0.4 mg    Or    naloxone (NARCAN) injection 0.2 mg    Or    naloxone (NARCAN) injection 0.4 mg    niCARdipine 40 mg in 200 mL NS (CARDENE) infusion    No heparin required    No heparin required    norepinephrine (LEVOPHED) 16 mg in sodium chloride 0.9 % 250 mL infusion CENTRAL    ondansetron (ZOFRAN ODT) ODT tab 4 mg    Or    ondansetron (ZOFRAN) injection 4 mg    oxyCODONE (ROXICODONE) tablet 5 mg    Or    oxyCODONE (ROXICODONE) tablet 10 mg    pantoprazole (PROTONIX) 2 mg/mL suspension 40 mg    Or    pantoprazole (PROTONIX) IV push injection 40 mg    phenylephrine (UMAIR-SYNEPHRINE) 50 mg in NaCl 0.9 % 250 mL infusion    piperacillin-tazobactam (ZOSYN) 3.375 g vial to attach to  mL bag    polyethylene glycol (MIRALAX) Packet 17 g    polyethylene glycol (MIRALAX) Packet 17 g    potassium chloride 20 mEq in 50 mL intermittent infusion    prochlorperazine (COMPAZINE) injection 5 mg    Or    prochlorperazine (COMPAZINE) tablet 5 mg    propofol (DIPRIVAN) infusion    QUEtiapine (SEROquel) tablet 50 mg    Reason beta blocker order not selected    senna-docusate (SENOKOT-S/PERICOLACE) 8.6-50 MG per tablet 1 tablet    senna-docusate (SENOKOT-S/PERICOLACE) 8.6-50 MG per tablet 1 tablet    Or    senna-docusate (SENOKOT-S/PERICOLACE) 8.6-50 MG per tablet 2 tablet    sodium chloride (PF) 0.9% PF flush 10-20 mL    sodium chloride (PF) 0.9% PF flush 10-40 mL    sodium chloride (PF) 0.9% PF flush 10-40 mL    sodium chloride (PF) 0.9% PF flush 10-40 mL    sodium chloride 0.9% BOLUS 1-250 mL    sodium phosphate 15 mmol in sodium chloride 0.9 % 250 mL intermittent infusion    vancomycin (VANCOCIN) 750 mg in sodium chloride 0.9 % 250 mL intermittent infusion     Past Medical History:   Diagnosis Date    Hx of CABG     Sept 2023 at  "[facility]        Review of Systems: 12 points negative other than above    /64   Pulse 72   Temp 97.6  F (36.4  C) (Oral)   Resp 16   Ht 1.803 m (5' 11\")   Wt 96.6 kg (213 lb)   SpO2 96%   BMI 29.71 kg/m    JVP<7cm, carotids normal  Lungs clear  Cor RRR no c,r,m  Abs soft +BS, no mass  Ext no c,c,e    Lab Results   Component Value Date    HGB 9.0 (L) 11/06/2023     Lab Results   Component Value Date     (L) 11/06/2023     No results found for: \"CREATININE\"  No components found for: \"K\"        Stuart Gallardo MD  Interventional Cardiology   [facility]  [phone]    "

## 2023-11-06 NOTE — OP NOTE
DATE OF PROCEDURE: November 5, 2023     PREOPERATIVE DIAGNOSIS: Pericardial effusion    POSTOPERATIVE DIAGNOSIS: Same.     PRIMARY SURGEON:?Ruchi Singleton MD     ASSISTING SURGEON: Livier Rosa MD    SURGICAL ASSISTANT: Mavis Saba [facility]    PROCEDURES PERFORMED:    1) Creation of subxiphoid pericardial window    FINDINGS:    Findings:?900cc of dark bloody fluid was drained from pericardial space    INDICATION:   Gerardo Al is a 78-year-old man who underwent 3-vessel CABG in September, 2023 at [facility] who presented to our ED with fatigue, nausea, vomiting, and hypotension. He was found to be in acute renal failure and also to have a vein harvest site on the left leg with purulent drainage. Broad spectrum antibiotics were started. TTE revealed large pericardial effusion but no tamponade physiology. After multiple days of treatment had failed to improve his status, we decided surgical intervention was warranted. The patient was counseled on and understood the potential risks, alternatives, and benefits associated with subxiphoid pericardial window creation and elected to proceed.    PROCEDURE DESCRIPTION:  The patient was brought to the operating room. A pre-induction timeout confirmed patient ID, site, procedure, VTE and antibiotic prophylaxis. The arms were tucked and the chest was prepped in routine fashion.     Conscious sedation was started and local anesthetic with 1% lidocaine with epinephrine was injected into the subxiphoid soft tissue. A subxiphoid incision was made and the tissue was dissected around the xiphoid. The xiphoid was removed and the dissection was carried down to the pericardium. The pericardium was entered bluntly with finger dissection and 900cc of pressurized dark bloody fluid was drained from the pericardial space.     General anesthesia was then induced and a LAURI probe was placed. LAURI confirmed complete resolution of the pericardial effusion and tamponade  physiology.    A chest tube was placed into the mediastinum for drainage. The soft tissue and skin was then closed in layers in routine fashion.    Sterile dressings were applied. All sponge and needle counts were correct at the end of the case and the patient was transported to the ICU in stable condition.     Ruchi Singleton MD

## 2023-11-06 NOTE — PROGRESS NOTES
Patient extubated at 1215, placed on 2L NC, (+) cuff leak, BS clear/diminished, patient cough is weak, non-productive, patient is able to verbalize upon extubation, no complications at this time.      Katya Cleary, RT

## 2023-11-07 ENCOUNTER — APPOINTMENT (OUTPATIENT)
Dept: OCCUPATIONAL THERAPY | Facility: HOSPITAL | Age: 78
DRG: 856 | End: 2023-11-07
Attending: NURSE PRACTITIONER
Payer: COMMERCIAL

## 2023-11-07 ENCOUNTER — APPOINTMENT (OUTPATIENT)
Dept: PHYSICAL THERAPY | Facility: HOSPITAL | Age: 78
DRG: 856 | End: 2023-11-07
Attending: NURSE PRACTITIONER
Payer: COMMERCIAL

## 2023-11-07 LAB
ALBUMIN SERPL BCG-MCNC: 3 G/DL (ref 3.5–5.2)
ALP SERPL-CCNC: 28 U/L (ref 40–129)
ALT SERPL W P-5'-P-CCNC: 159 U/L (ref 0–70)
ANION GAP SERPL CALCULATED.3IONS-SCNC: 17 MMOL/L (ref 7–15)
AST SERPL W P-5'-P-CCNC: 81 U/L (ref 0–45)
BACTERIA BLD CULT: NO GROWTH
BACTERIA BLD CULT: NO GROWTH
BILIRUB DIRECT SERPL-MCNC: 0.28 MG/DL (ref 0–0.3)
BILIRUB SERPL-MCNC: 0.6 MG/DL
BUN SERPL-MCNC: 8 MG/DL (ref 8–23)
CALCIUM SERPL-MCNC: 7.7 MG/DL (ref 8.8–10.2)
CALCIUM, IONIZED MEASURED: 1.12 MMOL/L (ref 1.11–1.3)
CHLORIDE SERPL-SCNC: 105 MMOL/L (ref 98–107)
CREAT SERPL-MCNC: 1.24 MG/DL (ref 0.67–1.17)
DEPRECATED HCO3 PLAS-SCNC: 19 MMOL/L (ref 22–29)
EGFRCR SERPLBLD CKD-EPI 2021: 60 ML/MIN/1.73M2
ERYTHROCYTE [DISTWIDTH] IN BLOOD BY AUTOMATED COUNT: 16.2 % (ref 10–15)
GLUCOSE BLDC GLUCOMTR-MCNC: 101 MG/DL (ref 70–99)
GLUCOSE BLDC GLUCOMTR-MCNC: 129 MG/DL (ref 70–99)
GLUCOSE BLDC GLUCOMTR-MCNC: 71 MG/DL (ref 70–99)
GLUCOSE BLDC GLUCOMTR-MCNC: 73 MG/DL (ref 70–99)
GLUCOSE BLDC GLUCOMTR-MCNC: 85 MG/DL (ref 70–99)
GLUCOSE BLDC GLUCOMTR-MCNC: 85 MG/DL (ref 70–99)
GLUCOSE SERPL-MCNC: 78 MG/DL (ref 70–99)
HCT VFR BLD AUTO: 28.4 % (ref 40–53)
HGB BLD-MCNC: 8.9 G/DL (ref 13.3–17.7)
INR PPP: 1.39 (ref 0.85–1.15)
ION CA PH 7.4: 1.09 MMOL/L (ref 1.11–1.3)
MAGNESIUM SERPL-MCNC: 2.3 MG/DL (ref 1.7–2.3)
MCH RBC QN AUTO: 28.5 PG (ref 26.5–33)
MCHC RBC AUTO-ENTMCNC: 31.3 G/DL (ref 31.5–36.5)
MCV RBC AUTO: 91 FL (ref 78–100)
PH: 7.36 (ref 7.35–7.45)
PHOSPHATE SERPL-MCNC: 3.2 MG/DL (ref 2.5–4.5)
PLATELET # BLD AUTO: 126 10E3/UL (ref 150–450)
POTASSIUM SERPL-SCNC: 3.6 MMOL/L (ref 3.4–5.3)
PROT SERPL-MCNC: 4.9 G/DL (ref 6.4–8.3)
RBC # BLD AUTO: 3.12 10E6/UL (ref 4.4–5.9)
SODIUM SERPL-SCNC: 141 MMOL/L (ref 135–145)
TRIGL SERPL-MCNC: 172 MG/DL
VANCOMYCIN SERPL-MCNC: 19.7 UG/ML
WBC # BLD AUTO: 7.4 10E3/UL (ref 4–11)

## 2023-11-07 PROCEDURE — 250N000011 HC RX IP 250 OP 636: Mod: JZ | Performed by: STUDENT IN AN ORGANIZED HEALTH CARE EDUCATION/TRAINING PROGRAM

## 2023-11-07 PROCEDURE — 97110 THERAPEUTIC EXERCISES: CPT | Mod: GO

## 2023-11-07 PROCEDURE — 250N000013 HC RX MED GY IP 250 OP 250 PS 637: Performed by: NURSE PRACTITIONER

## 2023-11-07 PROCEDURE — 97530 THERAPEUTIC ACTIVITIES: CPT | Mod: GP

## 2023-11-07 PROCEDURE — 250N000013 HC RX MED GY IP 250 OP 250 PS 637: Performed by: INTERNAL MEDICINE

## 2023-11-07 PROCEDURE — 84075 ASSAY ALKALINE PHOSPHATASE: CPT | Performed by: NURSE PRACTITIONER

## 2023-11-07 PROCEDURE — 250N000011 HC RX IP 250 OP 636: Performed by: NURSE PRACTITIONER

## 2023-11-07 PROCEDURE — 258N000003 HC RX IP 258 OP 636: Performed by: INTERNAL MEDICINE

## 2023-11-07 PROCEDURE — 250N000011 HC RX IP 250 OP 636: Mod: JZ | Performed by: NURSE PRACTITIONER

## 2023-11-07 PROCEDURE — 94799 UNLISTED PULMONARY SVC/PX: CPT

## 2023-11-07 PROCEDURE — 250N000011 HC RX IP 250 OP 636: Performed by: INTERNAL MEDICINE

## 2023-11-07 PROCEDURE — 84100 ASSAY OF PHOSPHORUS: CPT | Performed by: NURSE PRACTITIONER

## 2023-11-07 PROCEDURE — 250N000009 HC RX 250: Performed by: NURSE PRACTITIONER

## 2023-11-07 PROCEDURE — 85027 COMPLETE CBC AUTOMATED: CPT | Performed by: NURSE PRACTITIONER

## 2023-11-07 PROCEDURE — 84155 ASSAY OF PROTEIN SERUM: CPT | Performed by: NURSE PRACTITIONER

## 2023-11-07 PROCEDURE — 84478 ASSAY OF TRIGLYCERIDES: CPT | Performed by: INTERNAL MEDICINE

## 2023-11-07 PROCEDURE — 250N000009 HC RX 250: Performed by: INTERNAL MEDICINE

## 2023-11-07 PROCEDURE — 250N000013 HC RX MED GY IP 250 OP 250 PS 637: Performed by: STUDENT IN AN ORGANIZED HEALTH CARE EDUCATION/TRAINING PROGRAM

## 2023-11-07 PROCEDURE — 250N000011 HC RX IP 250 OP 636: Mod: JZ | Performed by: INTERNAL MEDICINE

## 2023-11-07 PROCEDURE — 999N000157 HC STATISTIC RCP TIME EA 10 MIN

## 2023-11-07 PROCEDURE — 82248 BILIRUBIN DIRECT: CPT | Performed by: NURSE PRACTITIONER

## 2023-11-07 PROCEDURE — 97162 PT EVAL MOD COMPLEX 30 MIN: CPT | Mod: GP

## 2023-11-07 PROCEDURE — 200N000001 HC R&B ICU

## 2023-11-07 PROCEDURE — 99233 SBSQ HOSP IP/OBS HIGH 50: CPT | Performed by: INTERNAL MEDICINE

## 2023-11-07 PROCEDURE — 85610 PROTHROMBIN TIME: CPT | Performed by: NURSE PRACTITIONER

## 2023-11-07 PROCEDURE — P9045 ALBUMIN (HUMAN), 5%, 250 ML: HCPCS | Mod: JZ | Performed by: INTERNAL MEDICINE

## 2023-11-07 PROCEDURE — 258N000003 HC RX IP 258 OP 636: Performed by: STUDENT IN AN ORGANIZED HEALTH CARE EDUCATION/TRAINING PROGRAM

## 2023-11-07 PROCEDURE — 83735 ASSAY OF MAGNESIUM: CPT | Performed by: NURSE PRACTITIONER

## 2023-11-07 PROCEDURE — 99232 SBSQ HOSP IP/OBS MODERATE 35: CPT | Performed by: INTERNAL MEDICINE

## 2023-11-07 PROCEDURE — 82330 ASSAY OF CALCIUM: CPT | Performed by: NURSE PRACTITIONER

## 2023-11-07 PROCEDURE — 97165 OT EVAL LOW COMPLEX 30 MIN: CPT | Mod: GO

## 2023-11-07 PROCEDURE — 82247 BILIRUBIN TOTAL: CPT | Performed by: NURSE PRACTITIONER

## 2023-11-07 PROCEDURE — 80202 ASSAY OF VANCOMYCIN: CPT | Performed by: STUDENT IN AN ORGANIZED HEALTH CARE EDUCATION/TRAINING PROGRAM

## 2023-11-07 PROCEDURE — 999N000156 HC STATISTIC RCP CONSULT EA 30 MIN

## 2023-11-07 PROCEDURE — 90947 DIALYSIS REPEATED EVAL: CPT

## 2023-11-07 RX ORDER — CEFTRIAXONE 1 G/1
1 INJECTION, POWDER, FOR SOLUTION INTRAMUSCULAR; INTRAVENOUS EVERY 24 HOURS
Status: DISCONTINUED | OUTPATIENT
Start: 2023-11-07 | End: 2023-11-10

## 2023-11-07 RX ORDER — ATORVASTATIN CALCIUM 40 MG/1
40 TABLET, FILM COATED ORAL EVERY EVENING
Status: DISCONTINUED | OUTPATIENT
Start: 2023-11-07 | End: 2023-11-14 | Stop reason: HOSPADM

## 2023-11-07 RX ORDER — DEXTROSE MONOHYDRATE 50 MG/ML
INJECTION, SOLUTION INTRAVENOUS CONTINUOUS
Status: DISCONTINUED | OUTPATIENT
Start: 2023-11-07 | End: 2023-11-11

## 2023-11-07 RX ORDER — QUETIAPINE FUMARATE 100 MG/1
100 TABLET, FILM COATED ORAL
Status: DISCONTINUED | OUTPATIENT
Start: 2023-11-07 | End: 2023-11-08

## 2023-11-07 RX ORDER — SODIUM BICARBONATE 650 MG/1
1300 TABLET ORAL 3 TIMES DAILY
Status: DISCONTINUED | OUTPATIENT
Start: 2023-11-07 | End: 2023-11-08

## 2023-11-07 RX ORDER — AMIODARONE HYDROCHLORIDE 200 MG/1
400 TABLET ORAL 2 TIMES DAILY
Status: DISCONTINUED | OUTPATIENT
Start: 2023-11-07 | End: 2023-11-14 | Stop reason: HOSPADM

## 2023-11-07 RX ADMIN — CEFTRIAXONE SODIUM 1 G: 1 INJECTION, POWDER, FOR SOLUTION INTRAMUSCULAR; INTRAVENOUS at 11:39

## 2023-11-07 RX ADMIN — SODIUM BICARBONATE 1300 MG: 648 TABLET ORAL at 20:07

## 2023-11-07 RX ADMIN — ALBUMIN HUMAN 12.5 G: 0.05 INJECTION, SOLUTION INTRAVENOUS at 09:59

## 2023-11-07 RX ADMIN — VANCOMYCIN HYDROCHLORIDE 750 MG: 500 INJECTION, POWDER, LYOPHILIZED, FOR SOLUTION INTRAVENOUS at 01:00

## 2023-11-07 RX ADMIN — AMIODARONE HYDROCHLORIDE 0.5 MG/MIN: 1.8 INJECTION, SOLUTION INTRAVENOUS at 02:52

## 2023-11-07 RX ADMIN — QUETIAPINE FUMARATE 100 MG: 100 TABLET ORAL at 22:04

## 2023-11-07 RX ADMIN — CALCIUM CHLORIDE, MAGNESIUM CHLORIDE, SODIUM CHLORIDE, SODIUM BICARBONATE, POTASSIUM CHLORIDE AND SODIUM PHOSPHATE DIBASIC DIHYDRATE 22.5 ML/KG/HR: 3.68; 3.05; 6.34; 3.09; .314; .187 INJECTION INTRAVENOUS at 03:28

## 2023-11-07 RX ADMIN — CALCIUM GLUCONATE 1 G: 20 INJECTION, SOLUTION INTRAVENOUS at 08:05

## 2023-11-07 RX ADMIN — SODIUM BICARBONATE 1300 MG: 648 TABLET ORAL at 10:51

## 2023-11-07 RX ADMIN — SENNOSIDES AND DOCUSATE SODIUM 1 TABLET: 8.6; 5 TABLET ORAL at 20:07

## 2023-11-07 RX ADMIN — HEPARIN SODIUM 5000 UNITS: 10000 INJECTION, SOLUTION INTRAVENOUS; SUBCUTANEOUS at 21:40

## 2023-11-07 RX ADMIN — AMIODARONE HYDROCHLORIDE 400 MG: 200 TABLET ORAL at 10:10

## 2023-11-07 RX ADMIN — ATORVASTATIN CALCIUM 40 MG: 40 TABLET, FILM COATED ORAL at 20:07

## 2023-11-07 RX ADMIN — OXYCODONE HYDROCHLORIDE 5 MG: 5 TABLET ORAL at 09:57

## 2023-11-07 RX ADMIN — DEXTROSE MONOHYDRATE: 50 INJECTION, SOLUTION INTRAVENOUS at 07:56

## 2023-11-07 RX ADMIN — AMIODARONE HYDROCHLORIDE 400 MG: 200 TABLET ORAL at 20:07

## 2023-11-07 RX ADMIN — Medication 40 MG: at 06:51

## 2023-11-07 RX ADMIN — HEPARIN SODIUM 5000 UNITS: 10000 INJECTION, SOLUTION INTRAVENOUS; SUBCUTANEOUS at 06:51

## 2023-11-07 RX ADMIN — ASPIRIN 81 MG CHEWABLE TABLET 162 MG: 81 TABLET CHEWABLE at 08:03

## 2023-11-07 RX ADMIN — HEPARIN SODIUM 5000 UNITS: 10000 INJECTION, SOLUTION INTRAVENOUS; SUBCUTANEOUS at 14:54

## 2023-11-07 RX ADMIN — PIPERACILLIN AND TAZOBACTAM 3.38 G: 3; .375 INJECTION, POWDER, LYOPHILIZED, FOR SOLUTION INTRAVENOUS at 04:33

## 2023-11-07 RX ADMIN — Medication 5 MG: at 20:07

## 2023-11-07 RX ADMIN — TORSEMIDE 50 MG: 20 TABLET ORAL at 10:51

## 2023-11-07 RX ADMIN — CALCIUM CHLORIDE, MAGNESIUM CHLORIDE, SODIUM CHLORIDE, SODIUM BICARBONATE, POTASSIUM CHLORIDE AND SODIUM PHOSPHATE DIBASIC DIHYDRATE 22.5 ML/KG/HR: 3.68; 3.05; 6.34; 3.09; .314; .187 INJECTION INTRAVENOUS at 01:10

## 2023-11-07 RX ADMIN — CALCIUM CHLORIDE, MAGNESIUM CHLORIDE, SODIUM CHLORIDE, SODIUM BICARBONATE, POTASSIUM CHLORIDE AND SODIUM PHOSPHATE DIBASIC DIHYDRATE 12.5 ML/KG/HR: 3.68; 3.05; 6.34; 3.09; .314; .187 INJECTION INTRAVENOUS at 03:30

## 2023-11-07 RX ADMIN — SODIUM BICARBONATE 1300 MG: 648 TABLET ORAL at 14:54

## 2023-11-07 ASSESSMENT — ACTIVITIES OF DAILY LIVING (ADL)
ADLS_ACUITY_SCORE: 28
ADLS_ACUITY_SCORE: 32
ADLS_ACUITY_SCORE: 28
ADLS_ACUITY_SCORE: 32
ADLS_ACUITY_SCORE: 32
ADLS_ACUITY_SCORE: 28
ADLS_ACUITY_SCORE: 32
ADLS_ACUITY_SCORE: 28

## 2023-11-07 NOTE — PROGRESS NOTES
"[facility]  Critical Care Progress Note    Summary:   Gerardo Al 79 yo M PMH multivessel CAD (9/26/23 s/p three vessel CABG), atrial fibrillation, T2DM, HTN, HLD, CKD, & BPH. Recent diagnosis 10/25/23 of left lower extremity harvest site erythema & pain concerning for soft tissue infection; he was started on Cephalexin    Presented 11/2/23 for progressive fatigue, nausea & vomiting. Found to have suspected septic shock secondary to purulent left lower extremity soft tissue infection (11/2 s/p bedside I&D), acute hypoxemic respiratory failure requiring intubation (11/5-11/7), oliguric CRISTAL requiring renal replacement therapy, & large posteriorly located loculated pericardial effusion with suspected tamponade physiology requiring pericardial window (11/5)    Interval Events:  Extubated yesterday to minimal supplemental O2. Remains on NE 0.01 for low MAPs with narrow PP & HR 90s.  CVP 8-10, mPAP mid 20s, CO 4-7, CI 2-3, SVR up to 700s off NE. Remains on amio infusion. Afebrile, net -1.9 L yesterday UF. BMP unremarkable, liver indices improved. CBC mostly stable, plts down-drifting. No new micro. MRSA nares negative.     Narrow to Ceftriaxone & vanc today, anticipate dropping vanc in comging days, discuss removal of PAC & arterial line, enteral amio, consider iHD, chest tube removed     Assessment & Plan:   Goals of Care:  Life prolonging without limits      Neurology, Psychiatry, Sedation, Analgesia:  Neurologically intact, altert & oriented     Cardiovascular:  Septic shock - resolved   Evinced by purulent soft tissue infection & low measured SVR with preserved cardiac output. Low suspicion for obstructive or neurologic phenomena   - NE goal MAP >65    Moderate sized posteriorly located pericardial effusion   Identified on 11/2 CT imaging & serial echocardiography. PA catheter was placed without convincing evidence of tamponade physiology initially. 11/5 TTE \"inconclusive for tamponade\" " with mild-moderate septal hypokinesis, basal lateral wall dyskinesia, LVEF 50-55%, normal RV size & function, IVC >2.1cm with minimal respiratory variation   - cardiology & cardiothoracic surgery was consulted    Multivessel CAD s/p recent three vessel CABG    - PTA ASA & atorvastatin     Atrial fibrillation  - amiodarone infusion (new this admission)   - hold PTA apixaban     Primary HTN  - hold PTA amlodipine   - hold PTA carvedilol     Respiratory, Airway:  Acute hypoxemic-hypercapnic respiratory failure   Compression atelectasis left lower lobe with shunt physiology   Suspect hypoxemia multifactorial secondary to decreased mixed venous O2 / limited cardiac output, left basilar shunting due to lung compression/atelectasis from the weight of the heart/pericardial structure  - supplemental O2 to maintain spO2 >= 90-96% & PaO2 >= 60 mmHg  - pulmonary hygiene: acapella QID, IS Q2H while awake, out of bed to chair, PT/OT as able     Small left pleural effusion   Appeared simple on 11/2 CT images. Presumed transudative however cannot rule out adjacent infectious process & parapneumonic effusion     Gastrointestinal:  Elevated transaminases - improved   Suspected congestive hepatopathy     Renal, Acid-base, Electrolytes, Volume:  Oliguric CRISTAL on CKD secondary to ATN  - consulted nephrology  - CRRT (11/3-current)     Infectious Disease:  Left lower extremity purulent soft tissue surgical site infection   Purulence expressed from extremity wound; 11/2/23 surgery performed a bedside I&D. 11/2 pus culture grew Klebsiella pneumoniae (R-ampicillin). Cannot rule out pneumonia in the setting of L-lower lobe atelectasis or consolidation however no symptoms of pneumonia prior to admission. Cannot rule out pericardial infection.   - ordered MRSA nares & sputum culture to evaluate alternative sources   - vanc & pip-tazo (11/2-11/7), transition to Ceftriaxone & vanc (11/7-current) for 10-14 days duration     Hematology,  Oncology:  Normocytic anemia   Multifactorial secondary acute/chronic inflammation/infection, CKD, phlebotomy, at least. No signs of bleeding  - monitor     Endocrine:  T2DM   - hold insulin    Hypoglycemia  - advance diet, D5 at 10ml/h    Checklist:  FEN: advance as tolerated   VTE ppx: SQH  GI ppx: PPI  Bowel regimen: senna/doc BID, PEG QD  Lines/tube-size: L-IJ tunneled HD catheter (11/3), RUE PICC (11/2), R-internal jugular PAC (11/3), radial art line (11/3), mediastinal tube (11/5)  Skin: L-lower extremity medial wound POA   PT/OT/SLP, early mobility: cardiac rehab, PT, OT  Code Status: full     Physical Exam:  Neurologic: Off sedation. Drowsy. Opens eyes, tracks, & follows commands in all extremities.   HEENT: Head and face normal. No nasal discharge. Oropharynx normal. Eyelids, conjunctiva, & sclera normal.   Neck: Neck appearance normal. No neck masses.   Respiratory: Lungs clear bilaterally. No wheezes, crackles, or rhonchi.   Cardiovascular: Regular rate & rhythm No murmurs  Gastrointestinal: Bowel sounds present. No tenderness. Obese.Soft     Musculoskeletal: Skeletal configuration normal and muscle mass normal for age. Joint appearance overall normal.  Skin, Hair, & Nails: Skin color normal. No skin lesions.  Hair & nails normal.  Extremities: 1+ lower extremity pitting edema. LLE medial wound, clean, dry, minimal erythema, no purulence or tenderness, no warmth     All pertinent vital signs, ventilator settings, I&Os, laboratory, microbiology, ECGs, & imaging data has been personally reviewed. Total subsequent encounter time, excluding procedures, was 30 minutes     ALLY Villeda MD  Critical Care Medicine

## 2023-11-07 NOTE — PROGRESS NOTES
SPIRITUAL HEALTH SERVICES Progress Note  [facility], ICU    Saw pt Gerardo Al per nurse referral. Nomi and his wife, Francine, present.    Patient/Family Understanding of Illness and Goals of Care - Nomi shared some about his cardiac history and recent hospitalization at [facility]. He reports feeling pretty well this afternoon.     Distress and Loss - Ongoing health challenges/recovery, otherwise no other distress noted at this time.     Strengths, Coping, and Resources - Family is source of support. Not able to fully assess at this time.     Meaning, Beliefs, and Spirituality - Patient comes from Buddhist [place] background and derives meaning, purpose, and comfort from kelsie. They have connection to [facility] in [place] and their  visited earlier today. Patient welcomed prayer and expressed appreciation for the visit.      Plan of Care - A  will continue to visit as able or per request by patient/family/staff.      Martínez Taylor MDiv, [facility]  /Manager Spiritual Health Services  [phone]

## 2023-11-07 NOTE — PLAN OF CARE
[facility] - ICU    RN Progress Note:            Pertinent Assessments:      Please refer to flowsheet rows for full assessment     Patient having a difficult time falling/staying asleep tonight. PRN melatonin and seroquel given at HS per patient request.  Unfortunately, I have to enter his room every hour for I/Os 2/2 CRRT, which is not helping the patient stay asleep.            Key Events - This Shift:       Levophed was turned off for a few hours last evening but had to be restarted on the NOC shift. Temporarily decreased CRRT removal rate from 50cc/hr to 25cc/hr.  Levophed is now off again this morning.                Barriers to Discharge / Downgrade:     CRRT, levophed         Point of Contact Update YES-OR-NO: No  If No, reason: Can be updated during wake hours.

## 2023-11-07 NOTE — PROGRESS NOTES
Imp/plan:  S/p window for loculated pericardial effusion - CT removed this AM, off pressors  Afib - change to po amiodarone, start AC eliquis in AM and plan limited echo friday  CAD s/p CABG - start statin noted mild elevation LFT related to tamponade and lower asp 81mg given starting eliquis  Extravascular fluid/renal failure - improving Cr with resolution of tamponade, will add one dose albumen to see if helps with removal of fluid and cont diuresis per nephrology (thank you).    Current Facility-Administered Medications   Medication    [Held by provider] acetaminophen (TYLENOL) tablet 650 mg    albumin human 5 % injection 12.5 g    amiodarone (PACERONE) tablet 400 mg    aspirin (ASA) chewable tablet 162 mg    bisacodyl (DULCOLAX) suppository 10 mg    calcium gluconate 1 g in 50 mL in sodium chloride intermittent infusion    calcium gluconate 2 g in  mL intermittent infusion    calcium gluconate 3 g in sodium chloride 0.9 % 100 mL intermittent infusion    dextrose 5% infusion    glucose gel 15-30 g    Or    dextrose 50 % injection 25-50 mL    Or    glucagon injection 1 mg    DOBUTamine (DOBUTREX) 500 mg in D5W 250 mL infusion (adult std conc)    EPINEPHrine (ADRENALIN) 5 mg in sodium chloride 0.9 % 250 mL infusion CENTRAL    heparin ANTICOAGULANT injection 5,000 Units    hydrALAZINE (APRESOLINE) injection 10 mg    influenza vac high-dose quad (FLUZONE HD) injection MARGARET 0.7 mL    ipratropium - albuterol 0.5 mg/2.5 mg/3 mL (DUONEB) neb solution 3 mL    lactated ringers BOLUS 250 mL    Lidocaine (LIDOCARE) 4 % Patch 1-2 patch    magnesium hydroxide (MILK OF MAGNESIA) suspension 30 mL    magnesium sulfate 2 g in 50 mL sterile water intermittent infusion    melatonin tablet 3 mg    naloxone (NARCAN) injection 0.2 mg    Or    naloxone (NARCAN) injection 0.4 mg    Or    naloxone (NARCAN) injection 0.2 mg    Or    naloxone (NARCAN) injection 0.4 mg    niCARdipine 40 mg in 200 mL NS (CARDENE) infusion    No  "heparin required    No heparin required    norepinephrine (LEVOPHED) 16 mg in sodium chloride 0.9 % 250 mL infusion CENTRAL    ondansetron (ZOFRAN ODT) ODT tab 4 mg    Or    ondansetron (ZOFRAN) injection 4 mg    oxyCODONE (ROXICODONE) tablet 5 mg    Or    oxyCODONE (ROXICODONE) tablet 10 mg    pantoprazole (PROTONIX) 2 mg/mL suspension 40 mg    Or    pantoprazole (PROTONIX) IV push injection 40 mg    phenylephrine (UMAIR-SYNEPHRINE) 50 mg in NaCl 0.9 % 250 mL infusion    piperacillin-tazobactam (ZOSYN) 3.375 g vial to attach to  mL bag    polyethylene glycol (MIRALAX) Packet 17 g    polyethylene glycol (MIRALAX) Packet 17 g    prochlorperazine (COMPAZINE) injection 5 mg    Or    prochlorperazine (COMPAZINE) tablet 5 mg    QUEtiapine (SEROquel) tablet 50 mg    Reason beta blocker order not selected    senna-docusate (SENOKOT-S/PERICOLACE) 8.6-50 MG per tablet 1 tablet    senna-docusate (SENOKOT-S/PERICOLACE) 8.6-50 MG per tablet 1 tablet    Or    senna-docusate (SENOKOT-S/PERICOLACE) 8.6-50 MG per tablet 2 tablet    sodium bicarbonate tablet 1,300 mg    sodium chloride (PF) 0.9% PF flush 10-20 mL    sodium chloride (PF) 0.9% PF flush 10-40 mL    sodium chloride (PF) 0.9% PF flush 10-40 mL    sodium chloride (PF) 0.9% PF flush 10-40 mL    sodium chloride 0.9% BOLUS 1-250 mL    torsemide (DEMADEX) tablet 50 mg    vancomycin (VANCOCIN) 750 mg in sodium chloride 0.9 % 250 mL intermittent infusion     Past Medical History:   Diagnosis Date    Hx of CABG     Sept 2023 at [facility]        Review of Systems: 12 points negative other than above    /64   Pulse 83   Temp 98  F (36.7  C) (Oral)   Resp 16   Ht 1.803 m (5' 11\")   Wt 96.8 kg (213 lb 8 oz)   SpO2 95%   BMI 29.78 kg/m    JVP<7cm, carotids normal  Lungs clear  Cor RRR no c,r,m  Abs soft +BS, no mass  Ext no c,c,e    Lab Results   Component Value Date    HGB 8.9 (L) 11/07/2023     Lab Results   Component Value Date     (L) " "11/07/2023     No results found for: \"CREATININE\"  No components found for: \"K\"        Stuart Gallardo MD  Interventional Cardiology   [facility]  [phone]    "

## 2023-11-07 NOTE — TREATMENT PLAN
RCAT Treatment Plan    Patient Score: 10  Patient Acuity: 4    Clinical Indication for Therapy: atelectasis and prevent atelectasis    Therapy Ordered: Flutter QID, Incentive Spirometry per unit routine    Assessment Summary: PT currently on 1L NC with an SpO2 of 96%  Clear, diminished.   RT will continue to encourage flutter and IS and will re-evaluate RCAT in x24 hours.    Allan Hidalgo, RT  11/7/2023

## 2023-11-07 NOTE — PROGRESS NOTES
11/07/23 1135   Appointment Info   Signing Clinician's Name / Credentials (OT) Heidy Melgoza, OTR/l   Living Environment   People in Home spouse   Current Living Arrangements house   Home Accessibility stairs to enter home;stairs within home   Number of Stairs, Main Entrance 2   Stair Railings, Main Entrance railings safe and in good condition   Number of Stairs, Within Home, Primary greater than 10 stairs   Stair Railings, Within Home, Primary railings safe and in good condition   Transportation Anticipated family or friend will provide   Living Environment Comments laundry in basement but doesn't need to go downstairs   Self-Care   Usual Activity Tolerance excellent   Current Activity Tolerance fair   Regular Exercise   (pt had attended one session of outpatient cardiac rehab)   Equipment Currently Used at Home none  (owns a walker but doesn't use)   Fall history within last six months no   Activity/Exercise/Self-Care Comment pt had been independent prior to open heard surgery.  Pt has been needing more help lately as he has been more sob with activity and fatigues easily   Instrumental Activities of Daily Living (IADL)   IADL Comments was independent with all IADLs prior to open heart surgery.  Pt has been needing assist with IADLs since returning home   General Information   Onset of Illness/Injury or Date of Surgery 11/02/23   Referring Physician Tiffanie Dutton APRN CNP   Patient/Family Therapy Goal Statement (OT) pt would like to return home with wife at discharge and return to outpatient cardiac rehab, however pt is open to TCU if needed   Additional Occupational Profile Info/Pertinent History of Current Problem per chart   Existing Precautions/Restrictions sternal;cardiac   Left Upper Extremity (Weight-bearing Status) partial weight-bearing (PWB)   Right Upper Extremity (Weight-bearing Status) partial weight-bearing (PWB)   General Observations and Info art line and swan line in   Cognitive Status  Examination   Follows Commands WNL   Posture   Posture forward head position   Strength Comprehensive (MMT)   Comment, General Manual Muscle Testing (MMT) Assessment generalized full body weakness   Bed Mobility   Bed Mobility supine-sit   Supine-Sit [place] (Bed Mobility) moderate assist (50% patient effort)   Assistive Device (Bed Mobility) draw sheet   Transfers   Transfers bed-chair transfer   Transfer Skill: Bed to Chair/Chair to Bed   Bed-Chair [place] (Transfers) moderate assist (50% patient effort);2 person assist;1 person to manage equipment   Balance   Balance Comments fall risk, LE weakness causing buckling in knees during transfer to chair   Activities of Daily Living   BADL Assessment/Intervention lower body dressing   Lower Body Dressing Assessment/Training   Comment, (Lower Body Dressing) due to weakness and reaching   [place] Level (Lower Body Dressing) dependent (less than 25% patient effort)   Clinical Impression   Criteria for Skilled Therapeutic Interventions Met (OT) Yes, treatment indicated   OT Diagnosis pericardial effusion with pericardial window   OT Problem List-Impairments impacting ADL problems related to;activity tolerance impaired;balance;mobility;post-surgical precautions   Assessment of Occupational Performance 3-5 Performance Deficits   Identified Performance Deficits transfers, dressing, safety   Planned Therapy Interventions (OT) ADL retraining;IADL retraining;strengthening;transfer training;home program guidelines;progressive activity/exercise   Clinical Decision Making Complexity (OT) problem focused assessment/low complexity   Risk & Benefits of therapy have been explained evaluation/treatment results reviewed;care plan/treatment goals reviewed;risks/benefits reviewed;current/potential barriers reviewed;participants voiced agreement with care plan;participants included;patient;spouse/significant other   OT Total Evaluation Time   OT Eval, Low Complexity Minutes  (23914) 16   OT Goals   Therapy Frequency (OT) Daily   OT Predicted Duration/Target Date for Goal Attainment 11/13/23   OT Goals Cardiac Phase 1   OT: Understanding of cardiac education to maximize quality of life, condition management, and health outcomes Patient;Caregiver;Verbalize;Demonstrate   OT: Perform aerobic activity with stable cardiovascular response intermittent;10 minutes;ambulation;NuStep   OT: Functional/aerobic ambulation tolerance with stable cardiovascular response in order to return to home and community environment Rolling walker;Within precautions;Greater than 300 feet;Supervision/SBA   OT: Navigation of stairs simulating home set up with stable cardiovascular response in order to return to home and community environment Supervision/SBA;Greater than 10 stairs   OT Discharge Planning   OT Plan transfers, standing, LE ex, rec. bike   OT Discharge Recommendation (DC Rec) Transitional Care Facility   OT Rationale for DC Rec pt is currently below baseline and needing mod A for transfers with legs going out due to weakness.  Pts goal is to return home if possible but is open to tcu if he does not progress to independence.  wife is not albe to provide lifting assist.   OT Brief overview of current status mod A x 2 bed to chair transfer.  leg weakness   Total Session Time   Total Session Time (sum of timed and untimed services) 16

## 2023-11-07 NOTE — PROGRESS NOTES
Renal progress note  CC:CRISTAL, anuria  Assessment and Plan:  78 year old male with hx of CKD 3, HTN , DM2, PAD, hx of Atrial fib on eliquis for AC, BPH , CAD with sp CABG 9/26/2023 after angiogram 9/22 with 3v disease cb mild CRISTAL at the time of discharge.  Nephrology consulted for severe CRISTAL with oligoanuric state     CRISTAL with anuria Baseline creatinine 1.2-1.3, uptrending to 1.5s at the time of discharge, creatinine was 1.35 on 10/9/2023.  Patient had repeat labs with creatinine acutely elevated to 7.85 on 10/31/2023, further increased to 10.58 with significant azotemia  on 11/2/2023  UA turbid appearing likely consistent with ATN  CT imaging with evidence of atherosclerotic changes otherwise nonobstructive renal stones and some evidence of cystitis no hydronephrosis  Recs:  - stop CRRT  - challenge with oral diuretic  - likely IHD tomorrow but will assess for need    Hyperkalemia --> hypokalemia, has been on 4K bath.  Discontinuing CRRT and protocols     Hypocalcemia  Follow on calcium off CRRT    Metabolic acidosis - slow to correct, has received multiple amps of bicarb and dialysis Rx increased.  Improved but remains on the lower side, will add oral bicarb for now     HTN hx  PTA on Amlodipine Benazepril and coreg  Held HTN meds   --> moderate pericardial effusion s/p pericardial window  --> management per ICU/card     Anemia acute on chronic  Hemoglobin 7s  Last hemoglobin was 9.4 on 10/9/2023  No evidence of bleed possibly inflammatory anemia/post CABG  Baseline is around 12  Iron sats low normal 26%  Did receive one-time PRBC overnight 11/5/23  Transfuse if less than 8 with recent cardiac surgery  --> We will defer these plans to cardiology and ICU     Coronary disease s/p CABG x3 on 9/26/2023  History of atrial fibrillation s/p cardioversion in August 2023  On AC with Eliquis currently held  Loculated pericardial effusion concerns for tamponade physiology  Last echo with EF of 52%  Echo showing  large-sized pericardial effusion with repeat echo 11/5/2023 showing some septal abnormality and large effusion s/p pericardial window      Possible septic shock with leg cellulitis/abscess  General surgery completed bedside I&D  On Abx ; Vanco and cefazolin  Blood Cx negative, wound culture growing Klebsiella     Acute respiratory failure in the setting of CRISTAL and hypervolemia  Extubated, on 1L NC     DM2  Insulin management per ICU       Thank you for the consultation we will follow    [facility]  [facility]  [phone]        Subjective  Extubated yesterday.  Sitting up, alert, awake.  Passed 150cc urine spontaneously last night but none since then.  Off pressors, BP on the softer side but quite acceptable.  Discussed stopping CRRT, very likely IHD tomorrow given anuria.  Will challenge with diuretics today.      Reviewed with ICU RN    Objective    Vital signs in last 24 hours  Temp:  [97.5  F (36.4  C)-98  F (36.7  C)] 98  F (36.7  C)  Pulse:  [] 83  Resp:  [16] 16  MAP:  [55 mmHg-86 mmHg] 66 mmHg  Arterial Line BP: ()/(42-67) 102/52  SpO2:  [87 %-99 %] 95 %  Weight:   [unfilled]    Intake/Output last 3 shifts  I/O last 3 completed shifts:  In: 2265.47 [P.O.:205; I.V.:2020.67; Other:39.8]  Out: 3553.7 [Urine:150; Other:3367.7; Chest Tube:36]  Intake/Output this shift:  I/O this shift:  In: 160.6 [P.O.:60; I.V.:100.6]  Out: 269.9 [Other:269.9]    Physical Exam  Awake and alert  CV: RRR without murmur or rub  Lung: clear and equal; no extra sounds  Ab: soft and NT; not distended; normal bs  Ext: edema in hands bilaterally, not much in legs  Skin; no rash  Barragan no barragan  Access: tunneled LIJ line    Pertinent Labs   Lab Results   Component Value Date    WBC 7.4 11/07/2023    HGB 8.9 (L) 11/07/2023    HCT 28.4 (L) 11/07/2023    MCV 91 11/07/2023     (L) 11/07/2023     Lab Results   Component Value Date    BUN 8.0 11/07/2023     11/07/2023    CO2 19 (L) 11/07/2023        Lab Results   Component Value Date    ALBUMIN 3.0 (L) 11/07/2023     Lab Results   Component Value Date    PHOS 3.2 11/07/2023     I reviewed all lab results

## 2023-11-07 NOTE — PROGRESS NOTES
11/07/23 1500   Appointment Info   Signing Clinician's Name / Credentials (PT) Tamika Ramos DPT   Living Environment   People in Home spouse   Current Living Arrangements house   Home Accessibility stairs to enter home   Number of Stairs, Main Entrance 2   Stair Railings, Main Entrance railings safe and in good condition   Transportation Anticipated family or friend will provide;health plan transportation   Living Environment Comments Able to stay on main level.   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 Recent diagnosis 10/25/23 of left lower extremity harvest site erythema & pain concerning for soft tissue infection; he was started on Cephalexin     Presented 11/2/23 for progressive fatigue, nausea & vomiting. Found to have suspected septic shock secondary to purulent left lower extremity soft tissue infection (11/2 s/p bedside I&D), acute hypoxemic respiratory failure requiring intubation (11/5-11/7), oliguric CRISTAL requiring renal replacement therapy, & large posteriorly located loculated pericardial effusion with suspected tamponade physiology requiring pericardial window (11/5)   Existing Precautions/Restrictions (S)  sternal   Strength (Manual Muscle Testing)   Strength (Manual Muscle Testing) Deficits observed during functional mobility   Bed Mobility   Bed Mobility sit-supine   Sit-Supine Piute (Bed Mobility) moderate assist (50% patient effort);2 person assist   Bed Mobility Limitations decreased ability to use arms for  pushing/pulling;decreased ability to use legs for bridging/pushing;impaired ability to control trunk for mobility   Transfers   Transfers sit-stand transfer   Sit-Stand Transfer   Sit-Stand Warner (Transfers) moderate assist (50% patient effort);2 person assist   Assistive Device (Sit-Stand Transfers)   (none)   Gait/Stairs (Locomotion)   Comment, (Gait/Stairs) n/a- pt unable to ambulate any distance today. Limited by lines.   Clinical Impression   Criteria for Skilled Therapeutic Intervention Yes, treatment indicated   PT Diagnosis (PT) Impaired functional mobility   Influenced by the following impairments Weakness, fatigue   Functional limitations due to impairments Impaired strength, transfers, gait   Clinical Presentation (PT Evaluation Complexity) evolving   Clinical Presentation Rationale Presents as diagnosed   Clinical Decision Making (Complexity) moderate complexity   Planned Therapy Interventions (PT) balance training;bed mobility training;gait training;home exercise program;stair training;strengthening;transfer training   Risk & Benefits of therapy have been explained patient   PT Total Evaluation Time   PT Eval, Moderate Complexity Minutes (27649) 10   Physical Therapy Goals   PT Frequency Daily   PT Predicted Duration/Target Date for Goal Attainment 11/14/23   PT Goals Bed Mobility;Transfers;Gait;Stairs   PT: Bed Mobility Supervision/stand-by assist;Supine to/from sit   PT: Transfers Supervision/stand-by assist;Sit to/from stand;Bed to/from chair;Assistive device   PT: Gait Rolling walker;100 feet  (CGA)   PT: Stairs Minimal assist;2 stairs   PT Discharge Planning   PT Plan progress transfers, short distance amb (chair follow), LE ex   PT Discharge Recommendation (DC Rec) [facility] patient will benefit from intensive, interdisciplinary therapy.  Anticipate will be able to tolerate 3 hours of therapy per day   PT Rationale for DC Rec Ax2 for transfers today. Unable to ambulate.    PT Brief overview of current status Recliner > bed, mod Ax2.   PT Equipment Needed at Discharge walker, rolling   Total Session Time   Total Session Time (sum of timed and untimed services) 10       Tamika Ramos, PT, DPT  11/7/2023

## 2023-11-07 NOTE — PROGRESS NOTES
CLINICAL NUTRITION SERVICES - ASSESSMENT NOTE     Nutrition Prescription    RECOMMENDATIONS FOR MDs/PROVIDERS TO ORDER:  Possible risk for refeeding syndrome as starts to eat better.    Malnutrition Status:    Moderate in acute illness    Recommendations already ordered by Registered Dietitian (RD):  Start Ensure Enlive with meals when diet advanced.    Future/Additional Recommendations:       REASON FOR ASSESSMENT  Gerardo Al is a/an 78 year old male assessed by the dietitian for Interdisciplinary Rounds with report of decreased po intake prior to admission.    NUTRITION HISTORY  Patient admitted with fatigue, found to have pericardial effusion, metabolic acidosis, cellulitis, possible septic shock.  Was on CRRT, now stopped.  May start hemodialysis.  Patient now s/p pericardial window 11/5/23.  Patient is s/p CABG 9/26/23 at other hospital.  History DM.  Patient reports decreased po intake since surgery in late September.      CURRENT NUTRITION ORDERS  Diet: Clear Liquid  CL/NPO x 5 days.    Only one meal ordered this admission per nutrition review:  Clear liquid supper on 11/6/23.    LABS  CMP  Recent Labs   Lab 11/07/23  0809 11/07/23  0611 11/07/23  0424 11/07/23  0209 11/06/23  2024 11/06/23 2012 11/06/23  1706 11/06/23  1240 11/06/23  0809 11/06/23  0513 11/06/23  0115 11/05/23  2214 11/05/23  2040 11/05/23  1718   NA  --   --  141  --   --  140  --  141  --  141  --  140  140  --  142  142   POTASSIUM  --   --  3.6  --   --  3.5  --  3.3*  --  3.2*  --  3.4  3.4  --  3.6  3.6  3.7   GLC 71 73 78 85   < > 79   < > 107*   < > 120*   < > 125*  125*   < > 148*  150*   BUN  --   --  8.0  --   --  9.1  --  10.7  --  13.4  --  17.5  17.5  --  20.3  20.8   CR  --   --  1.24*  --   --  1.33*  --  1.42*  --  1.68*  --  1.97*  1.97*  --  2.25*  2.32*   RIKA  --   --  7.7*  --   --  8.0*  --  8.4*  --  8.8  --  9.3  9.3  --  8.7*  8.9   MAG  --   --  2.3  --   --  2.1  --  2.0  --  2.0  --  1.9  --   1.8  1.9   PHOS  --   --  3.2  --   --  3.1  --  3.1  --  2.4*  --  2.6  --  3.3  3.4   ALBUMIN  --   --  3.0*  --   --  3.0*  --  3.0*  --  3.0*  --  3.0*  3.0*  --  2.9*  3.1*   BILITOTAL  --   --  0.6  --   --   --   --   --   --  0.8  --  0.9  0.9  --  1.0   ALKPHOS  --   --  28*  --   --   --   --   --   --  27*  --  30*  30*  --  29*   AST  --   --  81*  --   --   --   --   --   --  141*  --  186*  186*  --  225*   ALT  --   --  159*  --   --   --   --   --   --  192*  --  216*  216*  --  219*    < > = values in this interval not displayed.     Labs reviewed    MEDICATIONS   amiodarone  400 mg Oral BID    [START ON 11/8/2023] apixaban ANTICOAGULANT  5 mg Oral BID    atorvastatin  40 mg Oral QPM    heparin ANTICOAGULANT  5,000 Units Subcutaneous Q8H    influenza vac high-dose quad  0.7 mL Intramuscular Prior to discharge    lidocaine  1-2 patch Transdermal Q24H    pantoprazole  40 mg Per Feeding Tube QAM AC    Or    pantoprazole  40 mg Intravenous QAM AC    piperacillin-tazobactam  3.375 g Intravenous Q8H    polyethylene glycol  17 g Oral or Feeding Tube Daily    senna-docusate  1 tablet Oral or Feeding Tube BID    sodium bicarbonate  1,300 mg Oral TID    sodium chloride (PF)  10-40 mL Intracatheter Q7 Days    torsemide  50 mg Oral Daily    vancomycin  750 mg Intravenous Q12H       D5W 10 mL/hr at 11/07/23 0756    DOBUTamine Stopped (11/03/23 0900)    EPINEPHrine      niCARdipine Stopped (11/05/23 1640)    - MEDICATION INSTRUCTIONS -      - MEDICATION INSTRUCTIONS -      norepinephrine Stopped (11/07/23 0700)    phenylephrine Stopped (11/05/23 1530)    BETA BLOCKER NOT PRESCRIBED        [Held by provider] acetaminophen, bisacodyl, calcium gluconate, calcium gluconate, calcium gluconate, glucose **OR** dextrose **OR** glucagon, hydrALAZINE, ipratropium - albuterol 0.5 mg/2.5 mg/3 mL, lactated ringers, magnesium hydroxide, magnesium sulfate, melatonin, naloxone **OR** naloxone **OR** naloxone **OR**  "naloxone, - MEDICATION INSTRUCTIONS -, - MEDICATION INSTRUCTIONS -, ondansetron **OR** ondansetron, oxyCODONE **OR** oxyCODONE, polyethylene glycol, prochlorperazine **OR** prochlorperazine, QUEtiapine, BETA BLOCKER NOT PRESCRIBED, senna-docusate **OR** senna-docusate, sodium chloride (PF), sodium chloride (PF), sodium chloride (PF), sodium chloride 0.9%   Medications reviewed    ANTHROPOMETRICS  Height: 180.3 cm (5' 11\")  Admit wt :  97.9 kg (215 lb 13.3 oz) vs 100.6 kg (221 lb 12.5 oz) 11/2/23.  Most Recent Weight: 96.8 kg (213 lb 8 oz)    BMI: Overweight BMI 25-29.9  Weight History:   Wt Readings from Last 10 Encounters:   11/07/23 96.8 kg (213 lb 8 oz)   09/30/23 : 100.1 kg (220 lb 10.9 oz)   09/21/23 : 101.3 kg (223 lb 5.2 oz)  08/01/23 : 102.5 kg (226 lb)  12/22/22 : 108.4 kg (239 lb)  09/01/22 : 106 kg (233 lb 9.6 oz)     -195 ml since admit per I/Os.    No significant wt loss found.      Dosing Weight: 96.8 kg    ASSESSED NUTRITION NEEDS  Estimated Energy Needs: 1935+ kcals/day (20+ kcals/kg)  Justification: Overweight, Post-op, and Repletion  Estimated Protein Needs: 77- 97grams protein/day (0.8 - 1 grams of pro/kg)  Justification: CRISTAL, Post-op, and Repletion  Estimated Fluid Needs: 1800 mL/day (or per MD pending fluid status.)     PHYSICAL FINDINGS  See malnutrition section below.  Low urine output noted  Chest tube  Last BM 11/5  Surgical sites   LLE s/p I&D 11/2/23.    MALNUTRITION:  % Weight Loss:  Weight loss does not meet criteria for malnutrition 10.5% loss in 11 months.  % Intake:  </= 75% for >/= 1 month (severe malnutrition)  Subcutaneous Fat Loss:  Orbital region mild depletion  Muscle Loss:  Clavicle bone region mild depletion  Fluid Retention:  Moderate in jess hands.    Malnutrition Diagnosis: Moderate malnutrition  In Context of:  Acute illness or injury    NUTRITION DIAGNOSIS  Malnutrition related to acute illness as evidenced by decreased intake, loss of lean body mass, edema.  "     INTERVENTIONS  Implementation  Nutrition Education: Plan diet education on cardiac diet prior to discharge as appropriate.   Medical food supplement therapy -start Ensure Clear with lunch today.  When diet advanced plan to send Ensure Enlive with meals.  Monitor electrolytes as patient may be at risk for refeeding syndrome.    Goals  Participate in diet education before discharge  Meet nutrition needs  Electrolytes WNL  BG < 180  Patient to consume % of nutritionally adequate meals three times per day, or the equivalent with supplements/snacks.     Monitoring/Evaluation  Progress toward goals will be monitored and evaluated per protocol.

## 2023-11-07 NOTE — PHARMACY-VANCOMYCIN DOSING SERVICE
Pharmacy Vancomycin Note  Date of Service 2023  Patient's  1945   78 year old, male    Indication: Sepsis  Day of Therapy: 5  Current vancomycin regimen:  750 mg IV q12h (CRRT dosing)  Current vancomycin monitoring method: Trough (Method 2 = manual dose calculation)  Current vancomycin therapeutic monitoring goal: 15-20 mg/L    Current estimated CrCl = Estimated Creatinine Clearance: 58.3 mL/min (A) (based on SCr of 1.24 mg/dL (H)).    Creatinine for last 3 days  2023:  8:02 PM Creatinine 3.88 mg/dL  2023:  1:20 AM Creatinine 3.38 mg/dL;  5:12 AM Creatinine 2.88 mg/dL;  8:20 AM Creatinine 2.61 mg/dL;  1:30 PM Creatinine 2.68 mg/dL;  5:18 PM Creatinine 2.32 mg/dL;  5:18 PM Creatinine 2.25 mg/dL; 10:14 PM Creatinine 1.97 mg/dL; 10:14 PM Creatinine 1.97 mg/dL  2023:  5:13 AM Creatinine 1.68 mg/dL; 12:40 PM Creatinine 1.42 mg/dL;  8:12 PM Creatinine 1.33 mg/dL  2023:  4:24 AM Creatinine 1.24 mg/dL    Recent Vancomycin Levels (past 3 days)  2023:  8:20 AM Vancomycin 15.9 ug/mL  2023: 11:18 AM Vancomycin 19.7 ug/mL    Vancomycin IV Administrations (past 72 hours)                     vancomycin (VANCOCIN) 750 mg in sodium chloride 0.9 % 250 mL intermittent infusion (mg) 750 mg New Bag 23 0100     750 mg New Bag 23 1250     750 mg New Bag  0108     750 mg New Bag 23 0920     750 mg New Bag 23                    Nephrotoxins and other renal medications (From now, onward)      Start     Dose/Rate Route Frequency Ordered Stop    23  vancomycin place call - receiving intermittent dosing         1 each Intravenous SEE ADMIN INSTRUCTIONS 23 10523 0930  torsemide (DEMADEX) tablet 50 mg         50 mg Oral DAILY 23 0857      23  norepinephrine (LEVOPHED) 16 mg in sodium chloride 0.9 % 250 mL infusion CENTRAL         0.01-0.6 mcg/kg/min × 97.9 kg  0.9-55.1 mL/hr  Intravenous CONTINUOUS 23 0021                  Contrast Orders - past 72 hours (72h ago, onward)      None            Interpretation of levels and current regimen:  Vancomycin level is reflective of  a therapeutic level    Has serum creatinine changed greater than 50% in last 72 hours: N/A (CRRT)    Urine output:  diminished urine output    Renal Function:  CRRT      Plan:  CRRT was discontinued with plan for next HD run tomorrow 11/8/23.  Change vancomycin to intermittent dosing.  No doses are needed today as vancomycin level is on the upper end of the therapeutic range.  Vancomycin monitoring method: Trough (Method 2 = manual dose calculation)  Vancomycin therapeutic monitoring goal: 15-20 mg/L  Pharmacy will check vancomycin levels tomorrow AM.  Serum creatinine levels will be ordered  based on dialysis plan .    Roosevelt Santiago RPH

## 2023-11-08 ENCOUNTER — APPOINTMENT (OUTPATIENT)
Dept: OCCUPATIONAL THERAPY | Facility: HOSPITAL | Age: 78
DRG: 856 | End: 2023-11-08
Payer: COMMERCIAL

## 2023-11-08 ENCOUNTER — APPOINTMENT (OUTPATIENT)
Dept: CARDIOLOGY | Facility: HOSPITAL | Age: 78
DRG: 856 | End: 2023-11-08
Attending: INTERNAL MEDICINE
Payer: COMMERCIAL

## 2023-11-08 LAB
ALBUMIN SERPL BCG-MCNC: 3 G/DL (ref 3.5–5.2)
ANION GAP SERPL CALCULATED.3IONS-SCNC: 9 MMOL/L (ref 7–15)
BACTERIA SPT CULT: NO GROWTH
BUN SERPL-MCNC: 15.3 MG/DL (ref 8–23)
CALCIUM SERPL-MCNC: 8.6 MG/DL (ref 8.8–10.2)
CHLORIDE SERPL-SCNC: 105 MMOL/L (ref 98–107)
CREAT SERPL-MCNC: 2.4 MG/DL (ref 0.67–1.17)
DEPRECATED HCO3 PLAS-SCNC: 25 MMOL/L (ref 22–29)
EGFRCR SERPLBLD CKD-EPI 2021: 27 ML/MIN/1.73M2
ERYTHROCYTE [DISTWIDTH] IN BLOOD BY AUTOMATED COUNT: 16.2 % (ref 10–15)
GLUCOSE BLDC GLUCOMTR-MCNC: 115 MG/DL (ref 70–99)
GLUCOSE BLDC GLUCOMTR-MCNC: 122 MG/DL (ref 70–99)
GLUCOSE BLDC GLUCOMTR-MCNC: 129 MG/DL (ref 70–99)
GLUCOSE BLDC GLUCOMTR-MCNC: 156 MG/DL (ref 70–99)
GLUCOSE BLDC GLUCOMTR-MCNC: 171 MG/DL (ref 70–99)
GLUCOSE SERPL-MCNC: 123 MG/DL (ref 70–99)
GRAM STAIN RESULT: NORMAL
GRAM STAIN RESULT: NORMAL
HCT VFR BLD AUTO: 28.5 % (ref 40–53)
HGB BLD-MCNC: 8.8 G/DL (ref 13.3–17.7)
INR PPP: 1.32 (ref 0.85–1.15)
MAGNESIUM SERPL-MCNC: 2.1 MG/DL (ref 1.7–2.3)
MCH RBC QN AUTO: 28.8 PG (ref 26.5–33)
MCHC RBC AUTO-ENTMCNC: 30.9 G/DL (ref 31.5–36.5)
MCV RBC AUTO: 93 FL (ref 78–100)
PHOSPHATE SERPL-MCNC: 4.3 MG/DL (ref 2.5–4.5)
PLATELET # BLD AUTO: 122 10E3/UL (ref 150–450)
POTASSIUM SERPL-SCNC: 3.5 MMOL/L (ref 3.4–5.3)
RBC # BLD AUTO: 3.06 10E6/UL (ref 4.4–5.9)
SODIUM SERPL-SCNC: 139 MMOL/L (ref 135–145)
VANCOMYCIN SERPL-MCNC: 18.4 UG/ML
WBC # BLD AUTO: 7.2 10E3/UL (ref 4–11)

## 2023-11-08 PROCEDURE — 250N000013 HC RX MED GY IP 250 OP 250 PS 637: Performed by: STUDENT IN AN ORGANIZED HEALTH CARE EDUCATION/TRAINING PROGRAM

## 2023-11-08 PROCEDURE — 250N000011 HC RX IP 250 OP 636: Mod: JZ | Performed by: STUDENT IN AN ORGANIZED HEALTH CARE EDUCATION/TRAINING PROGRAM

## 2023-11-08 PROCEDURE — 85027 COMPLETE CBC AUTOMATED: CPT | Performed by: STUDENT IN AN ORGANIZED HEALTH CARE EDUCATION/TRAINING PROGRAM

## 2023-11-08 PROCEDURE — 83735 ASSAY OF MAGNESIUM: CPT | Performed by: NURSE PRACTITIONER

## 2023-11-08 PROCEDURE — 200N000001 HC R&B ICU

## 2023-11-08 PROCEDURE — 93325 DOPPLER ECHO COLOR FLOW MAPG: CPT | Mod: 26 | Performed by: INTERNAL MEDICINE

## 2023-11-08 PROCEDURE — 250N000013 HC RX MED GY IP 250 OP 250 PS 637: Performed by: NURSE PRACTITIONER

## 2023-11-08 PROCEDURE — 93321 DOPPLER ECHO F-UP/LMTD STD: CPT | Mod: 26 | Performed by: INTERNAL MEDICINE

## 2023-11-08 PROCEDURE — 999N000287 HC ICU ADULT ROUNDING, EACH 10 MINS

## 2023-11-08 PROCEDURE — 999N000156 HC STATISTIC RCP CONSULT EA 30 MIN

## 2023-11-08 PROCEDURE — 80202 ASSAY OF VANCOMYCIN: CPT | Performed by: STUDENT IN AN ORGANIZED HEALTH CARE EDUCATION/TRAINING PROGRAM

## 2023-11-08 PROCEDURE — 85610 PROTHROMBIN TIME: CPT | Performed by: NURSE PRACTITIONER

## 2023-11-08 PROCEDURE — 250N000011 HC RX IP 250 OP 636: Mod: JZ | Performed by: NURSE PRACTITIONER

## 2023-11-08 PROCEDURE — 93325 DOPPLER ECHO COLOR FLOW MAPG: CPT

## 2023-11-08 PROCEDURE — 97535 SELF CARE MNGMENT TRAINING: CPT | Mod: GO

## 2023-11-08 PROCEDURE — 99207 PR NO BILLABLE SERVICE THIS VISIT: CPT | Performed by: EMERGENCY MEDICINE

## 2023-11-08 PROCEDURE — 93308 TTE F-UP OR LMTD: CPT | Mod: 26 | Performed by: INTERNAL MEDICINE

## 2023-11-08 PROCEDURE — 250N000013 HC RX MED GY IP 250 OP 250 PS 637: Performed by: INTERNAL MEDICINE

## 2023-11-08 PROCEDURE — 97110 THERAPEUTIC EXERCISES: CPT | Mod: GO

## 2023-11-08 PROCEDURE — 99232 SBSQ HOSP IP/OBS MODERATE 35: CPT | Performed by: INTERNAL MEDICINE

## 2023-11-08 PROCEDURE — 80069 RENAL FUNCTION PANEL: CPT | Performed by: INTERNAL MEDICINE

## 2023-11-08 PROCEDURE — 999N000157 HC STATISTIC RCP TIME EA 10 MIN

## 2023-11-08 PROCEDURE — 93321 DOPPLER ECHO F-UP/LMTD STD: CPT

## 2023-11-08 RX ORDER — POTASSIUM CHLORIDE 1500 MG/1
20 TABLET, EXTENDED RELEASE ORAL ONCE
Status: COMPLETED | OUTPATIENT
Start: 2023-11-08 | End: 2023-11-08

## 2023-11-08 RX ORDER — QUETIAPINE FUMARATE 25 MG/1
50 TABLET, FILM COATED ORAL
Status: DISCONTINUED | OUTPATIENT
Start: 2023-11-08 | End: 2023-11-14 | Stop reason: HOSPADM

## 2023-11-08 RX ORDER — SODIUM BICARBONATE 650 MG/1
650 TABLET ORAL 3 TIMES DAILY
Status: DISCONTINUED | OUTPATIENT
Start: 2023-11-08 | End: 2023-11-09

## 2023-11-08 RX ADMIN — CEFTRIAXONE SODIUM 1 G: 1 INJECTION, POWDER, FOR SOLUTION INTRAMUSCULAR; INTRAVENOUS at 11:15

## 2023-11-08 RX ADMIN — APIXABAN 5 MG: 5 TABLET, FILM COATED ORAL at 21:06

## 2023-11-08 RX ADMIN — SODIUM BICARBONATE 650 MG: 648 TABLET ORAL at 21:06

## 2023-11-08 RX ADMIN — SODIUM BICARBONATE 1300 MG: 648 TABLET ORAL at 08:19

## 2023-11-08 RX ADMIN — APIXABAN 5 MG: 5 TABLET, FILM COATED ORAL at 08:15

## 2023-11-08 RX ADMIN — TORSEMIDE 50 MG: 20 TABLET ORAL at 17:20

## 2023-11-08 RX ADMIN — POLYETHYLENE GLYCOL 3350 17 G: 17 POWDER, FOR SOLUTION ORAL at 08:15

## 2023-11-08 RX ADMIN — HEPARIN SODIUM 5000 UNITS: 10000 INJECTION, SOLUTION INTRAVENOUS; SUBCUTANEOUS at 06:01

## 2023-11-08 RX ADMIN — ATORVASTATIN CALCIUM 40 MG: 40 TABLET, FILM COATED ORAL at 21:06

## 2023-11-08 RX ADMIN — SODIUM BICARBONATE 650 MG: 648 TABLET ORAL at 14:27

## 2023-11-08 RX ADMIN — POTASSIUM CHLORIDE 20 MEQ: 1500 TABLET, EXTENDED RELEASE ORAL at 10:21

## 2023-11-08 RX ADMIN — AMIODARONE HYDROCHLORIDE 400 MG: 200 TABLET ORAL at 08:16

## 2023-11-08 RX ADMIN — QUETIAPINE FUMARATE 50 MG: 25 TABLET ORAL at 21:54

## 2023-11-08 RX ADMIN — SENNOSIDES AND DOCUSATE SODIUM 1 TABLET: 8.6; 5 TABLET ORAL at 08:15

## 2023-11-08 RX ADMIN — AMIODARONE HYDROCHLORIDE 400 MG: 200 TABLET ORAL at 21:06

## 2023-11-08 RX ADMIN — TORSEMIDE 50 MG: 20 TABLET ORAL at 08:17

## 2023-11-08 RX ADMIN — Medication 5 MG: at 21:54

## 2023-11-08 ASSESSMENT — ACTIVITIES OF DAILY LIVING (ADL)
ADLS_ACUITY_SCORE: 28

## 2023-11-08 NOTE — PHARMACY-VANCOMYCIN DOSING SERVICE
Vanco now discontinued. Level drawn this am was 18.4. Scr increasing. SSTI so trough goal 10-15 would be reached this afternoon/evening. 1500 mg IV q48h may have been appropriate dosing but levels would be needed often with Scr increasing.    Regimen: 1500 mg IV every 48 hours.  Start time: 09:50 on 11/08/2023  Exposure target: AUC24 (range)400-600 mg/L.hr   AUC24,ss: 442 mg/L.hr  Probability of AUC24 > 400: 72 %  Ctrough,ss: 15.1 mg/L  Probability of Ctrough,ss > 20: 10 %  Probability of nephrotoxicity (Lodise CRISPIN 2009): 10 %

## 2023-11-08 NOTE — PROGRESS NOTES
"  Cardiology Progress Note    Assessment:  1.  History of recent bypass surgery.  Status post window for loculated pericardial effusion.  Patient underwent bypass surgery x3 vessels September 2023.  Patient admitted with progressive fatigue, possible septic shock with purulent left lower extremity.  Echocardiogram November fourth with normal systolic function, mild left ventricular hypertrophy and a medium to large size pericardial effusion.    2.  Atrial fibrillation.  Patient on amiodarone 400 mg twice daily.  Patient started on apixaban this morning.  Creatinine 2.40 up from 1.27 yesterday but previously 3.88 on November 4.  We will need to monitor in the setting of apixaban.  3.  Dyslipidemia on atorvastatin 40 mg daily elevated liver function test will need to monitor.  4.  Renal insufficiency.  Notes reviewed from nephrology.  Creatinine 1.24 yesterday but today 2.40.  Notes from nephrology reviewed.  IDH on hold today.  5.  Acute on chronic anemia.  Hemoglobin yesterday 8.9.  6.  Soft blood pressure.  Principal Problem:    Bilateral pleural effusion  Active Problems:    Hyperkalemia    Left leg cellulitis    Severe sepsis (H)    Acute renal failure, unspecified acute renal failure type (H24)    Anemia, unspecified type    Pericardial effusion    S/P pericardial window creation      Plan:  1.  Continue to monitor.  2.  Follow-up echocardiogram today given soft blood pressure.    Subjective:   Gerardo Al is seen in follow-up.  First visit for this examiner.  Chart notes reviewed.  Patient is status post window for loculated pericardial effusion.  Chest tubes removed yesterday.  He reports that he is feeling well today.    Objective:   Vital signs in last 24 hours:  VITALS: BP 93/61   Pulse 93   Temp 97.6  F (36.4  C) (Oral)   Resp 14   Ht 1.803 m (5' 11\")   Wt 96.8 kg (213 lb 8 oz)   SpO2 98%   BMI 29.78 kg/m    BMI: Body mass index is 29.78 kg/m .  Wt Readings from Last 3 Encounters:   11/07/23 " 96.8 kg (213 lb 8 oz)       Intake/Output Summary (Last 24 hours) at 2023 0638  Last data filed at 2023 0200  Gross per 24 hour   Intake 749.27 ml   Output 1219.9 ml   Net -470.63 ml       Physical Exam:  General: Comfortable but appears tired.   Neck: Mild increase in jugular venous pressure   Lungs: Decreased at the bases   COR: Irregular, soft systolic murmur, no rub noted   Abd: nondistended, BS present   Extrem: No edema  Neuro: Alert and oriented.    Cardiographics:    EC 5, low voltage, atrial fibrillation  Echocardiogram:   chocardiogram Limited  Order: [id number]  Status: Edited Result - FINAL       Visible to patient: No (inaccessible in MyChart)       Next appt: Today at 11:15 AM in Occupational Therapy (Heidy Melgoza, SHARITAR)    0 Result Notes  Details    Reading Physician Reading Date Result Priority   Adry Velez MD  [phone] 2023      Narrative & Impression  [id number]  [facility] hospital  [id number]  [id number]^AGUSTIN^ADRY^[place] [address]     Name: JONAH ARAUJO  MRN: [id number]  : 1945  Study Date: 2023 10:10 AM  Age: 78 yrs  Gender: Male  Patient Location: [facility]  Reason For Study: Pericardial Effusion  Ordering Physician: ADRY VELEZ  Referring Physician: CHERYL VARELA  Performed By: MDG     BSA: 2.2 m2  Height: 71 in  Weight: 227 lb  HR: 89  BP: 123/57 mmHg  ______________________________________________________________________________  Procedure  Limited Portable Echo Adult.  ______________________________________________________________________________  Interpretation Summary     The visual ejection fraction is 50-55%.  There is mild to moderate septal hypokinesis.  Basal lateral wall appars dyskinetic.  The right ventricle is normal in size and function.  Large pericardial effusion  The echo/Doppler findings are inconclusive for cardiac tamponade.  Compared to the prior study dated 2023, there are  changes as noted.  Septal wall motion abnormality is new; the lateral wall motion abnormality is  more prominent. Overall LV function has decreased mildly.  ______________________________________________________________________________  Left Ventricle  The left ventricle is normal in size. There is mild to moderate concentric  left ventricular hypertrophy. The visual ejection fraction is 50-55%. There is  mild to moderate septal hypokinesis. Basal lateral wall appars dyskinetic.     Right Ventricle  The right ventricle is normal in size and function.     Atria  The left atrium is mildly dilated. The right atrium is mildly dilated.     Mitral Valve  The mitral valve leaflets appear thickened, but open well.     Tricuspid Valve  Tricuspid valve leaflets appear normal.     Aortic Valve  The aortic valve is not well visualized.     Pulmonic Valve  The pulmonic valve is not well visualized.     Vessels  The thoracic aorta cannot be assessed. IVC diameter >2.1 cm collapsing <50%  with sniff suggests a high RA pressure estimated at 15 mmHg or greater.     Pericardium  Large pericardial effusion. The echo/Doppler findings are inconclusive for  cardiac tamponade.      onclusitodd Al is a 78 year old old male with CAD s/p CABG 1 mos ago at OSH, HTN, HL who is here for CRISTAL, shock, new pericardial effusion.     - elevated R- and L-sided filling pressures, moderate pHTN c/w pulmonary venous (due to L-sided HF); normal CO/CI by Cristofer  - rapid y descents may be seen w/ extremely elevated filling pressures, but can't definitively exclude constriction or tamponade by hemodynamics alone    Telemetry: Atrial fibrillation with controlled ventricular response    Imaging:   Chest X-Ray:     Narrative & Impression   EXAM: XR CHEST PORT 1 VIEW  LOCATION: [facility]  DATE: 11/6/2023     INDICATION: Post Op CVTS Surgery  COMPARISON: 11/05/2023                                                                "       IMPRESSION: Stable size of cardiomediastinal silhouette status post median sternotomy and CABG. Endotracheal tube with tip approximately 5.5 cm above the gabriel. Stable position of right internal jugular approach [place] catheter, left central venous   catheter and right upper extremity PICC. Persistent small left pleural effusion with associated compressive atelectasis. Mild right basilar atelectasis. No pneumothorax. Bones are unchanged.      Lab Results    Chemistry/lipid CBC Cardiac Enzymes/BNP/TSH/INR   Recent Labs   Lab Test 11/07/23  0612   TRIG 172*     No results for input(s): \"LDL\" in the last 31147 hours.  Recent Labs   Lab Test 11/08/23  0600 11/07/23 0611 11/07/23 0424   NA  --   --  141   POTASSIUM  --   --  3.6   CHLORIDE  --   --  105   CO2  --   --  19*   *   < > 78   BUN  --   --  8.0   CR  --   --  1.24*   GFRESTIMATED  --   --  60*   RIKA  --   --  7.7*    < > = values in this interval not displayed.     Recent Labs   Lab Test 11/07/23 0424 11/06/23 2012 11/06/23  1240   CR 1.24* 1.33* 1.42*     Recent Labs   Lab Test 11/02/23  1255   A1C 6.7*          Recent Labs   Lab Test 11/07/23 0424   WBC 7.4   HGB 8.9*   HCT 28.4*   MCV 91   *     Recent Labs   Lab Test 11/07/23 0424 11/06/23 2012 11/06/23  1240   HGB 8.9* 9.0* 9.0*    No results for input(s): \"TROPONINI\" in the last 64639 hours.  Recent Labs   Lab Test 11/03/23  0526 11/02/23 0139   NTBNPI 14,005* 11,250*     Recent Labs   Lab Test 11/02/23 0139   TSH 2.85     Recent Labs   Lab Test 11/08/23  0556 11/07/23  0612 11/06/23  0513   INR 1.32* 1.39* 1.49*            "

## 2023-11-08 NOTE — PROGRESS NOTES
Renal progress note  CC:CRISTAL, anuria  Assessment and Plan:  78 year old male with hx of CKD 3, HTN , DM2, PAD, hx of Atrial fib on eliquis for AC, BPH , CAD with sp CABG 9/26/2023 after angiogram 9/22 with 3v disease cb mild CRITSAL at the time of discharge.  Nephrology consulted for severe CRISTAL with oligoanuric state     CRISTAL - Baseline creatinine 1.2-1.3, uptrending to 1.5s at the time of discharge, creatinine was 1.35 on 10/9/2023.  Patient had repeat labs with creatinine acutely elevated to 7.85 on 10/31/2023, further increased to 10.58 with significant azotemia  on 11/2/2023  UA turbid appearing likely consistent with ATN  CT imaging with evidence of atherosclerotic changes otherwise nonobstructive renal stones and some evidence of cystitis no hydronephrosis.  UOP improving on torsemide.  Anephric rise in creatinine but no other indication for dialysis today.    Recs:  - discussed with ICU team, with no metabolic derangement, reasonable UOP and softer BP, will hold on IDH today to see if we can avoid going back on pressors  - suspect he will need later this week but will follow carefully    Volume - weights erratic, clearly volume up on exam, mostly 3rd spaced, not dyspneic.  Continue to push torsemide.  Could dialyze but would likely put him back on pressors/potentially extend CRISTAL.  Will follow    Hypotension - has stayed off pressors but BP pretty borderline, follow    Hypokalemia - trending to 3.5, on torsemide.  Will give 20meq KCl     Hypocalcemia - improved    Metabolic acidosis - much better, reduce bicarb supplement    HTN hx  PTA on Amlodipine Benazepril and coreg  Held HTN meds   --> moderate pericardial effusion s/p pericardial window  --> management per ICU/card     Anemia acute on chronic  Hemoglobin 8-9 recently  Iron sats low normal 26%  Did receive one-time PRBC overnight 11/5/23  Transfuse if less than 8 with recent cardiac surgery  --> We will defer these plans to cardiology and ICU      Coronary disease s/p CABG x3 on 9/26/2023  History of atrial fibrillation s/p cardioversion in August 2023  On AC with Eliquis currently held  Loculated pericardial effusion concerns for tamponade physiology  Last echo with EF of 52%  Echo showing large-sized pericardial effusion with repeat echo 11/5/2023 showing some septal abnormality and large effusion s/p pericardial window      Possible septic shock with leg cellulitis/abscess  General surgery completed bedside I&D  On ceftriaxone alone  Blood Cx negative, wound culture growing Klebsiella     Acute respiratory failure in the setting of CRISTAL and hypervolemia  Extubated, remains on 1L NC  Keeping on the drier side     DM2  Insulin management per ICU       Thank you for the consultation we will follow    [facility] Vanesa  [facility]  [phone]        Subjective  Seen in room.  BP softer but not needing pressors.  Decent response to torsemide.  1L NC.  Up in chair.  Arms and hands quite edematous.  No dyspnea, currently off oxygen.      Discussed with Dr. Lal    Objective    Vital signs in last 24 hours  Temp:  [97.5  F (36.4  C)-97.6  F (36.4  C)] 97.6  F (36.4  C)  Pulse:  [] 93  Resp:  [11-24] 14  BP: ()/(50-80) 93/61  MAP:  [57 mmHg-95 mmHg] 95 mmHg  Arterial Line BP: ()/(41-71) 150/71  FiO2 (%):  [21 %] 21 %  SpO2:  [89 %-100 %] 98 %  Weight:   [unfilled]    Intake/Output last 3 shifts  I/O last 3 completed shifts:  In: 789.27 [P.O.:310; I.V.:479.27]  Out: 1219.9 [Urine:950; Other:269.9]  Intake/Output this shift:  I/O this shift:  In: -   Out: 200 [Urine:200]    Physical Exam  Awake and alert, up in chair  CV: RRR without murmur or rub  Lung: clear and equal; no extra sounds  Ab: soft and NT; not distended; normal bs  Ext: edema in hands bilaterally, some in legs  Skin; no rash  Barragan no barragan  Access: tunneled LIJ line    Pertinent Labs   Lab Results   Component Value Date    WBC 7.4 11/07/2023    HGB 8.9 (L)  11/07/2023    HCT 28.4 (L) 11/07/2023    MCV 91 11/07/2023     (L) 11/07/2023     Lab Results   Component Value Date    BUN 15.3 11/08/2023     11/08/2023    CO2 25 11/08/2023       Lab Results   Component Value Date    ALBUMIN 3.0 (L) 11/08/2023     Lab Results   Component Value Date    PHOS 4.3 11/08/2023     I reviewed all lab results

## 2023-11-08 NOTE — PLAN OF CARE
[facility] - ICU    RN Progress Note:            Pertinent Assessments:      Please refer to flowsheet rows for full assessment     Slept well tonight, cares bundled, lighting decreased.  Also received 5mg melatonin and 100mg seroquel at HS.            Key Events - This Shift:       BP soft while sleeping.  No intervention required.                Barriers to Discharge / Downgrade:     None         Point of Contact Update YES-OR-NO:No  If No, reason: Uneventful night shift, can be updated during wake hours.

## 2023-11-08 NOTE — PROGRESS NOTES
Care Management Follow Up    Length of Stay (days): 6    Expected Discharge Date: 11/13/2023     Concerns to be Addressed:     Care Progression  Patient plan of care discussed at interdisciplinary rounds: Yes    Anticipated Discharge Disposition:  ARU - Referral pending     Anticipated Discharge Services:  NA  Anticipated Discharge DME:  NA    Patient/family educated on Medicare website which has current facility and service quality ratings:  NA  Education Provided on the Discharge Plan:  NA  Patient/Family in Agreement with the Plan:  NA    Referrals Placed by CM/SW:  [facility]  Private pay costs discussed: Not applicable    Additional Information:  Discussed patient in ICU rounds. Possible downgrade from ICU today.     Referral sent to ARU to determine eligibility.     Social HX: Patient from home with spouse. Independent at baseline, no Services, no DME. Recently hospitalized for a cardiac surgery.     CM will continue to follow care progression and aide in discharge planning as needed.     10:56 AM - ARU will follow medical readiness for discharge.     Nancy Orozco RN

## 2023-11-08 NOTE — PROGRESS NOTES
"[facility]  Critical Care Progress Note    Summary:   Gerardo Al 79 yo M PMH multivessel CAD (9/26/23 s/p three vessel CABG), atrial fibrillation, T2DM, HTN, HLD, CKD, & BPH. Recent diagnosis 10/25/23 of left lower extremity harvest site erythema & pain concerning for soft tissue infection; he was started on Cephalexin    Presented 11/2/23 for progressive fatigue, nausea & vomiting. Found to have suspected septic shock secondary to purulent left lower extremity soft tissue infection (11/2 s/p bedside I&D), acute hypoxemic respiratory failure requiring intubation (11/5-11/7), oliguric CRISTAL requiring renal replacement therapy, & large posteriorly located loculated pericardial effusion with suspected tamponade physiology requiring pericardial window (11/5)    Interval Events:  No acute events overnight, on 1 L NC this AM. Soft BP overnight, this morning 90s/60. HR 90s. Improved  mL yesterday. Electrolytes stable off CRRT. CBC stable Hgb & WC, plts down-drifting.      Cardiology planning limited TTE Friday. His weight is up 10 lbs from yesterday, will recheck; net -1 L since admission & > net - 2.5 L previous two days. Stop vanc. Hold on iHD & observe UO with diuresis.     Assessment & Plan:   Goals of Care:  Life prolonging without limits      Neurology, Psychiatry, Sedation, Analgesia:  Neurologically intact, altert & oriented     Cardiovascular:  Septic shock - resolved   Evinced by purulent soft tissue infection & low measured SVR with preserved cardiac output. Low suspicion for obstructive or neurologic phenomena     Moderate sized posteriorly located pericardial effusion s/p pericardial window (11/5)  Drained fluid consistent with old blood. Identified on 11/2 CT imaging & serial echocardiography. PA catheter was placed without convincing evidence of tamponade physiology initially. 11/5 TTE \"inconclusive for tamponade\" with mild-moderate septal hypokinesis, basal lateral wall " dyskinesia, LVEF 50-55%, normal RV size & function, IVC >2.1cm with minimal respiratory variation   - cardiology & cardiothoracic surgery was consulted    Multivessel CAD s/p recent three vessel CABG    - PTA ASA & atorvastatin     Atrial fibrillation  - enteral amiodarone   - PTA apixaban     Primary HTN  - hold PTA amlodipine   - hold PTA carvedilol     Respiratory, Airway:  Acute hypoxemic-hypercapnic respiratory failure   Compression atelectasis left lower lobe with shunt physiology   Suspect hypoxemia multifactorial secondary to decreased mixed venous O2 / limited cardiac output, left basilar shunting due to lung compression/atelectasis from the weight of the heart/pericardial structure  - supplemental O2 to maintain spO2 >= 90-96% & PaO2 >= 60 mmHg  - pulmonary hygiene: acapella QID, IS Q2H while awake, out of bed to chair, PT/OT as able     Small left pleural effusion   Appeared simple on 11/2 CT images. Presumed transudative however cannot rule out adjacent infectious process & parapneumonic effusion     Gastrointestinal:  Elevated transaminases - improved   Suspected congestive hepatopathy     Renal, Acid-base, Electrolytes, Volume:  Oliguric CRISTAL on CKD secondary to ATN - improved   - consulted nephrology    Infectious Disease:  Left lower extremity purulent soft tissue surgical site infection   Purulence expressed from extremity wound; 11/2/23 surgery performed a bedside I&D. 11/2 pus culture grew Klebsiella pneumoniae (R-ampicillin). Cannot rule out pneumonia in the setting of L-lower lobe atelectasis or consolidation however no symptoms of pneumonia prior to admission. Cannot rule out pericardial infection.   - ordered MRSA nares & sputum culture to evaluate alternative sources   - vanc & pip-tazo (11/2-11/7), transition to Ceftriaxone (11/7-current) for 10-14 days duration     Hematology, Oncology:  Normocytic anemia   Multifactorial secondary acute/chronic inflammation/infection, CKD, phlebotomy, at  least. No signs of bleeding  - monitor     Endocrine:  T2DM   - hold insulin    Hypoglycemia  - advance diet    Checklist:  FEN: advance as tolerated   VTE ppx: therapeutic apixaban   GI ppx: not indicated   Bowel regimen: senna/doc BID, PEG QD  Lines/tube-size: L-IJ tunneled HD catheter (11/3), RUE PICC (11/2)  Skin: L-lower extremity medial wound POA   PT/OT/SLP, early mobility: cardiac rehab, PT, OT  Code Status: full     Physical Exam:  Neurologic: Alert & oriented. 5/5 strength throughout.   HEENT: Head and face normal. No nasal discharge. Oropharynx normal. Eyelids, conjunctiva, & sclera normal.   Neck: Neck appearance normal. No neck masses.   Respiratory: Lungs clear bilaterally. No wheezes, crackles, or rhonchi.   Cardiovascular: Regular rate & rhythm. No murmurs  Gastrointestinal: Bowel sounds present. No tenderness. Obese.Soft     Musculoskeletal: Skeletal configuration normal and muscle mass normal for age. Joint appearance overall normal.  Skin, Hair, & Nails: Skin color normal. No skin lesions.  Hair & nails normal.  Extremities: 1+ lower extremity pitting edema. LLE medial wound, clean, dry, minimal erythema, no purulence or tenderness, no warmth     All pertinent vital signs, ventilator settings, I&Os, laboratory, microbiology, ECGs, & imaging data has been personally reviewed. Total subsequent encounter time, excluding procedures, was 30 minutes     ALLY Villeda MD  Critical Care Medicine

## 2023-11-08 NOTE — TREATMENT PLAN
RCAT Treatment Plan    Patient Score: 10  Patient Acuity: 4    Clinical Indication for Therapy: atelectasis and prevent atelectasis, weak cough    Therapy Ordered: flutter valve QID, IS     Assessment Summary: Patient is a nonsmoker with no pulmonary history. S/p CABG in September. Oxygen titrated down to room air with oxygen saturation in the mid 90s. Shallow breathing noted with respiratory rate of 16. BBS diminished and clear. Patient does well with IS and flutter during the first few breaths. Poorer/weaker effort half way through. Patient is able to achieve ~1,000 ml of IS. Patient has a weak, dry cough. CXR from 11/6 indicates atelectasis (left and right) and small left pleural effusion. RT will continue to follow. Will change flutter valve to BID per RCAT guideline. RT to re-evaluate within 24 hours.    Alexandra Manzo, RT  11/8/2023

## 2023-11-09 ENCOUNTER — APPOINTMENT (OUTPATIENT)
Dept: PHYSICAL THERAPY | Facility: HOSPITAL | Age: 78
DRG: 856 | End: 2023-11-09
Payer: COMMERCIAL

## 2023-11-09 ENCOUNTER — APPOINTMENT (OUTPATIENT)
Dept: OCCUPATIONAL THERAPY | Facility: HOSPITAL | Age: 78
DRG: 856 | End: 2023-11-09
Payer: COMMERCIAL

## 2023-11-09 LAB
ANION GAP SERPL CALCULATED.3IONS-SCNC: 9 MMOL/L (ref 7–15)
BUN SERPL-MCNC: 18.1 MG/DL (ref 8–23)
CALCIUM SERPL-MCNC: 8.2 MG/DL (ref 8.8–10.2)
CHLORIDE SERPL-SCNC: 105 MMOL/L (ref 98–107)
CREAT SERPL-MCNC: 3 MG/DL (ref 0.67–1.17)
DEPRECATED HCO3 PLAS-SCNC: 28 MMOL/L (ref 22–29)
EGFRCR SERPLBLD CKD-EPI 2021: 21 ML/MIN/1.73M2
ERYTHROCYTE [DISTWIDTH] IN BLOOD BY AUTOMATED COUNT: 16.2 % (ref 10–15)
GLUCOSE BLDC GLUCOMTR-MCNC: 105 MG/DL (ref 70–99)
GLUCOSE BLDC GLUCOMTR-MCNC: 113 MG/DL (ref 70–99)
GLUCOSE BLDC GLUCOMTR-MCNC: 123 MG/DL (ref 70–99)
GLUCOSE BLDC GLUCOMTR-MCNC: 174 MG/DL (ref 70–99)
GLUCOSE BLDC GLUCOMTR-MCNC: 183 MG/DL (ref 70–99)
GLUCOSE BLDC GLUCOMTR-MCNC: 195 MG/DL (ref 70–99)
GLUCOSE SERPL-MCNC: 116 MG/DL (ref 70–99)
HCT VFR BLD AUTO: 26.8 % (ref 40–53)
HGB BLD-MCNC: 8.4 G/DL (ref 13.3–17.7)
INR PPP: 1.54 (ref 0.85–1.15)
MAGNESIUM SERPL-MCNC: 2.1 MG/DL (ref 1.7–2.3)
MCH RBC QN AUTO: 28.9 PG (ref 26.5–33)
MCHC RBC AUTO-ENTMCNC: 31.3 G/DL (ref 31.5–36.5)
MCV RBC AUTO: 92 FL (ref 78–100)
PLATELET # BLD AUTO: 117 10E3/UL (ref 150–450)
POTASSIUM SERPL-SCNC: 3.3 MMOL/L (ref 3.4–5.3)
RBC # BLD AUTO: 2.91 10E6/UL (ref 4.4–5.9)
SODIUM SERPL-SCNC: 142 MMOL/L (ref 135–145)
WBC # BLD AUTO: 5.6 10E3/UL (ref 4–11)

## 2023-11-09 PROCEDURE — G0008 ADMIN INFLUENZA VIRUS VAC: HCPCS | Performed by: INTERNAL MEDICINE

## 2023-11-09 PROCEDURE — 83735 ASSAY OF MAGNESIUM: CPT | Performed by: NURSE PRACTITIONER

## 2023-11-09 PROCEDURE — 999N000156 HC STATISTIC RCP CONSULT EA 30 MIN

## 2023-11-09 PROCEDURE — 97110 THERAPEUTIC EXERCISES: CPT | Mod: GO

## 2023-11-09 PROCEDURE — 250N000012 HC RX MED GY IP 250 OP 636 PS 637: Performed by: EMERGENCY MEDICINE

## 2023-11-09 PROCEDURE — 210N000001 HC R&B IMCU HEART CARE

## 2023-11-09 PROCEDURE — 97110 THERAPEUTIC EXERCISES: CPT | Mod: GP

## 2023-11-09 PROCEDURE — 82310 ASSAY OF CALCIUM: CPT | Performed by: STUDENT IN AN ORGANIZED HEALTH CARE EDUCATION/TRAINING PROGRAM

## 2023-11-09 PROCEDURE — 97535 SELF CARE MNGMENT TRAINING: CPT | Mod: GO

## 2023-11-09 PROCEDURE — 250N000011 HC RX IP 250 OP 636: Performed by: INTERNAL MEDICINE

## 2023-11-09 PROCEDURE — 250N000013 HC RX MED GY IP 250 OP 250 PS 637: Performed by: EMERGENCY MEDICINE

## 2023-11-09 PROCEDURE — 250N000011 HC RX IP 250 OP 636: Mod: JZ | Performed by: STUDENT IN AN ORGANIZED HEALTH CARE EDUCATION/TRAINING PROGRAM

## 2023-11-09 PROCEDURE — 99233 SBSQ HOSP IP/OBS HIGH 50: CPT | Performed by: EMERGENCY MEDICINE

## 2023-11-09 PROCEDURE — 99232 SBSQ HOSP IP/OBS MODERATE 35: CPT | Performed by: INTERNAL MEDICINE

## 2023-11-09 PROCEDURE — 97116 GAIT TRAINING THERAPY: CPT | Mod: GP

## 2023-11-09 PROCEDURE — 250N000013 HC RX MED GY IP 250 OP 250 PS 637: Performed by: INTERNAL MEDICINE

## 2023-11-09 PROCEDURE — 99232 SBSQ HOSP IP/OBS MODERATE 35: CPT | Mod: 24 | Performed by: INTERNAL MEDICINE

## 2023-11-09 PROCEDURE — 85610 PROTHROMBIN TIME: CPT | Performed by: NURSE PRACTITIONER

## 2023-11-09 PROCEDURE — 999N000157 HC STATISTIC RCP TIME EA 10 MIN

## 2023-11-09 PROCEDURE — 85027 COMPLETE CBC AUTOMATED: CPT | Performed by: STUDENT IN AN ORGANIZED HEALTH CARE EDUCATION/TRAINING PROGRAM

## 2023-11-09 PROCEDURE — 90662 IIV NO PRSV INCREASED AG IM: CPT | Performed by: INTERNAL MEDICINE

## 2023-11-09 RX ORDER — POTASSIUM CHLORIDE 1500 MG/1
40 TABLET, EXTENDED RELEASE ORAL ONCE
Status: COMPLETED | OUTPATIENT
Start: 2023-11-09 | End: 2023-11-09

## 2023-11-09 RX ORDER — NICOTINE POLACRILEX 4 MG
15-30 LOZENGE BUCCAL
Status: DISCONTINUED | OUTPATIENT
Start: 2023-11-09 | End: 2023-11-09

## 2023-11-09 RX ORDER — DEXTROSE MONOHYDRATE 25 G/50ML
25-50 INJECTION, SOLUTION INTRAVENOUS
Status: DISCONTINUED | OUTPATIENT
Start: 2023-11-09 | End: 2023-11-09

## 2023-11-09 RX ADMIN — APIXABAN 5 MG: 5 TABLET, FILM COATED ORAL at 08:51

## 2023-11-09 RX ADMIN — INSULIN ASPART 1 UNITS: 100 INJECTION, SOLUTION INTRAVENOUS; SUBCUTANEOUS at 17:05

## 2023-11-09 RX ADMIN — APIXABAN 5 MG: 5 TABLET, FILM COATED ORAL at 20:02

## 2023-11-09 RX ADMIN — TORSEMIDE 50 MG: 20 TABLET ORAL at 09:01

## 2023-11-09 RX ADMIN — ATORVASTATIN CALCIUM 40 MG: 40 TABLET, FILM COATED ORAL at 20:02

## 2023-11-09 RX ADMIN — CEFTRIAXONE SODIUM 1 G: 1 INJECTION, POWDER, FOR SOLUTION INTRAMUSCULAR; INTRAVENOUS at 11:39

## 2023-11-09 RX ADMIN — SODIUM BICARBONATE 650 MG: 648 TABLET ORAL at 08:59

## 2023-11-09 RX ADMIN — INFLUENZA A VIRUS A/VICTORIA/4897/2022 IVR-238 (H1N1) ANTIGEN (FORMALDEHYDE INACTIVATED), INFLUENZA A VIRUS A/DARWIN/9/2021 SAN-010 (H3N2) ANTIGEN (FORMALDEHYDE INACTIVATED), INFLUENZA B VIRUS B/PHUKET/3073/2013 ANTIGEN (FORMALDEHYDE INACTIVATED), AND INFLUENZA B VIRUS B/MICHIGAN/01/2021 ANTIGEN (FORMALDEHYDE INACTIVATED) 0.7 ML: 60; 60; 60; 60 INJECTION, SUSPENSION INTRAMUSCULAR at 09:53

## 2023-11-09 RX ADMIN — POTASSIUM CHLORIDE 40 MEQ: 1500 TABLET, EXTENDED RELEASE ORAL at 09:51

## 2023-11-09 RX ADMIN — AMIODARONE HYDROCHLORIDE 400 MG: 200 TABLET ORAL at 20:01

## 2023-11-09 RX ADMIN — AMIODARONE HYDROCHLORIDE 400 MG: 200 TABLET ORAL at 08:50

## 2023-11-09 RX ADMIN — Medication 5 MG: at 20:01

## 2023-11-09 ASSESSMENT — ACTIVITIES OF DAILY LIVING (ADL)
ADLS_ACUITY_SCORE: 32
ADLS_ACUITY_SCORE: 32
ADLS_ACUITY_SCORE: 28
ADLS_ACUITY_SCORE: 28
ADLS_ACUITY_SCORE: 32
ADLS_ACUITY_SCORE: 28
ADLS_ACUITY_SCORE: 32
ADLS_ACUITY_SCORE: 32
ADLS_ACUITY_SCORE: 27
ADLS_ACUITY_SCORE: 28
ADLS_ACUITY_SCORE: 28
ADLS_ACUITY_SCORE: 32

## 2023-11-09 NOTE — PLAN OF CARE
Problem: Malnutrition  Goal: Improved Nutritional Intake  Outcome: Progressing   Goal Outcome Evaluation:    Patient drinking Ensure Enlive and eating about 50% at meals.  We reviewed protein foods to eat with meals for healing and repletion.    Patient to work on eating protein first at meals.

## 2023-11-09 NOTE — PROGRESS NOTES
Renal progress note  CC:CRISTAL, anuria  Assessment and Plan:  78 year old male with hx of CKD 3, HTN , DM2, PAD, hx of Atrial fib on eliquis for AC, BPH , CAD with sp CABG 9/26/2023 after angiogram 9/22 with 3v disease cb mild CRISTAL at the time of discharge.  Nephrology consulted for severe CRISTAL with oligoanuric state     CRISTAL - Baseline creatinine 1.2-1.3, uptrending to 1.5s at the time of discharge, creatinine was 1.35 on 10/9/2023.  Patient had repeat labs with creatinine acutely elevated to 7.85 on 10/31/2023, further increased to 10.58 with significant azotemia  on 11/2/2023  UA turbid appearing likely consistent with ATN  CT imaging with evidence of atherosclerotic changes otherwise nonobstructive renal stones and some evidence of cystitis no hydronephrosis.  UOP improving on torsemide.  Rise in Cr slowing.    Recs:  - hold on dialysis today  - keep CVC  - I'm okay with him going to the floor, if we have BP issues with dialysis we can use midodrine    Volume - weights trending down, mostly 3rd spaced.  Change to torsemide 50mg once a day    Hypotension - has stayed off pressors but BP pretty borderline, follow    Hypokalemia - trending to 3.3, on diuretic, will give 40meq and follow     Hypocalcemia - improved    Metabolic acidosis - much better, stop bicarb supplement    HTN hx  PTA on Amlodipine Benazepril and coreg  Held HTN meds   --> moderate pericardial effusion s/p pericardial window  --> management per ICU/card     Anemia acute on chronic  Hemoglobin 8-9 recently  Iron sats low normal 26%  Did receive one-time PRBC overnight 11/5/23  Transfuse if less than 8 with recent cardiac surgery  --> We will defer these plans to cardiology and ICU     Coronary disease s/p CABG x3 on 9/26/2023  History of atrial fibrillation s/p cardioversion in August 2023  On AC with Eliquis currently held  Loculated pericardial effusion concerns for tamponade physiology  Last echo with EF of 52%  Echo showing  large-sized pericardial effusion with repeat echo 11/5/2023 showing some septal abnormality and large effusion s/p pericardial window      Possible septic shock with leg cellulitis/abscess  General surgery completed bedside I&D  On ceftriaxone alone  Blood Cx negative, wound culture growing Klebsiella     Acute respiratory failure - resolved     DM2  Insulin management per ICU, explained why we're holding metformin       Thank you for the consultation we will follow    Isreal Alexis  [facility]  [phone]        Subjective  Seen in room.  bP on the softer side but remains off of levophed.  Cr rise slower than before.  Good response to diuretics.  Would continue to hold off on dialysis, seems like he might be recovering.      Objective    Vital signs in last 24 hours  Temp:  [97.3  F (36.3  C)-98  F (36.7  C)] 97.8  F (36.6  C)  Pulse:  [] 97  Resp:  [16-18] 18  BP: ()/(49-78) 110/72  SpO2:  [89 %-100 %] 96 %  Weight:   [unfilled]    Intake/Output last 3 shifts  I/O last 3 completed shifts:  In: 660 [P.O.:540; I.V.:120]  Out: 1900 [Urine:1700; Stool:200]  Intake/Output this shift:  No intake/output data recorded.    Physical Exam  Awake and alert, up in chair  CV: RRR without murmur or rub  Lung: clear and equal; no extra sounds  Ab: soft and NT; not distended; normal bs  Ext: edema in hands bilaterally, some in legs  Skin; no rash  Barragan no barragan  Access: tunneled LIJ line    Pertinent Labs   Lab Results   Component Value Date    WBC 5.6 11/09/2023    HGB 8.4 (L) 11/09/2023    HCT 26.8 (L) 11/09/2023    MCV 92 11/09/2023     (L) 11/09/2023     Lab Results   Component Value Date    BUN 18.1 11/09/2023     11/09/2023    CO2 28 11/09/2023       Lab Results   Component Value Date    ALBUMIN 3.0 (L) 11/08/2023     Lab Results   Component Value Date    PHOS 4.3 11/08/2023     I reviewed all lab results

## 2023-11-09 NOTE — PLAN OF CARE
[facility] - ICU    RN Progress Note:            Pertinent Assessments:      Please refer to flowsheet rows for full assessment     Bundled cares, lighting decreased. Slept well tonight. Upper extremities remain swollen.          Key Events - This Shift:     Decreased HS dose of seroquel back to 50mg in attempt to avoid soft Bps overnight.  Change in dosage did not make any difference, borderline Bps overnight.  No acute intervention, pressures rebound nicely when woken.  (Levophed has been off since 11/7/23 at 0700).          Barriers to Discharge / Downgrade:     Potential for requiring vasopressor support.          Point of Contact Update YES-OR-NO: No  If No, reason: Uneventful night shift, can be updated during wake hours.

## 2023-11-09 NOTE — PROGRESS NOTES
Imp/plan:  S/p surgical window for tamponade loculated effusion s/p CABG - echo yesterday no evidence tamponade physiology, sitting up using the toilet.   Afib on amiodarone and eliquis, Hgb 8.4 from 9 yesterday   CAD s/p CABG - on atorvastatin   Acute kidney injur/ATN on toresemide, rising Cr 3.0 from 1.24, followed by nephrology    Discussion only today  Current Facility-Administered Medications   Medication    [Held by provider] acetaminophen (TYLENOL) tablet 650 mg    amiodarone (PACERONE) tablet 400 mg    apixaban ANTICOAGULANT (ELIQUIS) tablet 5 mg    atorvastatin (LIPITOR) tablet 40 mg    bisacodyl (DULCOLAX) suppository 10 mg    calcium gluconate 1 g in 50 mL in sodium chloride intermittent infusion    calcium gluconate 2 g in  mL intermittent infusion    calcium gluconate 3 g in sodium chloride 0.9 % 100 mL intermittent infusion    cefTRIAXone (ROCEPHIN) 1 g vial to attach to  mL bag for ADULTS or NS 50 mL bag for PEDS    dextrose 5% infusion    glucose gel 15-30 g    Or    dextrose 50 % injection 25-50 mL    Or    glucagon injection 1 mg    hydrALAZINE (APRESOLINE) injection 10 mg    influenza vac high-dose quad (FLUZONE HD) injection MARGARET 0.7 mL    ipratropium - albuterol 0.5 mg/2.5 mg/3 mL (DUONEB) neb solution 3 mL    lactated ringers BOLUS 250 mL    Lidocaine (LIDOCARE) 4 % Patch 1-2 patch    magnesium hydroxide (MILK OF MAGNESIA) suspension 30 mL    magnesium sulfate 2 g in 50 mL sterile water intermittent infusion    melatonin tablet 5 mg    naloxone (NARCAN) injection 0.2 mg    Or    naloxone (NARCAN) injection 0.4 mg    Or    naloxone (NARCAN) injection 0.2 mg    Or    naloxone (NARCAN) injection 0.4 mg    No heparin required    No heparin required    norepinephrine (LEVOPHED) 16 mg in sodium chloride 0.9 % 250 mL infusion CENTRAL    ondansetron (ZOFRAN ODT) ODT tab 4 mg    Or    ondansetron (ZOFRAN) injection 4 mg    oxyCODONE (ROXICODONE) tablet 5 mg    Or    oxyCODONE (ROXICODONE)  "tablet 10 mg    polyethylene glycol (MIRALAX) Packet 17 g    polyethylene glycol (MIRALAX) Packet 17 g    prochlorperazine (COMPAZINE) injection 5 mg    Or    prochlorperazine (COMPAZINE) tablet 5 mg    QUEtiapine (SEROquel) tablet 50 mg    Reason beta blocker order not selected    senna-docusate (SENOKOT-S/PERICOLACE) 8.6-50 MG per tablet 1 tablet    senna-docusate (SENOKOT-S/PERICOLACE) 8.6-50 MG per tablet 1 tablet    Or    senna-docusate (SENOKOT-S/PERICOLACE) 8.6-50 MG per tablet 2 tablet    sodium bicarbonate tablet 650 mg    sodium chloride (PF) 0.9% PF flush 10-20 mL    sodium chloride (PF) 0.9% PF flush 10-40 mL    sodium chloride (PF) 0.9% PF flush 10-40 mL    sodium chloride (PF) 0.9% PF flush 10-40 mL    sodium chloride 0.9% BOLUS 1-250 mL    torsemide (DEMADEX) tablet 50 mg     Past Medical History:   Diagnosis Date    Hx of CABG     Sept 2023 at [facility]        Review of Systems: 12 points negative other than above    /60   Pulse 97   Temp 97.7  F (36.5  C) (Oral)   Resp 18   Ht 1.803 m (5' 11\")   Wt 99.9 kg (220 lb 4.8 oz)   SpO2 96%   BMI 30.73 kg/m        Lab Results   Component Value Date    HGB 8.4 (L) 11/09/2023     Lab Results   Component Value Date     (L) 11/09/2023     No results found for: \"CREATININE\"  No components found for: \"K\"          Stuart Gallardo MD  Interventional Cardiology   [facility]  [phone]    "

## 2023-11-09 NOTE — TREATMENT PLAN
RCAT Treatment Plan    Patient Score: 5  Patient Acuity: 5    Clinical Indication for Therapy: atelectasis and prevent atelectasis    Therapy Ordered: flutter valve BID, IS while awake    Assessment Summary: Patient is currently in room air with oxygen saturation in the mid 90s. BBS clear and diminished. Patient is able to effectively perform IS (up to 1,250 ml) and flutter valve with strong effort. Cough is stronger today and is dry. Will change flutter valve to PRN.     Alexandra Manzo, RT  11/9/2023

## 2023-11-09 NOTE — PLAN OF CARE
[facility] - ICU    RN Progress Note:            Pertinent Assessments:      Please refer to flowsheet rows for full assessment     Pt remains in Afib, VSS and did well this shift. Worked with cardiac rehabx2, up in chair and back to bed numerous times. No dialysis today, voiding small amounts. Had bedside echo.           Key Events - This Shift:   See above        YESICA SAT (Sedation Awakening Trial): For use ONLY if intubated    SAT Safety Screen Not Applicable   If FAILED why?    SAT Performed Not Applicable   If FAILED why?               Barriers to Discharge / Downgrade:     Close monitoring         Point of Contact Update YES-OR-NO: Yes  Name:Francine  Summary of Conversation: at bedside, aware of POC

## 2023-11-09 NOTE — PROGRESS NOTES
Daily Progress Note    Assessment/Plan:  78-year-old male with history of multivessel coronary artery disease status post three-vessel CABG September 26, 2023, atrial fibrillation, type 2 diabetes, hypertension, chronic kidney disease and BPH.  Recently diagnosed with left lower extremity harvest site cellulitis.  Admitted November 2 for septic shock secondary to purulent left lower extremity soft tissue infection.  Had acute hypoxic respiratory failure requiring intubation from November 5 through 7 and Dann uric CRISTAL requiring intermittent dialysis.  Developed a pericardial effusion with cardiac tamponade requiring pericardial window November 5.  Now stable for transfer out of ICU with [facility] to assume primary care.     Left lower extremity cellulitis with resolved sepsis.  Bedside I&D done on November 2 with cultures growing Klebsiella pneumonia.  Was on vancomycin and Zosyn November 2 through 7 and transition to Rocephin November 7.  Anticipate 10 to 14-day duration of therapy.  Acute hypoxic hypercapnic respiratory failure: Multifactorial secondary to decreased cardiac output and shunting with compression atelectasis from the pericardial effusion.  O2 to maintain sats between 90 and 96%.  Currently on 2 L nasal cannula.  Also had small left pleural effusion seen on November 2.  Oliguric CRISTAL with chronic kidney disease secondary to ATN: Improving, nephrology following.  Creatinine peaked at 10.58 on November 2.  Urine output improved on torsemide.  Holding further dialysis until possibly Friday.  Creatinine today 3.0 up from 2.4 yesterday..  Atrial fibrillation: On amiodarone and apixaban.  Cardiology following.  Coronary artery disease: Status post three-vessel CABG in September.  Resume aspirin and statin.  Pericardial effusion status post pericardial window November 5.  Type 2 diabetes: Blood sugars currently acceptable.  A1c on admission was 6.7.  Is typically on metformin at home, continue to hold for now.   Sliding scale started.    Code status:Full Code        Barriers to Discharge: Renal failure, hypoxia    Disposition: Anticipate multiday stay, will consult PT/OT    Subjective:  Nomi is new to me today, chart reviewed and discussed with staff.  He is sitting in a chair playing Wordle.  Overall he states he feels much better.  Does feel weak and a little lightheaded when up.  Blood pressures have been soft but acceptable.  No chest pain or shortness of breath, no fevers or chills.        Current Medications Reviewed via EHR List    Objective:  Vital signs in last 24 hours:  [unfilled]  .prog  Weight:   @THISENCWEIGHTS(1)@  Weight change: 3.084 kg (6 lb 12.8 oz)  Body mass index is 30.73 kg/m .    Intake/Output last 3 shifts:  I/O last 3 completed shifts:  In: 660 [P.O.:540; I.V.:120]  Out: 1900 [Urine:1700; Stool:200]  Intake/Output this shift:  No intake/output data recorded.    Physical Exam:  General: Sitting up in chair, comfortable and conversant  CV: Irregular, normal rate  Lungs: Clear  Abdomen: Soft, nontender        Imaging:  Personally Reviewed.  Echocardiogram Limited    Result Date: 2023  [id number]^KATIE^BRAD^MARAL                                                                      Version 2  [place] [id number]  Name: JONAH ARAUJO MRN: [id number] : 1945 Study Date: 2023 12:05 PM Age: 78 yrs Gender: Male Patient Location: [facility] Reason For Study: Pericardial Effusion Ordering Physician: BRAD WILSON Referring Physician: CHERYL VARELA Performed By: RD  BSA: 2.2 m2 Height: 71 in Weight: 222 lb HR: 110 BP: 118/70 mmHg ______________________________________________________________________________ Procedure Limited Echo Adult. ______________________________________________________________________________ Interpretation Summary  1. Limited echocardiographic study. 2. The left ventricle is normal in size. Left  "ventricular systolic performance is at the lower limits of normal. The ejection fraction is estimated to be 50%. 3. There is abnormal septal motion possibly related to prior cardiac surgery. 4. There is trace aortic insufficiency. 5. There is mild, to perhaps mild-moderate, tricuspid insufficiency. 6. Normal right ventricular size with low normal right ventricular systolic performance. 7. There is mild to moderate left atrial enlargement. There is mild right atrial enlargement. 8. There is organized material in the pericardial space. There is minimal actual fluid appreciated. There is no evidence of significant intraventricular dependence/tamponade physiology. 9. Incidental note is made of a pleural effusion  When compared to the prior real-time echocardiogram dated 5 November 2023, there has been near complete resolution of the pericardial effusion. The lateral wall now appears to have more normal contractility. ______________________________________________________________________________ Left ventricle: The left ventricle is normal in size. Left ventricular systolic performance is at the lower limits of normal. The ejection fraction is estimated to be 50%. There is abnormal septal motion possibly related to prior cardiac surgery. Left ventricular wall thickness is normal.  Assessment of LV Diastolic Function: Examination was performed as a \"limited study\". Assessment of diastolic filling consequently not performed.  Right ventricle: Normal right ventricular size with low normal right ventricular systolic performance.  Left atrium: There is mild to moderate left atrial enlargement.  Right atrium: There is mild right atrial enlargement.  IVC: The IVC is of normal caliber.  Aortic valve: The aortic valve is comprised of three cusps. There is mild aortic valve sclerosis without significant stenosis. There is trace aortic insufficiency.  Mitral valve: The mitral valve appears morphologically normal. There is mild " mitral insufficiency.  Tricuspid valve: The tricuspid valve is grossly morphologically normal. There is mild, to perhaps mild-moderate, tricuspid insufficiency.  Pulmonic valve: The pulmonic valve is grossly morphologically normal. There is mild pulmonic insufficiency.  Thoracic aorta: The aortic root and proximal ascending aorta are of normal dimension.  Pericardium: There is organized material in the pericardial space. There is minimal actual fluid appreciated. There is no evidence of significant intraventricular dependence/tamponade physiology. Incidental note is made of a pleural effusion. ______________________________________________________________________________ ______________________________________________________________________________ MMode/2D Measurements & Calculations IVSd: 1.1 cm LVIDd: 4.8 cm LVIDs: 3.1 cm LVPWd: 1.1 cm FS: 34.1 % LV mass(C)d: 190.9 grams LV mass(C)dI: 86.6 grams/m2 LVOT diam: 2.2 cm LVOT area: 3.7 cm2 LA Volume Indexed (AL/bp): 42.7 ml/m2 RWT: 0.47  Time Measurements MM HR: 94.0 BPM  Doppler Measurements & Calculations LV V1 max P.1 mmHg LV V1 max: 101.1 cm/sec LV V1 VTI: 16.2 cm SV(LVOT): 59.7 ml SI(LVOT): 27.1 ml/m2 TR max jerrell: 262.8 cm/sec TR max P.6 mmHg Peak E' Jerrell: 6.6 cm/sec  ______________________________________________________________________________ Report approved by: Zuleyka Molina 2023 01:40 PM       XR Chest Port 1 View    Result Date: 2023  EXAM: XR CHEST PORT 1 VIEW LOCATION: [facility] DATE: 2023 INDICATION: Post Op CVTS Surgery COMPARISON: 2023     IMPRESSION: Stable size of cardiomediastinal silhouette status post median sternotomy and CABG. Endotracheal tube with tip approximately 5.5 cm above the gabriel. Stable position of right internal jugular approach [place]-Anel catheter, left central venous catheter and right upper extremity PICC. Persistent small left pleural effusion with associated  compressive atelectasis. Mild right basilar atelectasis. No pneumothorax. Bones are unchanged.    XR Chest Port 1 View    Result Date: 2023  EXAM: XR CHEST PORT 1 VIEW LOCATION: [facility] DATE: 2023 INDICATION: Endotracheal tube positioning COMPARISON: Same day chest radiograph     IMPRESSION: Stable size of cardiomediastinal silhouette status post median sternotomy and CABG. Interval placement of endotracheal tube with tip approximately 6 cm above the gabriel. Stable position of right internal jugular approach [place]-Anel catheter, left central venous catheter and right upper extremity PICC. Persistent small left pleural effusion with associated compressive atelectasis. No pneumothorax. Bones are unchanged.    Echocardiogram Limited    Result Date: 2023  [id number]^AGUSTIN^ARMANDO^[place] [address]  Name: JONAH ARAUJO MRN: [id number] : 1945 Study Date: 2023 10:10 AM Age: 78 yrs Gender: Male Patient Location: [facility] Reason For Study: Pericardial Effusion Ordering Physician: ARMANDO VELEZ Referring Physician: CHERYL VARELA Performed By: MDG  BSA: 2.2 m2 Height: 71 in Weight: 227 lb HR: 89 BP: 123/57 mmHg ______________________________________________________________________________ Procedure Limited Portable Echo Adult. ______________________________________________________________________________ Interpretation Summary  The visual ejection fraction is 50-55%. There is mild to moderate septal hypokinesis. Basal lateral wall appars dyskinetic. The right ventricle is normal in size and function. Large pericardial effusion The echo/Doppler findings are inconclusive for cardiac tamponade. Compared to the prior study dated 2023, there are changes as noted. Septal wall motion abnormality is new; the lateral wall motion abnormality is more prominent. Overall LV function has decreased mildly.  ______________________________________________________________________________ Left Ventricle The left ventricle is normal in size. There is mild to moderate concentric left ventricular hypertrophy. The visual ejection fraction is 50-55%. There is mild to moderate septal hypokinesis. Basal lateral wall appars dyskinetic.  Right Ventricle The right ventricle is normal in size and function.  Atria The left atrium is mildly dilated. The right atrium is mildly dilated.  Mitral Valve The mitral valve leaflets appear thickened, but open well.  Tricuspid Valve Tricuspid valve leaflets appear normal.  Aortic Valve The aortic valve is not well visualized.  Pulmonic Valve The pulmonic valve is not well visualized.  Vessels The thoracic aorta cannot be assessed. IVC diameter >2.1 cm collapsing <50% with sniff suggests a high RA pressure estimated at 15 mmHg or greater.  Pericardium Large pericardial effusion. The echo/Doppler findings are inconclusive for cardiac tamponade.  ______________________________________________________________________________ Report approved by: Zuleyka Herron 2023 10:50 AM  ______________________________________________________________________________      XR Chest Port 1 View    Result Date: 2023  EXAM: XR CHEST PORT 1 VIEW LOCATION: [facility] DATE: 2023 INDICATION: resp failure COMPARISON: 2023     IMPRESSION: Multiple lines and tubes unchanged. Previous sternotomy. Stable cardiomegaly. Pulmonary venous congestion and left pleural effusion have improved. Persistent marked cardiac left basilar atelectasis/consolidation. No pneumothorax.    Echocardiogram Limited    Result Date: 2023  [id number]^MEGHAN^[place] [address]  Name: JONAH ARAUJO MRN: [id number] : 1945 Study Date: 2023 09:36 AM Age: 78 yrs Gender: Male Patient Location: [facility] Reason  "For Study: Tamponade Ordering Physician: SAHRA CHRISTIANSON Referring Physician: CHERYL VARELA Performed By: ACE  BSA: 2.2 m2 Height: 71 in Weight: 227 lb HR: 102 BP: 106/51 mmHg ______________________________________________________________________________ Procedure Limited Portable Echo Adult. ______________________________________________________________________________ Interpretation Summary  1. Normal left ventricular size and systolic performance with a visually estimated ejection fraction of 55-60%. 2. There is mild concentric increase in left ventricular wall thickness. 3. There is trace-mild aortic insufficiency. 4. Normal right ventricular size and systolic performance. 5. There is mild left atrial enlargement. 6. There is a medium to large sized pericardial effusion. The cumulative findings are not suggestive of significant intraventricular dependence/tamponade physiology.  When compared to the prior real-time echocardiogram dated 2 November 2023, the findings are felt to be fairly similar on both examinations with the pericardial effusion appearing fairly similar in size on both studies. ______________________________________________________________________________ Left ventricle: Normal left ventricular size and systolic performance with a visually estimated ejection fraction of 55-60%. There is normal regional wall motion. There is mild concentric increase in left ventricular wall thickness.  Assessment of LV Diastolic Function: Examination was performed as a \"limited study\". Assessment of diastolic filling consequently not performed.  Right ventricle: Normal right ventricular size and systolic performance.  Left atrium: There is mild left atrial enlargement.  Right atrium: The right atrium is of probable normal size (not well-visualized).  IVC: The IVC is moderately dilated.  Aortic valve: The aortic valve is not well visualized, but suspected to be comprised of three cusps. There is " mild aortic valve sclerosis without significant stenosis. There is trace to mild aortic insufficiency.  Mitral valve: The mitral valve appears morphologically normal. There is mild mitral insufficiency.  Tricuspid valve: The tricuspid valve is grossly morphologically normal. There is trace tricuspid insufficiency.  Pulmonic valve: The pulmonic valve is grossly morphologically normal.  Thoracic aorta: The aortic root and proximal ascending aorta are of normal dimension.  Pericardium: There is a medium to large sized pericardial effusion. There is mild respiratory variation in tricuspid & mitral inflow, but does not appear to be inordinate. There does not appear to be significant right ventricular or right atrial inversion. The cumulative findings are not suggestive of significant intraventricular dependence/tamponade physiology. ______________________________________________________________________________ ______________________________________________________________________________ MMode/2D Measurements & Calculations IVSd: 0.82 cm LVIDd: 3.3 cm LVIDs: 2.6 cm LVPWd: 1.2 cm FS: 19.8 % LV mass(C)d: 93.2 grams LV mass(C)dI: 41.9 grams/m2 RWT: 0.71  Time Measurements MM HR: 103.0 BPM  Doppler Measurements & Calculations TR max karolina: 214.0 cm/sec TR max P.3 mmHg  ______________________________________________________________________________ Report approved by: Zuleyka Molina 2023 12:30 PM       XR Chest Port 1 View    Result Date: 2023  EXAM: XR CHEST PORT 1 VIEW LOCATION: [facility] DATE: 2023 INDICATION: resp failure COMPARISON: Chest radiograph 2023.     IMPRESSION: Right IJ [place]-Anel catheter remains in similar position with tip overlying the right pulmonary artery. Right PICC tip overlies the cavoatrial junction. Left IJ central venous catheter has been repositioned with tip now overlying the upper SVC. Persistent enlargement of the cardiomediastinal  silhouette. Persistent moderate left pleural effusion with slightly increased left lung opacities. No pneumothorax. Prior median sternotomy and CABG.    XR Chest Port 1 View    Result Date: 11/3/2023  EXAM: XR CHEST PORT 1 VIEW LOCATION: [facility] DATE: 11/3/2023 INDICATION: increased o2 needs COMPARISON: Chest x-ray and CT CAP 11/02/2023     IMPRESSION: Right costophrenic angle is clipped. Poststernotomy changes. There is a new right IJ [place]-Anel catheter with its tip in the right main pulmonary artery. There is a new left IJ dialysis catheter with its tip in the upper right atrium. Right upper extremity PICC line catheter tip is at adjacent to the dialysis catheter. Poststernotomy changes. No pneumothorax. Cardiac silhouette remains enlarged (known, large hemorrhagic pericardial effusion). Small, left pleural effusion. No signs of failure.    IR CVC Tunnel Placement > 5 Yrs of Age    Result Date: 11/3/2023  [facility] LOCATION: [facility] DATE: 11/3/2023 PROCEDURE:TUNNELED DIALYSIS CATHETER PLACEMENT INTERVENTIONAL RADIOLOGIST: Sebastian Cintron MD. INDICATION: 78-year-old male with acute renal failure following CABG presents for tunneled dialysis catheter placement. CONSENT: The risks, benefits and alternatives of tunneled dialysis catheter placement were discussed with the patient  in detail. All questions were answered. Informed consent was given to proceed with the procedure. SEDATION: None. IV fentanyl was administered for patient comfort. CONTRAST: None ANTIBIOTICS: None. Antibiotics per ICU. ADDITIONAL MEDICATIONS: None. FLUOROSCOPIC TIME: 4.8 minutes. RADIATION DOSE: Air Kerma: 62 mGy. COMPLICATIONS: No immediate complications. STERILE BARRIER TECHNIQUE: Maximum sterile barrier technique was used. Cutaneous antisepsis was performed at the operative site with application of 2% chlorhexidine and large sterile drape. Prior to the procedure, the  and  assistant performed hand hygiene and wore hat, mask, sterile gown, and sterile gloves during the entire procedure. PROCEDURE:   Using real-time ultrasound guidance, the left internal jugular vein was punctured. A subcutaneous tunnel was created requiring a second incision. Using this access, a 15.5 Albanian, 27 cm tip to cuff Duraflow dialysis catheter was advanced until the tip was in the proximal cavoatrial junction.  The catheter was tested and found to flush and aspirate appropriately.  The catheter was heparinized and secured to the skin. FINDINGS: Ultrasound shows an anechoic and compressible jugular vein. A permanent image was stored.  At the completion of the study, the new catheter tip lies in the proximal right atrium.     IMPRESSION:  Tunneled dialysis catheter placement, as discussed above.    Cardiac Catheterization    Result Date: 2023  Images from the original result were not included. Gerardo Araujo is a 78 year old old male with CAD s/p CABG 1 mos ago at OSH, HTN, HL who is here for CRISTAL, shock, new pericardial effusion. - elevated R- and L-sided filling pressures, moderate pHTN c/w pulmonary venous (due to L-sided HF); normal CO/CI by Cristofer - rapid y descents may be seen w/ extremely elevated filling pressures, but can't definitively exclude constriction or tamponade by hemodynamics alone Findings: RHC: RA:20 RV:49/11 PA:45/16(31) PCWP:25, V waves to 33 SvO2:55.2 Ao Sat: 91 CO/CI: Cristofer:6.9/3.16 Access: R IJ vein Closure: SG catheter sutured in place     Echocardiogram Complete    Result Date: 2023  [id number]^SEB^MARK^[place] [address]  Name: GERARDO ARAUJO MRN: [id number] : 1945 Study Date: 2023 08:35 AM Age: 78 yrs Gender: Male Patient Location: [facility] Reason For Study: Shock Ordering Physician: MARK GRIGSBY Performed By: SENA  BSA: 2.2 m2 Height: 72 in Weight: 215 lb HR: 83  ______________________________________________________________________________ Procedure Complete Echo Adult. Adequate quality two-dimensional was performed and interpreted. No hemodynamically significant valvular abnormalities on 2D or color flow imaging. There is no comparison study available. ______________________________________________________________________________ Interpretation Summary  The left ventricle is normal in size with moderate concentric left ventricular hypertrophy. Left ventricular function is normal.The ejection fraction is 60-65%. Normal right ventricle size and systolic function. The left atrium is mildly dilated. No hemodynamically significant valvular abnormalities on 2D or color flow imaging. There is a moderate posterior pericardial effusion with no echocardiographic indications of cardiac tamponade. IVC diameter >2.1 cm collapsing <50% with sniff suggests a high RA pressure estimated at 15 mmHg or greater.  There is no comparison study available. ______________________________________________________________________________ Left Ventricle The left ventricle is normal in size. Left ventricular function is normal.The ejection fraction is 60-65%. There is moderate concentric left ventricular hypertrophy. Diastolic function not assessed due to atrial fibrillation. No regional wall motion abnormalities noted.  Right Ventricle Normal right ventricle size and systolic function.  Atria The left atrium is mildly dilated. Right atrial size is normal. There is no color Doppler evidence of an atrial shunt.  Mitral Valve Mitral valve leaflets appear normal. There is no evidence of mitral stenosis or clinically significant mitral regurgitation.  Tricuspid Valve Tricuspid valve leaflets appear normal. There is no evidence of tricuspid stenosis or clinically significant tricuspid regurgitation. Right ventricle systolic pressure estimate normal. The right ventricular systolic pressure is approximated at  26.6 mmHg plus the right atrial pressure.  Aortic Valve The aortic valve is trileaflet. Aortic valve leaflets appear normal. There is no evidence of aortic stenosis or clinically significant aortic regurgitation.  Pulmonic Valve The pulmonic valve is not well seen, but is grossly normal. This degree of valvular regurgitation is within normal limits. There is trace pulmonic valvular regurgitation.  Vessels The aorta root is normal. Normal size ascending aorta. IVC diameter >2.1 cm collapsing <50% with sniff suggests a high RA pressure estimated at 15 mmHg or greater.  Pericardium Moderate posterior pericardial effusion. There are no echocardiographic indications of cardiac tamponade.  Rhythm The rhythm was atrial fibrillation. ______________________________________________________________________________ MMode/2D Measurements & Calculations IVSd: 1.2 cm LVIDd: 4.1 cm LVIDs: 2.9 cm LVPWd: 1.4 cm FS: 29.0 % LV mass(C)d: 190.6 grams LV mass(C)dI: 86.8 grams/m2 Ao root diam: 3.2 cm LA dimension: 3.6 cm asc Aorta Diam: 3.8 cm LA/Ao: 1.1 LVOT diam: 2.1 cm LVOT area: 3.5 cm2 Ao root diam index Ht(cm/m): 1.7 Ao root diam index BSA (cm/m2): 1.5 Asc Ao diam index BSA (cm/m2): 1.7 Asc Ao diam index Ht(cm/m): 2.1  LA Volume Indexed (AL/bp): 36.3 ml/m2 RV Base: 3.2 cm RWT: 0.69 TAPSE: 1.3 cm  Time Measurements MM HR: 84.0 BPM  Doppler Measurements & Calculations MV E max jerrell: 98.6 cm/sec MV dec slope: 529.3 cm/sec2 MV dec time: 0.19 sec Ao V2 max: 189.5 cm/sec Ao max P.0 mmHg Ao V2 mean: 127.0 cm/sec Ao mean P.0 mmHg Ao V2 VTI: 29.5 cm TERRY(I,D): 2.2 cm2 TERRY(V,D): 2.6 cm2 LV V1 max P.1 mmHg LV V1 max: 142.0 cm/sec LV V1 VTI: 19.0 cm SV(LVOT): 65.8 ml SI(LVOT): 30.0 ml/m2  PA acc time: 0.07 sec TR max jerrell: 258.0 cm/sec TR max P.6 mmHg AV Jerrell Ratio (DI): 0.75 TERRY Index (cm2/m2): 1.0 E/E': 13.0 E/E' av.8 Lateral E/e': 10.7 Medial E/e': 12.9 Peak E' Jerrell: 7.6 cm/sec RV S Jerrell: 9.3 cm/sec   ______________________________________________________________________________ Report approved by: Zuleyka Tiwari 11/02/2023 09:33 AM       XR Chest Port 1 View    Result Date: 11/2/2023  EXAM: XR CHEST PORT 1 VIEW LOCATION: [facility] DATE: 11/2/2023 INDICATION: RN placed PICC   verify tip placement COMPARISON: CT from 11/02/2023     IMPRESSION: Cardiac silhouette is enlarged. Status post CABG. Right upper extremity PICC tip at cavoatrial junction. Left basilar atelectasis and adjacent pleural effusion. No visible pneumothorax.    Head CT w/o contrast    Result Date: 11/2/2023  EXAM: CT HEAD W/O CONTRAST LOCATION: [facility] DATE: 11/2/2023 INDICATION: right hand tingling COMPARISON: None. TECHNIQUE: Routine CT Head without IV contrast. Multiplanar reformats. Dose reduction techniques were used. FINDINGS: INTRACRANIAL CONTENTS: No intracranial hemorrhage, extraaxial collection, or mass effect.  No CT evidence of acute infarct. Mild presumed chronic small vessel ischemic changes. Mild generalized volume loss. No hydrocephalus. VISUALIZED ORBITS/SINUSES/MASTOIDS: Prior bilateral cataract surgery. Chronic deformities of the medial wall and floor of the left orbital. Trace mucosal thickening along the floors of the maxillary sinuses. Trace left mastoid effusion. BONES/SOFT TISSUES: No acute abnormality.     IMPRESSION: 1.  No CT evidence for acute intracranial process. 2.  Brain atrophy and presumed chronic microvascular ischemic changes as above.    CT Chest Abdomen Pelvis w/o Contrast    Result Date: 11/2/2023  EXAM: CT CHEST ABDOMEN PELVIS W/O CONTRAST LOCATION: [facility] DATE: 11/2/2023 INDICATION: hypotension, recent CABG, Cr 7 yesterday COMPARISON: None. TECHNIQUE: CT scan of the chest, abdomen, and pelvis was performed without IV contrast. Multiplanar reformats were obtained. Dose reduction techniques were used. CONTRAST: None.  FINDINGS: LUNGS AND PLEURA: Small bilateral pleural effusions, larger on the left. Associated dependent and basilar atelectasis bilaterally. Mild atelectasis also in the left upper lobe. MEDIASTINUM/AXILLAE: Expected postoperative changes of sternotomy and coronary artery bypass grafting. Enlarged heart. No lymphadenopathy. CORONARY ARTERY CALCIFICATION: Previous intervention (stents or CABG). HEPATOBILIARY: Trace perihepatic ascites. Otherwise normal. PANCREAS: Normal. SPLEEN: Normal. ADRENAL GLANDS: Normal. KIDNEYS/BLADDER: 3 mm and 1 mm nonobstructing right intrarenal stones. Questionable mild wall thickening of the urinary bladder. BOWEL: Colonic diverticulosis. No bowel obstruction or inflammation. LYMPH NODES: Normal. VASCULATURE: Moderately advanced aortoiliac atherosclerosis. PELVIC ORGANS: Post right inguinal herniorrhaphy. MUSCULOSKELETAL: Degenerative changes of the spine.     IMPRESSION: 1.  Expected postoperative changes of sternotomy and coronary artery bypass grafting. 2.  Enlarged heart. 3.  Small bilateral pleural effusions, larger on the left. 4.  Trace perihepatic ascites. 5.  Questionable mild wall thickening of the urinary bladder. Clinical correlation for cystitis suggested. 6.  Nonobstructive nephrolithiasis on the right. 7.  Colonic diverticulosis.    XR CHEST PA & LAT    Result Date: 10/25/2023  EXAM: XR CHEST PA & LAT DATE: 10/25/2023 12:04 PM COMPARISON: 10/11/2023. CLINICAL DATA: . ICD 10:  J90 Pleural effusion, not elsewhere classified VIEWS: PA and lateral views of the chest were obtained. FINDINGS: The heart is enlarged. The pulmonary vasculature is within normal limits. There is a left-sided pleural effusion, subtly decreased compared to the previous study. No mediastinal shift. No right pleural effusion. Subsegmental left perihilar atelectasis or fibrosis. No pneumothorax. Postsurgical changes consistent with median sternotomy.    IMPRESSION: Left-sided pleural effusion is subtly  "decreased compared to the previous study. REPORT SIGNED BY DR. Didi Vasques    XR CHEST PA & LAT    Result Date: 10/11/2023  EXAM: XR CHEST PA & LAT DATE: 10/11/2023 2:35 PM ICD 10: Z95.1 Presence of aortocoronary bypass graft, , CLINICAL DATA: Status post open heart surgery. COMPARISON: September 30, 2023    IMPRESSION: 2 views. Sternotomy wires remain intact. The cardiac silhouette is enlarged, but not appreciably changed. Bilateral pleural effusions are present. The volume of pleural fluid has increased since the comparison exam. No pleural gas. Parenchymal opacities at the lung bases are likely atelectasis. Linear atelectasis in the left midlung. Upper lungs are clear. REPORT SIGNED BY DR. Ashok Hernandez      Lab Results:  Personally Reviewed.   Fingerstick Blood Glucose: @[id number](POCGLUFGR:10)@    Last Hbg A1C: No results found for: \"HGBA1C\"   Lab Results   Component Value Date    INR 1.54 (H) 11/09/2023    INR 1.32 (H) 11/08/2023    INR 1.39 (H) 11/07/2023     Recent Results (from the past 24 hour(s))   CBC with platelets    Collection Time: 11/08/23  8:20 AM   Result Value Ref Range    WBC Count 7.2 4.0 - 11.0 10e3/uL    RBC Count 3.06 (L) 4.40 - 5.90 10e6/uL    Hemoglobin 8.8 (L) 13.3 - 17.7 g/dL    Hematocrit 28.5 (L) 40.0 - 53.0 %    MCV 93 78 - 100 fL    MCH 28.8 26.5 - 33.0 pg    MCHC 30.9 (L) 31.5 - 36.5 g/dL    RDW 16.2 (H) 10.0 - 15.0 %    Platelet Count 122 (L) 150 - 450 10e3/uL   Glucose by meter    Collection Time: 11/08/23 12:22 PM   Result Value Ref Range    GLUCOSE BY METER POCT 156 (H) 70 - 99 mg/dL   Glucose by meter    Collection Time: 11/08/23  9:11 PM   Result Value Ref Range    GLUCOSE BY METER POCT 171 (H) 70 - 99 mg/dL   Glucose by meter    Collection Time: 11/09/23  2:05 AM   Result Value Ref Range    GLUCOSE BY METER POCT 123 (H) 70 - 99 mg/dL   Magnesium    Collection Time: 11/09/23  6:20 AM   Result Value Ref Range    Magnesium 2.1 1.7 - 2.3 mg/dL   INR    Collection Time: " 11/09/23  6:20 AM   Result Value Ref Range    INR 1.54 (H) 0.85 - 1.15   Basic metabolic panel    Collection Time: 11/09/23  6:20 AM   Result Value Ref Range    Sodium 142 135 - 145 mmol/L    Potassium 3.3 (L) 3.4 - 5.3 mmol/L    Chloride 105 98 - 107 mmol/L    Carbon Dioxide (CO2) 28 22 - 29 mmol/L    Anion Gap 9 7 - 15 mmol/L    Urea Nitrogen 18.1 8.0 - 23.0 mg/dL    Creatinine 3.00 (H) 0.67 - 1.17 mg/dL    GFR Estimate 21 (L) >60 mL/min/1.73m2    Calcium 8.2 (L) 8.8 - 10.2 mg/dL    Glucose 116 (H) 70 - 99 mg/dL   CBC with platelets    Collection Time: 11/09/23  6:20 AM   Result Value Ref Range    WBC Count 5.6 4.0 - 11.0 10e3/uL    RBC Count 2.91 (L) 4.40 - 5.90 10e6/uL    Hemoglobin 8.4 (L) 13.3 - 17.7 g/dL    Hematocrit 26.8 (L) 40.0 - 53.0 %    MCV 92 78 - 100 fL    MCH 28.9 26.5 - 33.0 pg    MCHC 31.3 (L) 31.5 - 36.5 g/dL    RDW 16.2 (H) 10.0 - 15.0 %    Platelet Count 117 (L) 150 - 450 10e3/uL   Glucose by meter    Collection Time: 11/09/23  6:43 AM   Result Value Ref Range    GLUCOSE BY METER POCT 105 (H) 70 - 99 mg/dL           Advanced Care Planning    Medical Decision Making       50 MINUTES SPENT BY ME on the date of service doing chart review, history, exam, documentation & further activities per the note.      Chaka Silverman MD  Date: 11/9/2023  Time: 7:58 AM  [facility]

## 2023-11-09 NOTE — PROGRESS NOTES
Daily Progress Note    Assessment/Plan:  78-year-old male with history of multivessel coronary artery disease status post three-vessel CABG September 26, 2023, atrial fibrillation, type 2 diabetes, hypertension, chronic kidney disease and BPH.  Recently diagnosed with left lower extremity harvest site cellulitis.  Admitted November 2 for septic shock secondary to purulent left lower extremity soft tissue infection.  Had acute hypoxic respiratory failure requiring intubation from November 5 through 7 and Dann uric CRISTAL requiring renal replacement therapy.  Developed a pericardial effusion with cardiac tamponade requiring pericardial window November 5.    Left lower extremity cellulitis with resolved sepsis.  Bedside I&D done on November 2 with cultures growing Klebsiella pneumonia.  Was on vancomycin and Zosyn November 2 through 7 and transition to Rocephin November 7.  Anticipate 10 to 14-day duration of therapy.  Acute hypoxic hypercapnic respiratory failure: Multifactorial secondary to decreased cardiac output and shunting with compression atelectasis from the pericardial effusion.  O2 to maintain sats between 90 and 96%.  Also had small left pleural effusion seen on November 2.  Oliguric CRISTAL with chronic kidney disease secondary to ATN: Improving, nephrology following.  Creatinine peaked at 10.58 on November 2.  Urine output improved on torsemide.  Holding IDH until possibly Friday.  Atrial fibrillation: On amiodarone and apixaban.  Cardiology following.  Coronary artery disease: Status post three-vessel CABG in September.  Resume aspirin and statin.  Pericardial effusion status post pericardial window November 5.  Type 2 diabetes    Code status:Full Code        Barriers to Discharge: Multiple medical issues    Disposition: Anticipate multiday stay    Subjective:  Chart extensively reviewed and discussed with staff.  Will assume primary care and round in the morning.        Current Medications Reviewed via EHR  List    Objective:  Vital signs in last 24 hours:  [unfilled]  .prog  Weight:   @THISENCWEIGHTS(1)@  Weight change:   Body mass index is 31.05 kg/m .    Intake/Output last 3 shifts:  I/O last 3 completed shifts:  In: 1028 [P.O.:590; I.V.:438]  Out: 1475 [Urine:1475]  Intake/Output this shift:  I/O this shift:  In: 100 [P.O.:100]  Out: 600 [Urine:400; Stool:200]    Physical Exam:  :        Imaging:  Personally Reviewed.  Echocardiogram Limited    Result Date: 2023  [id number]^KATIE^BRAD^MARAL                                                                      Version 2  [place] [address]  Name: JONAH ARAUJO MRN: [id number] : 1945 Study Date: 2023 12:05 PM Age: 78 yrs Gender: Male Patient Location: [facility] Reason For Study: Pericardial Effusion Ordering Physician: BRAD WILSON Referring Physician: CHERYL VARELA Performed By: RD  BSA: 2.2 m2 Height: 71 in Weight: 222 lb HR: 110 BP: 118/70 mmHg ______________________________________________________________________________ Procedure Limited Echo Adult. ______________________________________________________________________________ Interpretation Summary  1. Limited echocardiographic study. 2. The left ventricle is normal in size. Left ventricular systolic performance is at the lower limits of normal. The ejection fraction is estimated to be 50%. 3. There is abnormal septal motion possibly related to prior cardiac surgery. 4. There is trace aortic insufficiency. 5. There is mild, to perhaps mild-moderate, tricuspid insufficiency. 6. Normal right ventricular size with low normal right ventricular systolic performance. 7. There is mild to moderate left atrial enlargement. There is mild right atrial enlargement. 8. There is organized material in the pericardial space. There is minimal actual fluid appreciated. There is no evidence of significant intraventricular  "dependence/tamponade physiology. 9. Incidental note is made of a pleural effusion  When compared to the prior real-time echocardiogram dated 5 November 2023, there has been near complete resolution of the pericardial effusion. The lateral wall now appears to have more normal contractility. ______________________________________________________________________________ Left ventricle: The left ventricle is normal in size. Left ventricular systolic performance is at the lower limits of normal. The ejection fraction is estimated to be 50%. There is abnormal septal motion possibly related to prior cardiac surgery. Left ventricular wall thickness is normal.  Assessment of LV Diastolic Function: Examination was performed as a \"limited study\". Assessment of diastolic filling consequently not performed.  Right ventricle: Normal right ventricular size with low normal right ventricular systolic performance.  Left atrium: There is mild to moderate left atrial enlargement.  Right atrium: There is mild right atrial enlargement.  IVC: The IVC is of normal caliber.  Aortic valve: The aortic valve is comprised of three cusps. There is mild aortic valve sclerosis without significant stenosis. There is trace aortic insufficiency.  Mitral valve: The mitral valve appears morphologically normal. There is mild mitral insufficiency.  Tricuspid valve: The tricuspid valve is grossly morphologically normal. There is mild, to perhaps mild-moderate, tricuspid insufficiency.  Pulmonic valve: The pulmonic valve is grossly morphologically normal. There is mild pulmonic insufficiency.  Thoracic aorta: The aortic root and proximal ascending aorta are of normal dimension.  Pericardium: There is organized material in the pericardial space. There is minimal actual fluid appreciated. There is no evidence of significant intraventricular dependence/tamponade physiology. Incidental note is made of a pleural effusion. " ______________________________________________________________________________ ______________________________________________________________________________ MMode/2D Measurements & Calculations IVSd: 1.1 cm LVIDd: 4.8 cm LVIDs: 3.1 cm LVPWd: 1.1 cm FS: 34.1 % LV mass(C)d: 190.9 grams LV mass(C)dI: 86.6 grams/m2 LVOT diam: 2.2 cm LVOT area: 3.7 cm2 LA Volume Indexed (AL/bp): 42.7 ml/m2 RWT: 0.47  Time Measurements MM HR: 94.0 BPM  Doppler Measurements & Calculations LV V1 max P.1 mmHg LV V1 max: 101.1 cm/sec LV V1 VTI: 16.2 cm SV(LVOT): 59.7 ml SI(LVOT): 27.1 ml/m2 TR max jerrell: 262.8 cm/sec TR max P.6 mmHg Peak E' Jerrell: 6.6 cm/sec  ______________________________________________________________________________ Report approved by: Zuleyka Molina 2023 01:40 PM       XR Chest Port 1 View    Result Date: 2023  EXAM: XR CHEST PORT 1 VIEW LOCATION: [facility] DATE: 2023 INDICATION: Post Op CVTS Surgery COMPARISON: 2023     IMPRESSION: Stable size of cardiomediastinal silhouette status post median sternotomy and CABG. Endotracheal tube with tip approximately 5.5 cm above the gabriel. Stable position of right internal jugular approach [place]-Anel catheter, left central venous catheter and right upper extremity PICC. Persistent small left pleural effusion with associated compressive atelectasis. Mild right basilar atelectasis. No pneumothorax. Bones are unchanged.    XR Chest Port 1 View    Result Date: 2023  EXAM: XR CHEST PORT 1 VIEW LOCATION: [facility] DATE: 2023 INDICATION: Endotracheal tube positioning COMPARISON: Same day chest radiograph     IMPRESSION: Stable size of cardiomediastinal silhouette status post median sternotomy and CABG. Interval placement of endotracheal tube with tip approximately 6 cm above the gabriel. Stable position of right internal jugular approach [place]-Anel catheter, left central venous catheter and  right upper extremity PICC. Persistent small left pleural effusion with associated compressive atelectasis. No pneumothorax. Bones are unchanged.    Echocardiogram Limited    Result Date: 2023  [id number]^AGUSTIN^ARMANDO^[place] [address]  Name: JONAH ARAUJO MRN: [id number] : 1945 Study Date: 2023 10:10 AM Age: 78 yrs Gender: Male Patient Location: [facility] Reason For Study: Pericardial Effusion Ordering Physician: ARMANDO VELEZ Referring Physician: CHERYL VARELA Performed By: MDG  BSA: 2.2 m2 Height: 71 in Weight: 227 lb HR: 89 BP: 123/57 mmHg ______________________________________________________________________________ Procedure Limited Portable Echo Adult. ______________________________________________________________________________ Interpretation Summary  The visual ejection fraction is 50-55%. There is mild to moderate septal hypokinesis. Basal lateral wall appars dyskinetic. The right ventricle is normal in size and function. Large pericardial effusion The echo/Doppler findings are inconclusive for cardiac tamponade. Compared to the prior study dated 2023, there are changes as noted. Septal wall motion abnormality is new; the lateral wall motion abnormality is more prominent. Overall LV function has decreased mildly. ______________________________________________________________________________ Left Ventricle The left ventricle is normal in size. There is mild to moderate concentric left ventricular hypertrophy. The visual ejection fraction is 50-55%. There is mild to moderate septal hypokinesis. Basal lateral wall appars dyskinetic.  Right Ventricle The right ventricle is normal in size and function.  Atria The left atrium is mildly dilated. The right atrium is mildly dilated.  Mitral Valve The mitral valve leaflets appear thickened, but open well.  Tricuspid Valve Tricuspid valve leaflets appear normal.  Aortic  Valve The aortic valve is not well visualized.  Pulmonic Valve The pulmonic valve is not well visualized.  Vessels The thoracic aorta cannot be assessed. IVC diameter >2.1 cm collapsing <50% with sniff suggests a high RA pressure estimated at 15 mmHg or greater.  Pericardium Large pericardial effusion. The echo/Doppler findings are inconclusive for cardiac tamponade.  ______________________________________________________________________________ Report approved by: Zuleyka Herron 2023 10:50 AM  ______________________________________________________________________________      XR Chest Port 1 View    Result Date: 2023  EXAM: XR CHEST PORT 1 VIEW LOCATION: [facility] DATE: 2023 INDICATION: resp failure COMPARISON: 2023     IMPRESSION: Multiple lines and tubes unchanged. Previous sternotomy. Stable cardiomegaly. Pulmonary venous congestion and left pleural effusion have improved. Persistent marked cardiac left basilar atelectasis/consolidation. No pneumothorax.    Echocardiogram Limited    Result Date: 2023  [id number]^MEGHAN^SAHRA^[place] [address]  Name: JONAH ARAUJO MRN: [id number] : 1945 Study Date: 2023 09:36 AM Age: 78 yrs Gender: Male Patient Location: [facility] Reason For Study: Tamponade Ordering Physician: SAHRA CHRISTIANSON Referring Physician: CHERYL VARELA Performed By: ACE  BSA: 2.2 m2 Height: 71 in Weight: 227 lb HR: 102 BP: 106/51 mmHg ______________________________________________________________________________ Procedure Limited Portable Echo Adult. ______________________________________________________________________________ Interpretation Summary  1. Normal left ventricular size and systolic performance with a visually estimated ejection fraction of 55-60%. 2. There is mild concentric increase in left ventricular wall thickness. 3. There  "is trace-mild aortic insufficiency. 4. Normal right ventricular size and systolic performance. 5. There is mild left atrial enlargement. 6. There is a medium to large sized pericardial effusion. The cumulative findings are not suggestive of significant intraventricular dependence/tamponade physiology.  When compared to the prior real-time echocardiogram dated 2 November 2023, the findings are felt to be fairly similar on both examinations with the pericardial effusion appearing fairly similar in size on both studies. ______________________________________________________________________________ Left ventricle: Normal left ventricular size and systolic performance with a visually estimated ejection fraction of 55-60%. There is normal regional wall motion. There is mild concentric increase in left ventricular wall thickness.  Assessment of LV Diastolic Function: Examination was performed as a \"limited study\". Assessment of diastolic filling consequently not performed.  Right ventricle: Normal right ventricular size and systolic performance.  Left atrium: There is mild left atrial enlargement.  Right atrium: The right atrium is of probable normal size (not well-visualized).  IVC: The IVC is moderately dilated.  Aortic valve: The aortic valve is not well visualized, but suspected to be comprised of three cusps. There is mild aortic valve sclerosis without significant stenosis. There is trace to mild aortic insufficiency.  Mitral valve: The mitral valve appears morphologically normal. There is mild mitral insufficiency.  Tricuspid valve: The tricuspid valve is grossly morphologically normal. There is trace tricuspid insufficiency.  Pulmonic valve: The pulmonic valve is grossly morphologically normal.  Thoracic aorta: The aortic root and proximal ascending aorta are of normal dimension.  Pericardium: There is a medium to large sized pericardial effusion. There is mild respiratory variation in tricuspid & mitral inflow, " but does not appear to be inordinate. There does not appear to be significant right ventricular or right atrial inversion. The cumulative findings are not suggestive of significant intraventricular dependence/tamponade physiology. ______________________________________________________________________________ ______________________________________________________________________________ MMode/2D Measurements & Calculations IVSd: 0.82 cm LVIDd: 3.3 cm LVIDs: 2.6 cm LVPWd: 1.2 cm FS: 19.8 % LV mass(C)d: 93.2 grams LV mass(C)dI: 41.9 grams/m2 RWT: 0.71  Time Measurements MM HR: 103.0 BPM  Doppler Measurements & Calculations TR max karolina: 214.0 cm/sec TR max P.3 mmHg  ______________________________________________________________________________ Report approved by: Zuleyka Molina 2023 12:30 PM       XR Chest Port 1 View    Result Date: 2023  EXAM: XR CHEST PORT 1 VIEW LOCATION: [facility] DATE: 2023 INDICATION: resp failure COMPARISON: Chest radiograph 2023.     IMPRESSION: Right IJ [place]-Anel catheter remains in similar position with tip overlying the right pulmonary artery. Right PICC tip overlies the cavoatrial junction. Left IJ central venous catheter has been repositioned with tip now overlying the upper SVC. Persistent enlargement of the cardiomediastinal silhouette. Persistent moderate left pleural effusion with slightly increased left lung opacities. No pneumothorax. Prior median sternotomy and CABG.    XR Chest Port 1 View    Result Date: 11/3/2023  EXAM: XR CHEST PORT 1 VIEW LOCATION: [facility] DATE: 11/3/2023 INDICATION: increased o2 needs COMPARISON: Chest x-ray and CT CAP 2023     IMPRESSION: Right costophrenic angle is clipped. Poststernotomy changes. There is a new right IJ [place]-Anel catheter with its tip in the right main pulmonary artery. There is a new left IJ dialysis catheter with its tip in the upper right  atrium. Right upper extremity PICC line catheter tip is at adjacent to the dialysis catheter. Poststernotomy changes. No pneumothorax. Cardiac silhouette remains enlarged (known, large hemorrhagic pericardial effusion). Small, left pleural effusion. No signs of failure.    IR CVC Tunnel Placement > 5 Yrs of Age    Result Date: 11/3/2023  [place] [facility] LOCATION: [facility] DATE: 11/3/2023 PROCEDURE:TUNNELED DIALYSIS CATHETER PLACEMENT INTERVENTIONAL RADIOLOGIST: Sebastian Cintron MD. INDICATION: 78-year-old male with acute renal failure following CABG presents for tunneled dialysis catheter placement. CONSENT: The risks, benefits and alternatives of tunneled dialysis catheter placement were discussed with the patient  in detail. All questions were answered. Informed consent was given to proceed with the procedure. SEDATION: None. IV fentanyl was administered for patient comfort. CONTRAST: None ANTIBIOTICS: None. Antibiotics per ICU. ADDITIONAL MEDICATIONS: None. FLUOROSCOPIC TIME: 4.8 minutes. RADIATION DOSE: Air Kerma: 62 mGy. COMPLICATIONS: No immediate complications. STERILE BARRIER TECHNIQUE: Maximum sterile barrier technique was used. Cutaneous antisepsis was performed at the operative site with application of 2% chlorhexidine and large sterile drape. Prior to the procedure, the  and assistant performed hand hygiene and wore hat, mask, sterile gown, and sterile gloves during the entire procedure. PROCEDURE:   Using real-time ultrasound guidance, the left internal jugular vein was punctured. A subcutaneous tunnel was created requiring a second incision. Using this access, a 15.5 Frisian, 27 cm tip to cuff Duraflow dialysis catheter was advanced until the tip was in the proximal cavoatrial junction.  The catheter was tested and found to flush and aspirate appropriately.  The catheter was heparinized and secured to the skin. FINDINGS: Ultrasound shows an anechoic and compressible  jugular vein. A permanent image was stored.  At the completion of the study, the new catheter tip lies in the proximal right atrium.     IMPRESSION:  Tunneled dialysis catheter placement, as discussed above.    Cardiac Catheterization    Result Date: 2023  Images from the original result were not included. Gerardo Araujo is a 78 year old old male with CAD s/p CABG 1 mos ago at OSH, HTN, HL who is here for CRISTAL, shock, new pericardial effusion. - elevated R- and L-sided filling pressures, moderate pHTN c/w pulmonary venous (due to L-sided HF); normal CO/CI by Cristofer - rapid y descents may be seen w/ extremely elevated filling pressures, but can't definitively exclude constriction or tamponade by hemodynamics alone Findings: RHC: RA:20 RV:49/11 PA:45/16(31) PCWP:25, V waves to 33 SvO2:55.2 Ao Sat: 91 CO/CI: Cristofer:6.9/3.16 Access: R IJ vein Closure: SG catheter sutured in place     Echocardiogram Complete    Result Date: 2023  [id number]^SEB^MARK^[place] [address]  Name: GERARDO ARAUJO MRN: [id number] : 1945 Study Date: 2023 08:35 AM Age: 78 yrs Gender: Male Patient Location: [facility] Reason For Study: Shock Ordering Physician: MARK GRIGSBY Performed By: SENA  BSA: 2.2 m2 Height: 72 in Weight: 215 lb HR: 83 ______________________________________________________________________________ Procedure Complete Echo Adult. Adequate quality two-dimensional was performed and interpreted. No hemodynamically significant valvular abnormalities on 2D or color flow imaging. There is no comparison study available. ______________________________________________________________________________ Interpretation Summary  The left ventricle is normal in size with moderate concentric left ventricular hypertrophy. Left ventricular function is normal.The ejection fraction is 60-65%. Normal right ventricle size and systolic function. The left  atrium is mildly dilated. No hemodynamically significant valvular abnormalities on 2D or color flow imaging. There is a moderate posterior pericardial effusion with no echocardiographic indications of cardiac tamponade. IVC diameter >2.1 cm collapsing <50% with sniff suggests a high RA pressure estimated at 15 mmHg or greater.  There is no comparison study available. ______________________________________________________________________________ Left Ventricle The left ventricle is normal in size. Left ventricular function is normal.The ejection fraction is 60-65%. There is moderate concentric left ventricular hypertrophy. Diastolic function not assessed due to atrial fibrillation. No regional wall motion abnormalities noted.  Right Ventricle Normal right ventricle size and systolic function.  Atria The left atrium is mildly dilated. Right atrial size is normal. There is no color Doppler evidence of an atrial shunt.  Mitral Valve Mitral valve leaflets appear normal. There is no evidence of mitral stenosis or clinically significant mitral regurgitation.  Tricuspid Valve Tricuspid valve leaflets appear normal. There is no evidence of tricuspid stenosis or clinically significant tricuspid regurgitation. Right ventricle systolic pressure estimate normal. The right ventricular systolic pressure is approximated at 26.6 mmHg plus the right atrial pressure.  Aortic Valve The aortic valve is trileaflet. Aortic valve leaflets appear normal. There is no evidence of aortic stenosis or clinically significant aortic regurgitation.  Pulmonic Valve The pulmonic valve is not well seen, but is grossly normal. This degree of valvular regurgitation is within normal limits. There is trace pulmonic valvular regurgitation.  Vessels The aorta root is normal. Normal size ascending aorta. IVC diameter >2.1 cm collapsing <50% with sniff suggests a high RA pressure estimated at 15 mmHg or greater.  Pericardium Moderate posterior pericardial  effusion. There are no echocardiographic indications of cardiac tamponade.  Rhythm The rhythm was atrial fibrillation. ______________________________________________________________________________ MMode/2D Measurements & Calculations IVSd: 1.2 cm LVIDd: 4.1 cm LVIDs: 2.9 cm LVPWd: 1.4 cm FS: 29.0 % LV mass(C)d: 190.6 grams LV mass(C)dI: 86.8 grams/m2 Ao root diam: 3.2 cm LA dimension: 3.6 cm asc Aorta Diam: 3.8 cm LA/Ao: 1.1 LVOT diam: 2.1 cm LVOT area: 3.5 cm2 Ao root diam index Ht(cm/m): 1.7 Ao root diam index BSA (cm/m2): 1.5 Asc Ao diam index BSA (cm/m2): 1.7 Asc Ao diam index Ht(cm/m): 2.1  LA Volume Indexed (AL/bp): 36.3 ml/m2 RV Base: 3.2 cm RWT: 0.69 TAPSE: 1.3 cm  Time Measurements MM HR: 84.0 BPM  Doppler Measurements & Calculations MV E max jerrell: 98.6 cm/sec MV dec slope: 529.3 cm/sec2 MV dec time: 0.19 sec Ao V2 max: 189.5 cm/sec Ao max P.0 mmHg Ao V2 mean: 127.0 cm/sec Ao mean P.0 mmHg Ao V2 VTI: 29.5 cm TERRY(I,D): 2.2 cm2 TERRY(V,D): 2.6 cm2 LV V1 max P.1 mmHg LV V1 max: 142.0 cm/sec LV V1 VTI: 19.0 cm SV(LVOT): 65.8 ml SI(LVOT): 30.0 ml/m2  PA acc time: 0.07 sec TR max jerrell: 258.0 cm/sec TR max P.6 mmHg AV Jerrell Ratio (DI): 0.75 TERRY Index (cm2/m2): 1.0 E/E': 13.0 E/E' av.8 Lateral E/e': 10.7 Medial E/e': 12.9 Peak E' Jerrell: 7.6 cm/sec RV S Jerrell: 9.3 cm/sec  ______________________________________________________________________________ Report approved by: Zuleyka Tiwari 2023 09:33 AM       XR Chest Port 1 View    Result Date: 2023  EXAM: XR CHEST PORT 1 VIEW LOCATION: [facility] DATE: 2023 INDICATION: RN placed PICC   verify tip placement COMPARISON: CT from 2023     IMPRESSION: Cardiac silhouette is enlarged. Status post CABG. Right upper extremity PICC tip at cavoatrial junction. Left basilar atelectasis and adjacent pleural effusion. No visible pneumothorax.    Head CT w/o contrast    Result Date: 2023  EXAM: CT HEAD W/O  CONTRAST LOCATION: [facility] DATE: 11/2/2023 INDICATION: right hand tingling COMPARISON: None. TECHNIQUE: Routine CT Head without IV contrast. Multiplanar reformats. Dose reduction techniques were used. FINDINGS: INTRACRANIAL CONTENTS: No intracranial hemorrhage, extraaxial collection, or mass effect.  No CT evidence of acute infarct. Mild presumed chronic small vessel ischemic changes. Mild generalized volume loss. No hydrocephalus. VISUALIZED ORBITS/SINUSES/MASTOIDS: Prior bilateral cataract surgery. Chronic deformities of the medial wall and floor of the left orbital. Trace mucosal thickening along the floors of the maxillary sinuses. Trace left mastoid effusion. BONES/SOFT TISSUES: No acute abnormality.     IMPRESSION: 1.  No CT evidence for acute intracranial process. 2.  Brain atrophy and presumed chronic microvascular ischemic changes as above.    CT Chest Abdomen Pelvis w/o Contrast    Result Date: 11/2/2023  EXAM: CT CHEST ABDOMEN PELVIS W/O CONTRAST LOCATION: [facility] DATE: 11/2/2023 INDICATION: hypotension, recent CABG, Cr 7 yesterday COMPARISON: None. TECHNIQUE: CT scan of the chest, abdomen, and pelvis was performed without IV contrast. Multiplanar reformats were obtained. Dose reduction techniques were used. CONTRAST: None. FINDINGS: LUNGS AND PLEURA: Small bilateral pleural effusions, larger on the left. Associated dependent and basilar atelectasis bilaterally. Mild atelectasis also in the left upper lobe. MEDIASTINUM/AXILLAE: Expected postoperative changes of sternotomy and coronary artery bypass grafting. Enlarged heart. No lymphadenopathy. CORONARY ARTERY CALCIFICATION: Previous intervention (stents or CABG). HEPATOBILIARY: Trace perihepatic ascites. Otherwise normal. PANCREAS: Normal. SPLEEN: Normal. ADRENAL GLANDS: Normal. KIDNEYS/BLADDER: 3 mm and 1 mm nonobstructing right intrarenal stones. Questionable mild wall thickening of the urinary  bladder. BOWEL: Colonic diverticulosis. No bowel obstruction or inflammation. LYMPH NODES: Normal. VASCULATURE: Moderately advanced aortoiliac atherosclerosis. PELVIC ORGANS: Post right inguinal herniorrhaphy. MUSCULOSKELETAL: Degenerative changes of the spine.     IMPRESSION: 1.  Expected postoperative changes of sternotomy and coronary artery bypass grafting. 2.  Enlarged heart. 3.  Small bilateral pleural effusions, larger on the left. 4.  Trace perihepatic ascites. 5.  Questionable mild wall thickening of the urinary bladder. Clinical correlation for cystitis suggested. 6.  Nonobstructive nephrolithiasis on the right. 7.  Colonic diverticulosis.    XR CHEST PA & LAT    Result Date: 10/25/2023  EXAM: XR CHEST PA & LAT DATE: 10/25/2023 12:04 PM COMPARISON: 10/11/2023. CLINICAL DATA: . ICD 10:  J90 Pleural effusion, not elsewhere classified VIEWS: PA and lateral views of the chest were obtained. FINDINGS: The heart is enlarged. The pulmonary vasculature is within normal limits. There is a left-sided pleural effusion, subtly decreased compared to the previous study. No mediastinal shift. No right pleural effusion. Subsegmental left perihilar atelectasis or fibrosis. No pneumothorax. Postsurgical changes consistent with median sternotomy.    IMPRESSION: Left-sided pleural effusion is subtly decreased compared to the previous study. REPORT SIGNED BY DR. Didi Vasques    XR CHEST PA & LAT    Result Date: 10/11/2023  EXAM: XR CHEST PA & LAT DATE: 10/11/2023 2:35 PM ICD 10: Z95.1 Presence of aortocoronary bypass graft, , CLINICAL DATA: Status post open heart surgery. COMPARISON: September 30, 2023    IMPRESSION: 2 views. Sternotomy wires remain intact. The cardiac silhouette is enlarged, but not appreciably changed. Bilateral pleural effusions are present. The volume of pleural fluid has increased since the comparison exam. No pleural gas. Parenchymal opacities at the lung bases are likely atelectasis. Linear  "atelectasis in the left midlung. Upper lungs are clear. REPORT SIGNED BY DR. Ashok Hernandez      Lab Results:  Personally Reviewed.   Fingerstick Blood Glucose: @[id number](POCGLUFGR:10)@    Last Hbg A1C: No results found for: \"HGBA1C\"   Lab Results   Component Value Date    INR 1.32 (H) 11/08/2023    INR 1.39 (H) 11/07/2023    INR 1.49 (H) 11/06/2023     Recent Results (from the past 24 hour(s))   Glucose by meter    Collection Time: 11/07/23  9:38 PM   Result Value Ref Range    GLUCOSE BY METER POCT 129 (H) 70 - 99 mg/dL   Glucose by meter    Collection Time: 11/08/23  1:14 AM   Result Value Ref Range    GLUCOSE BY METER POCT 129 (H) 70 - 99 mg/dL   Magnesium    Collection Time: 11/08/23  5:56 AM   Result Value Ref Range    Magnesium 2.1 1.7 - 2.3 mg/dL   INR    Collection Time: 11/08/23  5:56 AM   Result Value Ref Range    INR 1.32 (H) 0.85 - 1.15   Renal panel    Collection Time: 11/08/23  5:56 AM   Result Value Ref Range    Sodium 139 135 - 145 mmol/L    Potassium 3.5 3.4 - 5.3 mmol/L    Chloride 105 98 - 107 mmol/L    Carbon Dioxide (CO2) 25 22 - 29 mmol/L    Anion Gap 9 7 - 15 mmol/L    Glucose 123 (H) 70 - 99 mg/dL    Urea Nitrogen 15.3 8.0 - 23.0 mg/dL    Creatinine 2.40 (H) 0.67 - 1.17 mg/dL    GFR Estimate 27 (L) >60 mL/min/1.73m2    Calcium 8.6 (L) 8.8 - 10.2 mg/dL    Albumin 3.0 (L) 3.5 - 5.2 g/dL    Phosphorus 4.3 2.5 - 4.5 mg/dL   Vancomycin level    Collection Time: 11/08/23  5:56 AM   Result Value Ref Range    Vancomycin 18.4   ug/mL   Glucose by meter    Collection Time: 11/08/23  6:00 AM   Result Value Ref Range    GLUCOSE BY METER POCT 122 (H) 70 - 99 mg/dL   Glucose by meter    Collection Time: 11/08/23  7:56 AM   Result Value Ref Range    GLUCOSE BY METER POCT 115 (H) 70 - 99 mg/dL   CBC with platelets    Collection Time: 11/08/23  8:20 AM   Result Value Ref Range    WBC Count 7.2 4.0 - 11.0 10e3/uL    RBC Count 3.06 (L) 4.40 - 5.90 10e6/uL    Hemoglobin 8.8 (L) 13.3 - 17.7 g/dL    Hematocrit " 28.5 (L) 40.0 - 53.0 %    MCV 93 78 - 100 fL    MCH 28.8 26.5 - 33.0 pg    MCHC 30.9 (L) 31.5 - 36.5 g/dL    RDW 16.2 (H) 10.0 - 15.0 %    Platelet Count 122 (L) 150 - 450 10e3/uL   Glucose by meter    Collection Time: 11/08/23 12:22 PM   Result Value Ref Range    GLUCOSE BY METER POCT 156 (H) 70 - 99 mg/dL           Advanced Care Planning    Medical Decision Making           Chaka Silverman MD  Date: 11/8/2023  Time: 8:28 PM  [facility]

## 2023-11-09 NOTE — PROGRESS NOTES
Care Management Follow Up    Length of Stay (days): 7    Expected Discharge Date: 11/14/2023     Concerns to be Addressed:     Care Progression  Patient plan of care discussed at interdisciplinary rounds: Yes    Anticipated Discharge Disposition:  ARU - Referral pending     Anticipated Discharge Services:  NA  Anticipated Discharge DME:  NA    Patient/family educated on Medicare website which has current facility and service quality ratings:  NA  Education Provided on the Discharge Plan:  NA  Patient/Family in Agreement with the Plan:  NA    Referrals Placed by CM/SW:  NA  Private pay costs discussed: Not applicable    Additional Information:  Chart reviewed.     Referral sent to ARU, ARU to follow patient progression.     Social HX: Patient from home with spouse. Independent at baseline, no Services, no DME. Recently hospitalized for a cardiac surgery.     CM will continue to follow care progression and aide in discharge planning as needed.     Nancy Orozco RN

## 2023-11-09 NOTE — PLAN OF CARE
Goal Outcome Evaluation:       [facility] - ICU    RN Progress Note:            Pertinent Assessments:      Please refer to flowsheet rows for full assessment     Patient can ambulate over 20 feet, however, he is at high risk of fall. Fall prevention applied. Poor to fair nutrition with monitoring blood glucose.           Key Events - This Shift:         Due to high Creatinine (3.0), Nephrologist came to visit him this morning and he is monitored his Cr and put off dialysis. Also, given Potassium tablets due to low K (3.3) and monitoring signs of hypokalemia.   His cardiologist visited by, he plan to perform TTE tomorrow

## 2023-11-10 ENCOUNTER — APPOINTMENT (OUTPATIENT)
Dept: PHYSICAL THERAPY | Facility: HOSPITAL | Age: 78
DRG: 856 | End: 2023-11-10
Payer: COMMERCIAL

## 2023-11-10 ENCOUNTER — APPOINTMENT (OUTPATIENT)
Dept: CARDIOLOGY | Facility: HOSPITAL | Age: 78
DRG: 856 | End: 2023-11-10
Attending: EMERGENCY MEDICINE
Payer: COMMERCIAL

## 2023-11-10 ENCOUNTER — APPOINTMENT (OUTPATIENT)
Dept: OCCUPATIONAL THERAPY | Facility: HOSPITAL | Age: 78
DRG: 856 | End: 2023-11-10
Payer: COMMERCIAL

## 2023-11-10 PROBLEM — N17.9 ACUTE KIDNEY FAILURE, UNSPECIFIED (H): Status: ACTIVE | Noted: 2023-11-10

## 2023-11-10 LAB
ANION GAP SERPL CALCULATED.3IONS-SCNC: 7 MMOL/L (ref 7–15)
BUN SERPL-MCNC: 20.3 MG/DL (ref 8–23)
CALCIUM SERPL-MCNC: 8.9 MG/DL (ref 8.8–10.2)
CHLORIDE SERPL-SCNC: 103 MMOL/L (ref 98–107)
CREAT SERPL-MCNC: 3.03 MG/DL (ref 0.67–1.17)
DEPRECATED HCO3 PLAS-SCNC: 31 MMOL/L (ref 22–29)
EGFRCR SERPLBLD CKD-EPI 2021: 20 ML/MIN/1.73M2
ERYTHROCYTE [DISTWIDTH] IN BLOOD BY AUTOMATED COUNT: 16.3 % (ref 10–15)
GLUCOSE BLDC GLUCOMTR-MCNC: 118 MG/DL (ref 70–99)
GLUCOSE BLDC GLUCOMTR-MCNC: 127 MG/DL (ref 70–99)
GLUCOSE BLDC GLUCOMTR-MCNC: 149 MG/DL (ref 70–99)
GLUCOSE BLDC GLUCOMTR-MCNC: 150 MG/DL (ref 70–99)
GLUCOSE SERPL-MCNC: 134 MG/DL (ref 70–99)
HCT VFR BLD AUTO: 27.2 % (ref 40–53)
HGB BLD-MCNC: 8.5 G/DL (ref 13.3–17.7)
INR PPP: 1.8 (ref 0.85–1.15)
LVEF ECHO: NORMAL
MAGNESIUM SERPL-MCNC: 1.7 MG/DL (ref 1.7–2.3)
MCH RBC QN AUTO: 28.8 PG (ref 26.5–33)
MCHC RBC AUTO-ENTMCNC: 31.3 G/DL (ref 31.5–36.5)
MCV RBC AUTO: 92 FL (ref 78–100)
PLATELET # BLD AUTO: 127 10E3/UL (ref 150–450)
POTASSIUM SERPL-SCNC: 3.5 MMOL/L (ref 3.4–5.3)
RBC # BLD AUTO: 2.95 10E6/UL (ref 4.4–5.9)
SODIUM SERPL-SCNC: 141 MMOL/L (ref 135–145)
WBC # BLD AUTO: 7.1 10E3/UL (ref 4–11)

## 2023-11-10 PROCEDURE — 250N000013 HC RX MED GY IP 250 OP 250 PS 637: Performed by: FAMILY MEDICINE

## 2023-11-10 PROCEDURE — 250N000013 HC RX MED GY IP 250 OP 250 PS 637: Performed by: EMERGENCY MEDICINE

## 2023-11-10 PROCEDURE — 97535 SELF CARE MNGMENT TRAINING: CPT | Mod: GO

## 2023-11-10 PROCEDURE — 83735 ASSAY OF MAGNESIUM: CPT | Performed by: EMERGENCY MEDICINE

## 2023-11-10 PROCEDURE — 93325 DOPPLER ECHO COLOR FLOW MAPG: CPT | Mod: 26 | Performed by: INTERNAL MEDICINE

## 2023-11-10 PROCEDURE — 250N000011 HC RX IP 250 OP 636: Mod: JZ | Performed by: EMERGENCY MEDICINE

## 2023-11-10 PROCEDURE — 85610 PROTHROMBIN TIME: CPT | Performed by: EMERGENCY MEDICINE

## 2023-11-10 PROCEDURE — 97530 THERAPEUTIC ACTIVITIES: CPT | Mod: GP

## 2023-11-10 PROCEDURE — 99232 SBSQ HOSP IP/OBS MODERATE 35: CPT | Performed by: INTERNAL MEDICINE

## 2023-11-10 PROCEDURE — 93325 DOPPLER ECHO COLOR FLOW MAPG: CPT

## 2023-11-10 PROCEDURE — 250N000012 HC RX MED GY IP 250 OP 636 PS 637: Performed by: EMERGENCY MEDICINE

## 2023-11-10 PROCEDURE — 93321 DOPPLER ECHO F-UP/LMTD STD: CPT | Mod: 26 | Performed by: INTERNAL MEDICINE

## 2023-11-10 PROCEDURE — 97116 GAIT TRAINING THERAPY: CPT | Mod: GP

## 2023-11-10 PROCEDURE — 250N000013 HC RX MED GY IP 250 OP 250 PS 637: Performed by: INTERNAL MEDICINE

## 2023-11-10 PROCEDURE — 93308 TTE F-UP OR LMTD: CPT | Mod: 26 | Performed by: INTERNAL MEDICINE

## 2023-11-10 PROCEDURE — 210N000001 HC R&B IMCU HEART CARE

## 2023-11-10 PROCEDURE — 80048 BASIC METABOLIC PNL TOTAL CA: CPT | Performed by: EMERGENCY MEDICINE

## 2023-11-10 PROCEDURE — 99232 SBSQ HOSP IP/OBS MODERATE 35: CPT | Mod: 24 | Performed by: INTERNAL MEDICINE

## 2023-11-10 PROCEDURE — 99232 SBSQ HOSP IP/OBS MODERATE 35: CPT | Performed by: FAMILY MEDICINE

## 2023-11-10 PROCEDURE — 93321 DOPPLER ECHO F-UP/LMTD STD: CPT

## 2023-11-10 PROCEDURE — 97110 THERAPEUTIC EXERCISES: CPT | Mod: GO

## 2023-11-10 PROCEDURE — 85027 COMPLETE CBC AUTOMATED: CPT | Performed by: EMERGENCY MEDICINE

## 2023-11-10 RX ORDER — CEFTRIAXONE 1 G/1
1 INJECTION, POWDER, FOR SOLUTION INTRAMUSCULAR; INTRAVENOUS EVERY 24 HOURS
Status: COMPLETED | OUTPATIENT
Start: 2023-11-11 | End: 2023-11-13

## 2023-11-10 RX ORDER — CARBOXYMETHYLCELLULOSE SODIUM 5 MG/ML
1 SOLUTION/ DROPS OPHTHALMIC
Status: DISCONTINUED | OUTPATIENT
Start: 2023-11-10 | End: 2023-11-14 | Stop reason: HOSPADM

## 2023-11-10 RX ORDER — POTASSIUM CHLORIDE 1500 MG/1
40 TABLET, EXTENDED RELEASE ORAL ONCE
Status: COMPLETED | OUTPATIENT
Start: 2023-11-10 | End: 2023-11-10

## 2023-11-10 RX ORDER — TORSEMIDE 20 MG/1
20 TABLET ORAL DAILY
Status: DISCONTINUED | OUTPATIENT
Start: 2023-11-10 | End: 2023-11-14 | Stop reason: HOSPADM

## 2023-11-10 RX ADMIN — APIXABAN 5 MG: 5 TABLET, FILM COATED ORAL at 20:20

## 2023-11-10 RX ADMIN — Medication 1 DROP: at 19:19

## 2023-11-10 RX ADMIN — Medication 5 MG: at 20:20

## 2023-11-10 RX ADMIN — ATORVASTATIN CALCIUM 40 MG: 40 TABLET, FILM COATED ORAL at 20:21

## 2023-11-10 RX ADMIN — CEFTRIAXONE SODIUM 1 G: 1 INJECTION, POWDER, FOR SOLUTION INTRAMUSCULAR; INTRAVENOUS at 11:48

## 2023-11-10 RX ADMIN — AMIODARONE HYDROCHLORIDE 400 MG: 200 TABLET ORAL at 08:58

## 2023-11-10 RX ADMIN — POTASSIUM CHLORIDE 40 MEQ: 1500 TABLET, EXTENDED RELEASE ORAL at 11:10

## 2023-11-10 RX ADMIN — MICONAZOLE NITRATE ANTIFUNGAL POWDER: 2 POWDER TOPICAL at 20:21

## 2023-11-10 RX ADMIN — INSULIN ASPART 1 UNITS: 100 INJECTION, SOLUTION INTRAVENOUS; SUBCUTANEOUS at 17:31

## 2023-11-10 RX ADMIN — QUETIAPINE FUMARATE 50 MG: 25 TABLET ORAL at 20:20

## 2023-11-10 RX ADMIN — TORSEMIDE 20 MG: 20 TABLET ORAL at 11:10

## 2023-11-10 RX ADMIN — QUETIAPINE FUMARATE 50 MG: 25 TABLET ORAL at 03:44

## 2023-11-10 RX ADMIN — APIXABAN 5 MG: 5 TABLET, FILM COATED ORAL at 08:58

## 2023-11-10 RX ADMIN — AMIODARONE HYDROCHLORIDE 400 MG: 200 TABLET ORAL at 20:21

## 2023-11-10 ASSESSMENT — ACTIVITIES OF DAILY LIVING (ADL)
ADLS_ACUITY_SCORE: 27

## 2023-11-10 NOTE — PROGRESS NOTES
[facility]    Medicine Progress Note - Hospitalist Service    Date of Admission:  11/2/2023    Assessment & Plan     78-year-old male with history of multivessel coronary artery disease status post three-vessel CABG September 26, 2023, atrial fibrillation, type 2 diabetes and recent  left lower extremity harvest site cellulitis.  Admitted November 2 for septic shock secondary to purulent left lower extremity soft tissue infection.  Had acute hypoxic respiratory failure requiring intubation from November 5 through 7 and oliguric CRISTAL requiring CRRT 11/3-11/7.   Developed a pericardial effusion with cardiac tamponade requiring pericardial window November 5.    Transferred out of ICU and to [facility] 11/8 for ongoing medical care     Left lower extremity cellulitis with resolved sepsis.    ---Bedside I&D done on November 2 with cultures growing Klebsiella pneumonia. --- Was on vancomycin and Zosyn November 2 through 7 and transition to Rocephin November 7.    ---add stop date today for 14 day of total therapy - consider top sooner if clinically responds    Acute hypoxic hypercapnic respiratory failure:   ---resolved  ---Multifactorial secondary to decreased cardiac output and shunting with compression atelectasis from the pericardial effusion,  small left pleural effusion seen on November 2.  ---was intubated  ---monitor    Oliguric CRISTAL with chronic kidney disease secondary to ATN:   ---Improving,   ---today discussed with nephrology ---Creatinine peaked at 10.58 on November ---CRRT November 3-7  --- Urine output improved on torsemide.   ---has tunneled line  ---  Creatinine now around  3.0     Atrial fibrillation:   ---somewhat tachy today  ---mag normal  ---On amiodarone and apixaban.  ---Cardiology following.    Coronary artery disease:  --- Status post three-vessel CABG in September.   ---had pericardial effusion   -- Resume aspirin and statin.  ---S/p surgical window for tamponade loculated  "effusion s/p CABG 11/5   - echo 11/8 no evidence tamponade physiology     Type 2 diabetes:   ---Blood sugars currently acceptable.   -- A1c on admission was 6.7.    ---Is typically on metformin at home, continue to hold for now.    ---continue sliding scale        Diet: Snacks/Supplements Adult: Ensure Enlive; With Meals  Low Saturated Fat Na <2400 mg    DVT Prophylaxis: DOAC  Cano Catheter: Not present  Lines: PRESENT      PICC 11/02/23 Triple Lumen Right Brachial vein medial-Site Assessment: WDL  Hemodialysis Vascular Access Subclavian vein catheter Left Subclavian-Site Assessment: WDL      Cardiac Monitoring: ACTIVE order. Indication: Open heart surgery (72 hours)  Code Status: Full Code      Clinically Significant Risk Factors        # Hypokalemia: Lowest K = 3.3 mmol/L in last 2 days, will replace as needed       # Hypoalbuminemia: Lowest albumin = 2.9 g/dL at 11/5/2023  5:18 PM, will monitor as appropriate  # Coagulation Defect: INR = 1.80 (Ref range: 0.85 - 1.15) and/or PTT = 33 Seconds (Ref range: 22 - 38 Seconds), will monitor for bleeding  # Thrombocytopenia: Lowest platelets = 117 in last 2 days, will monitor for bleeding         # DMII: A1C = 6.7 % (Ref range: <5.7 %) within past 6 months   # Overweight: Estimated body mass index is 29.6 kg/m  as calculated from the following:    Height as of this encounter: 1.803 m (5' 11\").    Weight as of this encounter: 96.3 kg (212 lb 3.2 oz).   # Moderate Malnutrition: based on nutrition assessment           Disposition Plan     Expected Discharge Date: 11/14/2023                    Justine Jimenez MD  Hospitalist Service  [facility]  Securely message with [employer] (more info)  Text page via [employer] Paging/Directory   ______________________________________________________________________    Interval History   --- Patient seen and chart reviewed.  Was up to bathroom  --- PT recommending ARU  --- He does endorse shortness of breath with exertion  --- " Poor appetite  --- Making some urine  --- Endorses some loose stools.    Physical Exam   Vital Signs: Temp: 98.8  F (37.1  C) Temp src: Oral BP: 122/71 Pulse: 96   Resp: 20 SpO2: 94 % O2 Device: None (Room air)    Weight: 212 lbs 3.2 oz    General Appearance: Pleasant male in no apparent distress  Respiratory: Clear to auscultation, kyphotic in his back without significant presacral edema  Cardiovascular: Irregularly irregular tachycardic  GI: Soft and nontender without hepatosplenomegaly  Skin: Trace lower extremity edema  Other: Neurologically grossly intact without focal deficits appreciated    Medical Decision Making             Data     I have personally reviewed the following data over the past 24 hrs:    7.1  \   8.5 (L)   / 127 (L)     141 103 20.3 /  149 (H)   3.5 31 (H) 3.03 (H) \     INR:  1.80 (H) PTT:  N/A   D-dimer:  N/A Fibrinogen:  N/A       Imaging results reviewed over the past 24 hrs:   Recent Results (from the past 24 hour(s))   Echocardiogram Limited   Result Value    LVEF  45-50% (mildly reduced)    Narrative    [id number]  [id number]  [id number]  [id number]^LORETTA^EDDIE^[place] [address]     Name: JONAH ARAUJO  MRN: [id number]  : 1945  Study Date: 11/10/2023 07:06 AM  Age: 78 yrs  Gender: Male  Patient Location: [facility]  Reason For Study: Pericardial Effusion  Ordering Physician: EDDIE BURGOS  Referring Physician: CHERYL VARELA  Performed By: DIONY     BSA: 2.2 m2  Height: 71 in  Weight: 212 lb  HR: 87  ______________________________________________________________________________  Procedure  Limited Portable Echo Adult.  ______________________________________________________________________________  Interpretation Summary     Limited study.  The left ventricle is normal in size.  Left ventricular function is decreased. The ejection fraction is 45-50%  (mildly reduced).  The left atrium is mildly dilated.  Small pericardial  effusion  Pleural effusion.  ______________________________________________________________________________  Left Ventricle  The left ventricle is normal in size. Left ventricular function is decreased.  The ejection fraction is 45-50% (mildly reduced). There is mild concentric  left ventricular hypertrophy. Septal motion is consistent with post-operative  state.     Right Ventricle  Normal right ventricle size and systolic function.     Atria  The left atrium is mildly dilated. Right atrial size is normal. There is no  color Doppler evidence of an atrial shunt.     Mitral Valve  Mitral valve leaflets appear normal.     Tricuspid Valve  The tricuspid valve is not well visualized, but is grossly normal.     Aortic Valve  Aortic valve leaflets appear normal.     Pulmonic Valve  The pulmonic valve is not well visualized.     Vessels  The aorta root is normal. Normal size ascending aorta.     Pericardium  Small pericardial effusion. There are no echocardiographic indications of  cardiac tamponade. Pleural effusion.     ______________________________________________________________________________  MMode/2D Measurements & Calculations  IVSd: 1.3 cm  LVIDd: 4.9 cm  LVIDs: 4.1 cm  LVPWd: 1.5 cm  FS: 15.8 %     LV mass(C)d: 284.3 grams  LV mass(C)dI: 131.5 grams/m2  RWT: 0.63     ______________________________________________________________________________  Report approved by: Zuleyka Espinoza 11/10/2023 09:55 AM

## 2023-11-10 NOTE — PROGRESS NOTES
"Care Management Follow Up    Length of Stay (days): 8    Expected Discharge Date: 11/14/2023     Concerns to be Addressed:     Care progression  Patient plan of care discussed at interdisciplinary rounds: Yes    Anticipated Discharge Disposition:  ARC     Anticipated Discharge Services:  ARC  Anticipated Discharge DME:  [facility]IR[facility]    Patient/family educated on Medicare website which has current facility and service quality ratings:  NA  Education Provided on the Discharge Plan:  Per team  Patient/Family in Agreement with the Plan:  NA    Referrals Placed by CM/SW:  NA  Private pay costs discussed: Not applicable    Additional Information:  Per provider, Dr. Jimenez, looks like ARU following. Pending renal input, patient maybe able to discharge Sun/Mon.    Social Hx: \"Patient from home with spouse. Spouse is primary family contact. ARU - referral pending.\"    RNCM to follow for medical progression, recommendations, and final discharge plan.     Alexandra Cameron RN    Called [phone] and spoke with Roosevelt. He wants RNCM to teams Raya LR is following patient.    Sent message to Raya Stark called and left a message. Question if patient will be on hemodialysis or not? Looks like some medication changes, will be on diuretic. Patient is appropriate for ARU per review. Raya will be here over the weekend into Mon, call when patient nearing dishcarge.  "

## 2023-11-10 NOTE — PLAN OF CARE
Problem: Risk for Delirium  Goal: Optimal Coping  Intervention: Optimize Psychosocial Adjustment to Delirium  Recent Flowsheet Documentation  Taken 11/10/2023 0354 by Tommie Still RN  Supportive Measures: self-care encouraged  Taken 11/9/2023 2302 by Tommie Still RN  Supportive Measures: self-care encouraged     Problem: Comorbidity Management  Goal: Blood Glucose Levels Within Targeted Range  Outcome: Progressing  Intervention: Monitor and Manage Glycemia  Recent Flowsheet Documentation  Taken 11/10/2023 0354 by Tommie Still RN  Glycemic Management: blood glucose monitored  Medication Review/Management: medications reviewed  Taken 11/9/2023 2302 by Tommie Still RN  Glycemic Management: blood glucose monitored  Medication Review/Management: medications reviewed   Goal Outcome Evaluation:       Pt arrived to P3 around 2300 last night. Vitals stable. Lung sounds clear. Pt on RA. Tele: Afib. Pt denies pain, neuropathy on toes at baseline. Pt denies SOB. Pt report being annoyed w/ frequent needed to urinate. Pt has torsemide schedule daily now instead of BID. Gave pt seroquel x1 for sleep around 0300am. Pt will try to get some sleep. Pt able to make needs known. Call light is within reach.

## 2023-11-10 NOTE — PROGRESS NOTES
NUTRITION EDUCATION      REASON FOR ASSESSMENT:  Consultation for Heart Healthy and Diabetes Diet Education    NUTRITION HISTORY:  Information obtained from nutrition note 11/7  History of DM. Patient reports decreased po intake since surgery in late September.      Today, pt mentioned they know eating less salt is good for them and rarely add table salt, and they like some fish sources, sunflower seeds in their yogurt, drink 1% milk, and know how to read nutrition labels.    CURRENT DIET:  Low Saturated Fat/Na <2400 mg  Ensure Enlive TID    NUTRITION DIAGNOSIS:  Food- and nutrition-related knowledge deficit R/t no previous formal diet education per pt report.    INTERVENTIONS:    Nutrition Prescription:  Continue to expand on base knowledge of heart healthy and diabetes-friendly diet and incorporate low fat and low sodium options into daily eating patterns.    Implementation:      *  Nutrition Education (Content):   A)  Provided handouts Heart Healthy Eating Nutrition Therapy, Heart Healthy Eating with Diabetes   B)  Discussed all heart healthy guidelines, carbohydrate sources that can raise blood sugar, incorporating a protein in with a carbohydrate and spacing carbohydrates throughout the day, label reading, and low sodium and low fat options for Asian food.      *  Nutrition Education (Application):   A)  Discussed current eating habits and recommended alternative food choices      *  Anticipate good compliance      *  Diet Education - refer to Education Flowsheet    Goals:      *  Patient will verbalize understanding of diet by naming dietary changes discussed today (moderation, lower-fat options, lower sodium options, or spreading out CHO with a protein source)       *  All of the above goals met during the education session    Follow Up/Monitoring:      *  Provided RD contact information for future questions      *  Recommended Out-Patient Nutrition Referral, if further diet instructions are needed     Jordyn  Tamiko  Dietetic Intern

## 2023-11-10 NOTE — PROGRESS NOTES
Renal progress note  CC:CRISTAL, anuria  Assessment and Plan:  78 year old male with hx of CKD 3, HTN , DM2, PAD, hx of Atrial fib on eliquis for AC, BPH , CAD with sp CABG 9/26/2023 after angiogram 9/22 with 3v disease cb mild CRISTAL at the time of discharge.  Nephrology consulted for severe CRISTAL with oligoanuric state     CRISTAL - Baseline creatinine 1.2-1.3, uptrending to 1.5s at the time of discharge, creatinine was 1.35 on 10/9/2023.  Patient had repeat labs with creatinine acutely elevated to 7.85 on 10/31/2023, further increased to 10.58 with significant azotemia  on 11/2/2023  UA turbid appearing likely consistent with ATN  CT imaging with evidence of atherosclerotic changes otherwise nonobstructive renal stones and some evidence of cystitis no hydronephrosis.  UOP good on torsemide, not all of it is recorded.  Cr unchanged from yesterday, seems to be at a plateau   Recs:  - can reduce torsemide a bit, trending more alkalotic  - no need for dialysis today  - seems a bit early to pull CVC, although IR likely unavailable over the weekend    Volume - weights trending down, arms look better, still some significant lower extremity edema.  Change torsemide to 20mg    Hypotension - BP firming up now    Hypokalemia - 3.5, give another 40meq KCl     Hypocalcemia - improved    HTN  PTA on Amlodipine Benazepril and coreg  Held HTN meds   Moderate pericardial effusion s/p pericardial window  Cards following     Anemia acute on chronic  Hemoglobin 8-9 recently  Iron sats low normal 26%  Did receive one-time PRBC overnight 11/5/23  Transfuse if less than 8 with recent cardiac surgery     Coronary disease s/p CABG x3 on 9/26/2023    History of atrial fibrillation s/p cardioversion in August 2023  On AC with Eliquis currently held    Loculated pericardial effusion concerns for tamponade physiology  Echo showing large-sized pericardial effusion with repeat echo 11/5/2023 showing some septal abnormality and large effusion  s/p pericardial window.  Repeat echo with improvement.  Cards following     Possible septic shock with leg cellulitis/abscess  General surgery completed bedside I&D  On ceftriaxone alone  Blood Cx negative, wound culture growing Klebsiella     Acute respiratory failure - resolved     DM2  Insulin management per ICU, explained why we're holding metformin       Thank you for the consultation we will follow    Isreal Alexis  [facility]  [phone]        Subjective  Seen in room, moved out of ICU.  BP firming up.  Complains of poor sleep 2/2 urinary frequency.  Got seroquel which helped.  Still has a lot of ankle edema.  Cr stable, I don't think he needs dialysis today.      Objective    Vital signs in last 24 hours  Temp:  [97.5  F (36.4  C)-98.8  F (37.1  C)] 98.8  F (37.1  C)  Pulse:  [] 96  Resp:  [16-78] 20  BP: (101-133)/(55-73) 122/71  SpO2:  [91 %-97 %] 94 %  Weight:   [unfilled]    Intake/Output last 3 shifts  I/O last 3 completed shifts:  In: 370 [P.O.:150; I.V.:220]  Out: 2125 [Urine:2125]  Intake/Output this shift:  No intake/output data recorded.    Physical Exam  Awake and alert, up in chair  CV: RRR without murmur or rub  Lung: clear and equal; no extra sounds  Ab: soft and NT; not distended; normal bs  Ext: edema in hands bilaterally, some in legs  Skin; no rash  Barragan no barragan  Access: tunneled LIJ line    Pertinent Labs   Lab Results   Component Value Date    WBC 7.1 11/10/2023    HGB 8.5 (L) 11/10/2023    HCT 27.2 (L) 11/10/2023    MCV 92 11/10/2023     (L) 11/10/2023     Lab Results   Component Value Date    BUN 20.3 11/10/2023     11/10/2023    CO2 31 (H) 11/10/2023       Lab Results   Component Value Date    ALBUMIN 3.0 (L) 11/08/2023     Lab Results   Component Value Date    PHOS 4.3 11/08/2023     I reviewed all lab results

## 2023-11-10 NOTE — PROGRESS NOTES
Imp/plan:  S/p surgical window for tamponade loculated effusion s/p CABG - echo yesterday no evidence tamponade physiology, sitting up using the toilet.   Afib on amiodarone and eliquis, Hgb 8.5 from 8.4 yesterday   CAD s/p CABG - on atorvastatin   Acute kidney injur/ATN on toresemide, rising Cr 3.0 from 1.24, followed by nephrology    Follow by Dr. Garcia in [facility]     Will sign off    Current Facility-Administered Medications   Medication    [Held by provider] acetaminophen (TYLENOL) tablet 650 mg    amiodarone (PACERONE) tablet 400 mg    apixaban ANTICOAGULANT (ELIQUIS) tablet 5 mg    atorvastatin (LIPITOR) tablet 40 mg    bisacodyl (DULCOLAX) suppository 10 mg    calcium gluconate 1 g in 50 mL in sodium chloride intermittent infusion    calcium gluconate 2 g in  mL intermittent infusion    calcium gluconate 3 g in sodium chloride 0.9 % 100 mL intermittent infusion    [START ON 11/11/2023] cefTRIAXone (ROCEPHIN) 1 g vial to attach to  mL bag for ADULTS or NS 50 mL bag for PEDS    dextrose 5% infusion    glucose gel 15-30 g    Or    dextrose 50 % injection 25-50 mL    Or    glucagon injection 1 mg    hydrALAZINE (APRESOLINE) injection 10 mg    insulin aspart (NovoLOG) injection (RAPID ACTING)    insulin aspart (NovoLOG) injection (RAPID ACTING)    ipratropium - albuterol 0.5 mg/2.5 mg/3 mL (DUONEB) neb solution 3 mL    lactated ringers BOLUS 250 mL    Lidocaine (LIDOCARE) 4 % Patch 1-2 patch    magnesium hydroxide (MILK OF MAGNESIA) suspension 30 mL    magnesium sulfate 2 g in 50 mL sterile water intermittent infusion    melatonin tablet 5 mg    miconazole (MICATIN) 2 % powder    naloxone (NARCAN) injection 0.2 mg    Or    naloxone (NARCAN) injection 0.4 mg    Or    naloxone (NARCAN) injection 0.2 mg    Or    naloxone (NARCAN) injection 0.4 mg    No heparin required    No heparin required    ondansetron (ZOFRAN ODT) ODT tab 4 mg    Or    ondansetron (ZOFRAN) injection 4 mg    oxyCODONE  "(ROXICODONE) tablet 5 mg    Or    oxyCODONE (ROXICODONE) tablet 10 mg    polyethylene glycol (MIRALAX) Packet 17 g    polyethylene glycol (MIRALAX) Packet 17 g    prochlorperazine (COMPAZINE) injection 5 mg    Or    prochlorperazine (COMPAZINE) tablet 5 mg    QUEtiapine (SEROquel) tablet 50 mg    Reason beta blocker order not selected    senna-docusate (SENOKOT-S/PERICOLACE) 8.6-50 MG per tablet 1 tablet    senna-docusate (SENOKOT-S/PERICOLACE) 8.6-50 MG per tablet 1 tablet    Or    senna-docusate (SENOKOT-S/PERICOLACE) 8.6-50 MG per tablet 2 tablet    sodium chloride (PF) 0.9% PF flush 10-20 mL    sodium chloride (PF) 0.9% PF flush 10-40 mL    sodium chloride (PF) 0.9% PF flush 10-40 mL    sodium chloride (PF) 0.9% PF flush 10-40 mL    sodium chloride 0.9% BOLUS 1-250 mL    torsemide (DEMADEX) tablet 20 mg     Past Medical History:   Diagnosis Date    Hx of CABG     Sept 2023 at [facility]        Review of Systems: 12 points negative other than above    /68 (BP Location: Left arm)   Pulse 96   Temp 98.6  F (37  C) (Oral)   Resp 16   Ht 1.803 m (5' 11\")   Wt 96.3 kg (212 lb 3.2 oz)   SpO2 98%   BMI 29.60 kg/m    JVP<7cm, carotids normal  Lungs clear  Cor RRR no c,r,m  Abs soft +BS, no mass  Ext no c,c,e    Lab Results   Component Value Date    HGB 8.5 (L) 11/10/2023     Lab Results   Component Value Date     (L) 11/10/2023     No results found for: \"CREATININE\"  No components found for: \"K\"          Stuart Gallardo MD  Interventional Cardiology   [facility]  [phone]    "

## 2023-11-11 ENCOUNTER — APPOINTMENT (OUTPATIENT)
Dept: PHYSICAL THERAPY | Facility: HOSPITAL | Age: 78
DRG: 856 | End: 2023-11-11
Payer: COMMERCIAL

## 2023-11-11 LAB
ANION GAP SERPL CALCULATED.3IONS-SCNC: 7 MMOL/L (ref 7–15)
BUN SERPL-MCNC: 21.1 MG/DL (ref 8–23)
CALCIUM SERPL-MCNC: 8.2 MG/DL (ref 8.8–10.2)
CHLORIDE SERPL-SCNC: 102 MMOL/L (ref 98–107)
CREAT SERPL-MCNC: 3.07 MG/DL (ref 0.67–1.17)
DEPRECATED HCO3 PLAS-SCNC: 33 MMOL/L (ref 22–29)
EGFRCR SERPLBLD CKD-EPI 2021: 20 ML/MIN/1.73M2
GLUCOSE BLDC GLUCOMTR-MCNC: 104 MG/DL (ref 70–99)
GLUCOSE BLDC GLUCOMTR-MCNC: 114 MG/DL (ref 70–99)
GLUCOSE BLDC GLUCOMTR-MCNC: 122 MG/DL (ref 70–99)
GLUCOSE BLDC GLUCOMTR-MCNC: 158 MG/DL (ref 70–99)
GLUCOSE SERPL-MCNC: 118 MG/DL (ref 70–99)
HGB BLD-MCNC: 7.7 G/DL (ref 13.3–17.7)
MAGNESIUM SERPL-MCNC: 1.7 MG/DL (ref 1.7–2.3)
POTASSIUM SERPL-SCNC: 3.8 MMOL/L (ref 3.4–5.3)
SODIUM SERPL-SCNC: 142 MMOL/L (ref 135–145)

## 2023-11-11 PROCEDURE — 80048 BASIC METABOLIC PNL TOTAL CA: CPT | Performed by: EMERGENCY MEDICINE

## 2023-11-11 PROCEDURE — 250N000013 HC RX MED GY IP 250 OP 250 PS 637: Performed by: EMERGENCY MEDICINE

## 2023-11-11 PROCEDURE — 250N000013 HC RX MED GY IP 250 OP 250 PS 637: Performed by: INTERNAL MEDICINE

## 2023-11-11 PROCEDURE — 99232 SBSQ HOSP IP/OBS MODERATE 35: CPT | Performed by: INTERNAL MEDICINE

## 2023-11-11 PROCEDURE — 83735 ASSAY OF MAGNESIUM: CPT | Performed by: EMERGENCY MEDICINE

## 2023-11-11 PROCEDURE — 85018 HEMOGLOBIN: CPT | Performed by: FAMILY MEDICINE

## 2023-11-11 PROCEDURE — 250N000011 HC RX IP 250 OP 636: Mod: JZ | Performed by: FAMILY MEDICINE

## 2023-11-11 PROCEDURE — 97116 GAIT TRAINING THERAPY: CPT | Mod: GP | Performed by: PHYSICAL THERAPIST

## 2023-11-11 PROCEDURE — 97530 THERAPEUTIC ACTIVITIES: CPT | Mod: GP | Performed by: PHYSICAL THERAPIST

## 2023-11-11 PROCEDURE — 210N000001 HC R&B IMCU HEART CARE

## 2023-11-11 RX ADMIN — MICONAZOLE NITRATE ANTIFUNGAL POWDER: 2 POWDER TOPICAL at 08:34

## 2023-11-11 RX ADMIN — Medication 5 MG: at 21:16

## 2023-11-11 RX ADMIN — CEFTRIAXONE SODIUM 1 G: 1 INJECTION, POWDER, FOR SOLUTION INTRAMUSCULAR; INTRAVENOUS at 11:47

## 2023-11-11 RX ADMIN — QUETIAPINE FUMARATE 50 MG: 25 TABLET ORAL at 01:30

## 2023-11-11 RX ADMIN — AMIODARONE HYDROCHLORIDE 400 MG: 200 TABLET ORAL at 21:16

## 2023-11-11 RX ADMIN — MICONAZOLE NITRATE ANTIFUNGAL POWDER: 2 POWDER TOPICAL at 21:16

## 2023-11-11 RX ADMIN — TORSEMIDE 20 MG: 20 TABLET ORAL at 08:33

## 2023-11-11 RX ADMIN — APIXABAN 5 MG: 5 TABLET, FILM COATED ORAL at 21:16

## 2023-11-11 RX ADMIN — APIXABAN 5 MG: 5 TABLET, FILM COATED ORAL at 08:33

## 2023-11-11 RX ADMIN — AMIODARONE HYDROCHLORIDE 400 MG: 200 TABLET ORAL at 08:33

## 2023-11-11 RX ADMIN — INSULIN ASPART 1 UNITS: 100 INJECTION, SOLUTION INTRAVENOUS; SUBCUTANEOUS at 11:40

## 2023-11-11 RX ADMIN — QUETIAPINE FUMARATE 50 MG: 25 TABLET ORAL at 21:16

## 2023-11-11 RX ADMIN — ATORVASTATIN CALCIUM 40 MG: 40 TABLET, FILM COATED ORAL at 21:16

## 2023-11-11 ASSESSMENT — ACTIVITIES OF DAILY LIVING (ADL)
ADLS_ACUITY_SCORE: 27

## 2023-11-11 NOTE — PLAN OF CARE
Problem: Comorbidity Management  Goal: Blood Glucose Levels Within Targeted Range  Intervention: Monitor and Manage Glycemia  Recent Flowsheet Documentation  Taken 11/11/2023 0838 by Joseisto Mitchell RN  Medication Review/Management: medications reviewed     Problem: Malnutrition  Goal: Improved Nutritional Intake  Outcome: Not Progressing     Creatinine 3.07 this morning (3.03 11/10). Tentative plan for CVC to be pulled 11/13 per nephrology.    Pt slept better overnight and reports more energy overall today. Ambulated hallways three times during day shift (approximately 300 ft each time with RN). Poor appetite today. Consumed a few bites for breakfast and lunch and an Ensure. Loose BM x2 today. VSS. A-fib on tele with no changes.

## 2023-11-11 NOTE — PROGRESS NOTES
Renal progress note  CC:CRISTAL, anuria  Assessment and Plan:  78 year old male with hx of CKD 3, HTN , DM2, PAD, hx of Atrial fib on eliquis for AC, BPH , CAD with sp CABG 9/26/2023 after angiogram 9/22 with 3v disease cb mild CRISTAL at the time of discharge.  Nephrology consulted for severe CRISTAL with oligoanuric state     CRISTAL - Baseline creatinine 1.2-1.3, uptrending to 1.5s at the time of discharge, creatinine was 1.35 on 10/9/2023.  Patient had repeat labs with creatinine acutely elevated to 7.85 on 10/31/2023, further increased to 10.58 with significant azotemia  on 11/2/2023  UA turbid appearing likely consistent with ATN  CT imaging with evidence of atherosclerotic changes otherwise nonobstructive renal stones and some evidence of cystitis no hydronephrosis.  UOP good on torsemide, not all of it is recorded.  Cr unchanged from yesterday, seems to be at a plateau   Recs:  - pause torsemide, trending more alkalotic  - no need for dialysis today  - likely pull CVC on Monday  - will benefit from follow up in our clinic    Volume - weights trending down, arms look better, still some significant lower extremity edema but more alkalotic, will pause torsemide    Hypotension - BP better now    Hypokalemia - 3.8, no replacement today     Hypocalcemia - improved    HTN  PTA on Amlodipine Benazepril and coreg  Held HTN meds   Moderate pericardial effusion s/p pericardial window  Cards signed off  BP still soft, can resume carvedilol as needed     Anemia acute on chronic  Hemoglobin 8-9 recently  Iron sats low normal 26%  Did receive one-time PRBC overnight 11/5/23  Transfuse if less than 8 with recent cardiac surgery     Coronary disease s/p CABG x3 on 9/26/2023    History of atrial fibrillation s/p cardioversion in August 2023  On AC with Eliquis currently held    Loculated pericardial effusion concerns for tamponade physiology  Echo showing large-sized pericardial effusion with repeat echo 11/5/2023 showing some  septal abnormality and large effusion s/p pericardial window.  Repeat echo with improvement.  Cards following     Possible septic shock with leg cellulitis/abscess  General surgery completed bedside I&D  On ceftriaxone alone  Blood Cx negative, wound culture growing Klebsiella     DM2  Insulin management per ICU, explained why we're holding metformin       Thank you for the consultation we will follow    Isreal Alexis  [facility]  [phone]        Subjective  Seen in room, feeling okay.  A bit better sleep.  Cr marginally up but fundamentally stable the last few days.  Still LOWER EXTREMITY edema but trending more alkalotic, will pause his torsemide.      Objective    Vital signs in last 24 hours  Temp:  [97.5  F (36.4  C)-98.8  F (37.1  C)] 98  F (36.7  C)  Pulse:  [] 94  Resp:  [16-20] 20  BP: (105-145)/(64-86) 128/71  SpO2:  [93 %-98 %] 94 %  Weight:   [unfilled]    Intake/Output last 3 shifts  I/O last 3 completed shifts:  In: 220 [P.O.:220]  Out: 750 [Urine:750]  Intake/Output this shift:  I/O this shift:  In: 360 [P.O.:360]  Out: 200 [Urine:200]    Physical Exam  Awake and alert, up in chair  CV: RRR without murmur or rub  Lung: clear and equal; no extra sounds  Ab: soft and NT; not distended; normal bs  Ext: edema in hands bilaterally, some in legs  Skin; no rash  Barragan no barragan  Access: tunneled LIJ line    Pertinent Labs   Lab Results   Component Value Date    WBC 7.1 11/10/2023    HGB 7.7 (L) 11/11/2023    HCT 27.2 (L) 11/10/2023    MCV 92 11/10/2023     (L) 11/10/2023     Lab Results   Component Value Date    BUN 21.1 11/11/2023     11/11/2023    CO2 33 (H) 11/11/2023       Lab Results   Component Value Date    ALBUMIN 3.0 (L) 11/08/2023     Lab Results   Component Value Date    PHOS 4.3 11/08/2023     I reviewed all lab results

## 2023-11-11 NOTE — PLAN OF CARE
Problem: Cardiovascular Surgery  Goal: Improved Activity Tolerance  Outcome: Progressing     Pt tolerating activity well today despite report of 2-3 hours of sleep last night. Ambulated hallways x3 today with therapy. Showered today. LLE wound appears to be healing well and dressing changed today. Site of infection pink with minimal serous drainage.  Lung sounds clear/diminished. A-fib with HR  on tele. No complaints of pain. Small loose stool this afternoon (senna and miralax held in a.m.). Vitally stable.    No dialysis today. Creatinine 3.03 this morning and 3.00 yesterday. Left subclavian catheter remaining in place at the moment. Continuing to monitor renal labs. Torsemide reduced by nephrology from 50 mg daily to 20 mg.

## 2023-11-11 NOTE — PROGRESS NOTES
[facility]    Medicine Progress Note - Hospitalist Service    Date of Admission:  11/2/2023    Assessment & Plan   78-year-old male with history of multivessel coronary artery disease status post three-vessel CABG September 26, 2023, atrial fibrillation, type 2 diabetes and recent  left lower extremity harvest site cellulitis.  Admitted November 2 for septic shock secondary to purulent left lower extremity soft tissue infection.  Had acute hypoxic respiratory failure requiring intubation from November 5 through 7 and oliguric CRISTAL requiring CRRT 11/3-11/7.   Developed a pericardial effusion with cardiac tamponade requiring pericardial window November 5.    [place] [facility] [place]     Left lower extremity cellulitis with resolved sepsis.    ---Bedside I&D done on November 2 with cultures growing Klebsiella pneumonia. --- Was on vancomycin and Zosyn November 2 through 7 and transition to Rocephin November 7.    ---add stop date today for 14 day of total therapy - consider top sooner if clinically responds  Discontinue Rocephin on 11/13    Acute hypoxic hypercapnic respiratory failure:   ---resolved  ---Multifactorial secondary to decreased cardiac output and shunting with compression atelectasis from the pericardial effusion,  small left pleural effusion seen on November 2.  ---was intubated  ---now on room air    Oliguric CRISTAL with chronic kidney disease secondary to ATN:   ---Improving,   ---today discussed with nephrology ---Creatinine peaked at 10.58 on November ---CRRT November 3-7  --- Urine output improved on torsemide.   ---has tunneled line, Nephrology will pull it off on Monday  ---  Creatinine now around  3.0     Atrial fibrillation:   --rate controlled  ---mag normal  ---On amiodarone and apixaban.  ---Cardiology following.    Coronary artery disease:  --- Status post three-vessel CABG in September.   ---had pericardial effusion   -- Resume  "aspirin and statin.  ---S/p surgical window for tamponade loculated effusion s/p CABG 11/5   - echo 11/8 no evidence tamponade physiology     Type 2 diabetes:   ---Blood sugars currently acceptable.   -- A1c on admission was 6.7.    ---Is typically on metformin at home, continue to hold for now.    ---continue sliding scale          Diet: Snacks/Supplements Adult: Ensure Enlive; With Meals  Low Saturated Fat Na <2400 mg    DVT Prophylaxis: DOAC  Cano Catheter: Not present  Lines: PRESENT      PICC 11/02/23 Triple Lumen Right Brachial vein medial-Site Assessment: WDL  Hemodialysis Vascular Access Subclavian vein catheter Left Subclavian-Site Assessment: WDL except      Cardiac Monitoring: ACTIVE order. Indication: Open heart surgery (72 hours)  Code Status: Full Code      Clinically Significant Risk Factors              # Hypoalbuminemia: Lowest albumin = 2.9 g/dL at 11/5/2023  5:18 PM, will monitor as appropriate  # Coagulation Defect: INR = 1.80 (Ref range: 0.85 - 1.15) and/or PTT = 33 Seconds (Ref range: 22 - 38 Seconds), will monitor for bleeding  # Thrombocytopenia: Lowest platelets = 127 in last 2 days, will monitor for bleeding         # DMII: A1C = 6.7 % (Ref range: <5.7 %) within past 6 months   # Overweight: Estimated body mass index is 29.6 kg/m  as calculated from the following:    Height as of this encounter: 1.803 m (5' 11\").    Weight as of this encounter: 96.3 kg (212 lb 3.2 oz).   # Moderate Malnutrition: based on nutrition assessment           Disposition Plan     Expected Discharge Date: 11/14/2023                    Александр Portillo MD  Hospitalist Service  [facility]  Securely message with [employer] (more info)  Text page via [facility] Paging/Directory   ______________________________________________________________________    Interval History   Patient new to me.  Chart reviewed.  No new complaints.  Awaiting placement.    Physical Exam   Vital Signs: Temp: 98  F (36.7  C) Temp " src: Oral BP: 119/62 Pulse: 97   Resp: 20 SpO2: 96 % O2 Device: None (Room air)    Weight: 212 lbs 3.2 oz    Central venous line on the left side the chest   lungs are clear to auscultation  Central chest surgical scar from previous bypass surgery  Abdomen is soft    Medical Decision Making       35 MINUTES SPENT BY ME on the date of service doing chart review, history, exam, documentation & further activities per the note.  MANAGEMENT DISCUSSED with the following over the past 24 hours: Patient   NOTE(S)/MEDICAL RECORDS REVIEWED over the past 24 hours: Nephrology notes       Data     I have personally reviewed the following data over the past 24 hrs:    N/A  \   7.7 (L)   / N/A     142 102 21.1 /  158 (H)   3.8 33 (H) 3.07 (H) \       Imaging results reviewed over the past 24 hrs:   No results found for this or any previous visit (from the past 24 hour(s)).

## 2023-11-11 NOTE — PLAN OF CARE
Problem: Adult Inpatient Plan of Care  Goal: Absence of Hospital-Acquired Illness or Injury  Intervention: Identify and Manage Fall Risk  Recent Flowsheet Documentation  Taken 11/11/2023 0400 by Uvaldo Chatman RN  Safety Promotion/Fall Prevention:   activity supervised   safety round/check completed   room organization consistent   room near nurse's station   room door open   patient and family education   nonskid shoes/slippers when out of bed   mobility aid in reach   lighting adjusted   increase visualization of patient   increased rounding and observation   clutter free environment maintained  Taken 11/11/2023 0000 by Uvaldo Chatman RN  Safety Promotion/Fall Prevention:   activity supervised   safety round/check completed   room organization consistent   room near nurse's station   room door open   patient and family education   nonskid shoes/slippers when out of bed   mobility aid in reach   lighting adjusted   increase visualization of patient   increased rounding and observation   clutter free environment maintained  Taken 11/10/2023 2000 by Uvaldo Chatman RN  Safety Promotion/Fall Prevention:   activity supervised   safety round/check completed   room organization consistent   room near nurse's station   room door open   patient and family education   nonskid shoes/slippers when out of bed   mobility aid in reach   lighting adjusted   increase visualization of patient   increased rounding and observation   clutter free environment maintained  Intervention: Prevent Skin Injury  Recent Flowsheet Documentation  Taken 11/11/2023 0400 by Uvaldo Chatman RN  Body Position: position changed independently  Taken 11/11/2023 0000 by Uvaldo Chatman RN  Body Position: position changed independently  Taken 11/10/2023 2000 by Uvaldo Chatman RN  Body Position: position changed independently  Intervention: Prevent Infection  Recent Flowsheet Documentation  Taken  11/11/2023 0400 by Uvaldo Chatman, RN  Infection Prevention:   rest/sleep promoted   single patient room provided   visitors restricted/screened  Taken 11/11/2023 0000 by Uvaldo Chatman RN  Infection Prevention:   rest/sleep promoted   single patient room provided   visitors restricted/screened  Taken 11/10/2023 2000 by Uvaldo Chatman, RN  Infection Prevention:   rest/sleep promoted   single patient room provided   visitors restricted/screened   Goal Outcome Evaluation:    Cardiac:  A-Fib with rate control, trending 80-90's.    Respiratory:  Rate 16-20, non-labored.  Maintaining sat's in the mid 90's at 2L NC.  LS: Diminished in the bases, bilat.  Neuro:  WNL.  GI:  Passing flatus.  BS: Present X4 quadrants.  X1 loose BM during HS/NOC.  :  Voiding adequate UOP (see I/O).  Pain:  Denies.  Ambulation:  Up with assist X1 with walker/gait belt.  Labs:  Renal levels worsening over last 24 hours (see results).  Plan:  Monitor renal function.  Pending possible discharge today.

## 2023-11-12 ENCOUNTER — APPOINTMENT (OUTPATIENT)
Dept: OCCUPATIONAL THERAPY | Facility: HOSPITAL | Age: 78
DRG: 856 | End: 2023-11-12
Payer: COMMERCIAL

## 2023-11-12 LAB
ANION GAP SERPL CALCULATED.3IONS-SCNC: 8 MMOL/L (ref 7–15)
BUN SERPL-MCNC: 21.5 MG/DL (ref 8–23)
CALCIUM SERPL-MCNC: 9 MG/DL (ref 8.8–10.2)
CHLORIDE SERPL-SCNC: 102 MMOL/L (ref 98–107)
CREAT SERPL-MCNC: 3 MG/DL (ref 0.67–1.17)
DEPRECATED HCO3 PLAS-SCNC: 33 MMOL/L (ref 22–29)
EGFRCR SERPLBLD CKD-EPI 2021: 21 ML/MIN/1.73M2
GLUCOSE BLDC GLUCOMTR-MCNC: 101 MG/DL (ref 70–99)
GLUCOSE BLDC GLUCOMTR-MCNC: 116 MG/DL (ref 70–99)
GLUCOSE BLDC GLUCOMTR-MCNC: 128 MG/DL (ref 70–99)
GLUCOSE BLDC GLUCOMTR-MCNC: 165 MG/DL (ref 70–99)
GLUCOSE SERPL-MCNC: 113 MG/DL (ref 70–99)
MAGNESIUM SERPL-MCNC: 1.6 MG/DL (ref 1.7–2.3)
PLATELET # BLD AUTO: 140 10E3/UL (ref 150–450)
POTASSIUM SERPL-SCNC: 4 MMOL/L (ref 3.4–5.3)
SODIUM SERPL-SCNC: 143 MMOL/L (ref 135–145)

## 2023-11-12 PROCEDURE — 250N000011 HC RX IP 250 OP 636: Mod: JZ | Performed by: INTERNAL MEDICINE

## 2023-11-12 PROCEDURE — 250N000013 HC RX MED GY IP 250 OP 250 PS 637: Performed by: EMERGENCY MEDICINE

## 2023-11-12 PROCEDURE — 97110 THERAPEUTIC EXERCISES: CPT | Mod: GO

## 2023-11-12 PROCEDURE — 80048 BASIC METABOLIC PNL TOTAL CA: CPT | Performed by: EMERGENCY MEDICINE

## 2023-11-12 PROCEDURE — 99232 SBSQ HOSP IP/OBS MODERATE 35: CPT | Performed by: INTERNAL MEDICINE

## 2023-11-12 PROCEDURE — 83735 ASSAY OF MAGNESIUM: CPT | Performed by: EMERGENCY MEDICINE

## 2023-11-12 PROCEDURE — 210N000001 HC R&B IMCU HEART CARE

## 2023-11-12 PROCEDURE — 85049 AUTOMATED PLATELET COUNT: CPT | Performed by: EMERGENCY MEDICINE

## 2023-11-12 RX ADMIN — MICONAZOLE NITRATE ANTIFUNGAL POWDER: 2 POWDER TOPICAL at 08:03

## 2023-11-12 RX ADMIN — MICONAZOLE NITRATE ANTIFUNGAL POWDER: 2 POWDER TOPICAL at 21:22

## 2023-11-12 RX ADMIN — CEFTRIAXONE SODIUM 1 G: 1 INJECTION, POWDER, FOR SOLUTION INTRAMUSCULAR; INTRAVENOUS at 12:51

## 2023-11-12 RX ADMIN — AMIODARONE HYDROCHLORIDE 400 MG: 200 TABLET ORAL at 21:23

## 2023-11-12 RX ADMIN — APIXABAN 5 MG: 5 TABLET, FILM COATED ORAL at 21:23

## 2023-11-12 RX ADMIN — ATORVASTATIN CALCIUM 40 MG: 40 TABLET, FILM COATED ORAL at 21:23

## 2023-11-12 RX ADMIN — APIXABAN 5 MG: 5 TABLET, FILM COATED ORAL at 08:03

## 2023-11-12 RX ADMIN — QUETIAPINE FUMARATE 50 MG: 25 TABLET ORAL at 01:25

## 2023-11-12 RX ADMIN — Medication 5 MG: at 21:23

## 2023-11-12 RX ADMIN — AMIODARONE HYDROCHLORIDE 400 MG: 200 TABLET ORAL at 08:03

## 2023-11-12 RX ADMIN — INSULIN ASPART 1 UNITS: 100 INJECTION, SOLUTION INTRAVENOUS; SUBCUTANEOUS at 12:44

## 2023-11-12 RX ADMIN — QUETIAPINE FUMARATE 50 MG: 25 TABLET ORAL at 21:46

## 2023-11-12 ASSESSMENT — ACTIVITIES OF DAILY LIVING (ADL)
ADLS_ACUITY_SCORE: 27
ADLS_ACUITY_SCORE: 27
ADLS_ACUITY_SCORE: 31
ADLS_ACUITY_SCORE: 27
ADLS_ACUITY_SCORE: 31
ADLS_ACUITY_SCORE: 27

## 2023-11-12 NOTE — PROGRESS NOTES
[facility]    Medicine Progress Note - Hospitalist Service    Date of Admission:  11/2/2023    Assessment & Plan   78-year-old male with history of multivessel coronary artery disease status post three-vessel CABG September 26, 2023, atrial fibrillation, type 2 diabetes and recent  left lower extremity harvest site cellulitis.  Admitted November 2 for septic shock secondary to purulent left lower extremity soft tissue infection.  Had acute hypoxic respiratory failure requiring intubation from November 5 through 7 and oliguric CRISTAL requiring CRRT 11/3-11/7.   Developed a pericardial effusion with cardiac tamponade requiring pericardial window November 5.    Transferred out of ICU and to [facility] [place] 11/8 for ongoing medical care     Left lower extremity cellulitis with resolved sepsis.    ---Bedside I&D done on November 2 with cultures growing Klebsiella pneumonia. --- Was on vancomycin and Zosyn November 2 through 7 and transition to Rocephin November 7.    ---add stop date today for 14 day of total therapy - consider top sooner if clinically responds  Discontinue Rocephin on 11/13    Acute hypoxic hypercapnic respiratory failure:   ---resolved  ---Multifactorial secondary to decreased cardiac output and shunting with compression atelectasis from the pericardial effusion,  small left pleural effusion seen on November 2.  ---was intubated  ---now on room air    Oliguric CRISTAL with chronic kidney disease secondary to ATN:   ---Improving,   --y discussed with nephrology ---Creatinine peaked at 10.58 on November ---CRRT November 3-7  --- Urine output improved on torsemide.   ---has tunneled line, Nephrology will pull it off on Monday  ---  Creatinine now around  3.0     Atrial fibrillation:   --rate controlled  ---mag normal  ---On amiodarone and apixaban.  ---Cardiology following.    Coronary artery disease:  --- Status post three-vessel CABG in September.   ---had pericardial effusion   -- Resume aspirin  "and statin.  ---S/p surgical window for tamponade loculated effusion s/p CABG 11/5   - echo 11/8 no evidence tamponade physiology     Type 2 diabetes:   ---Blood sugars currently acceptable.   -- A1c on admission was 6.7.    ---Is typically on metformin at home, continue to hold for now.    ---continue sliding scale          Diet: Snacks/Supplements Adult: Ensure Enlive; With Meals  Low Saturated Fat Na <2400 mg    DVT Prophylaxis: DOAC  Cano Catheter: Not present  Lines: PRESENT      PICC 11/02/23 Triple Lumen Right Brachial vein medial-Site Assessment: WDL  Hemodialysis Vascular Access Subclavian vein catheter Left Subclavian-Site Assessment: WDL except      Cardiac Monitoring: ACTIVE order. Indication: Open heart surgery (72 hours)  Code Status: Full Code      Clinically Significant Risk Factors            # Hypomagnesemia: Lowest Mg = 1.6 mg/dL in last 2 days, will replace as needed   # Hypoalbuminemia: Lowest albumin = 2.9 g/dL at 11/5/2023  5:18 PM, will monitor as appropriate    # Coagulation Defect: INR = 1.80 (Ref range: 0.85 - 1.15) and/or PTT = 33 Seconds (Ref range: 22 - 38 Seconds), will monitor for bleeding          # DMII: A1C = 6.7 % (Ref range: <5.7 %) within past 6 months   # Overweight: Estimated body mass index is 29.36 kg/m  as calculated from the following:    Height as of this encounter: 1.803 m (5' 11\").    Weight as of this encounter: 95.5 kg (210 lb 8 oz).   # Moderate Malnutrition: based on nutrition assessment           Disposition Plan     Expected Discharge Date: 11/14/2023                    Александр Portillo MD  Hospitalist Service  [facility]  Securely message with [employer] (more info)  Text page via [email or web address] Paging/Directory   ______________________________________________________________________    Interval History   Patient new to me.  Chart reviewed.  No new complaints.  Awaiting placement.  Wife at bedside.  Physical Exam   Vital Signs: Temp: 98.6  F (37  C) " Temp src: Oral BP: 127/74 Pulse: 100   Resp: 20 SpO2: 95 % O2 Device: None (Room air)    Weight: 210 lbs 8 oz    Central venous line on the left side the chest   lungs are clear to auscultation  Central chest surgical scar from previous bypass surgery  Abdomen is soft    Medical Decision Making       35 MINUTES SPENT BY ME on the date of service doing chart review, history, exam, documentation & further activities per the note.  MANAGEMENT DISCUSSED with the following over the past 24 hours: Patient   NOTE(S)/MEDICAL RECORDS REVIEWED over the past 24 hours: Nephrology notes       Data     I have personally reviewed the following data over the past 24 hrs:    N/A  \   N/A   / 140 (L)     143 102 21.5 /  101 (H)   4.0 33 (H) 3.00 (H) \       Imaging results reviewed over the past 24 hrs:   No results found for this or any previous visit (from the past 24 hour(s)).

## 2023-11-12 NOTE — PROGRESS NOTES
Renal progress note  CC:CRISTAL, sepsis    Assessment and Plan:  78 year old male with hx of CKD 3, HTN , DM2, PAD, hx of Atrial fib on eliquis for AC, BPH , CAD with sp CABG 9/26/2023 after angiogram 9/22 with 3v disease cb mild CRISTAL at the time of discharge.  Nephrology consulted for severe CRISTAL with oligoanuric state     CRISTAL - Baseline creatinine 1.2-1.3, uptrending to 1.5s at the time of discharge, creatinine was 1.35 on 10/9/2023.  Patient had repeat labs with creatinine acutely elevated to 7.85 on 10/31/2023, further increased to 10.58 with significant azotemia  on 11/2/2023  UA turbid appearing likely consistent with ATN  CT imaging with evidence of atherosclerotic changes otherwise nonobstructive renal stones and some evidence of cystitis no hydronephrosis.  UOP good on torsemide, not all of it is recorded.  Cr unchanged from yesterday, seems to be at a plateau   Recs:  - torsemide on hold, likely need some still on discharge  - no need for dialysis today  - likely pull CVC on Monday if Cr stable  - will benefit from follow up in our clinic, not arranged    Volume - weights trending down, arms look better, still some lower extremity edema but more alkalotic and torsemide held    Hypotension - BP better now, off cardiac meds     Hypokalemia - stable    HTN  PTA on Amlodipine Benazepril and coreg  Held HTN meds   Moderate pericardial effusion s/p pericardial window  Cards signed off  BP firming up, can resume carvedilol as needed     Anemia acute on chronic  Hemoglobin 8-9 recently  Iron sats low normal 26%  Did receive one-time PRBC overnight 11/5/23  Transfuse if less than 8 with recent cardiac surgery     Coronary disease s/p CABG x3 on 9/26/2023    Atrial fibrillation s/p cardioversion in August 2023  On AC with Eliquis currently held    Loculated pericardial effusion - s/p pericardial window.  Repeat echo with improvement.  Cards signed off     Possible septic shock with leg  cellulitis/abscess  General surgery completed bedside I&D  On ceftriaxone alone, finishing tomorrow  Blood Cx negative, wound culture growing Klebsiella     DM2  Insulin management per ICU, explained why we're holding metformin       Thank you for the consultation we will follow    Isreal Alexis  [facility]  [phone]          Subjective  Cr remains stable around 3, hopeful to see trend downwards.  Anticipate being able to remove CVC tomorrow.  Will need follow up after discharge.    Objective    Vital signs in last 24 hours  Temp:  [97.7  F (36.5  C)-98.6  F (37  C)] 98  F (36.7  C)  Pulse:  [87-99] 97  Resp:  [18-20] 20  BP: (101-145)/(60-72) 118/72  SpO2:  [92 %-97 %] 96 %  Weight:   [unfilled]    Intake/Output last 3 shifts  I/O last 3 completed shifts:  In: 480 [P.O.:480]  Out: 1300 [Urine:1300]  Intake/Output this shift:  I/O this shift:  In: 290 [P.O.:290]  Out: -     Physical Exam  Awake and alert, up in chair  CV: RRR without murmur or rub  Lung: clear and equal; no extra sounds  Ab: soft and NT; not distended; normal bs  Ext: edema in hands bilaterally, some in legs  Skin; no rash  Barragan no barragan  Access: tunneled LIJ line    Pertinent Labs   Lab Results   Component Value Date    WBC 7.1 11/10/2023    HGB 7.7 (L) 11/11/2023    HCT 27.2 (L) 11/10/2023    MCV 92 11/10/2023     (L) 11/12/2023     Lab Results   Component Value Date    BUN 21.5 11/12/2023     11/12/2023    CO2 33 (H) 11/12/2023       Lab Results   Component Value Date    ALBUMIN 3.0 (L) 11/08/2023     Lab Results   Component Value Date    PHOS 4.3 11/08/2023     I reviewed all lab results

## 2023-11-12 NOTE — PLAN OF CARE
Problem: Adult Inpatient Plan of Care  Goal: Absence of Hospital-Acquired Illness or Injury  Intervention: Identify and Manage Fall Risk  Recent Flowsheet Documentation  Taken 11/12/2023 0000 by Uvaldo Chatman RN  Safety Promotion/Fall Prevention:   activity supervised   clutter free environment maintained   nonskid shoes/slippers when out of bed   room door open  Taken 11/11/2023 2000 by Uvaldo Chatman RN  Safety Promotion/Fall Prevention:   activity supervised   clutter free environment maintained   nonskid shoes/slippers when out of bed   room door open  Intervention: Prevent Skin Injury  Recent Flowsheet Documentation  Taken 11/12/2023 0000 by Uvaldo Chatman RN  Body Position: position changed independently  Taken 11/11/2023 2000 by Uvaldo Chatman RN  Body Position: position changed independently   Goal Outcome Evaluation:    Cardiac:  A-Fib with rate control, trending 80-90's.    Respiratory:  Rate 16-20, non-labored.  Maintaining sat's in the mid 90's at RA.  LS: Diminished in the bases, bilat.  Requested supplemental O2 during NOC.  Describing sleep as restless.  Pt's thought this may be O2 related.   Neuro:  WNL.  GI:  Passing flatus.  BS: Present X4 quadrants.    :  Voiding adequate UOP (see I/O).  Pain:  Denies.  Ambulation:  Up with assist X1 with walker/gait belt.  Labs:  AM labs pending.  Plan:  Monitor renal function.  Treat S&S as indicated.

## 2023-11-13 ENCOUNTER — APPOINTMENT (OUTPATIENT)
Dept: INTERVENTIONAL RADIOLOGY/VASCULAR | Facility: HOSPITAL | Age: 78
DRG: 856 | End: 2023-11-13
Payer: COMMERCIAL

## 2023-11-13 ENCOUNTER — APPOINTMENT (OUTPATIENT)
Dept: PHYSICAL THERAPY | Facility: HOSPITAL | Age: 78
DRG: 856 | End: 2023-11-13
Payer: COMMERCIAL

## 2023-11-13 ENCOUNTER — APPOINTMENT (OUTPATIENT)
Dept: OCCUPATIONAL THERAPY | Facility: HOSPITAL | Age: 78
DRG: 856 | End: 2023-11-13
Payer: COMMERCIAL

## 2023-11-13 LAB
ANION GAP SERPL CALCULATED.3IONS-SCNC: 6 MMOL/L (ref 7–15)
BUN SERPL-MCNC: 21.4 MG/DL (ref 8–23)
CALCIUM SERPL-MCNC: 8.4 MG/DL (ref 8.8–10.2)
CHLORIDE SERPL-SCNC: 102 MMOL/L (ref 98–107)
CREAT SERPL-MCNC: 2.91 MG/DL (ref 0.67–1.17)
DEPRECATED HCO3 PLAS-SCNC: 34 MMOL/L (ref 22–29)
EGFRCR SERPLBLD CKD-EPI 2021: 21 ML/MIN/1.73M2
GLUCOSE BLDC GLUCOMTR-MCNC: 101 MG/DL (ref 70–99)
GLUCOSE BLDC GLUCOMTR-MCNC: 115 MG/DL (ref 70–99)
GLUCOSE BLDC GLUCOMTR-MCNC: 118 MG/DL (ref 70–99)
GLUCOSE BLDC GLUCOMTR-MCNC: 122 MG/DL (ref 70–99)
GLUCOSE BLDC GLUCOMTR-MCNC: 124 MG/DL (ref 70–99)
GLUCOSE SERPL-MCNC: 112 MG/DL (ref 70–99)
MAGNESIUM SERPL-MCNC: 1.7 MG/DL (ref 1.7–2.3)
POTASSIUM SERPL-SCNC: 4 MMOL/L (ref 3.4–5.3)
SODIUM SERPL-SCNC: 142 MMOL/L (ref 135–145)

## 2023-11-13 PROCEDURE — 99232 SBSQ HOSP IP/OBS MODERATE 35: CPT | Performed by: INTERNAL MEDICINE

## 2023-11-13 PROCEDURE — 36589 REMOVAL TUNNELED CV CATH: CPT

## 2023-11-13 PROCEDURE — 250N000013 HC RX MED GY IP 250 OP 250 PS 637: Performed by: EMERGENCY MEDICINE

## 2023-11-13 PROCEDURE — 250N000011 HC RX IP 250 OP 636: Mod: JZ | Performed by: INTERNAL MEDICINE

## 2023-11-13 PROCEDURE — 02PY33Z REMOVAL OF INFUSION DEVICE FROM GREAT VESSEL, PERCUTANEOUS APPROACH: ICD-10-PCS | Performed by: RADIOLOGY

## 2023-11-13 PROCEDURE — 80048 BASIC METABOLIC PNL TOTAL CA: CPT | Performed by: EMERGENCY MEDICINE

## 2023-11-13 PROCEDURE — 0JPT3XZ REMOVAL OF TUNNELED VASCULAR ACCESS DEVICE FROM TRUNK SUBCUTANEOUS TISSUE AND FASCIA, PERCUTANEOUS APPROACH: ICD-10-PCS | Performed by: RADIOLOGY

## 2023-11-13 PROCEDURE — 83735 ASSAY OF MAGNESIUM: CPT | Performed by: EMERGENCY MEDICINE

## 2023-11-13 PROCEDURE — 97110 THERAPEUTIC EXERCISES: CPT | Mod: GP

## 2023-11-13 PROCEDURE — 210N000001 HC R&B IMCU HEART CARE

## 2023-11-13 PROCEDURE — 97110 THERAPEUTIC EXERCISES: CPT | Mod: GO

## 2023-11-13 RX ADMIN — AMIODARONE HYDROCHLORIDE 400 MG: 200 TABLET ORAL at 21:14

## 2023-11-13 RX ADMIN — APIXABAN 5 MG: 5 TABLET, FILM COATED ORAL at 08:23

## 2023-11-13 RX ADMIN — ATORVASTATIN CALCIUM 40 MG: 40 TABLET, FILM COATED ORAL at 21:14

## 2023-11-13 RX ADMIN — AMIODARONE HYDROCHLORIDE 400 MG: 200 TABLET ORAL at 08:23

## 2023-11-13 RX ADMIN — MICONAZOLE NITRATE ANTIFUNGAL POWDER: 2 POWDER TOPICAL at 08:23

## 2023-11-13 RX ADMIN — CEFTRIAXONE SODIUM 1 G: 1 INJECTION, POWDER, FOR SOLUTION INTRAMUSCULAR; INTRAVENOUS at 11:19

## 2023-11-13 RX ADMIN — QUETIAPINE FUMARATE 50 MG: 25 TABLET ORAL at 21:14

## 2023-11-13 RX ADMIN — Medication 5 MG: at 21:15

## 2023-11-13 RX ADMIN — QUETIAPINE FUMARATE 50 MG: 25 TABLET ORAL at 01:21

## 2023-11-13 RX ADMIN — APIXABAN 5 MG: 5 TABLET, FILM COATED ORAL at 21:15

## 2023-11-13 ASSESSMENT — ACTIVITIES OF DAILY LIVING (ADL)
ADLS_ACUITY_SCORE: 31
ADLS_ACUITY_SCORE: 29
ADLS_ACUITY_SCORE: 31
ADLS_ACUITY_SCORE: 31

## 2023-11-13 NOTE — IR NOTE
Interventional Radiology Post-Procedure Note   ?   Brief Procedure Note:   Patient name: Gerardo Al  Pt MRN:[id number]   Date of procedure: 11/13/2023     Procedure(s): Tunneled dialysis catheter removal  Sedation method: Moderate sedation was not employed. Local lidocaine only.    ?   Specimen(s) removed: None   Additional studies ordered: None  Drains: None   Estimated Blood Loss: Minimal  Complications: None  Vascular closure method: N/A    Findings/Notes/Comments: Successful left internal jugular tunneled dialysis catheter removal.   ?   Please see dictation in PACS or under the Imaging tab in [facility] for detailed procedure note.     Sebastian Cintron M.D.   Vascular and Interventional Radiology   Pager: [phone]   After Hours / Scheduling: [phone]     11/13/2023  12:46 PM

## 2023-11-13 NOTE — PROGRESS NOTES
Renal progress note  CC:CRISTAL, sepsis    Assessment and Plan:  78 year old male with hx of CKD 3, HTN , DM2, PAD, hx of Atrial fib on eliquis for AC, BPH , CAD with sp CABG 9/26/2023 after angiogram 9/22 with 3v disease cb mild CRISTAL at the time of discharge.  Nephrology consulted for severe CRISTAL with oligoanuric state     CRISTAL - Baseline creatinine 1.2-1.3, uptrending to 1.5s at the time of discharge, creatinine was 1.35 on 10/9/2023.  Patient had repeat labs with creatinine acutely elevated to 7.85 on 10/31/2023, further increased to 10.58 with significant azotemia  on 11/2/2023  UA turbid appearing likely consistent with ATN  CT imaging with evidence of atherosclerotic changes otherwise nonobstructive renal stones and some evidence of cystitis no hydronephrosis.  UOP good on torsemide, not all of it is recorded.  Cr unchanged from yesterday, seems to be at a plateau   Recs:  - torsemide on hold, likely need some still on discharge  - no need for dialysis today  - plan to pull CVC out today  - will benefit from follow up in our clinic and will arrange.     Volume - weights trending down, arms look better, still some lower extremity edema but more alkalotic and torsemide held  - monitor weight.     Hypotension - BP better now, off cardiac meds   - no changes to be made today.     Hypokalemia - stable,   K 4.0 today.     HTN  PTA on Amlodipine Benazepril and coreg  Held HTN meds   Moderate pericardial effusion s/p pericardial window  Cards signed off  BP firming up, can resume carvedilol as needed  - keep the same.      Anemia acute on chronic  Hemoglobin 8-9 recently  Iron sats low normal 26%  Did receive one-time PRBC overnight 11/5/23  Transfuse if less than 8 with recent cardiac surgery  - further transfusion per primary team.           Thank you for the consultation we will follow    Zoie Segura MD   [facility]  [phone]          Subjective  Patient seen and examine at  bedside. Feeling ok. Cr trending down.   Plan to remove CVC today.     Objective    Vital signs in last 24 hours  Temp:  [97.4  F (36.3  C)-98.8  F (37.1  C)] 98.4  F (36.9  C)  Pulse:  [78-96] 96  Resp:  [18-20] 20  BP: (112-135)/(62-81) 131/73  SpO2:  [93 %-97 %] 94 %  Weight:   [unfilled]    Intake/Output last 3 shifts  I/O last 3 completed shifts:  In: 410 [P.O.:410]  Out: 675 [Urine:675]  Intake/Output this shift:  I/O this shift:  In: 240 [P.O.:240]  Out: 350 [Urine:350]    Physical Exam  Awake and alert, up in chair  CV: RRR without murmur or rub  Lung: clear and equal; no extra sounds  Ab: soft and NT; not distended; normal bs  Ext: edema in hands bilaterally, some in legs  Skin; no rash  Barragan no barragan  Access: tunneled LIJ line    Pertinent Labs   Lab Results   Component Value Date    WBC 7.1 11/10/2023    HGB 7.7 (L) 11/11/2023    HCT 27.2 (L) 11/10/2023    MCV 92 11/10/2023     (L) 11/12/2023     Lab Results   Component Value Date    BUN 21.4 11/13/2023     11/13/2023    CO2 34 (H) 11/13/2023       Lab Results   Component Value Date    ALBUMIN 3.0 (L) 11/08/2023     Lab Results   Component Value Date    PHOS 4.3 11/08/2023     I reviewed all lab results

## 2023-11-13 NOTE — PROGRESS NOTES
Tunneled dialysis cath removed. Cath intact. Pressure held by MD and dressing applied. No bleeding noted at time of transfer back to room 315. Report to be given to primary Nurse at bedside.

## 2023-11-13 NOTE — PLAN OF CARE
Problem: Adult Inpatient Plan of Care  Goal: Absence of Hospital-Acquired Illness or Injury  Intervention: Identify and Manage Fall Risk  Recent Flowsheet Documentation  Taken 11/13/2023 0000 by Uvaldo Chatman RN  Safety Promotion/Fall Prevention:   activity supervised   clutter free environment maintained   room door open   safety round/check completed   room organization consistent   room near nurse's station   patient and family education   nonskid shoes/slippers when out of bed   mobility aid in reach   lighting adjusted   increase visualization of patient   increased rounding and observation  Taken 11/12/2023 2000 by Uvaldo Chatman RN  Safety Promotion/Fall Prevention:   activity supervised   clutter free environment maintained   room door open   safety round/check completed   room organization consistent   room near nurse's station   patient and family education   nonskid shoes/slippers when out of bed   mobility aid in reach   lighting adjusted   increase visualization of patient   increased rounding and observation  Intervention: Prevent Skin Injury  Recent Flowsheet Documentation  Taken 11/13/2023 0000 by Uvadlo Chatman RN  Body Position: position changed independently  Taken 11/12/2023 2000 by Uvaldo Chatman RN  Body Position: position changed independently  Goal: Optimal Comfort and Wellbeing  Intervention: Provide Person-Centered Care  Recent Flowsheet Documentation  Taken 11/13/2023 0100 by Uvaldo Chatman RN  Trust Relationship/Rapport:   care explained   empathic listening provided   Goal Outcome Evaluation:    Cardiac:  A-Fib with rate control, trending 90's.    Respiratory:  Rate 16-20, non-labored.  Maintaining sat's in the mid 90's at RA.  LS: Diminished in the bases, bilat.   Describing sleep as restful.  Pt's thought this may be O2 related as pt requested to receive supplemental O2 via NC.  Neuro:  WNL.  GI:  Passing flatus.  BS: Present X4  quadrants.  No BM for HS/NOC.  :  Voiding adequate UOP (see I/O).  Pain:  Denies.  Ambulation:  Up with SBA X1 with walker/gait belt.  Labs:  AM labs pending.  Plan:  Monitor renal function.  Treat S&S as indicated.

## 2023-11-13 NOTE — PROGRESS NOTES
Care Management Follow Up    Length of Stay (days): 11    Expected Discharge Date: 11/14/2023     Concerns to be Addressed:   tunnel cath removal      Patient plan of care discussed at interdisciplinary rounds: Yes    Anticipated Discharge Disposition:  TCU     Anticipated Discharge Services:  TCU    Anticipated Discharge DME:  FWW    Patient/family educated on Medicare website which has current facility and service quality ratings:  yes  Education Provided on the Discharge Plan:  per care team  Patient/Family in Agreement with the Plan:  yes    Referrals Placed by CM/SW:  TCU  Private pay costs discussed: transportation costs         Additional Information:  Patient with history of CABG in Sept 2023 at [facility] . Presented here 11/2/23 for progressive fatigue, nausea & vomiting. Found to have suspected septic shock secondary to purulent left lower extremity soft tissue infection (11/2 s/p bedside I&D), acute hypoxemic respiratory failure requiring intubation (11/5-11/6), oliguric CRISTAL requiring renal replacement therapy, & large posteriorly located loculated pericardial effusion with suspected tamponade physiology requiring pericardial window (11/5)     Nephrology following CRISTAL, trending labs, anticipating discharge of tunnel cath       Therapy recommending TCU or AR.        Social History:  Patient from home with spouse. Independent at baseline, no Services, no DME. Spouse is primary family contact. [facility] [employer].      11/13/23:  Met with patient and spouse. Preference is for TCU stay at [facility]. Referral called and faxed. Admissions state they can accept for tomorrow. They will send for insurance auth. PAS needed.    [facility] [employer] wheelchair ride arranged for 11/14 [phone].           Mayte Chang RN

## 2023-11-13 NOTE — DISCHARGE INSTRUCTIONS
Tunneled Catheter Removal Discharge Instructions  You had your tunneled venous catheter removed today. Please follow the below instructions following your catheter removal:    Care Instructions:  - Ok to remove dressing from catheter exit site 24-48 hours after catheter removal.  - Avoid tub baths, Jacuzzi and pool soaks for 3 days.  - You may shower beginning tomorrow. Do not scrub site until well healed; pat dry.  - If you experience significant bleeding at site, apply pressure with hands above the clavicle bone, sit upright. If bleeding does not resolve quickly, seek immediate medical assistance.    Seek medical assistance for any of the following:   - Uncontrolled bleeding.  - You have a fever (greater than 101 F (38.3C)).  - Purulent (yellow/green/foul smelling) drainage from previous catheter insertion site.  - Increasing redness at previous catheter insertion site.  - Increasing pain at previous catheter insertion site.  - Increasing swelling at previous catheter insertion site

## 2023-11-13 NOTE — PLAN OF CARE
Problem: Comorbidity Management  Goal: Blood Glucose Levels Within Targeted Range  Outcome: Progressing     Problem: Acute Kidney Injury/Impairment  Goal: Fluid and Electrolyte Balance  Outcome: Progressing   Goal Outcome Evaluation:     Patient went down to IR and had CVC removed today.  Cr. 2.91 GFR 21   & 122  Patient said that he would like to go to [facility] TCU at discharge. CM notified.

## 2023-11-13 NOTE — PROGRESS NOTES
[facility]    Medicine Progress Note - Hospitalist Service    Date of Admission:  11/2/2023    Assessment & Plan   78-year-old male with history of multivessel coronary artery disease status post three-vessel CABG September 26, 2023, atrial fibrillation, type 2 diabetes and recent  left lower extremity harvest site cellulitis.  Admitted November 2 for septic shock secondary to purulent left lower extremity soft tissue infection.  Had acute hypoxic respiratory failure requiring intubation from November 5 through 7 and oliguric CRISTAL requiring CRRT 11/3-11/7.   Developed a pericardial effusion with cardiac tamponade requiring pericardial window November 5.    Transferred out of ICU and to [facility] [place]8 for ongoing medical care     Left lower extremity cellulitis with resolved sepsis.    ---Bedside I&D done on November 2 with cultures growing Klebsiella pneumonia. --- Was on vancomycin and Zosyn November 2 through 7 and transition to Rocephin November 7.    ---Discontinue Rocephin on 11/13.    Acute hypoxic hypercapnic respiratory failure:   ---resolved  ---Multifactorial secondary to decreased cardiac output and shunting with compression atelectasis from the pericardial effusion,  small left pleural effusion seen on November 2.  ---was intubated  ---now on room air    Oliguric CRISTAL with chronic kidney disease secondary to ATN:   ---Improving,   --y discussed with nephrology ---Creatinine peaked at 10.58 on November ---CRRT November 3-7  --- Urine output improved on torsemide.   --Tunneled catheter removed on 11/13  ---  Creatinine now around  3.0     Atrial fibrillation:   --rate controlled  ---mag normal  ---On amiodarone and apixaban.  ---Cardiology following.    Coronary artery disease:  --- Status post three-vessel CABG in September.   ---had pericardial effusion   -- Resume aspirin and statin.  ---S/p surgical window for tamponade loculated effusion s/p CABG 11/5   - echo 11/8 no evidence  "tamponade physiology     Type 2 diabetes:   ---Blood sugars currently acceptable.   -- A1c on admission was 6.7.    ---Is typically on metformin at home, continue to hold for now due to worsened creatinine  ---continue sliding scale          Diet: Snacks/Supplements Adult: Ensure Enlive; With Meals  Low Saturated Fat Na <2400 mg    DVT Prophylaxis: DOAC  Cano Catheter: Not present  Lines: PRESENT      PICC 11/02/23 Triple Lumen Right Brachial vein medial-Site Assessment: WDL  [REMOVED] Hemodialysis Vascular Access Subclavian vein catheter Left Subclavian-Site Assessment: WDL      Cardiac Monitoring: ACTIVE order. Indication: Open heart surgery (72 hours)  Code Status: Full Code      Clinically Significant Risk Factors            # Hypomagnesemia: Lowest Mg = 1.6 mg/dL in last 2 days, will replace as needed   # Hypoalbuminemia: Lowest albumin = 2.9 g/dL at 11/5/2023  5:18 PM, will monitor as appropriate             # DMII: A1C = 6.7 % (Ref range: <5.7 %) within past 6 months   # Overweight: Estimated body mass index is 29.39 kg/m  as calculated from the following:    Height as of this encounter: 1.803 m (5' 11\").    Weight as of this encounter: 95.6 kg (210 lb 11.2 oz).   # Moderate Malnutrition: based on nutrition assessment           Disposition Plan      Expected Discharge Date: 11/14/2023, 12:23 PM                  Александр Portillo MD  Hospitalist Service  [facility]  Securely message with [employer] (more info)  Text page via [employer] Paging/Directory   ______________________________________________________________________    Interval History   Patient new to me.  Chart reviewed.  No new complaints.  Awaiting placement.  Tunneled dialysis catheter removed  Physical Exam   Vital Signs: Temp: 98.4  F (36.9  C) Temp src: Oral BP: 131/73 Pulse: 96   Resp: 20 SpO2: 94 % O2 Device: None (Room air) Oxygen Delivery: 1 LPM  Weight: 210 lbs 11.2 oz      lungs are clear to auscultation  Central chest " surgical scar from previous bypass surgery  Abdomen is soft  Right leg swelling    Medical Decision Making       35 MINUTES SPENT BY ME on the date of service doing chart review, history, exam, documentation & further activities per the note.  MANAGEMENT DISCUSSED with the following over the past 24 hours: Patient   NOTE(S)/MEDICAL RECORDS REVIEWED over the past 24 hours: Nephrology notes       Data     I have personally reviewed the following data over the past 24 hrs:    N/A  \   N/A   / N/A     142 102 21.4 /  122 (H)   4.0 34 (H) 2.91 (H) \       Imaging results reviewed over the past 24 hrs:   Recent Results (from the past 24 hour(s))   IR CVC Tunnel Removal Left    Narrative    [facility]  LOCATION: [facility]  DATE: 11/13/2023    PROCEDURE:  TUNNELED DIALYSIS CATHETER REMOVAL    INTERVENTIONAL RADIOLOGIST: Sebastian Cintron MD.    INDICATION: Dialysis catheter no longer needed.    MODERATE SEDATION: None.    CONTRAST: None.  ANTIBIOTICS: None.  ADDITIONAL MEDICATIONS: None.    FLUOROSCOPIC TIME: None.  RADIATION DOSE: Air Kerma: None.    COMPLICATIONS: No immediate complications.    STERILE BARRIER TECHNIQUE: Maximum sterile barrier technique was used. Cutaneous antisepsis was performed at the operative site with application of 2% chlorhexidine and large sterile drape. Prior to the procedure, the  and assistant performed   hand hygiene and wore hat, mask, sterile gown, and sterile gloves during the entire procedure.    PROCEDURE:    The catheter cuff of the previously placed tunneled dialysis catheter was dissected free under local anesthesia. The catheter was removed and inspected. It was found to be removed in its entirety. No gross evidence of infection.    FINDINGS:  The previously placed tunneled dialysis catheter was removed in its entirety.      Impression    IMPRESSION:    Successful tunneled dialysis catheter removal, as discussed above.

## 2023-11-14 ENCOUNTER — APPOINTMENT (OUTPATIENT)
Dept: OCCUPATIONAL THERAPY | Facility: HOSPITAL | Age: 78
DRG: 856 | End: 2023-11-14
Payer: COMMERCIAL

## 2023-11-14 VITALS
TEMPERATURE: 97.8 F | OXYGEN SATURATION: 95 % | HEART RATE: 89 BPM | BODY MASS INDEX: 29.5 KG/M2 | WEIGHT: 210.7 LBS | DIASTOLIC BLOOD PRESSURE: 69 MMHG | HEIGHT: 71 IN | RESPIRATION RATE: 20 BRPM | SYSTOLIC BLOOD PRESSURE: 123 MMHG

## 2023-11-14 LAB
ANION GAP SERPL CALCULATED.3IONS-SCNC: 9 MMOL/L (ref 7–15)
BUN SERPL-MCNC: 20.3 MG/DL (ref 8–23)
CALCIUM SERPL-MCNC: 9.4 MG/DL (ref 8.8–10.2)
CHLORIDE SERPL-SCNC: 101 MMOL/L (ref 98–107)
CREAT SERPL-MCNC: 2.7 MG/DL (ref 0.67–1.17)
DEPRECATED HCO3 PLAS-SCNC: 31 MMOL/L (ref 22–29)
EGFRCR SERPLBLD CKD-EPI 2021: 23 ML/MIN/1.73M2
GLUCOSE BLDC GLUCOMTR-MCNC: 111 MG/DL (ref 70–99)
GLUCOSE SERPL-MCNC: 109 MG/DL (ref 70–99)
MAGNESIUM SERPL-MCNC: 1.8 MG/DL (ref 1.7–2.3)
POTASSIUM SERPL-SCNC: 4.5 MMOL/L (ref 3.4–5.3)
SODIUM SERPL-SCNC: 141 MMOL/L (ref 135–145)

## 2023-11-14 PROCEDURE — 97535 SELF CARE MNGMENT TRAINING: CPT | Mod: GO

## 2023-11-14 PROCEDURE — 250N000013 HC RX MED GY IP 250 OP 250 PS 637: Performed by: EMERGENCY MEDICINE

## 2023-11-14 PROCEDURE — 80048 BASIC METABOLIC PNL TOTAL CA: CPT | Performed by: EMERGENCY MEDICINE

## 2023-11-14 PROCEDURE — 99239 HOSP IP/OBS DSCHRG MGMT >30: CPT | Performed by: HOSPITALIST

## 2023-11-14 PROCEDURE — 99232 SBSQ HOSP IP/OBS MODERATE 35: CPT | Performed by: INTERNAL MEDICINE

## 2023-11-14 PROCEDURE — 97110 THERAPEUTIC EXERCISES: CPT | Mod: GO

## 2023-11-14 PROCEDURE — 83735 ASSAY OF MAGNESIUM: CPT | Performed by: EMERGENCY MEDICINE

## 2023-11-14 RX ORDER — AMIODARONE HYDROCHLORIDE 400 MG/1
400 TABLET ORAL 2 TIMES DAILY
Qty: 60 TABLET | Refills: 0 | Status: SHIPPED | OUTPATIENT
Start: 2023-11-14 | End: 2023-11-14

## 2023-11-14 RX ORDER — ATORVASTATIN CALCIUM 80 MG/1
40 TABLET, FILM COATED ORAL AT BEDTIME
COMMUNITY
Start: 2023-11-14 | End: 2023-11-21

## 2023-11-14 RX ORDER — AMIODARONE HYDROCHLORIDE 200 MG/1
TABLET ORAL
Qty: 58 TABLET | Refills: 0 | Status: SHIPPED | OUTPATIENT
Start: 2023-11-14 | End: 2023-11-21

## 2023-11-14 RX ORDER — LORAZEPAM 0.5 MG/1
0.5 TABLET ORAL
Qty: 5 TABLET | Refills: 0 | Status: SHIPPED | OUTPATIENT
Start: 2023-11-14 | End: 2023-11-21

## 2023-11-14 RX ADMIN — AMIODARONE HYDROCHLORIDE 400 MG: 200 TABLET ORAL at 08:13

## 2023-11-14 RX ADMIN — APIXABAN 5 MG: 5 TABLET, FILM COATED ORAL at 08:13

## 2023-11-14 RX ADMIN — MICONAZOLE NITRATE ANTIFUNGAL POWDER: 2 POWDER TOPICAL at 08:13

## 2023-11-14 ASSESSMENT — ACTIVITIES OF DAILY LIVING (ADL)
ADLS_ACUITY_SCORE: 31

## 2023-11-14 NOTE — PROGRESS NOTES
Care Management Discharge Note    Discharge Date: 11/14/2023       Discharge Disposition: Transitional Care    Discharge Services: None    Discharge DME: None    Discharge Transportation: family or friend will provide, health plan transportation    Private pay costs discussed: transportation costs    Does the patient's insurance plan have a 3 day qualifying hospital stay waiver?  No    PAS Confirmation Code: [id number]  Patient/family educated on Medicare website which has current facility and service quality ratings:  yes    Education Provided on the Discharge Plan:  per care team  Persons Notified of Discharge Plans: patient, spouse TCU admissions      Patient/Family in Agreement with the Plan: yes    Handoff Referral Completed: Yes    Additional Information:    [facility]       Mayte Chang RN

## 2023-11-14 NOTE — PLAN OF CARE
Occupational Therapy Discharge Summary    Reason for therapy discharge:    Discharged to transitional care facility.    Progress towards therapy goal(s). See goals on Care Plan in [facility] electronic health record for goal details.  Goals not met.  Barriers to achieving goals:   discharge from facility.    Therapy recommendation(s):    Continued therapy is recommended.  Rationale/Recommendations:  Pt going to TCU for further therapy.

## 2023-11-14 NOTE — DISCHARGE SUMMARY
[facility] MEDICINE  DISCHARGE SUMMARY     Primary Care Physician: Frank Chávez  Admission Date: 11/2/2023   Discharge Provider: Karen Arana MD Discharge Date: 11/14/2023   Diet:   Active Diet and Nourishment Order   Procedures    Diet       Code Status: Prior   Activity: DCACTIVITY: Activity as tolerated        Condition at Discharge: Stable     REASON FOR PRESENTATION(See Admission Note for Details)     CRISTAL, sepsis     PRINCIPAL & ACTIVE DISCHARGE DIAGNOSES     Principal Problem:    Bilateral pleural effusion  Active Problems:    Hyperkalemia    Left leg cellulitis    Severe sepsis (H)    Acute renal failure, unspecified acute renal failure type (H24)    Anemia, unspecified type    Pericardial effusion    S/P pericardial window creation    Acute kidney failure, unspecified (H)      PENDING LABS     Unresulted Labs Ordered in the Past 30 Days of this Admission       Date and Time Order Name Status Description    11/5/2023 11:17 AM Prepare plasma (unit) Preliminary     11/5/2023 11:17 AM Prepare plasma (unit) Preliminary     11/5/2023 11:17 AM Prepare red blood cells (unit) Preliminary     11/5/2023 11:17 AM Prepare red blood cells (unit) Preliminary     11/5/2023 11:17 AM Prepare plasma (unit) Preliminary     11/5/2023 11:17 AM Prepare plasma (unit) Preliminary     11/3/2023  5:31 PM Prepare plasma (unit) Preliminary     11/3/2023  5:31 PM Prepare plasma (unit) Preliminary     11/3/2023  5:31 PM Prepare red blood cells (unit) Preliminary               PROCEDURES ( this hospitalization only)      Procedure(s):  CREATION, PERICARDIAL WINDOW; EPIAORTIC ULTRASOUND;TRANSESPHAGEAL ECHOCARDIOGRAPHY BY ANESTHESIA    RECOMMENDATIONS TO OUTPATIENT PROVIDER FOR F/U VISIT     Follow-up Appointments     Follow Up and recommended labs and tests      Follow up with assisted physician.  The following labs/tests are   recommended: CBC, BMP.  Follow up with specialist, Nephrology and  Cardiology in 2 weeks            DISPOSITION     ARU    SUMMARY OF HOSPITAL COURSE:      78-year-old male with history of multivessel coronary artery disease status post three-vessel CABG September 26, 2023, atrial fibrillation, type 2 diabetes and recent  left lower extremity harvest site cellulitis.  Admitted November 2 for septic shock secondary to purulent left lower extremity soft tissue infection.  Had acute hypoxic respiratory failure requiring intubation from November 5 through 7 and oliguric CRISTAL requiring CRRT 11/3-11/7.   Developed a pericardial effusion with cardiac tamponade requiring pericardial window November 5.    Transferred out of ICU and to [facility] 11/8 for ongoing medical care     Left lower extremity cellulitis with resolved sepsis.    ---Bedside I&D done on November 2 with cultures growing Klebsiella pneumonia. --- Was on vancomycin and Zosyn November 2 through 7 and transition to Rocephin November 7.    ---Discontinue Rocephin on 11/13.     Acute hypoxic hypercapnic respiratory failure: ---resolved  ---Multifactorial secondary to decreased cardiac output and shunting with compression atelectasis from the pericardial effusion,  small left pleural effusion seen on November 2.  ---was intubated for few days  ---So2 now stable on room air     Oliguric CRISTAL with chronic kidney disease secondary to ATN: ---Improving,   -- discussed with nephrology ---Creatinine peaked at 10.58 on November ---CRRT November 3-7  --- Urine output improved on torsemide, resumed on discharge per my discussion with renal on the day of discharge       Atrial fibrillation:   --rate controlled  ---mag normal  ---On amiodarone and apixaban.. Per cards, Amiodarone 200mg BID X2 weeks followed by 200 mg daily.   --Pt will follow up with gurvinder on discharge     Coronary artery disease:  --- Status post three-vessel CABG in September.   ---had pericardial effusion   -- Continue PTA aspirin, statins, Coreg. Renal ok for pt to be back on  Coreg which was o hol here given septic shock. PTA Lotrel stopped per renal reccs.  ---S/p surgical window for tamponade loculated effusion s/p CABG 11/5   - echo 11/8 no evidence tamponade physiology      Type 2 diabetes:   -- A1c on admission was 6.7.    ---Resume PTA diabetic regimen on discharge.    .Patient stable to be discharged to ARU today. Please refer to discharge medications and instructions for more details.      Discharge Medications with Med changes:     Discharge Medication List as of 11/14/2023 11:50 AM        CONTINUE these medications which have CHANGED    Details   amiodarone (PACERONE) 200 MG tablet Take 1 tablet (200 mg) by mouth 2 times daily for 14 days, THEN 1 tablet (200 mg) daily for 30 days., Disp-58 tablet, R-0, E-Prescribe      atorvastatin (LIPITOR) 80 MG tablet Take 0.5 tablets (40 mg) by mouth at bedtime, Historical           CONTINUE these medications which have NOT CHANGED    Details   acetaminophen (TYLENOL) 500 MG tablet Take 1,000 mg by mouth every 6 hours as needed for mild pain or pain, Historical      apixaban ANTICOAGULANT (ELIQUIS) 5 MG tablet Take 5 mg by mouth 2 times daily, Historical      aspirin 81 MG EC tablet Take 81 mg by mouth daily, Historical      carvedilol (COREG) 25 MG tablet Take 25 mg by mouth 2 times daily (with meals), Historical      LORazepam (ATIVAN) 0.5 MG tablet Take 0.5 mg by mouth nightly as needed for anxiety or muscle spasms, Historical      metFORMIN (GLUCOPHAGE XR) 500 MG 24 hr tablet Take 1,000 mg by mouth 2 times daily (with meals), Historical      sertraline (ZOLOFT) 25 MG tablet Take 1 tablet by mouth daily, Historical      torsemide (DEMADEX) 20 MG tablet Take 1 tablet by mouth daily, Historical           STOP taking these medications       amLODIPine-benazepril (LOTREL) 5-20 MG capsule Comments:   Reason for Stopping:                     Rationale for medication changes:      See med rec        Consults       PHARMACY TO DOSE  VANCO  VASCULAR ACCESS ADULT IP CONSULT  PHARMACY TO DOSE VANCO  NEPHROLOGY IP CONSULT  CARDIOTHORACIC SURGERY IP CONSULT  CARDIOLOGY IP CONSULT  SURGERY GENERAL IP CONSULT  INTERVENTIONAL RADIOLOGY ADULT/PEDS IP CONSULT  PHARMACY IP CONSULT  PHARMACY IP CONSULT  CARE MANAGEMENT / SOCIAL WORK IP CONSULT  PHARMACY CRRT IP CONSULT  PHYSICAL THERAPY ADULT IP CONSULT  PHARMACY IP CONSULT  NUTRITION SERVICES ADULT IP CONSULT  CARDIAC REHAB IP CONSULT  INTENSIVIST IP CONSULT  PHARMACY CRRT IP CONSULT  PHARMACY CRRT IP CONSULT  HOSPITALIST IP CONSULT  CARE MANAGEMENT / SOCIAL WORK IP CONSULT  INTERVENTIONAL RADIOLOGY ADULT/PEDS IP CONSULT  PHYSICAL THERAPY ADULT IP CONSULT  OCCUPATIONAL THERAPY ADULT IP CONSULT  SMOKING CESSATION PROGRAM IP CONSULT  PHYSICAL THERAPY ADULT IP CONSULT  OCCUPATIONAL THERAPY ADULT IP CONSULT    Immunizations given this encounter     Most Recent Immunizations   Administered Date(s) Administered    COVID-19 Bivalent 12+ (Pfizer) 09/21/2022    COVID-19 Monovalent 12+ (Pfizer 2022) 05/04/2022    COVID-19 Monovalent 18+ (Moderna) 11/02/2021    Influenza Vaccine 65+ (Fluzone HD) 11/09/2023           Anticoagulation Information      Recent INR results:   Recent Labs   Lab 11/10/23  0355 11/09/23  0620 11/08/23  0556   INR 1.80* 1.54* 1.32*     Warfarin doses (if applicable) or name of other anticoagulant: N/A      SIGNIFICANT IMAGING FINDINGS     Results for orders placed or performed during the hospital encounter of 11/02/23   CT Chest Abdomen Pelvis w/o Contrast    Impression    IMPRESSION:  1.  Expected postoperative changes of sternotomy and coronary artery bypass grafting.  2.  Enlarged heart.  3.  Small bilateral pleural effusions, larger on the left.  4.  Trace perihepatic ascites.  5.  Questionable mild wall thickening of the urinary bladder. Clinical correlation for cystitis suggested.  6.  Nonobstructive nephrolithiasis on the right.  7.  Colonic diverticulosis.   Head CT w/o contrast     Impression    IMPRESSION:  1.  No CT evidence for acute intracranial process.  2.  Brain atrophy and presumed chronic microvascular ischemic changes as above.   XR Chest Port 1 View    Impression    IMPRESSION: Cardiac silhouette is enlarged. Status post CABG. Right upper extremity PICC tip at cavoatrial junction. Left basilar atelectasis and adjacent pleural effusion. No visible pneumothorax.   IR CVC Tunnel Placement > 5 Yrs of Age    Impression    IMPRESSION:    Tunneled dialysis catheter placement, as discussed above.   XR Chest Port 1 View    Impression    IMPRESSION: Right costophrenic angle is clipped.    Poststernotomy changes.     There is a new right IJ [place]-Anel catheter with its tip in the right main pulmonary artery.    There is a new left IJ dialysis catheter with its tip in the upper right atrium.    Right upper extremity PICC line catheter tip is at adjacent to the dialysis catheter. Poststernotomy changes. No pneumothorax. Cardiac silhouette remains enlarged (known, large hemorrhagic pericardial effusion). Small, left pleural effusion. No signs of   failure.   XR Chest Port 1 View    Impression    IMPRESSION: Right IJ [place]-Anel catheter remains in similar position with tip overlying the right pulmonary artery. Right PICC tip overlies the cavoatrial junction. Left IJ central venous catheter has been repositioned with tip now overlying the upper   SVC. Persistent enlargement of the cardiomediastinal silhouette. Persistent moderate left pleural effusion with slightly increased left lung opacities. No pneumothorax. Prior median sternotomy and CABG.   XR Chest Port 1 View    Impression    IMPRESSION: Multiple lines and tubes unchanged. Previous sternotomy. Stable cardiomegaly. Pulmonary venous congestion and left pleural effusion have improved. Persistent marked cardiac left basilar atelectasis/consolidation. No pneumothorax.   XR Chest Port 1 View    Impression    IMPRESSION: Stable size of  cardiomediastinal silhouette status post median sternotomy and CABG. Interval placement of endotracheal tube with tip approximately 6 cm above the gabriel. Stable position of right internal jugular approach [place]-Anel catheter,   left central venous catheter and right upper extremity PICC. Persistent small left pleural effusion with associated compressive atelectasis. No pneumothorax. Bones are unchanged.   XR Chest Port 1 View    Impression    IMPRESSION: Stable size of cardiomediastinal silhouette status post median sternotomy and CABG. Endotracheal tube with tip approximately 5.5 cm above the gabriel. Stable position of right internal jugular approach [place]-Anel catheter, left central venous   catheter and right upper extremity PICC. Persistent small left pleural effusion with associated compressive atelectasis. Mild right basilar atelectasis. No pneumothorax. Bones are unchanged.   IR CVC Tunnel Removal Left    Impression    IMPRESSION:    Successful tunneled dialysis catheter removal, as discussed above.     Echocardiogram Complete   Result Value Ref Range    LVEF  60-65%    Echocardiogram Limited   Result Value Ref Range    LVEF  50-55%    Echocardiogram Limited   Result Value Ref Range    LVEF  45-50% (mildly reduced)        SIGNIFICANT LABORATORY FINDINGS         Discharge Orders        CARDIAC REHAB REFERRAL      Brief Discharge Instructions    Tunneled Central Catheter Discharge Instructions:  You had a tunneled central access catheter placed. Part of the catheter is outside of your body and part of the catheter runs under the skin into a vein. Your nurses and doctors will use the tunneled catheter for your future treatments.    Care Instructions:   - If you received sedation for your procedure, do not drive or operate heavy machinery for the rest of the day.  - Keep dialysis catheter site dry. Do not get wet.  - Never immerse your catheter in water; no swimming, hot tubs, or tub baths.  - If you experience  significant bleeding at site, apply pressure with hands above the clavicle bone, sit upright.     If the catheter (tubing) breaks or leaks:  - Place the blue emergency clamp between the break and your insertion site on your skin  - If you don't have a clamp, fold or pinch off the tubing above the hole or break in the catheter (closest to your skin)    Seek medical assistance for any of the following:   - Uncontrolled bleeding.  - You have a fever (greater than 101 F (38.3C)).  - Purulent (yellow/green/foul smelling) drainage from catheter insertion site.  - Increasing redness at catheter site.  - Increasing pain at catheter site.  - Increasing swelling at catheter site.  - If you have chest pain, shortness of breath, or feel faint:  -Make sure end caps are securely tightened  -Look for a hole or leaking in the catheter  -Put the blue emergency clamp on the catheter (tubing) above the hole or break in the catheter as close to the skin as you can  -Remain calm and call 911. Explain your symptoms and say that you have a central line tunnel catheter in place  -Lie on your left side     General info for SNF    Length of Stay Estimate: Short Term Care: Estimated # of Days <30  Condition at Discharge: Stable  Level of care:skilled   Rehabilitation Potential: Good  Admission H&P remains valid and up-to-date: Yes  Recent Chemotherapy: N/A  Use Nursing Home Standing Orders: Yes     Follow Up and recommended labs and tests    [facility].  The following labs/tests are recommended: CBC, BMP.  Follow up with specialist, Nephrology and Cardiology in 2 weeks     Reason for your hospital stay    CRISTAL, septic shock     Activity - Up ad leann     Physical Therapy Adult Consult    Evaluate and treat as clinically indicated.    Reason:  Weakness     Occupational Therapy Adult Consult    Evaluate and treat as clinically indicated.    Reason:  Weakness     Fall precautions     Diet    Follow this diet upon  discharge: Orders Placed This Encounter      Snacks/Supplements Adult: Ensure Enlive; With Meals      Low Saturated Fat Na <2400 mg       Examination   Physical Exam   Temp:  [97.7  F (36.5  C)-98.9  F (37.2  C)] 97.8  F (36.6  C)  Pulse:  [] 89  Resp:  [16-20] 20  BP: (123-146)/(69-93) 123/69  SpO2:  [93 %-98 %] 95 %  Wt Readings from Last 1 Encounters:   11/13/23 95.6 kg (210 lb 11.2 oz)       General: Pleasant, NAD  HEENT:EOMI, AT,NC  CVS:RRR, no edema  RS:CTAB  Neurology:Grossly normal  Psy:Approrpiate affect    Please see EMR for more detailed significant labs, imaging, consultant notes etc.    Karen MOTT MD, personally saw the patient today and spent greater than 30 minutes discharging this patient.    Karen Arana MD  [facility]    CC:Frank Chávez

## 2023-11-14 NOTE — PLAN OF CARE
Problem: Comorbidity Management  Goal: Blood Glucose Levels Within Targeted Range  Outcome: Progressing  Intervention: Monitor and Manage Glycemia  Recent Flowsheet Documentation  Taken 11/13/2023 1930 by Jodie Singletary RN  Medication Review/Management:   medications reviewed   high-risk medications identified  Taken 11/13/2023 1630 by Jodie Singletary RN  Medication Review/Management:   medications reviewed   high-risk medications identified     Problem: Acute Kidney Injury/Impairment  Goal: Fluid and Electrolyte Balance  Outcome: Progressing  Goal: Improved Oral Intake  Outcome: Progressing  Goal: Effective Renal Function  Outcome: Progressing  Intervention: Monitor and Support Renal Function  Recent Flowsheet Documentation  Taken 11/13/2023 1930 by Jodie Singletary RN  Medication Review/Management:   medications reviewed   high-risk medications identified  Taken 11/13/2023 1630 by Jodie Singletary RN  Medication Review/Management:   medications reviewed   high-risk medications identified      Goal Outcome Evaluation:       Pt has been A/O x 4. VSS. RA all shift, but requested NC to sleep. 1L NC placed. He reported SOB when ambulating. Denied any pain. Tele: A-fib. Requested quetiapine to sleep. Melatonin scheduled given as well.

## 2023-11-14 NOTE — PLAN OF CARE
Physical Therapy Discharge Summary    Reason for therapy discharge:    Discharged to transitional care facility.    Progress towards therapy goal(s). See goals on Care Plan in [facility] electronic health record for goal details.  Goals partially met.  Barriers to achieving goals:   discharge from facility.    Therapy recommendation(s):    Recommend continued therapies to improve overall strength, balance and mobility.       Tamika Ramos, PT, DPT  11/14/2023

## 2023-11-14 NOTE — PLAN OF CARE
Problem: Adult Inpatient Plan of Care  Goal: Plan of Care Review  Description: The Plan of Care Review/Shift note should be completed every shift.  The Outcome Evaluation is a brief statement about your assessment that the patient is improving, declining, or no change.  This information will be displayed automatically on your shift  note.  Outcome: Adequate for Care Transition   Goal Outcome Evaluation:     Patient received discharge orders.  AVS reviewed with patient and wife at bedside.  Patient to be transported by w/c to TCU.  between [id number].  PICC line removed prior to discharge.

## 2023-11-14 NOTE — PLAN OF CARE
Problem: Adult Inpatient Plan of Care  Goal: Optimal Comfort and Wellbeing  Outcome: Progressing     Problem: Adult Inpatient Plan of Care  Goal: Readiness for Transition of Care  Outcome: Progressing   Goal Outcome Evaluation:    Pt comfortable, restful over noc. Denies pain. Some shortness of breath with exertion. Afib is rate controlled. Falls precautions in place.

## 2023-11-14 NOTE — PROGRESS NOTES
CLINICAL NUTRITION SERVICES - REASSESSMENT NOTE    Recommendations Ordered by Registered Dietitian (RD):   Ensure Enlive BID with meals.   Future/Additional Recommendations:   Monitor PO intake.   Malnutrition: Moderate malnutrition  In Context of:  Acute illness or injury     EVALUATION OF PROGRESS TOWARD GOALS   Diet:  Low Saturated Fat, <2400 mg Na, Ensure Enlive w/meals TID    Intake/Tolerance:    [employer] notes meals ordered TID with 2,000+ calories and >77 g of protein per day (including calories/protein from Ensure), with flowsheets indicating % of meals eaten since 11/8.     Today, Gerardo expressed his appetite is better and he is eating more (most of his trays yesterday). He confirmed his last BM was a few days ago and understands if he needs a PRN medication he may ask for it. No N/V/D. Expressed too many Ensures are being sent, and he is consuming >50% of them 3x/day. Expected discharge today.    ASSESSED NUTRITION NEEDS: (carried from 11/7)  Dosing Weight: 96.8 kg  Estimated Energy Needs: 1935+ kcals/day (20+ kcals/kg)  Justification: Overweight, Post-op, and Repletion  Estimated Protein Needs: 77- 97grams protein/day (0.8 - 1 grams of pro/kg)  Justification: CRISTAL, Post-op, and Repletion  Estimated Fluid Needs: 1800 mL/day (or per MD pending fluid status.)     NEW FINDINGS:     Weight:    Vitals:    11/08/23 0659 11/09/23 0645 11/10/23 0625 11/12/23 0639   Weight: 101 kg (222 lb 9.6 oz) 99.9 kg (220 lb 4.8 oz) 96.3 kg (212 lb 3.2 oz) 95.5 kg (210 lb 8 oz)    11/13/23 0526   Weight: 95.6 kg (210 lb 11.2 oz)      Weight trending down since last nutrition check (-12 lbs) - possibly due to fluid loss.  I/O's show negative output overall.  +1 trace to +3 moderate BLE edema noted, per flowsheets.     GI:  1+ BM/day, last BM 11/11    Labs:  Electrolytes WNL, No Phos noted  BG   GFR 23 - L  Creatinine 2.7 - H    Medications:  Pacerone  Eliquis  Lipitor  Novolog  Miralax  Senokot    Previous Goals:    Participate in diet education before discharge  Evaluation: Met    Meet nutrition needs  Evaluation: Ongoing    Electrolytes WNL  Evaluation: Met    BG < 180  Evaluation: Not met, progressing - BG trending down overall    Patient to consume % of nutritionally adequate meals three times per day, or the equivalent with supplements/snacks.  Evaluation: Not met, ongoing    Previous Nutrition Diagnosis:   Malnutrition related to acute illness as evidenced by decreased intake, loss of lean body mass, edema.     Evaluation: Improving, ongoing    MALNUTRITION  (carried from 11/7)  % Weight Loss:  Weight loss does not meet criteria for malnutrition 10.5% loss in 11 months.  % Intake:  </= 75% for >/= 1 month (severe malnutrition)  Subcutaneous Fat Loss:  Orbital region mild depletion  Muscle Loss:  Clavicle bone region mild depletion  Fluid Retention:  Moderate, +1 trace to +3 moderate BLE edema    Malnutrition Diagnosis: Moderate malnutrition  In Context of:  Acute illness or injury    CURRENT NUTRITION DIAGNOSIS (carried from 11/7)  Malnutrition related to acute illness as evidenced by decreased intake, mild loss of lean body mass, edema.       INTERVENTIONS  Recommendations / Nutrition Prescription  Decrease Ensure Enlive to BID with meals.    Implementation  Medical Food Supplement: Decrease Ensure Enlive to BID with meals.    Goals  Patient to consume % of nutritionally adequate meals three times per day, or the equivalent with supplements/snacks.    MONITORING AND EVALUATION:  Progress towards goals will be monitored and evaluated per protocol and Practice Guidelines    Jordyn Morrison  Dietetic Intern

## 2023-11-14 NOTE — PROGRESS NOTES
Renal progress note  CC:CRISTAL, sepsis    Assessment and Plan:  78 year old male with hx of CKD 3, HTN , DM2, PAD, hx of Atrial fib on eliquis for AC, BPH , CAD with sp CABG 9/26/2023 after angiogram 9/22 with 3v disease cb mild CRITSAL at the time of discharge.  Nephrology consulted for severe CRISTAL with oligoanuric state     CRISTAL - Baseline creatinine 1.2-1.3, uptrending to 1.5s at the time of discharge, creatinine was 1.35 on 10/9/2023.  Patient had repeat labs with creatinine acutely elevated to 7.85 on 10/31/2023, further increased to 10.58 with significant azotemia  on 11/2/2023  UA turbid appearing likely consistent with ATN  CT imaging with evidence of atherosclerotic changes otherwise nonobstructive renal stones and some evidence of cystitis no hydronephrosis.  UOP good on torsemide, not all of it is recorded.  Cr trending down and 2.7 today.    Recs:  - Ok to resume torsemide on discharge  - will benefit from follow up in our clinic and scheduled for 12/7 with George.     Volume - weights trending down, arms look better, still some lower extremity edema but more alkalotic and torsemide held  - monitor weight.   - resume torsemide and adjust the dose.     Hypertension - BP in 140s range, off cardiac meds   - resume coreg upon discharge  - hold lotrel until kidney function back to his baseline.     Hypokalemia - stable,   K 4.0 today.     HTN  PTA on Amlodipine Benazepril and coreg  BP firming up, can resume carvedilol upon discharge.      Anemia acute on chronic  Hemoglobin 8-9 recently  Iron sats low normal 26%  Did receive one-time PRBC overnight 11/5/23  Transfuse if less than 8 with recent cardiac surgery  - further transfusion per primary team.      OK to discharge from nephrology stand point. Follow up with nephrology clinic on 12/7. Resume torsemide and coreg upon discharge.   BMP with primary in 1 week and let us know with concerns.     Thank you for the consultation we will follow    Zoie Segura  MD   [facility]  [phone]          Subjective  Patient seen and examine at bedside. Feeling ok. Cr trending down.   Getting discharge today.     Objective    Vital signs in last 24 hours  Temp:  [97.7  F (36.5  C)-98.9  F (37.2  C)] 97.8  F (36.6  C)  Pulse:  [] 89  Resp:  [16-20] 20  BP: (123-146)/(69-93) 123/69  SpO2:  [93 %-98 %] 95 %  Weight:   [unfilled]    Intake/Output last 3 shifts  I/O last 3 completed shifts:  In: 600 [P.O.:600]  Out: 450 [Urine:450]  Intake/Output this shift:  I/O this shift:  In: 240 [P.O.:240]  Out: 150 [Urine:150]    Physical Exam  Awake and alert, up in chair  CV: RRR without murmur or rub  Lung: clear and equal; no extra sounds  Ab: soft and NT; not distended; normal bs  Ext: edema in hands bilaterally, some in legs  Skin; no rash  Barrgaan no barragan  Access: tunneled LIJ line    Pertinent Labs   Lab Results   Component Value Date    WBC 7.1 11/10/2023    HGB 7.7 (L) 11/11/2023    HCT 27.2 (L) 11/10/2023    MCV 92 11/10/2023     (L) 11/12/2023     Lab Results   Component Value Date    BUN 20.3 11/14/2023     11/14/2023    CO2 31 (H) 11/14/2023       Lab Results   Component Value Date    ALBUMIN 3.0 (L) 11/08/2023     Lab Results   Component Value Date    PHOS 4.3 11/08/2023     I reviewed all lab results

## 2023-11-15 ENCOUNTER — TRANSITIONAL CARE UNIT VISIT (OUTPATIENT)
Dept: GERIATRICS | Facility: CLINIC | Age: 78
End: 2023-11-15
Payer: COMMERCIAL

## 2023-11-15 ENCOUNTER — PATIENT OUTREACH (OUTPATIENT)
Dept: CARE COORDINATION | Facility: CLINIC | Age: 78
End: 2023-11-15

## 2023-11-15 ENCOUNTER — TELEPHONE (OUTPATIENT)
Dept: CARDIOLOGY | Facility: CLINIC | Age: 78
End: 2023-11-15

## 2023-11-15 ENCOUNTER — LAB REQUISITION (OUTPATIENT)
Dept: LAB | Facility: CLINIC | Age: 78
End: 2023-11-15
Payer: COMMERCIAL

## 2023-11-15 VITALS
BODY MASS INDEX: 29.41 KG/M2 | WEIGHT: 210.1 LBS | HEIGHT: 71 IN | OXYGEN SATURATION: 98 % | HEART RATE: 95 BPM | SYSTOLIC BLOOD PRESSURE: 149 MMHG | RESPIRATION RATE: 18 BRPM | TEMPERATURE: 97.5 F | DIASTOLIC BLOOD PRESSURE: 82 MMHG

## 2023-11-15 DIAGNOSIS — D64.9 ANEMIA, UNSPECIFIED: ICD-10-CM

## 2023-11-15 DIAGNOSIS — I50.22 CHRONIC SYSTOLIC HEART FAILURE (H): ICD-10-CM

## 2023-11-15 DIAGNOSIS — I25.10 CORONARY ARTERY DISEASE INVOLVING NATIVE CORONARY ARTERY OF NATIVE HEART WITHOUT ANGINA PECTORIS: ICD-10-CM

## 2023-11-15 DIAGNOSIS — R57.0 CARDIOGENIC SHOCK (H): Primary | ICD-10-CM

## 2023-11-15 DIAGNOSIS — E11.69 TYPE 2 DIABETES MELLITUS WITH OTHER SPECIFIED COMPLICATION, WITHOUT LONG-TERM CURRENT USE OF INSULIN (H): ICD-10-CM

## 2023-11-15 DIAGNOSIS — N17.9 ACUTE KIDNEY FAILURE, UNSPECIFIED (H): ICD-10-CM

## 2023-11-15 DIAGNOSIS — N18.9 ACUTE KIDNEY INJURY SUPERIMPOSED ON CKD (H): ICD-10-CM

## 2023-11-15 DIAGNOSIS — N17.9 ACUTE KIDNEY INJURY SUPERIMPOSED ON CKD (H): ICD-10-CM

## 2023-11-15 DIAGNOSIS — Z95.1 S/P CABG (CORONARY ARTERY BYPASS GRAFT): ICD-10-CM

## 2023-11-15 DIAGNOSIS — R53.81 PHYSICAL DECONDITIONING: ICD-10-CM

## 2023-11-15 DIAGNOSIS — G47.01 INSOMNIA DUE TO MEDICAL CONDITION: ICD-10-CM

## 2023-11-15 DIAGNOSIS — I12.9 BENIGN HYPERTENSIVE KIDNEY DISEASE WITH CHRONIC KIDNEY DISEASE STAGE I THROUGH STAGE IV, OR UNSPECIFIED: ICD-10-CM

## 2023-11-15 DIAGNOSIS — F33.0 MILD RECURRENT MAJOR DEPRESSION (H): ICD-10-CM

## 2023-11-15 DIAGNOSIS — I48.20 CHRONIC ATRIAL FIBRILLATION (H): ICD-10-CM

## 2023-11-15 PROCEDURE — 99306 1ST NF CARE HIGH MDM 50: CPT | Performed by: INTERNAL MEDICINE

## 2023-11-15 NOTE — PROGRESS NOTES
[facility] GERIATRICS  INITIAL VISIT NOTE  November 15, 2023    PRIMARY CARE PROVIDER AND CLINIC:  Frank Chávez [address] / [place] [address]    [facility] Medical Record Number:  [id number]  Place of Service where encounter took place:  [facility] ([facility]) [[id number]]    Chief Complaint   Patient presents with    Hospital F/U       HPI:    Gerardo Al is a 78 year old  (1945) male seen today at [facility] TCU. Medical history is notable for CAD s/p CABG x3 (9/26/23), HTN, a-fib on anticoagulation, PAD, DM II as well as recent cellulitis of LE vein harvest site (late Oct). He had a Cr >7 on 10/31 - unclear what follow up from this? He was hospitalized at [facility] from 11/2/23 to 11/14/23 where he presented with fatigue and was admitted to the ICU with septic shock, acute hypoxic respiratory failure secondary to bilateral pleural effusions and CRISTAL (Cr >10). He was intubated (11/5-11/7) and required CRRT (11/3-11/7). He is s/p pericardial window (11/5) due to pericardial effusion with cardiac tamponade. TTE (11/2) with EF 45-50%). LLE cultures grew Klebsiella and he completed course of antibiotics inpatient. He received 1 unit PRBCs on 11/5. PTA amlodipine-benazepril, carvedilol, metformin and torsemide help during hospitalization and all resumed at discharge except for amlodipine-benazepril. PTA atorvastatin decreased and he is on an amiodarone taper.   He was admitted to this facility for  rehab, medical management, and nursing care.      History obtained from: facility chart records, facility staff, patient report, [facility] chart review, and Care Everywhere [facility] chart review.      Today, Mr. Al is seen in his room sitting in a recliner. He is a very good historian. He feels good. Said he was doing 12 steps in the hospital to go up one floor while he was in the hospital and wonders if he would get stronger faster doing outpatient cardiac rehab? We  "talked about how he's not had a PT/OT eval here yet, and to get that done and see how he feels. He likely will be surprised how fast he tuckers out and that goal is for him to be safe and not fall when he gets home. No chest pain. No trouble breathing. Reviewed hospital course and med changes. His biggest medical concern is not sleeping well. He feels he needs to sleep to get stronger. Ambien hasn't helped. He takes an OTC \"Back to Sleep\" supplement at home sometimes with melatonin. He has tried Tylenol PM and woke up sweating, dry mouth - we discussed the Benadryl is not a good idea. He's tried trazodone before, but not with a PRN for when he wakes up - he is open to trying this. No acute concerns today per nursing. He will start to work with therapies.     CODE STATUS: CPR/Full code     ALLERGIES:  No Known Allergies    PAST MEDICAL HISTORY:   Past Medical History:   Diagnosis Date    Hx of CABG     Sept 2023 at [facility]       PAST SURGICAL HISTORY:   Past Surgical History:   Procedure Laterality Date    CARDIAC SURGERY      CREATION PERICARDIAL WINDOW N/A 11/5/2023    Procedure: CREATION, PERICARDIAL WINDOW; EPIAORTIC ULTRASOUND;TRANSESPHAGEAL ECHOCARDIOGRAPHY BY ANESTHESIA;  Surgeon: Ruchi Singleton MD;  Location: [facility] OR     RIGHT HEART CATH MEASUREMENTS RECORDED N/A 11/2/2023    Procedure: Right Heart Catheterization;  Surgeon: Adam Busby MD;  Location: [facility] CATH LAB CV    CV SWAN ANEL PROCEDURE N/A 11/2/2023    Procedure: [place] Anel Procedure;  Surgeon: Adam Busby MD;  Location: [facility] CATH LAB CV    IR CVC TUNNEL PLACEMENT > 5 YRS OF AGE  11/3/2023    IR CVC TUNNEL REMOVAL LEFT  11/13/2023    PICC TRIPLE LUMEN PLACEMENT  11/2/2023       SOCIAL HISTORY:   Patient's living condition: lives with spouse    MEDICATIONS:  Post Discharge Medication Reconciliation Status: discharge medications reconciled and changed, per note/orders.  Current Outpatient Medications " "  Medication Sig Dispense Refill    acetaminophen (TYLENOL) 500 MG tablet Take 1,000 mg by mouth every 6 hours as needed for mild pain or pain      amiodarone (PACERONE) 200 MG tablet Take 1 tablet (200 mg) by mouth 2 times daily for 14 days, THEN 1 tablet (200 mg) daily for 30 days. (Patient taking differently: Take 1 tablet (200 mg) by mouth 2 times daily for 14 days, THEN 1 tablet (200 mg) daily for 30 days starting 11/29/23) 58 tablet 0    apixaban ANTICOAGULANT (ELIQUIS) 5 MG tablet Take 5 mg by mouth 2 times daily      aspirin 81 MG EC tablet Take 81 mg by mouth daily      atorvastatin (LIPITOR) 80 MG tablet Take 0.5 tablets (40 mg) by mouth at bedtime      carvedilol (COREG) 25 MG tablet Take 25 mg by mouth 2 times daily (with meals)      LORazepam (ATIVAN) 0.5 MG tablet Take 1 tablet (0.5 mg) by mouth nightly as needed for anxiety or muscle spasms 5 tablet 0    metFORMIN (GLUCOPHAGE XR) 500 MG 24 hr tablet Take 1,000 mg by mouth 2 times daily (with meals)      sertraline (ZOLOFT) 25 MG tablet Take 1 tablet by mouth daily      torsemide (DEMADEX) 20 MG tablet Take 1 tablet by mouth daily      traZODone (DESYREL) 50 MG tablet Take 25 mg by mouth at bedtime May repeat 25 mg x1 before 0200 for persistent insomnia, max 50 mg/night       ROS:  10 point ROS neg other than the symptoms noted above in the HPI.    PHYSICAL EXAM:  BP (!) 149/82   Pulse 95   Temp 97.5  F (36.4  C)   Resp 18   Ht 1.803 m (5' 11\")   Wt 95.3 kg (210 lb 1.6 oz)   SpO2 98%   BMI 29.30 kg/m    Gen: sitting in recliner, alert, cooperative and in no acute distress  Card: RRR, S1, S2, no murmurs  Resp: lungs clear to auscultation bilaterally, no crackles or wheezes and moving good air   Ext: no LE edema  Neuro: CX II-XII grossly in tact; ROM in all four extremities grossly in tact  Psych: alert and oriented x3; normal affect  Skin: sternotomy incision is well healed as is harvest site on LLE - no signs of infection      LABORATORY/IMAGING " DATA:  Reviewed as per [facility] and/or [facility]    ASSESSMENT/PLAN:    Cardiogenic Shock, Resolved  CAD s/p CABG x3 (9/26/23)  Cardiac Tamponade s/p Pericardial Window (11/5/23)  HFrEF (EF 45-50%), HTN, HLD  SBPs 130s-140s with limited data. PTA amlodipine-benazepril 5-20 mg remains on hold and statin is at half dose. Carvedilol was resumed on hospital discharge.   -- ASA 81 mg daily, atorvastatin 40 mg at bedtime, carvedilol 25 mg BID, torsemide 20 mg daily   -- follow BPs, weights clinical volume status    Oliguric CRISTAL on CKD, Stage III  Baseline Cr 1.2-1.3. Required CRRT for oliguric CRISTAL. Cr 2.7 on 11/14 - jules FRANK. Torsemide has been resumed.   -- BMP is ordered for tomorrow  -- follow up in nephrology clinic as scheduled on 1/17/24    Acute on Chronic Anemia  Recent Hgb 8-9. Received 1 unit PRBCs on 11/5/23. Fe sat 26%. Hgb 7/7 on 11/11.   -- CBC is ordered for tomorrow    Acute Hypoxic Respiratory Failure, Resolved  In setting of above. Lungs clear today  -- encourage good pulmonary toilet    Chronic Atrial Fibrillation   HR 80s-90s. History of cardioversion in August. On amiodarone taper per cardiology. Irregularly irregular on exam today.   -- amiodarone 200  mg BID x14 days then 200 mg daily (starting 11/29/23)  -- carvedilol 25 mg BID  -- apixaban 5 mg BID   -- follow up with cardiology as scheduled     Insomnia  Long standing, but worse in recent months. Has tried zolpidem without benefit. Also trazodone but without a PRN for persistent insomnia.   -- trazodone 25 mg at bedtime and may repeat 25 mg x1 before 0200 for persistent insomnia, max 50 mg/night  -- discussed will follow up on 11/17 and can consolidate into 50 mg dose and do a 25 mg PRN if needed  -- encouraged him not to nap late in the day    DM, Type II  Hgb A1c 6.7. Does not check sugars at home.   -- metformin 1000 mg BID  -- check sugars PRN    Mild Major Recurrent Depression  Mood and spirits were good today.   -- sertraline 25 mg  daily  -- supportive cares     Physical Deconditioning  In setting of hospitalization and underlying medical conditions  -- ongoing PT/OT      Electronically signed by:  Prabha Collier MD

## 2023-11-15 NOTE — PROGRESS NOTES
[facility] Resource [facility]    Background: Transitional Care Management program identified per system criteria and reviewed by [facility] team for possible outreach.    Assessment: Upon chart review, CCRC Team member will not proceed with patient outreach related to this episode of Transitional Care Management program due to reason below:    Non-[facility] TCU: CCRC team member noted patient discharged to TCU/ARU/LTACH. Patient is not established with a [facility] currently supported by Primary Care-Care Coordination therefore handoff to Primary Care-Care Coordination is not appropriate at this time.    Skilled Nursing Facility     Plan: Transitional Care Management episode addressed appropriately per reason noted above.      EVER Stringer  [facility], [facility]    *Connected Care Resource Team does NOT follow patient ongoing. Referrals are identified based on internal discharge reports and the outreach is to ensure patient has an understanding of their discharge instructions.

## 2023-11-15 NOTE — TELEPHONE ENCOUNTER
[place] Call [facility]    Phone Message    May a detailed message be left on voicemail: yes     Reason for Call: Other: Shunx with [facility] called to schedule patient follow up to see cardiology in 2 weeks per hospital follow up. Patient has not established care with provider yet and was seen in the hospital by Dr. Busby and Dr. Hansen, and both providers do not have any availability until next year. Wanting to know if patient can get in to see either one of them or which GIOVANI they should be scheduled with to see. Please call Yunier back at [phone] to further discuss.     Action Taken: Other: Cardiology    Travel Screening: Not Applicable    Thank you!  [facility]

## 2023-11-15 NOTE — TELEPHONE ENCOUNTER
"Per Dr. Hansen's 11-2-23 inpt consult note:  \"Primary cardiologist: [facility] cardiology\"    Per Dr. Gallardo's 11-10-23 inpt progress note \"Follow by Dr. Garcia in [facility]\".      Return msg sent to sched w/ request to contact [facility] staff w/ No[facility] cardiologist sched # [phone].  mg  "

## 2023-11-15 NOTE — LETTER
11/15/2023        RE: Gerardo Al  [address]  [place] [address]        [facility] GERIATRICS  INITIAL VISIT NOTE  November 15, 2023    PRIMARY CARE PROVIDER AND CLINIC:  Frank Chávez [address] / [place] [address]    [facility] Medical Record Number:  [id number]  Place of Service where encounter took place:  [facility] ([facility]) [[id number]]    Chief Complaint   Patient presents with     Hospital F/U       HPI:    Gerardo Al is a 78 year old  (1945) male seen today at [facility] TCU. Medical history is notable for CAD s/p CABG x3 (9/26/23), HTN, a-fib on anticoagulation, PAD, DM II as well as recent cellulitis of LE vein harvest site (late Oct). He had a Cr >7 on 10/31 - unclear what follow up from this? He was hospitalized at [facility] from 11/2/23 to 11/14/23 where he presented with fatigue and was admitted to the ICU with septic shock, acute hypoxic respiratory failure secondary to bilateral pleural effusions and CRISTAL (Cr >10). He was intubated (11/5-11/7) and required CRRT (11/3-11/7). He is s/p pericardial window (11/5) due to pericardial effusion with cardiac tamponade. TTE (11/2) with EF 45-50%). LLE cultures grew Klebsiella and he completed course of antibiotics inpatient. He received 1 unit PRBCs on 11/5. PTA amlodipine-benazepril, carvedilol, metformin and torsemide help during hospitalization and all resumed at discharge except for amlodipine-benazepril. PTA atorvastatin decreased and he is on an amiodarone taper.   He was admitted to this facility for  rehab, medical management, and nursing care.      History obtained from: facility chart records, facility staff, patient report, [facility] chart review, and Care Everywhere [facility] chart review.      Today, Mr. Al is seen in his room sitting in a recliner. He is a very good historian. He feels good. Said he was doing 12 steps in the hospital to go up one floor while he was in the  "hospital and wonders if he would get stronger faster doing outpatient cardiac rehab? We talked about how he's not had a PT/OT eval here yet, and to get that done and see how he feels. He likely will be surprised how fast he tuckers out and that goal is for him to be safe and not fall when he gets home. No chest pain. No trouble breathing. Reviewed hospital course and med changes. His biggest medical concern is not sleeping well. He feels he needs to sleep to get stronger. Ambien hasn't helped. He takes an OTC \"Back to Sleep\" supplement at home sometimes with melatonin. He has tried Tylenol PM and woke up sweating, dry mouth - we discussed the Benadryl is not a good idea. He's tried trazodone before, but not with a PRN for when he wakes up - he is open to trying this. No acute concerns today per nursing. He will start to work with therapies.     CODE STATUS: CPR/Full code     ALLERGIES:  No Known Allergies    PAST MEDICAL HISTORY:   Past Medical History:   Diagnosis Date     Hx of CABG     Sept 2023 at [facility]       PAST SURGICAL HISTORY:   Past Surgical History:   Procedure Laterality Date     CARDIAC SURGERY       CREATION PERICARDIAL WINDOW N/A 11/5/2023    Procedure: CREATION, PERICARDIAL WINDOW; EPIAORTIC ULTRASOUND;TRANSESPHAGEAL ECHOCARDIOGRAPHY BY ANESTHESIA;  Surgeon: Ruchi Singleton MD;  Location: [facility] Main OR     CV RIGHT HEART CATH MEASUREMENTS RECORDED N/A 11/2/2023    Procedure: Right Heart Catheterization;  Surgeon: Adam Busby MD;  Location: [facility] CATH LAB CV     CV SWAN ANEL PROCEDURE N/A 11/2/2023    Procedure: [place] Anel Procedure;  Surgeon: Adam Busby MD;  Location: [facility] CATH LAB CV     IR CVC TUNNEL PLACEMENT > 5 YRS OF AGE  11/3/2023     IR CVC TUNNEL REMOVAL LEFT  11/13/2023     PICC TRIPLE LUMEN PLACEMENT  11/2/2023       SOCIAL HISTORY:   Patient's living condition: lives with spouse    MEDICATIONS:  Post Discharge Medication Reconciliation Status: " "discharge medications reconciled and changed, per note/orders.  Current Outpatient Medications   Medication Sig Dispense Refill     acetaminophen (TYLENOL) 500 MG tablet Take 1,000 mg by mouth every 6 hours as needed for mild pain or pain       amiodarone (PACERONE) 200 MG tablet Take 1 tablet (200 mg) by mouth 2 times daily for 14 days, THEN 1 tablet (200 mg) daily for 30 days. (Patient taking differently: Take 1 tablet (200 mg) by mouth 2 times daily for 14 days, THEN 1 tablet (200 mg) daily for 30 days starting 11/29/23) 58 tablet 0     apixaban ANTICOAGULANT (ELIQUIS) 5 MG tablet Take 5 mg by mouth 2 times daily       aspirin 81 MG EC tablet Take 81 mg by mouth daily       atorvastatin (LIPITOR) 80 MG tablet Take 0.5 tablets (40 mg) by mouth at bedtime       carvedilol (COREG) 25 MG tablet Take 25 mg by mouth 2 times daily (with meals)       LORazepam (ATIVAN) 0.5 MG tablet Take 1 tablet (0.5 mg) by mouth nightly as needed for anxiety or muscle spasms 5 tablet 0     metFORMIN (GLUCOPHAGE XR) 500 MG 24 hr tablet Take 1,000 mg by mouth 2 times daily (with meals)       sertraline (ZOLOFT) 25 MG tablet Take 1 tablet by mouth daily       torsemide (DEMADEX) 20 MG tablet Take 1 tablet by mouth daily       traZODone (DESYREL) 50 MG tablet Take 25 mg by mouth at bedtime May repeat 25 mg x1 before 0200 for persistent insomnia, max 50 mg/night       ROS:  10 point ROS neg other than the symptoms noted above in the HPI.    PHYSICAL EXAM:  BP (!) 149/82   Pulse 95   Temp 97.5  F (36.4  C)   Resp 18   Ht 1.803 m (5' 11\")   Wt 95.3 kg (210 lb 1.6 oz)   SpO2 98%   BMI 29.30 kg/m    Gen: sitting in recliner, alert, cooperative and in no acute distress  Card: RRR, S1, S2, no murmurs  Resp: lungs clear to auscultation bilaterally, no crackles or wheezes and moving good air   Ext: no LE edema  Neuro: CX II-XII grossly in tact; ROM in all four extremities grossly in tact  Psych: alert and oriented x3; normal affect  Skin: " sternotomy incision is well healed as is harvest site on LLE - no signs of infection      LABORATORY/IMAGING DATA:  Reviewed as per [facility] and/or [facility]    ASSESSMENT/PLAN:    Cardiogenic Shock, Resolved  CAD s/p CABG x3 (9/26/23)  Cardiac Tamponade s/p Pericardial Window (11/5/23)  HFrEF (EF 45-50%), HTN, HLD  SBPs 130s-140s with limited data. PTA amlodipine-benazepril 5-20 mg remains on hold and statin is at half dose. Carvedilol was resumed on hospital discharge.   -- ASA 81 mg daily, atorvastatin 40 mg at bedtime, carvedilol 25 mg BID, torsemide 20 mg daily   -- follow BPs, weights clinical volume status    Oliguric CRISTAL on CKD, Stage III  Baseline Cr 1.2-1.3. Required CRRT for oliguric CRISTAL. Cr 2.7 on 11/14 - jules OK. Torsemide has been resumed.   -- BMP is ordered for tomorrow  -- follow up in nephrology clinic as scheduled on 1/17/24    Acute on Chronic Anemia  Recent Hgb 8-9. Received 1 unit PRBCs on 11/5/23. Fe sat 26%. Hgb 7/7 on 11/11.   -- CBC is ordered for tomorrow    Acute Hypoxic Respiratory Failure, Resolved  In setting of above. Lungs clear today  -- encourage good pulmonary toilet    Chronic Atrial Fibrillation   HR 80s-90s. History of cardioversion in August. On amiodarone taper per cardiology. Irregularly irregular on exam today.   -- amiodarone 200  mg BID x14 days then 200 mg daily (starting 11/29/23)  -- carvedilol 25 mg BID  -- apixaban 5 mg BID   -- follow up with cardiology as scheduled     Insomnia  Long standing, but worse in recent months. Has tried zolpidem without benefit. Also trazodone but without a PRN for persistent insomnia.   -- trazodone 25 mg at bedtime and may repeat 25 mg x1 before 0200 for persistent insomnia, max 50 mg/night  -- discussed will follow up on 11/17 and can consolidate into 50 mg dose and do a 25 mg PRN if needed  -- encouraged him not to nap late in the day    DM, Type II  Hgb A1c 6.7. Does not check sugars at home.   -- metformin 1000 mg BID  -- check  sugars PRN    Mild Major Recurrent Depression  Mood and spirits were good today.   -- sertraline 25 mg daily  -- supportive cares     Physical Deconditioning  In setting of hospitalization and underlying medical conditions  -- ongoing PT/OT      Electronically signed by:  Prabha Collier MD                          Sincerely,        Prabha Collier MD

## 2023-11-16 LAB
ANION GAP SERPL CALCULATED.3IONS-SCNC: 12 MMOL/L (ref 7–15)
BUN SERPL-MCNC: 22.4 MG/DL (ref 8–23)
CALCIUM SERPL-MCNC: 9.2 MG/DL (ref 8.8–10.2)
CHLORIDE SERPL-SCNC: 101 MMOL/L (ref 98–107)
CREAT SERPL-MCNC: 2.9 MG/DL (ref 0.67–1.17)
DEPRECATED HCO3 PLAS-SCNC: 27 MMOL/L (ref 22–29)
EGFRCR SERPLBLD CKD-EPI 2021: 21 ML/MIN/1.73M2
ERYTHROCYTE [DISTWIDTH] IN BLOOD BY AUTOMATED COUNT: 16.5 % (ref 10–15)
GLUCOSE SERPL-MCNC: 89 MG/DL (ref 70–99)
HCT VFR BLD AUTO: 27.1 % (ref 40–53)
HGB BLD-MCNC: 8.3 G/DL (ref 13.3–17.7)
MCH RBC QN AUTO: 28.7 PG (ref 26.5–33)
MCHC RBC AUTO-ENTMCNC: 30.6 G/DL (ref 31.5–36.5)
MCV RBC AUTO: 94 FL (ref 78–100)
PLATELET # BLD AUTO: 191 10E3/UL (ref 150–450)
POTASSIUM SERPL-SCNC: 4.5 MMOL/L (ref 3.4–5.3)
RBC # BLD AUTO: 2.89 10E6/UL (ref 4.4–5.9)
SODIUM SERPL-SCNC: 140 MMOL/L (ref 135–145)
WBC # BLD AUTO: 4.6 10E3/UL (ref 4–11)

## 2023-11-16 PROCEDURE — 80048 BASIC METABOLIC PNL TOTAL CA: CPT | Mod: ORL | Performed by: INTERNAL MEDICINE

## 2023-11-16 PROCEDURE — P9604 ONE-WAY ALLOW PRORATED TRIP: HCPCS | Mod: ORL | Performed by: INTERNAL MEDICINE

## 2023-11-16 PROCEDURE — 85027 COMPLETE CBC AUTOMATED: CPT | Mod: ORL | Performed by: INTERNAL MEDICINE

## 2023-11-16 PROCEDURE — 36415 COLL VENOUS BLD VENIPUNCTURE: CPT | Mod: ORL | Performed by: INTERNAL MEDICINE

## 2023-11-16 RX ORDER — TRAZODONE HYDROCHLORIDE 50 MG/1
25 TABLET, FILM COATED ORAL AT BEDTIME
COMMUNITY
End: 2023-11-21

## 2023-11-17 ENCOUNTER — TRANSITIONAL CARE UNIT VISIT (OUTPATIENT)
Dept: GERIATRICS | Facility: CLINIC | Age: 78
End: 2023-11-17
Payer: COMMERCIAL

## 2023-11-17 VITALS
HEIGHT: 71 IN | HEART RATE: 66 BPM | WEIGHT: 207.8 LBS | RESPIRATION RATE: 18 BRPM | DIASTOLIC BLOOD PRESSURE: 58 MMHG | TEMPERATURE: 98.2 F | SYSTOLIC BLOOD PRESSURE: 111 MMHG | BODY MASS INDEX: 29.09 KG/M2 | OXYGEN SATURATION: 97 %

## 2023-11-17 DIAGNOSIS — N17.9 ACUTE KIDNEY INJURY SUPERIMPOSED ON CKD (H): ICD-10-CM

## 2023-11-17 DIAGNOSIS — I48.20 CHRONIC ATRIAL FIBRILLATION (H): ICD-10-CM

## 2023-11-17 DIAGNOSIS — N18.9 ACUTE KIDNEY INJURY SUPERIMPOSED ON CKD (H): ICD-10-CM

## 2023-11-17 DIAGNOSIS — I50.22 CHRONIC SYSTOLIC HEART FAILURE (H): Primary | ICD-10-CM

## 2023-11-17 DIAGNOSIS — I12.9 BENIGN HYPERTENSIVE KIDNEY DISEASE WITH CHRONIC KIDNEY DISEASE STAGE I THROUGH STAGE IV, OR UNSPECIFIED: ICD-10-CM

## 2023-11-17 DIAGNOSIS — I25.10 CORONARY ARTERY DISEASE INVOLVING NATIVE CORONARY ARTERY OF NATIVE HEART WITHOUT ANGINA PECTORIS: ICD-10-CM

## 2023-11-17 DIAGNOSIS — R53.81 PHYSICAL DECONDITIONING: ICD-10-CM

## 2023-11-17 PROCEDURE — 99310 SBSQ NF CARE HIGH MDM 45: CPT | Performed by: INTERNAL MEDICINE

## 2023-11-17 NOTE — LETTER
11/17/2023        RE: Gerardo Al  [address]  [facility] [address]        [facility] GERIATRICS  TCU Episodic Visit   November 17, 2023      Chief Complaint   Patient presents with     RECHECK       HPI:    Gerardo Al is a 78 year old  (1945), who is being seen today for an episodic care visit at [facility] TCU where he was admitted after a hosptialization with cardiogenic shock, pericardial effusion s/p pericardial window, respiratory failure requiring intubation, oliguric renal failure requiring CRRT.     Labs yesterday with Hgb 8.3 (from 7.7) and Cr 2.9 - stable from 11/13.   He's had a couple of lower SBPs of 99 and 111.     Today, Mr. Al is seen in his room with his spouse, Francine. He is overall doing OK. Mouth is dry. Doesn't feel dizzy, per se, but does take a few seconds when he stands up to make sure he has his feet under him. He weighs 212 lbs at home - is 208 at the TCU. We talked about the torsemide and cutting it back vs holding it for a few days. He'd prefer to hold it - he's not eating/drinking like he does at home and he feels like between daily weights and just watching for edema this should be OK. He has baseline dependent LE edema - we talked about compression, elevation. No chest pain. Talked about upcoming follow up - I suggested that if he is able to see his PCP he may not need to see nephrology given etiology of the renal failure and lab monitoring. No acute concerns today per nursing. He is working with therapies.     ALLERGIES: Patient has no known allergies.    Past Medical, Surgical, Family and Social History reviewed and updated in EPIC.    MEDICATIONS  Current Outpatient Medications   Medication Sig Dispense Refill     acetaminophen (TYLENOL) 500 MG tablet Take 1,000 mg by mouth every 6 hours as needed for mild pain or pain       amiodarone (PACERONE) 200 MG tablet Take 1 tablet (200 mg) by mouth 2 times daily for 14 days, THEN 1 tablet (200  "mg) daily for 30 days. (Patient taking differently: Take 1 tablet (200 mg) by mouth 2 times daily for 14 days, THEN 1 tablet (200 mg) daily for 30 days starting 11/29/23) 58 tablet 0     apixaban ANTICOAGULANT (ELIQUIS) 5 MG tablet Take 5 mg by mouth 2 times daily       aspirin 81 MG EC tablet Take 81 mg by mouth daily       atorvastatin (LIPITOR) 80 MG tablet Take 0.5 tablets (40 mg) by mouth at bedtime       carvedilol (COREG) 25 MG tablet Take 25 mg by mouth 2 times daily (with meals)       LORazepam (ATIVAN) 0.5 MG tablet Take 1 tablet (0.5 mg) by mouth nightly as needed for anxiety or muscle spasms 5 tablet 0     metFORMIN (GLUCOPHAGE XR) 500 MG 24 hr tablet Take 1,000 mg by mouth 2 times daily (with meals)       sertraline (ZOLOFT) 25 MG tablet Take 1 tablet by mouth daily       torsemide (DEMADEX) 20 MG tablet Take 1 tablet by mouth daily Placed on HOLD starting 11/18/23       traZODone (DESYREL) 50 MG tablet Take 25 mg by mouth at bedtime May repeat 25 mg x1 before 0200 for persistent insomnia, max 50 mg/night       Medications reviewed:  Medications reconciled to facility chart and changes were made to reflect current medications as identified as above med list. Below are the changes that were made:   Medications stopped since last EPIC medication reconciliation:   There are no discontinued medications.  Medications started since last [facility] medication reconciliation:  No orders of the defined types were placed in this encounter.      REVIEW OF SYSTEMS:  4 point ROS neg other than the symptoms noted above in the HPI.    PHYSICAL EXAM:  /58   Pulse 66   Temp 98.2  F (36.8  C)   Resp 18   Ht 1.803 m (5' 11\")   Wt 94.3 kg (207 lb 12.8 oz)   SpO2 97%   BMI 28.98 kg/m    Gen: sitting in recliner, alert, cooperative and in no acute distress  Resp: breathing non labored, no tachypnea   Ext: some dependent LE edema he reports at baseline  Neuro: CX II-XII grossly in tact; ROM in all four extremities " grossly in tact  Psych: alert and oriented x3; normal affect    LABS & IMAGING  Recent Labs and Imaging:  Reviewed as per Epic    ASSESSMENT/PLAN:    Cardiogenic Shock, Resolved  CAD s/p CABG x3 (9/26/23)  Cardiac Tamponade s/p Pericardial Window (11/5/23)  HFrEF (EF 45-50%), HTN, HLD  SBPs 130s-140s with a couple lower SBPs (99 and 111). PTA amlodipine-benazepril 5-20 mg remains on hold and statin is at half dose. Carvedilol was resumed on hospital discharge. Concern he is getting a bit dry given soft BPs and not eating/drinking as he does at home  -- ASA 81 mg daily, atorvastatin 40 mg at bedtime, carvedilol 25 mg BID  -- discontinue torsemide 20 mg daily and follow weights and volume status - update for weight 212 lbs or more and/or any increase in edema - will re-eval on 11/21  -- follow BPs, weights clinical volume status     Oliguric CRISTAL on CKD, Stage III  Baseline Cr 1.2-1.3. Required CRRT for oliguric CRISTAL. Cr 2.7 on 11/14 and 2.9 yesterday (stable over past several BMPs) - lytes OK.   -- follow up in nephrology clinic as scheduled on 12/7/24  -- discussed that if he sees his PCP prior, he may not need to see nephrology as should be able to trend labs     Acute on Chronic Anemia  Recent Hgb 8-9. Received 1 unit PRBCs on 11/5/23. Fe sat 26%. Hgb 7,7 on 11/11 and 8.3 yesterday  -- discussed starting a Fe supplement, iron rich foods - for now, focus on diet, not a OTC supplement    Chronic Atrial Fibrillation   HR 80s-90s. History of cardioversion in August. On amiodarone taper per cardiology.   -- amiodarone 200  mg BID x14 days then 200 mg daily (starting 11/29/23)  -- carvedilol 25 mg BID  -- apixaban 5 mg BID   -- follow up with cardiology as scheduled     DM, Type II  Hgb A1c 6.7. Does not check sugars at home.   -- metformin 1000 mg BID  -- check sugars PRN     Mild Major Recurrent Depression  Mood and spirits were good today.   -- sertraline 25 mg daily  -- supportive cares      Physical  Deconditioning  In setting of hospitalization and underlying medical conditions  -- ongoing PT/OT      Electronically signed by  Prabha Collier MD                        Sincerely,        Prabha Collier MD

## 2023-11-17 NOTE — PROGRESS NOTES
[facility] GERIATRICS  TCU Episodic Visit   November 17, 2023      Chief Complaint   Patient presents with    RECHECK       HPI:    Gerardo Al is a 78 year old  (1945), who is being seen today for an episodic care visit at [facility] TCU where he was admitted after a hosptialization with cardiogenic shock, pericardial effusion s/p pericardial window, respiratory failure requiring intubation, oliguric renal failure requiring CRRT.     Labs yesterday with Hgb 8.3 (from 7.7) and Cr 2.9 - stable from 11/13.   He's had a couple of lower SBPs of 99 and 111.     Today, Mr. Al is seen in his room with his spouse, Francine. He is overall doing OK. Mouth is dry. Doesn't feel dizzy, per se, but does take a few seconds when he stands up to make sure he has his feet under him. He weighs 212 lbs at home - is 208 at the TCU. We talked about the torsemide and cutting it back vs holding it for a few days. He'd prefer to hold it - he's not eating/drinking like he does at home and he feels like between daily weights and just watching for edema this should be OK. He has baseline dependent LE edema - we talked about compression, elevation. No chest pain. Talked about upcoming follow up - I suggested that if he is able to see his PCP he may not need to see nephrology given etiology of the renal failure and lab monitoring. No acute concerns today per nursing. He is working with therapies.     ALLERGIES: Patient has no known allergies.    Past Medical, Surgical, Family and Social History reviewed and updated in EPIC.    MEDICATIONS  Current Outpatient Medications   Medication Sig Dispense Refill    acetaminophen (TYLENOL) 500 MG tablet Take 1,000 mg by mouth every 6 hours as needed for mild pain or pain      amiodarone (PACERONE) 200 MG tablet Take 1 tablet (200 mg) by mouth 2 times daily for 14 days, THEN 1 tablet (200 mg) daily for 30 days. (Patient taking differently: Take 1 tablet (200 mg) by mouth 2  "times daily for 14 days, THEN 1 tablet (200 mg) daily for 30 days starting 11/29/23) 58 tablet 0    apixaban ANTICOAGULANT (ELIQUIS) 5 MG tablet Take 5 mg by mouth 2 times daily      aspirin 81 MG EC tablet Take 81 mg by mouth daily      atorvastatin (LIPITOR) 80 MG tablet Take 0.5 tablets (40 mg) by mouth at bedtime      carvedilol (COREG) 25 MG tablet Take 25 mg by mouth 2 times daily (with meals)      LORazepam (ATIVAN) 0.5 MG tablet Take 1 tablet (0.5 mg) by mouth nightly as needed for anxiety or muscle spasms 5 tablet 0    metFORMIN (GLUCOPHAGE XR) 500 MG 24 hr tablet Take 1,000 mg by mouth 2 times daily (with meals)      sertraline (ZOLOFT) 25 MG tablet Take 1 tablet by mouth daily      torsemide (DEMADEX) 20 MG tablet Take 1 tablet by mouth daily Placed on HOLD starting 11/18/23      traZODone (DESYREL) 50 MG tablet Take 25 mg by mouth at bedtime May repeat 25 mg x1 before 0200 for persistent insomnia, max 50 mg/night       Medications reviewed:  Medications reconciled to facility chart and changes were made to reflect current medications as identified as above med list. Below are the changes that were made:   Medications stopped since last EPIC medication reconciliation:   There are no discontinued medications.  Medications started since last [facility] medication reconciliation:  No orders of the defined types were placed in this encounter.      REVIEW OF SYSTEMS:  4 point ROS neg other than the symptoms noted above in the HPI.    PHYSICAL EXAM:  /58   Pulse 66   Temp 98.2  F (36.8  C)   Resp 18   Ht 1.803 m (5' 11\")   Wt 94.3 kg (207 lb 12.8 oz)   SpO2 97%   BMI 28.98 kg/m    Gen: sitting in recliner, alert, cooperative and in no acute distress  Resp: breathing non labored, no tachypnea   Ext: some dependent LE edema he reports at baseline  Neuro: CX II-XII grossly in tact; ROM in all four extremities grossly in tact  Psych: alert and oriented x3; normal affect    LABS & IMAGING  Recent Labs and " Imaging:  Reviewed as per Epic    ASSESSMENT/PLAN:    Cardiogenic Shock, Resolved  CAD s/p CABG x3 (9/26/23)  Cardiac Tamponade s/p Pericardial Window (11/5/23)  HFrEF (EF 45-50%), HTN, HLD  SBPs 130s-140s with a couple lower SBPs (99 and 111). PTA amlodipine-benazepril 5-20 mg remains on hold and statin is at half dose. Carvedilol was resumed on hospital discharge. Concern he is getting a bit dry given soft BPs and not eating/drinking as he does at home  -- ASA 81 mg daily, atorvastatin 40 mg at bedtime, carvedilol 25 mg BID  -- discontinue torsemide 20 mg daily and follow weights and volume status - update for weight 212 lbs or more and/or any increase in edema - will re-eval on 11/21  -- follow BPs, weights clinical volume status     Oliguric CRISTAL on CKD, Stage III  Baseline Cr 1.2-1.3. Required CRRT for oliguric CRISTAL. Cr 2.7 on 11/14 and 2.9 yesterday (stable over past several BMPs) - jules OK.   -- follow up in nephrology clinic as scheduled on 12/7/24  -- discussed that if he sees his PCP prior, he may not need to see nephrology as should be able to trend labs     Acute on Chronic Anemia  Recent Hgb 8-9. Received 1 unit PRBCs on 11/5/23. Fe sat 26%. Hgb 7,7 on 11/11 and 8.3 yesterday  -- discussed starting a Fe supplement, iron rich foods - for now, focus on diet, not a OTC supplement    Chronic Atrial Fibrillation   HR 80s-90s. History of cardioversion in August. On amiodarone taper per cardiology.   -- amiodarone 200  mg BID x14 days then 200 mg daily (starting 11/29/23)  -- carvedilol 25 mg BID  -- apixaban 5 mg BID   -- follow up with cardiology as scheduled     DM, Type II  Hgb A1c 6.7. Does not check sugars at home.   -- metformin 1000 mg BID  -- check sugars PRN     Mild Major Recurrent Depression  Mood and spirits were good today.   -- sertraline 25 mg daily  -- supportive cares      Physical Deconditioning  In setting of hospitalization and underlying medical conditions  -- ongoing  PT/OT      Electronically signed by  Prabha Collier MD

## 2023-11-20 NOTE — CONFIDENTIAL NOTE
DIAGNOSIS:  Per Care facility, CRISTAL and Kidney disease, hospital follow up    DATE RECEIVED: 01.17.2024   NOTES STATUS DETAILS   OFFICE NOTE from referring provider     OFFICE NOTE from other specialist      *Only VASCULITIS or LUPUS gather office notes for the following     *PULMONARY       *ENT     *DERMATOLOGY     *RHEUMATOLOGY     DISCHARGE SUMMARY from hospital Internal 11.02.2023 [facility]   DISCHARGE REPORT from the ER     MEDICATION LIST Internal    IMAGING  (NEED IMAGES AND REPORTS)     KIDNEY CT SCAN     KIDNEY ULTRASOUND     MR ABDOMEN     NUCLEAR MEDICINE RENAL     LABS     CBC Internal 11.16.2023   CMP Internal 11.16.2023   BMP Internal 11.16.2023   UA Care Everywhere 09.28.2023    URINE PROTEIN Care Everywhere 09.28.2023   RENAL PANEL Internal 11.08.2023   BIOPSY     KIDNEY BIOPSY

## 2023-11-21 ENCOUNTER — DISCHARGE SUMMARY NURSING HOME (OUTPATIENT)
Dept: GERIATRICS | Facility: CLINIC | Age: 78
End: 2023-11-21
Payer: COMMERCIAL

## 2023-11-21 VITALS
HEIGHT: 71 IN | RESPIRATION RATE: 18 BRPM | DIASTOLIC BLOOD PRESSURE: 75 MMHG | TEMPERATURE: 97.5 F | BODY MASS INDEX: 28.84 KG/M2 | OXYGEN SATURATION: 96 % | HEART RATE: 85 BPM | WEIGHT: 206 LBS | SYSTOLIC BLOOD PRESSURE: 138 MMHG

## 2023-11-21 DIAGNOSIS — I48.20 CHRONIC ATRIAL FIBRILLATION (H): ICD-10-CM

## 2023-11-21 DIAGNOSIS — E11.69 TYPE 2 DIABETES MELLITUS WITH OTHER SPECIFIED COMPLICATION, WITHOUT LONG-TERM CURRENT USE OF INSULIN (H): ICD-10-CM

## 2023-11-21 DIAGNOSIS — I25.10 CORONARY ARTERY DISEASE INVOLVING NATIVE CORONARY ARTERY OF NATIVE HEART WITHOUT ANGINA PECTORIS: Primary | ICD-10-CM

## 2023-11-21 DIAGNOSIS — Z95.1 S/P CABG (CORONARY ARTERY BYPASS GRAFT): ICD-10-CM

## 2023-11-21 DIAGNOSIS — I12.9 BENIGN HYPERTENSIVE KIDNEY DISEASE WITH CHRONIC KIDNEY DISEASE STAGE I THROUGH STAGE IV, OR UNSPECIFIED: ICD-10-CM

## 2023-11-21 DIAGNOSIS — F41.9 ANXIETY: ICD-10-CM

## 2023-11-21 DIAGNOSIS — R53.81 PHYSICAL DECONDITIONING: ICD-10-CM

## 2023-11-21 DIAGNOSIS — F33.0 MILD RECURRENT MAJOR DEPRESSION (H): ICD-10-CM

## 2023-11-21 DIAGNOSIS — I48.0 PAROXYSMAL ATRIAL FIBRILLATION (H): ICD-10-CM

## 2023-11-21 PROCEDURE — 99316 NF DSCHRG MGMT 30 MIN+: CPT | Performed by: INTERNAL MEDICINE

## 2023-11-21 RX ORDER — METFORMIN HCL 500 MG
1000 TABLET, EXTENDED RELEASE 24 HR ORAL 2 TIMES DAILY WITH MEALS
Qty: 60 TABLET | Refills: 0 | Status: SHIPPED | OUTPATIENT
Start: 2023-11-21 | End: 2024-01-25 | Stop reason: DRUGHIGH

## 2023-11-21 RX ORDER — SERTRALINE HYDROCHLORIDE 25 MG/1
25 TABLET, FILM COATED ORAL DAILY
Qty: 30 TABLET | Refills: 0 | Status: SHIPPED | OUTPATIENT
Start: 2023-11-21

## 2023-11-21 RX ORDER — ASPIRIN 81 MG/1
81 TABLET ORAL DAILY
Qty: 30 TABLET | Refills: 0 | Status: SHIPPED | OUTPATIENT
Start: 2023-11-21

## 2023-11-21 RX ORDER — ATORVASTATIN CALCIUM 80 MG/1
40 TABLET, FILM COATED ORAL AT BEDTIME
Qty: 30 TABLET | Refills: 0 | Status: SHIPPED | OUTPATIENT
Start: 2023-11-21 | End: 2024-01-25

## 2023-11-21 RX ORDER — CARVEDILOL 25 MG/1
25 TABLET ORAL 2 TIMES DAILY WITH MEALS
Qty: 30 TABLET | Refills: 0 | Status: SHIPPED | OUTPATIENT
Start: 2023-11-21

## 2023-11-21 RX ORDER — LORAZEPAM 0.5 MG/1
0.5 TABLET ORAL
Qty: 5 TABLET | Refills: 0 | Status: SHIPPED | OUTPATIENT
Start: 2023-11-21

## 2023-11-21 RX ORDER — AMIODARONE HYDROCHLORIDE 200 MG/1
TABLET ORAL
Qty: 58 TABLET | Refills: 0 | Status: SHIPPED | OUTPATIENT
Start: 2023-11-21 | End: 2024-01-25

## 2023-11-21 NOTE — PROGRESS NOTES
[facility] GERIATRIC SERVICES   DISCHARGE SUMMARY    PATIENT'S NAME: Gerardo Al  YOB: 1945  MEDICAL RECORD NUMBER:  [id number]  Place of Service where encounter took place:  [facility] ([facility]) [[id number]]    PRIMARY CARE PROVIDER AND CLINIC RESPONSIBLE AFTER TRANSFER: Frank Chávez [address] / [place] [address]     TRANSFERRING PROVIDERS: Prabha Collier MD,  DATE OF SNF ADMISSION:  November 14, 2023  DATE OF SNF DISCHARGE: November 22, 2023  DISCHARGE DISPOSITION: Non-[facility] Provider   RECENT HOSPITALIZATION/ED:  [facility] from 11/2/23 to 11/14/23     CODE STATUS:   CPR/Full code     No Known Allergies    Condition on Discharge:  Improving.    DISCHARGE DIAGNOSIS:   Cardiogenic Shock, Resolved  CAD s/p CABG x3 (9/26/23)  Cardiac Tamponade s/p Pericardial Window (11/5/23)  HFrEF (EF 45-50%), HTN, HLD  Oliguric CRISTAL on CKD, Stage III  Acute on Chronic Anemia  Acute Hypoxic Respiratory Failure, Resolved  Chronic Atrial Fibrillation   Insomnia  DM, Type II  Mild Major Recurrent Depression  Physical Deconditioning    [facility] Nursing Facility Course:  Mr. Al was admitted to the [facility] TCU after a complex hospitalization at [facility] with cardiogenic shock, pericardial effusion s/p pericardial window, respiratory failure requiring intubation, oliguric renal failure requiring CRRT.     TCU course notable for soft BPs and weight loss in setting of resumption of diuretic prior to hospital discharge as well as ongoing insomnia. He is going to do outpatient cardiac rehab. Please see A/P below    Cardiogenic Shock, Resolved  CAD s/p CABG x3 (9/26/23)  Cardiac Tamponade s/p Pericardial Window (11/5/23)  HFrEF (EF 45-50%), HTN, HLD  SBPs 130s-140s  -->  SBPs 99 and 111 and weight was trending down at TCU. Discontinued torsemide 20 mg daily starting 11/18/23. BPs have improved back into the 130s and weight stable at 206 lbs (he reports baseline is 212 lbs). PTA  amlodipine-benazepril 5-20 mg remains on hold from hospitalization and statin is at half dose.   -- ASA 81 mg daily, atorvastatin 40 mg at bedtime, carvedilol 25 mg BID  -- he remains off of torsemide 20 mg daily and he will follow weights and volume status  -- follow BPs, weights clinical volume status  -- outpatient cardiac rehab     Oliguric CRISTAL on CKD, Stage III  Baseline Cr 1.2-1.3. Required CRRT for oliguric CRISTAL. Cr 2.7 on 11/14 and 2.9 at TCU on 11/16. Cr stable over past several BMPs - lytes OK. Torsemide discontinued on 11/18 as above. He is making good urine output.   -- follow up in nephrology clinic as scheduled on 12/7/24  -- discussed that if he sees his PCP prior, he may not need to see nephrology as should be able to trend labs     Acute on Chronic Anemia  Recent Hgb 8-9. Received 1 unit PRBCs on 11/5/23. Fe sat 26%. Hgb 7,7 on 11/11 and 8.3 at TCU on 11/16.   -- discussed starting a Fe supplement, iron rich foods - for now, focus on diet, not a OTC supplement    Acute Hypoxic Respiratory Failure, Resolved  In setting of above. Lungs clear today  -- encourage good pulmonary toilet     Chronic Atrial Fibrillation   HR 60s-80s. History of cardioversion in August. On amiodarone taper per cardiology. Irregularly irregular on exam today.   -- amiodarone 200  mg BID x14 days then 200 mg daily (starting 11/29/23) - this is a new med from hospitalization   -- carvedilol 25 mg BID  -- apixaban 5 mg BID   -- follow up with cardiology as scheduled      Insomnia  Long standing, but worse in recent months. Has tried zolpidem without benefit. Also trazodone but without a PRN for persistent insomnia. We tried trazodone with a PRN at the TCU and it was not effective. He did not wish to continue this.   -- defer to PCP re: ongoing options      DM, Type II  Hgb A1c 6.7. Does not check sugars at home.   -- metformin 1000 mg BID  -- check sugars PRN     Mild Major Recurrent Depression  Mood and spirits were good today.  "  -- sertraline 25 mg daily  -- supportive cares      Physical Deconditioning  In setting of hospitalization and underlying medical conditions  -- outpatient cardiac rehab    PAST MEDICAL HISTORY:  has a past medical history of CABG.    DISCHARGE MEDICATIONS:  Current Outpatient Medications   Medication Sig Dispense Refill    acetaminophen (TYLENOL) 500 MG tablet Take 1,000 mg by mouth every 6 hours as needed for mild pain or pain      amiodarone (PACERONE) 200 MG tablet Take 1 tablet (200 mg) by mouth 2 times daily for 14 days, THEN 1 tablet (200 mg) daily for 30 days starting 11/29/23 58 tablet 0    apixaban ANTICOAGULANT (ELIQUIS) 5 MG tablet Take 1 tablet (5 mg) by mouth 2 times daily 60 tablet 0    aspirin 81 MG EC tablet Take 1 tablet (81 mg) by mouth daily 30 tablet 0    atorvastatin (LIPITOR) 80 MG tablet Take 0.5 tablets (40 mg) by mouth at bedtime 30 tablet 0    carvedilol (COREG) 25 MG tablet Take 1 tablet (25 mg) by mouth 2 times daily (with meals) 30 tablet 0    LORazepam (ATIVAN) 0.5 MG tablet Take 1 tablet (0.5 mg) by mouth nightly as needed for anxiety or muscle spasms 5 tablet 0    metFORMIN (GLUCOPHAGE XR) 500 MG 24 hr tablet Take 2 tablets (1,000 mg) by mouth 2 times daily (with meals) 60 tablet 0    sertraline (ZOLOFT) 25 MG tablet Take 1 tablet (25 mg) by mouth daily 30 tablet 0    torsemide (DEMADEX) 20 MG tablet Take 1 tablet by mouth daily Placed on HOLD starting 11/18/23         MEDICATION CHANGES/RATIONALE:   Torsemide discontinued on 11/18 due to soft BPs, weight loss  Trial of trazodone for insomnia was ineffective    Controlled medications sent with patient: None     ROS:  4 point ROS negative except as stated above    PHYSICAL EXAM  /75   Pulse 85   Temp 97.5  F (36.4  C)   Resp 18   Ht 1.803 m (5' 11\")   Wt 93.4 kg (206 lb)   SpO2 96%   BMI 28.73 kg/m    Gen: sitting in recliner, alert, cooperative and in no acute distress  Card: irregularly irregular, S1, S2, no " murmurs  Resp: lungs clear to auscultation bilaterally, no crackles or wheezes and moving good air  Ext: stable dependent bilateral LE edema with stasis changes  Neuro: CX II-XII grossly in tact; ROM in all four extremities grossly in tact  Psych: alert and oriented x3; normal affect    LABS: Please see Epic    DISCHARGE PLAN:   outpatient cardiac rehab    [facility] scheduled appointments:  Future Appointments   Date Time Provider Department Center   1/17/2024  8:30 AM  LAB [facility]   1/17/2024  9:30 AM Tylor Hicks MD [facility]     TOTAL DISCHARGE TIME:   Greater than 30 minutes    Electronically signed by:  Prabha Collier MD

## 2023-11-21 NOTE — LETTER
11/21/2023        RE: Gerardo Al  [address]  [place] [address]        [facility] GERIATRIC SERVICES   DISCHARGE SUMMARY    PATIENT'S NAME: Gerardo Al  YOB: 1945  MEDICAL RECORD NUMBER:  [id number]  Place of Service where encounter took place:  [facility] ([facility]) [[id number]]    PRIMARY CARE PROVIDER AND CLINIC RESPONSIBLE AFTER TRANSFER: Frank Chávez [address] / [place] [address]     TRANSFERRING PROVIDERS: Prabha Collier MD,  DATE OF SNF ADMISSION:  November 14, 2023  DATE OF SNF DISCHARGE: November 22, 2023  DISCHARGE DISPOSITION: Non-FMG Provider   RECENT HOSPITALIZATION/ED:  [facility] from 11/2/23 to 11/14/23     CODE STATUS:   CPR/Full code     No Known Allergies    Condition on Discharge:  Improving.    DISCHARGE DIAGNOSIS:   Cardiogenic Shock, Resolved  CAD s/p CABG x3 (9/26/23)  Cardiac Tamponade s/p Pericardial Window (11/5/23)  HFrEF (EF 45-50%), HTN, HLD  Oliguric CRISTAL on CKD, Stage III  Acute on Chronic Anemia  Acute Hypoxic Respiratory Failure, Resolved  Chronic Atrial Fibrillation   Insomnia  DM, Type II  Mild Major Recurrent Depression  Physical Deconditioning    HPI Nursing Facility Course:  Mr. Al was admitted to the [facility] TCU after a complex hospitalization at [facility] with cardiogenic shock, pericardial effusion s/p pericardial window, respiratory failure requiring intubation, oliguric renal failure requiring CRRT.     TCU course notable for soft BPs and weight loss in setting of resumption of diuretic prior to hospital discharge as well as ongoing insomnia. He is going to do outpatient cardiac rehab. Please see A/P below    Cardiogenic Shock, Resolved  CAD s/p CABG x3 (9/26/23)  Cardiac Tamponade s/p Pericardial Window (11/5/23)  HFrEF (EF 45-50%), HTN, HLD  SBPs 130s-140s  -->  SBPs 99 and 111 and weight was trending down at TCU. Discontinued torsemide 20 mg daily starting 11/18/23. BPs have improved back  into the 130s and weight stable at 206 lbs (he reports baseline is 212 lbs). PTA amlodipine-benazepril 5-20 mg remains on hold from hospitalization and statin is at half dose.   -- ASA 81 mg daily, atorvastatin 40 mg at bedtime, carvedilol 25 mg BID  -- he remains off of torsemide 20 mg daily and he will follow weights and volume status  -- follow BPs, weights clinical volume status  -- outpatient cardiac rehab     Oliguric CRISTAL on CKD, Stage III  Baseline Cr 1.2-1.3. Required CRRT for oliguric CRISTAL. Cr 2.7 on 11/14 and 2.9 at TCU on 11/16. Cr stable over past several BMPs - lytes OK. Torsemide discontinued on 11/18 as above. He is making good urine output.   -- follow up in nephrology clinic as scheduled on 12/7/24  -- discussed that if he sees his PCP prior, he may not need to see nephrology as should be able to trend labs     Acute on Chronic Anemia  Recent Hgb 8-9. Received 1 unit PRBCs on 11/5/23. Fe sat 26%. Hgb 7,7 on 11/11 and 8.3 at TCU on 11/16.   -- discussed starting a Fe supplement, iron rich foods - for now, focus on diet, not a OTC supplement    Acute Hypoxic Respiratory Failure, Resolved  In setting of above. Lungs clear today  -- encourage good pulmonary toilet     Chronic Atrial Fibrillation   HR 60s-80s. History of cardioversion in August. On amiodarone taper per cardiology. Irregularly irregular on exam today.   -- amiodarone 200  mg BID x14 days then 200 mg daily (starting 11/29/23) - this is a new med from hospitalization   -- carvedilol 25 mg BID  -- apixaban 5 mg BID   -- follow up with cardiology as scheduled      Insomnia  Long standing, but worse in recent months. Has tried zolpidem without benefit. Also trazodone but without a PRN for persistent insomnia. We tried trazodone with a PRN at the TCU and it was not effective. He did not wish to continue this.   -- defer to PCP re: ongoing options      DM, Type II  Hgb A1c 6.7. Does not check sugars at home.   -- metformin 1000 mg BID  -- check  "sugars PRN     Mild Major Recurrent Depression  Mood and spirits were good today.   -- sertraline 25 mg daily  -- supportive cares      Physical Deconditioning  In setting of hospitalization and underlying medical conditions  -- outpatient cardiac rehab    PAST MEDICAL HISTORY:  has a past medical history of CABG.    DISCHARGE MEDICATIONS:  Current Outpatient Medications   Medication Sig Dispense Refill     acetaminophen (TYLENOL) 500 MG tablet Take 1,000 mg by mouth every 6 hours as needed for mild pain or pain       amiodarone (PACERONE) 200 MG tablet Take 1 tablet (200 mg) by mouth 2 times daily for 14 days, THEN 1 tablet (200 mg) daily for 30 days starting 11/29/23 58 tablet 0     apixaban ANTICOAGULANT (ELIQUIS) 5 MG tablet Take 1 tablet (5 mg) by mouth 2 times daily 60 tablet 0     aspirin 81 MG EC tablet Take 1 tablet (81 mg) by mouth daily 30 tablet 0     atorvastatin (LIPITOR) 80 MG tablet Take 0.5 tablets (40 mg) by mouth at bedtime 30 tablet 0     carvedilol (COREG) 25 MG tablet Take 1 tablet (25 mg) by mouth 2 times daily (with meals) 30 tablet 0     LORazepam (ATIVAN) 0.5 MG tablet Take 1 tablet (0.5 mg) by mouth nightly as needed for anxiety or muscle spasms 5 tablet 0     metFORMIN (GLUCOPHAGE XR) 500 MG 24 hr tablet Take 2 tablets (1,000 mg) by mouth 2 times daily (with meals) 60 tablet 0     sertraline (ZOLOFT) 25 MG tablet Take 1 tablet (25 mg) by mouth daily 30 tablet 0     torsemide (DEMADEX) 20 MG tablet Take 1 tablet by mouth daily Placed on HOLD starting 11/18/23         MEDICATION CHANGES/RATIONALE:   Torsemide discontinued on 11/18 due to soft BPs, weight loss  Trial of trazodone for insomnia was ineffective    Controlled medications sent with patient: None     ROS:  4 point ROS negative except as stated above    PHYSICAL EXAM  /75   Pulse 85   Temp 97.5  F (36.4  C)   Resp 18   Ht 1.803 m (5' 11\")   Wt 93.4 kg (206 lb)   SpO2 96%   BMI 28.73 kg/m    Gen: sitting in recliner, " alert, cooperative and in no acute distress  Card: irregularly irregular, S1, S2, no murmurs  Resp: lungs clear to auscultation bilaterally, no crackles or wheezes and moving good air  Ext: stable dependent bilateral LE edema with stasis changes  Neuro: CX II-XII grossly in tact; ROM in all four extremities grossly in tact  Psych: alert and oriented x3; normal affect    LABS: Please see Epic    DISCHARGE PLAN:   outpatient cardiac rehab    [facility] scheduled appointments:  Future Appointments   Date Time Provider Department Center   1/17/2024  8:30 AM  LAB [facility]   1/17/2024  9:30 AM Tylor Hicks MD [facility]     TOTAL DISCHARGE TIME:   Greater than 30 minutes    Electronically signed by:  Prabha Collier MD        Sincerely,        Prabha Collier MD

## 2023-11-28 ENCOUNTER — HOSPITAL ENCOUNTER (OUTPATIENT)
Dept: CARDIAC REHAB | Facility: HOSPITAL | Age: 78
Discharge: HOME | End: 2023-11-28
Attending: INTERNAL MEDICINE
Payer: COMMERCIAL

## 2023-11-28 PROCEDURE — 93798 PHYS/QHP OP CAR RHAB W/ECG: CPT

## 2023-11-30 ENCOUNTER — HOSPITAL ENCOUNTER (OUTPATIENT)
Dept: CARDIAC REHAB | Facility: HOSPITAL | Age: 78
Discharge: HOME | End: 2023-11-30
Attending: INTERNAL MEDICINE
Payer: COMMERCIAL

## 2023-11-30 PROCEDURE — 93798 PHYS/QHP OP CAR RHAB W/ECG: CPT

## 2023-12-05 ENCOUNTER — HOSPITAL ENCOUNTER (OUTPATIENT)
Dept: CARDIAC REHAB | Facility: HOSPITAL | Age: 78
Discharge: HOME | End: 2023-12-05
Attending: INTERNAL MEDICINE
Payer: COMMERCIAL

## 2023-12-05 PROCEDURE — 93798 PHYS/QHP OP CAR RHAB W/ECG: CPT

## 2023-12-07 ENCOUNTER — HOSPITAL ENCOUNTER (OUTPATIENT)
Dept: CARDIAC REHAB | Facility: HOSPITAL | Age: 78
Discharge: HOME | End: 2023-12-07
Attending: INTERNAL MEDICINE
Payer: COMMERCIAL

## 2023-12-07 PROCEDURE — 93798 PHYS/QHP OP CAR RHAB W/ECG: CPT

## 2023-12-14 ENCOUNTER — HOSPITAL ENCOUNTER (OUTPATIENT)
Dept: CARDIAC REHAB | Facility: HOSPITAL | Age: 78
Discharge: HOME | End: 2023-12-14
Attending: INTERNAL MEDICINE
Payer: COMMERCIAL

## 2023-12-14 PROCEDURE — 93798 PHYS/QHP OP CAR RHAB W/ECG: CPT

## 2023-12-19 ENCOUNTER — HOSPITAL ENCOUNTER (OUTPATIENT)
Dept: CARDIAC REHAB | Facility: HOSPITAL | Age: 78
Discharge: HOME | End: 2023-12-19
Attending: INTERNAL MEDICINE
Payer: COMMERCIAL

## 2023-12-19 PROCEDURE — 93797 PHYS/QHP OP CAR RHAB WO ECG: CPT

## 2023-12-21 ENCOUNTER — HOSPITAL ENCOUNTER (OUTPATIENT)
Dept: CARDIAC REHAB | Facility: HOSPITAL | Age: 78
Discharge: HOME | End: 2023-12-21
Attending: INTERNAL MEDICINE
Payer: COMMERCIAL

## 2023-12-21 PROCEDURE — 93798 PHYS/QHP OP CAR RHAB W/ECG: CPT

## 2023-12-26 ENCOUNTER — HOSPITAL ENCOUNTER (OUTPATIENT)
Dept: CARDIAC REHAB | Facility: HOSPITAL | Age: 78
Discharge: HOME | End: 2023-12-26
Attending: INTERNAL MEDICINE
Payer: COMMERCIAL

## 2023-12-26 PROCEDURE — 93798 PHYS/QHP OP CAR RHAB W/ECG: CPT

## 2023-12-28 ENCOUNTER — HOSPITAL ENCOUNTER (OUTPATIENT)
Dept: CARDIAC REHAB | Facility: HOSPITAL | Age: 78
Discharge: HOME | End: 2023-12-28
Attending: INTERNAL MEDICINE
Payer: COMMERCIAL

## 2023-12-28 PROCEDURE — 93798 PHYS/QHP OP CAR RHAB W/ECG: CPT

## 2024-01-02 ENCOUNTER — HOSPITAL ENCOUNTER (OUTPATIENT)
Dept: CARDIAC REHAB | Facility: HOSPITAL | Age: 79
Discharge: HOME | End: 2024-01-02
Attending: INTERNAL MEDICINE
Payer: COMMERCIAL

## 2024-01-02 PROCEDURE — 93798 PHYS/QHP OP CAR RHAB W/ECG: CPT

## 2024-01-04 ENCOUNTER — HOSPITAL ENCOUNTER (OUTPATIENT)
Dept: CARDIAC REHAB | Facility: HOSPITAL | Age: 79
Discharge: HOME | End: 2024-01-04
Attending: INTERNAL MEDICINE
Payer: COMMERCIAL

## 2024-01-04 PROCEDURE — 93798 PHYS/QHP OP CAR RHAB W/ECG: CPT

## 2024-01-09 ENCOUNTER — HOSPITAL ENCOUNTER (OUTPATIENT)
Dept: CARDIAC REHAB | Facility: HOSPITAL | Age: 79
Discharge: HOME | End: 2024-01-09
Attending: INTERNAL MEDICINE
Payer: COMMERCIAL

## 2024-01-09 PROCEDURE — 93798 PHYS/QHP OP CAR RHAB W/ECG: CPT

## 2024-01-11 ENCOUNTER — HOSPITAL ENCOUNTER (OUTPATIENT)
Dept: CARDIAC REHAB | Facility: HOSPITAL | Age: 79
Discharge: HOME | End: 2024-01-11
Attending: INTERNAL MEDICINE
Payer: COMMERCIAL

## 2024-01-11 ENCOUNTER — TELEPHONE (OUTPATIENT)
Dept: NEPHROLOGY | Facility: CLINIC | Age: 79
End: 2024-01-11
Payer: COMMERCIAL

## 2024-01-11 PROCEDURE — 93798 PHYS/QHP OP CAR RHAB W/ECG: CPT

## 2024-01-11 NOTE — TELEPHONE ENCOUNTER
Appt reminder. Called pt and lvm for sathya scheduled on 1/17 at 9:30 am with non-fasting labs at 8:30 am.  Sultana Dickerson,  Nephrology Clinic Navigator  [facility]  Direct: [phone].

## 2024-01-12 ENCOUNTER — DOCUMENTATION ONLY (OUTPATIENT)
Dept: NEPHROLOGY | Facility: CLINIC | Age: 79
End: 2024-01-12
Payer: COMMERCIAL

## 2024-01-17 ENCOUNTER — PRE VISIT (OUTPATIENT)
Dept: NEPHROLOGY | Facility: CLINIC | Age: 79
End: 2024-01-17

## 2024-01-18 ENCOUNTER — HOSPITAL ENCOUNTER (OUTPATIENT)
Dept: CARDIAC REHAB | Facility: HOSPITAL | Age: 79
Discharge: HOME | End: 2024-01-18
Attending: INTERNAL MEDICINE
Payer: COMMERCIAL

## 2024-01-18 PROCEDURE — 93798 PHYS/QHP OP CAR RHAB W/ECG: CPT

## 2024-01-25 ENCOUNTER — APPOINTMENT (OUTPATIENT)
Dept: RADIOLOGY | Facility: HOSPITAL | Age: 79
DRG: 291 | End: 2024-01-25
Attending: EMERGENCY MEDICINE
Payer: COMMERCIAL

## 2024-01-25 ENCOUNTER — NURSE TRIAGE (OUTPATIENT)
Dept: CARDIOLOGY | Facility: CLINIC | Age: 79
End: 2024-01-25
Payer: COMMERCIAL

## 2024-01-25 ENCOUNTER — HOSPITAL ENCOUNTER (INPATIENT)
Facility: HOSPITAL | Age: 79
LOS: 2 days | Discharge: HOME | DRG: 291 | End: 2024-01-27
Attending: EMERGENCY MEDICINE | Admitting: STUDENT IN AN ORGANIZED HEALTH CARE EDUCATION/TRAINING PROGRAM
Payer: COMMERCIAL

## 2024-01-25 DIAGNOSIS — I50.9 ACUTE ON CHRONIC CONGESTIVE HEART FAILURE, UNSPECIFIED HEART FAILURE TYPE (H): ICD-10-CM

## 2024-01-25 PROBLEM — Z95.1 PRESENCE OF AORTOCORONARY BYPASS GRAFT: Status: ACTIVE | Noted: 2023-11-14

## 2024-01-25 PROBLEM — K63.5 POLYP OF COLON: Status: ACTIVE | Noted: 2017-02-14

## 2024-01-25 PROBLEM — E83.51 HYPOCALCEMIA: Status: ACTIVE | Noted: 2023-11-14

## 2024-01-25 PROBLEM — R94.39 ABNORMAL STRESS TEST: Status: ACTIVE | Noted: 2023-09-13

## 2024-01-25 PROBLEM — F33.0 MAJOR DEPRESSIVE DISORDER, RECURRENT, MILD (H): Status: ACTIVE | Noted: 2023-11-14

## 2024-01-25 PROBLEM — R53.1 WEAKNESS: Status: ACTIVE | Noted: 2023-11-14

## 2024-01-25 PROBLEM — E87.1 HYPO-OSMOLALITY AND HYPONATREMIA: Status: ACTIVE | Noted: 2023-11-14

## 2024-01-25 PROBLEM — I48.19 ATRIAL FIBRILLATION, PERSISTENT (H): Status: ACTIVE | Noted: 2023-08-01

## 2024-01-25 PROBLEM — I73.9 PAD (PERIPHERAL ARTERY DISEASE) (H): Status: ACTIVE | Noted: 2019-10-01

## 2024-01-25 PROBLEM — I12.9 HYPERTENSIVE CHRONIC KIDNEY DISEASE WITH STAGE 1 THROUGH STAGE 4 CHRONIC KIDNEY DISEASE, OR UNSPECIFIED CHRONIC KIDNEY DISEASE: Status: ACTIVE | Noted: 2023-11-14

## 2024-01-25 PROBLEM — E66.3 OVERWEIGHT: Status: ACTIVE | Noted: 2023-11-14

## 2024-01-25 PROBLEM — R11.2 NAUSEA WITH VOMITING, UNSPECIFIED: Status: ACTIVE | Noted: 2023-11-14

## 2024-01-25 PROBLEM — I10 PRIMARY HYPERTENSION: Status: ACTIVE | Noted: 2024-01-25

## 2024-01-25 PROBLEM — Z87.09 PERSONAL HISTORY OF OTHER DISEASES OF THE RESPIRATORY SYSTEM: Status: ACTIVE | Noted: 2023-11-14

## 2024-01-25 PROBLEM — D64.9 ANEMIA, UNSPECIFIED: Status: ACTIVE | Noted: 2023-11-14

## 2024-01-25 PROBLEM — D12.5 BENIGN NEOPLASM OF SIGMOID COLON: Status: ACTIVE | Noted: 2017-02-16

## 2024-01-25 PROBLEM — F41.9 ANXIETY DISORDER, UNSPECIFIED: Status: ACTIVE | Noted: 2023-11-14

## 2024-01-25 PROBLEM — I48.0 PAROXYSMAL ATRIAL FIBRILLATION (H): Status: ACTIVE | Noted: 2023-07-24

## 2024-01-25 PROBLEM — Z86.19 PERSONAL HISTORY OF OTHER INFECTIOUS AND PARASITIC DISEASES: Status: ACTIVE | Noted: 2023-11-14

## 2024-01-25 PROBLEM — I31.4 CARDIAC TAMPONADE: Status: ACTIVE | Noted: 2023-11-14

## 2024-01-25 PROBLEM — N52.9 ED (ERECTILE DYSFUNCTION): Status: ACTIVE | Noted: 2024-01-25

## 2024-01-25 PROBLEM — I50.22 CHRONIC SYSTOLIC (CONGESTIVE) HEART FAILURE (H): Status: ACTIVE | Noted: 2023-11-14

## 2024-01-25 PROBLEM — E87.21 ACUTE METABOLIC ACIDOSIS: Status: ACTIVE | Noted: 2023-11-14

## 2024-01-25 PROBLEM — R53.81 OTHER MALAISE: Status: ACTIVE | Noted: 2023-11-14

## 2024-01-25 PROBLEM — I31.39 OTHER PERICARDIAL EFFUSION (NONINFLAMMATORY): Status: ACTIVE | Noted: 2023-11-14

## 2024-01-25 PROBLEM — I95.9 HYPOTENSION, UNSPECIFIED: Status: ACTIVE | Noted: 2023-11-14

## 2024-01-25 PROBLEM — G47.00 INSOMNIA, UNSPECIFIED: Status: ACTIVE | Noted: 2023-11-14

## 2024-01-25 PROBLEM — I25.10 CORONARY ARTERY DISEASE INVOLVING NATIVE CORONARY ARTERY OF NATIVE HEART: Status: ACTIVE | Noted: 2023-09-25

## 2024-01-25 PROBLEM — J90 PLEURAL EFFUSION, NOT ELSEWHERE CLASSIFIED: Status: ACTIVE | Noted: 2023-11-14

## 2024-01-25 PROBLEM — E11.9 TYPE 2 DIABETES MELLITUS, WITHOUT LONG-TERM CURRENT USE OF INSULIN (H): Status: ACTIVE | Noted: 2023-11-14

## 2024-01-25 PROBLEM — N18.30 CHRONIC KIDNEY DISEASE, STAGE 3 UNSPECIFIED (H): Status: ACTIVE | Noted: 2023-11-14

## 2024-01-25 PROBLEM — N40.0 BPH (BENIGN PROSTATIC HYPERPLASIA): Status: ACTIVE | Noted: 2023-11-14

## 2024-01-25 LAB
ANION GAP SERPL CALCULATED.3IONS-SCNC: 7 MMOL/L (ref 7–15)
BASOPHILS # BLD AUTO: 0 10E3/UL (ref 0–0.2)
BASOPHILS NFR BLD AUTO: 1 %
BUN SERPL-MCNC: 34.1 MG/DL (ref 8–23)
CALCIUM SERPL-MCNC: 9.1 MG/DL (ref 8.8–10.2)
CHLORIDE SERPL-SCNC: 105 MMOL/L (ref 98–107)
CREAT SERPL-MCNC: 2.47 MG/DL (ref 0.67–1.17)
DEPRECATED HCO3 PLAS-SCNC: 29 MMOL/L (ref 22–29)
EGFRCR SERPLBLD CKD-EPI 2021: 26 ML/MIN/1.73M2
EOSINOPHIL # BLD AUTO: 0.1 10E3/UL (ref 0–0.7)
EOSINOPHIL NFR BLD AUTO: 3 %
ERYTHROCYTE [DISTWIDTH] IN BLOOD BY AUTOMATED COUNT: 14.8 % (ref 10–15)
FLUAV RNA SPEC QL NAA+PROBE: NEGATIVE
FLUBV RNA RESP QL NAA+PROBE: NEGATIVE
GLUCOSE BLDC GLUCOMTR-MCNC: 116 MG/DL (ref 70–99)
GLUCOSE BLDC GLUCOMTR-MCNC: 133 MG/DL (ref 70–99)
GLUCOSE SERPL-MCNC: 134 MG/DL (ref 70–99)
HCT VFR BLD AUTO: 29.9 % (ref 40–53)
HGB BLD-MCNC: 9.1 G/DL (ref 13.3–17.7)
IMM GRANULOCYTES # BLD: 0 10E3/UL
IMM GRANULOCYTES NFR BLD: 0 %
LYMPHOCYTES # BLD AUTO: 1.2 10E3/UL (ref 0.8–5.3)
LYMPHOCYTES NFR BLD AUTO: 23 %
MCH RBC QN AUTO: 29 PG (ref 26.5–33)
MCHC RBC AUTO-ENTMCNC: 30.4 G/DL (ref 31.5–36.5)
MCV RBC AUTO: 95 FL (ref 78–100)
MONOCYTES # BLD AUTO: 0.6 10E3/UL (ref 0–1.3)
MONOCYTES NFR BLD AUTO: 11 %
NEUTROPHILS # BLD AUTO: 3.2 10E3/UL (ref 1.6–8.3)
NEUTROPHILS NFR BLD AUTO: 62 %
NRBC # BLD AUTO: 0 10E3/UL
NRBC BLD AUTO-RTO: 0 /100
NT-PROBNP SERPL-MCNC: ABNORMAL PG/ML (ref 0–1800)
PLATELET # BLD AUTO: 230 10E3/UL (ref 150–450)
POTASSIUM SERPL-SCNC: 4.9 MMOL/L (ref 3.4–5.3)
RBC # BLD AUTO: 3.14 10E6/UL (ref 4.4–5.9)
RSV RNA SPEC NAA+PROBE: NEGATIVE
SARS-COV-2 RNA RESP QL NAA+PROBE: NEGATIVE
SODIUM SERPL-SCNC: 141 MMOL/L (ref 135–145)
TROPONIN T SERPL HS-MCNC: 33 NG/L
TROPONIN T SERPL HS-MCNC: 35 NG/L
WBC # BLD AUTO: 5.1 10E3/UL (ref 4–11)

## 2024-01-25 PROCEDURE — 99223 1ST HOSP IP/OBS HIGH 75: CPT | Performed by: STUDENT IN AN ORGANIZED HEALTH CARE EDUCATION/TRAINING PROGRAM

## 2024-01-25 PROCEDURE — 250N000011 HC RX IP 250 OP 636: Performed by: STUDENT IN AN ORGANIZED HEALTH CARE EDUCATION/TRAINING PROGRAM

## 2024-01-25 PROCEDURE — 36415 COLL VENOUS BLD VENIPUNCTURE: CPT | Performed by: STUDENT IN AN ORGANIZED HEALTH CARE EDUCATION/TRAINING PROGRAM

## 2024-01-25 PROCEDURE — 85025 COMPLETE CBC W/AUTO DIFF WBC: CPT | Performed by: EMERGENCY MEDICINE

## 2024-01-25 PROCEDURE — 87637 SARSCOV2&INF A&B&RSV AMP PRB: CPT | Performed by: EMERGENCY MEDICINE

## 2024-01-25 PROCEDURE — 84484 ASSAY OF TROPONIN QUANT: CPT | Performed by: EMERGENCY MEDICINE

## 2024-01-25 PROCEDURE — 250N000011 HC RX IP 250 OP 636: Performed by: EMERGENCY MEDICINE

## 2024-01-25 PROCEDURE — 71046 X-RAY EXAM CHEST 2 VIEWS: CPT

## 2024-01-25 PROCEDURE — 99285 EMERGENCY DEPT VISIT HI MDM: CPT | Mod: 25

## 2024-01-25 PROCEDURE — 84484 ASSAY OF TROPONIN QUANT: CPT | Performed by: STUDENT IN AN ORGANIZED HEALTH CARE EDUCATION/TRAINING PROGRAM

## 2024-01-25 PROCEDURE — 210N000001 HC R&B IMCU HEART CARE

## 2024-01-25 PROCEDURE — 250N000013 HC RX MED GY IP 250 OP 250 PS 637: Performed by: STUDENT IN AN ORGANIZED HEALTH CARE EDUCATION/TRAINING PROGRAM

## 2024-01-25 PROCEDURE — 83880 ASSAY OF NATRIURETIC PEPTIDE: CPT | Performed by: EMERGENCY MEDICINE

## 2024-01-25 PROCEDURE — 36415 COLL VENOUS BLD VENIPUNCTURE: CPT | Performed by: EMERGENCY MEDICINE

## 2024-01-25 PROCEDURE — 93005 ELECTROCARDIOGRAM TRACING: CPT | Performed by: STUDENT IN AN ORGANIZED HEALTH CARE EDUCATION/TRAINING PROGRAM

## 2024-01-25 PROCEDURE — 250N000012 HC RX MED GY IP 250 OP 636 PS 637: Performed by: STUDENT IN AN ORGANIZED HEALTH CARE EDUCATION/TRAINING PROGRAM

## 2024-01-25 PROCEDURE — 80048 BASIC METABOLIC PNL TOTAL CA: CPT | Performed by: EMERGENCY MEDICINE

## 2024-01-25 RX ORDER — ASPIRIN 81 MG/1
81 TABLET ORAL DAILY
Status: DISCONTINUED | OUTPATIENT
Start: 2024-01-26 | End: 2024-01-27 | Stop reason: HOSPADM

## 2024-01-25 RX ORDER — FUROSEMIDE 10 MG/ML
60 INJECTION INTRAMUSCULAR; INTRAVENOUS EVERY 8 HOURS
Status: DISCONTINUED | OUTPATIENT
Start: 2024-01-25 | End: 2024-01-27

## 2024-01-25 RX ORDER — FUROSEMIDE 10 MG/ML
40 INJECTION INTRAMUSCULAR; INTRAVENOUS ONCE
Status: COMPLETED | OUTPATIENT
Start: 2024-01-25 | End: 2024-01-25

## 2024-01-25 RX ORDER — ACETAMINOPHEN 500 MG
1000 TABLET ORAL EVERY 6 HOURS PRN
Status: DISCONTINUED | OUTPATIENT
Start: 2024-01-25 | End: 2024-01-27 | Stop reason: HOSPADM

## 2024-01-25 RX ORDER — TORSEMIDE 5 MG/1
5 TABLET ORAL DAILY
Status: ON HOLD | COMMUNITY
End: 2024-01-27

## 2024-01-25 RX ORDER — LIDOCAINE 40 MG/G
CREAM TOPICAL
Status: DISCONTINUED | OUTPATIENT
Start: 2024-01-25 | End: 2024-01-27 | Stop reason: HOSPADM

## 2024-01-25 RX ORDER — ATORVASTATIN CALCIUM 40 MG/1
80 TABLET, FILM COATED ORAL AT BEDTIME
Status: DISCONTINUED | OUTPATIENT
Start: 2024-01-25 | End: 2024-01-27 | Stop reason: HOSPADM

## 2024-01-25 RX ORDER — NICOTINE POLACRILEX 4 MG
15-30 LOZENGE BUCCAL
Status: DISCONTINUED | OUTPATIENT
Start: 2024-01-25 | End: 2024-01-27 | Stop reason: HOSPADM

## 2024-01-25 RX ORDER — ACETAMINOPHEN 325 MG/1
650 TABLET ORAL EVERY 4 HOURS PRN
Status: DISCONTINUED | OUTPATIENT
Start: 2024-01-25 | End: 2024-01-25

## 2024-01-25 RX ORDER — AMOXICILLIN 250 MG
1 CAPSULE ORAL 2 TIMES DAILY PRN
Status: DISCONTINUED | OUTPATIENT
Start: 2024-01-25 | End: 2024-01-27 | Stop reason: HOSPADM

## 2024-01-25 RX ORDER — SERTRALINE HYDROCHLORIDE 25 MG/1
25 TABLET, FILM COATED ORAL DAILY
Status: DISCONTINUED | OUTPATIENT
Start: 2024-01-26 | End: 2024-01-27 | Stop reason: HOSPADM

## 2024-01-25 RX ORDER — AMOXICILLIN 250 MG
2 CAPSULE ORAL 2 TIMES DAILY PRN
Status: DISCONTINUED | OUTPATIENT
Start: 2024-01-25 | End: 2024-01-27 | Stop reason: HOSPADM

## 2024-01-25 RX ORDER — ATORVASTATIN CALCIUM 80 MG/1
80 TABLET, FILM COATED ORAL AT BEDTIME
COMMUNITY

## 2024-01-25 RX ORDER — CALCIUM CARBONATE 500 MG/1
1000 TABLET, CHEWABLE ORAL 4 TIMES DAILY PRN
Status: DISCONTINUED | OUTPATIENT
Start: 2024-01-25 | End: 2024-01-27 | Stop reason: HOSPADM

## 2024-01-25 RX ORDER — CARVEDILOL 12.5 MG/1
25 TABLET ORAL 2 TIMES DAILY WITH MEALS
Status: DISCONTINUED | OUTPATIENT
Start: 2024-01-25 | End: 2024-01-27 | Stop reason: HOSPADM

## 2024-01-25 RX ORDER — METFORMIN HCL 500 MG
500 TABLET, EXTENDED RELEASE 24 HR ORAL
Status: ON HOLD | COMMUNITY
End: 2024-01-27

## 2024-01-25 RX ORDER — ACETAMINOPHEN 650 MG/1
650 SUPPOSITORY RECTAL EVERY 4 HOURS PRN
Status: DISCONTINUED | OUTPATIENT
Start: 2024-01-25 | End: 2024-01-25

## 2024-01-25 RX ORDER — DEXTROSE MONOHYDRATE 25 G/50ML
25-50 INJECTION, SOLUTION INTRAVENOUS
Status: DISCONTINUED | OUTPATIENT
Start: 2024-01-25 | End: 2024-01-27 | Stop reason: HOSPADM

## 2024-01-25 RX ADMIN — INSULIN ASPART 3 UNITS: 100 INJECTION, SOLUTION INTRAVENOUS; SUBCUTANEOUS at 18:09

## 2024-01-25 RX ADMIN — APIXABAN 5 MG: 5 TABLET, FILM COATED ORAL at 19:36

## 2024-01-25 RX ADMIN — CARVEDILOL 25 MG: 12.5 TABLET, FILM COATED ORAL at 18:08

## 2024-01-25 RX ADMIN — ATORVASTATIN CALCIUM 80 MG: 40 TABLET, FILM COATED ORAL at 21:05

## 2024-01-25 RX ADMIN — FUROSEMIDE 40 MG: 10 INJECTION, SOLUTION INTRAMUSCULAR; INTRAVENOUS at 10:47

## 2024-01-25 RX ADMIN — FUROSEMIDE 60 MG: 10 INJECTION, SOLUTION INTRAMUSCULAR; INTRAVENOUS at 15:58

## 2024-01-25 RX ADMIN — FUROSEMIDE 60 MG: 10 INJECTION, SOLUTION INTRAMUSCULAR; INTRAVENOUS at 23:59

## 2024-01-25 ASSESSMENT — ACTIVITIES OF DAILY LIVING (ADL)
DEPENDENT_IADLS:: INDEPENDENT
ADLS_ACUITY_SCORE: 21
ADLS_ACUITY_SCORE: 21
ADLS_ACUITY_SCORE: 35
ADLS_ACUITY_SCORE: 21
ADLS_ACUITY_SCORE: 35
ADLS_ACUITY_SCORE: 21

## 2024-01-25 NOTE — CARE PLAN
Pt is alert and oriented x4. Pt is med complaint. Pt's v/s is stable. Pt denies pain. Pt will transfer to unit p3. Report was called to the nurse on p3.

## 2024-01-25 NOTE — CONSULTS
Care Management Initial Consult    General Information  Assessment completed with: Patient, Spouse or significant other, pt and wife  Type of CM/SW Visit: Initial Assessment    Primary Care Provider verified and updated as needed: Yes   Readmission within the last 30 days: no previous admission in last 30 days      Reason for Consult: discharge planning  Advance Care Planning: Advance Care Planning Reviewed: no concerns identified, verified with patient     General Information Comments: lives w/wife, independent and no svcs, wife to transport    Communication Assessment  Patient's communication style: spoken language (English or Bilingual)             Cognitive  Cognitive/Neuro/Behavioral: WDL                      Living Environment:   People in home: spouse     Current living Arrangements: house      Able to return to prior arrangements: yes       Family/Social Support:  Care provided by: self  Provides care for: no one  Marital Status:   Wife          Description of Support System: Involved, Supportive    Support Assessment: Adequate family and caregiver support, Adequate social supports    Current Resources:   Patient receiving home care services: No     Community Resources: None  Equipment currently used at home:    Supplies currently used at home: None    Employment/Financial:  Employment Status:          Financial Concerns: none   Referral to Financial Worker: No       Does the patient's insurance plan have a 3 day qualifying hospital stay waiver?  No    Lifestyle & Psychosocial Needs:  Social Determinants of Health     Food Insecurity: Not on file   Depression: Not on file   Housing Stability: Not on file   Tobacco Use: Low Risk  (1/25/2024)    Patient History     Smoking Tobacco Use: Never     Smokeless Tobacco Use: Never     Passive Exposure: Not on file   Financial Resource Strain: Not on file   Alcohol Use: Not on file   Transportation Needs: Not on file   Physical Activity: Not on file    Interpersonal Safety: Not on file   Stress: Not on file   Social Connections: Not on file       Functional Status:  Prior to admission patient needed assistance:   Dependent ADLs:: Independent  Dependent IADLs:: Independent  Assesssment of Functional Status: Not at baseline with ADL Functioning    Mental Health Status:  Mental Health Status: No Current Concerns       Chemical Dependency Status:                Values/Beliefs:  Spiritual, Cultural Beliefs, Druze Practices, Values that affect care:                 Additional Information:  Assessed,lives w/wife, independent and no svcs, wife to transport. No CM needs identified.     Ghislaine Garcia RN

## 2024-01-25 NOTE — PHARMACY-ADMISSION MEDICATION HISTORY
Pharmacist Admission Medication History    Admission medication history is complete. The information provided in this note is only as accurate as the sources available at the time of the update.    Information Source(s): Patient, Family member, and Clinic records via in-person    Pertinent Information: torsemide resumed on 1/23/24 and amiodarone discontinued    Changes made to PTA medication list:  Added: None  Deleted: amiodarone  Changed: atorvastatin, metformin, torsemide    Medication Affordability:       Allergies reviewed with patient and updates made in EHR: yes    Medication History Completed By: Marilu Louise RPH 1/25/2024 9:35 AM    PTA Med List   Medication Sig Note Last Dose    acetaminophen (TYLENOL) 500 MG tablet Take 1,000 mg by mouth every 6 hours as needed for mild pain or pain  Unknown at PRN    apixaban ANTICOAGULANT (ELIQUIS) 5 MG tablet Take 1 tablet (5 mg) by mouth 2 times daily  1/25/2024 at am    aspirin 81 MG EC tablet Take 1 tablet (81 mg) by mouth daily  1/25/2024 at am    atorvastatin (LIPITOR) 80 MG tablet Take 80 mg by mouth at bedtime  1/24/2024 at PM    carvedilol (COREG) 25 MG tablet Take 1 tablet (25 mg) by mouth 2 times daily (with meals)  1/25/2024 at am    LORazepam (ATIVAN) 0.5 MG tablet Take 1 tablet (0.5 mg) by mouth nightly as needed for anxiety or muscle spasms  Unknown at PRN    metFORMIN (GLUCOPHAGE XR) 500 MG 24 hr tablet Take 500 mg by mouth daily (with dinner)  1/24/2024 at PM    sertraline (ZOLOFT) 25 MG tablet Take 1 tablet (25 mg) by mouth daily  1/25/2024 at am    torsemide (DEMADEX) 5 MG tablet Take 5 mg by mouth daily 1/25/2024: Just resumed on 1/23/24 at lower dosing 1/25/2024 at am

## 2024-01-25 NOTE — ED NOTES
"[facility] ED Handoff Report    ED Chief Complaint: shortness of breath    ED Diagnosis:  (I50.9) Acute on chronic congestive heart failure, unspecified heart failure type (H)  Comment:   Plan: lasix IV       PMH:    Past Medical History:   Diagnosis Date    Hx of CABG     Sept 2023 at [facility]        Code Status:  Prior     Falls Risk: No Band: Not applicable    Current Living Situation/Residence: lives with a significant other     Elimination Status: Continent: Yes     Activity Level: Independent    Patients Preferred Language:  English     Needed: No    Vital Signs:  BP (!) 142/88   Pulse 80   Temp 97.5  F (36.4  C) (Oral)   Resp 20   Ht 1.803 m (5' 11\")   Wt 95.3 kg (210 lb)   SpO2 94%   BMI 29.29 kg/m       Cardiac Rhythm: a. fib    Pain Score: 0/10    Is the Patient Confused:  No    Last Food or Drink: 01/25/24 at 0800    Focused Assessment:  patient is here for increased shortness of breath since last night and felt like there was fluid buildup, shortness of breath intensifies with activity, denies chest pain. Walks independently to the bathroom    Tests Performed: Done: Labs and Imaging    Treatments Provided:  lasix    Family Dynamics/Concerns: No    Family Updated On Visitor Policy: Yes    Plan of Care Communicated to Family: Yes    Who Was Updated about Plan of Care: spouse    Medications sent with patient:     Covid: symptomatic , negative    Additional Information:     RN: Francesca Fleming RN   1/25/2024 10:40 AM      "

## 2024-01-25 NOTE — ED PROVIDER NOTES
EMERGENCY DEPARTMENT ENCOUNTER     NAME: Gerardo Al   AGE: 78 year old male   YOB: 1945   MRN: [id number]   EVALUATION DATE & TIME: No admission date for patient encounter.   PCP: Frank Chávez     Chief Complaint   Patient presents with    Shortness of Breath   :    FINAL IMPRESSION       1. Acute on chronic congestive heart failure, unspecified heart failure type (H)           ED COURSE & MEDICAL DECISION MAKING      Pertinent Labs & Imaging studies reviewed. (See chart for details)   78 year old male  presents to the Emergency Department for evaluation of shortness of breath.  On chart review, patient has a history of both CKD and CHF and was off of his torsemide for a while.  He just saw primary care for this 2 days ago and they restarted torsemide at 5 mg but he is having significant orthopnea and exertional dyspnea. Initial Vitals Reviewed. Initial exam notable for patient who was noted in triage to be quite dyspneic just from walking in from the car.  He does look fluid overloaded with significant peripheral edema and diminished breath sounds at the bases but is not hypoxic when sitting still.  I suspect CHF exacerbation and I do not think 2 days of very low-dose torsemide that was one fourth of his previous dose was going to be enough to overcome his current status of fluid overload.  It is also complicated by the fact that he has CKD making diuresis as an outpatient more difficult.  His workup today does show a BNP of 12,000, an x-ray that by my independent interpretation looks fluid overloaded with pleural effusions bilaterally, and patient is anticoagulated and I do not suspect PE.  No signs of infectious process.  I have started him on IV diuresis and discussed the case with hospitalist who accepted the patient for admission.        8:50 AM I met with the patient, obtained history, performed an initial exam, and discussed options and plan for diagnostics and treatment here in the ED.    10:48 AM I spoke with the admitting Hospitalist, Dr. Simeon, who is agreeable for admission.     At the conclusion of the encounter I discussed the results of all of the tests and the disposition. The questions were answered. The patient or family acknowledged understanding and was agreeable with the care plan.     0 minutes critical care time, see procedure note below for details if relevant    Medical Decision Making    History:  Supplemental history from: Documented in chart, family member  External Record(s) reviewed: Outpatient Record: [facility] (1/23/2024)    Work Up:  Chart documentation includes differential considered and any EKGs or imaging independently interpreted by provider, where specified.  In additional to work up documented, I considered the following work up: Documented in chart, if applicable.    External consultation:  Discussion of management with another provider: Hospitalist    Complicating factors:  Care impacted by chronic illness: Cancer/Chemotherapy, Chronic Kidney Disease, Diabetes, Heart Disease, Hyperlipidemia, and Other: atrial fibrillation , PAD, pericardial effusion  Care affected by social determinants of health: access to care    Disposition considerations: Admit.        MEDICATIONS GIVEN IN THE EMERGENCY:   Medications - No data to display   NEW PRESCRIPTIONS STARTED AT TODAY'S ER VISIT   New Prescriptions    No medications on file     ================================================================   HISTORY OF PRESENT ILLNESS       Patient information was obtained from: patient      Use of Intrepreter: N/A     Gerardo Al is a 78 year old male with history of benign neoplasm of sigmoid colon, CKD3, T2DM, CHF, hyperlipidemia, atrial fibrillation, pericardial effusion and PAD who presents for shortness of breath.     Per Chart Review:  Patient was seen by PCP at [facility] for shortness of breath and struggling  with insomnia. Symptomatic treatment and Demadex 5 mg was prescribed.     Patient called Nurse Triage today. Called because he is SOB and feels like he has fluid buildup around his heart. Seen by his primary provider 1/23 who told him to see Cardiology in the next 2 weeks or go to ER if increased SOB. Patient isn't sure when the SOB started but he couldn't sleep last night due to SOB. Can hear him having SOB with just talking. I told him he needs to go to the ER for evaluation. He will have his wife take him to the ER at [facility].     The patient reports shortness of breath that worsened last night. Patient saw PCP 2 days ago (1/23) and was prescribed a water pill (Demadex). He says that he was on a water pill (20 mg) in the past, then was discontinued on it, and now is restarted on the medication. Patient has baseline hemoglobin is 9.     He is post cardiac bypass surgery on Sept 26 with vein harvesting from left leg. He notes complications following, including infection at harvest site, sepsis and acute kidney failure. No other complaints at this time.       ================================================================        PAST HISTORY     PAST MEDICAL HISTORY:   Past Medical History:   Diagnosis Date    Hx of CABG     Sept 2023 at [facility]      PAST SURGICAL HISTORY:   Past Surgical History:   Procedure Laterality Date    CARDIAC SURGERY      CREATION PERICARDIAL WINDOW N/A 11/5/2023    Procedure: CREATION, PERICARDIAL WINDOW; EPIAORTIC ULTRASOUND;TRANSESPHAGEAL ECHOCARDIOGRAPHY BY ANESTHESIA;  Surgeon: Ruchi Singleton MD;  Location: [facility] Main OR    CV RIGHT HEART CATH MEASUREMENTS RECORDED N/A 11/2/2023    Procedure: Right Heart Catheterization;  Surgeon: Adam Busby MD;  Location: [facility] CATH LAB CV    CV SWAN ANEL PROCEDURE N/A 11/2/2023    Procedure: [place] Anel Procedure;  Surgeon: Adam Busby MD;  Location: [facility] CATH LAB CV    IR CVC TUNNEL PLACEMENT > 5 YRS OF AGE   "11/3/2023    IR CVC TUNNEL REMOVAL LEFT  11/13/2023    PICC TRIPLE LUMEN PLACEMENT  11/2/2023      CURRENT MEDICATIONS:   acetaminophen (TYLENOL) 500 MG tablet  amiodarone (PACERONE) 200 MG tablet  apixaban ANTICOAGULANT (ELIQUIS) 5 MG tablet  aspirin 81 MG EC tablet  atorvastatin (LIPITOR) 80 MG tablet  carvedilol (COREG) 25 MG tablet  LORazepam (ATIVAN) 0.5 MG tablet  metFORMIN (GLUCOPHAGE XR) 500 MG 24 hr tablet  sertraline (ZOLOFT) 25 MG tablet  torsemide (DEMADEX) 20 MG tablet      ALLERGIES:   No Known Allergies   FAMILY HISTORY:   History reviewed. No pertinent family history.   SOCIAL HISTORY:   Social History     Socioeconomic History    Marital status:    Tobacco Use    Smoking status: Never    Smokeless tobacco: Never        VITALS  Patient Vitals for the past 24 hrs:   BP Temp Temp src Pulse Resp SpO2 Height Weight   01/25/24 1045 -- -- -- 77 17 -- -- --   01/25/24 1001 -- -- -- -- 20 -- -- --   01/25/24 0915 -- -- -- -- -- 94 % -- --   01/25/24 0854 (!) 142/88 97.5  F (36.4  C) Oral 80 20 99 % -- --   01/25/24 0852 -- -- -- -- -- -- 1.803 m (5' 11\") 95.3 kg (210 lb)        ================================================================    PHYSICAL EXAM     VITAL SIGNS: BP (!) 142/88   Pulse 77   Temp 97.5  F (36.4  C) (Oral)   Resp 17   Ht 1.803 m (5' 11\")   Wt 95.3 kg (210 lb)   SpO2 94%   BMI 29.29 kg/m     Constitutional:  Awake, no acute distress   HENT:  Atraumatic, oropharynx without exudate or erythema, membranes moist  Lymph:  No adenopathy  Eyes: EOM intact, PERRL, no injection  Neck: Supple  Respiratory:  Clear to auscultation bilaterally, no wheezes or crackles   Cardiovascular:  Regular rate and rhythm, single S1 and S2, sternotomy scar, diminished heart sounds at the bases  GI:  Soft, nontender, nondistended, no rebound or guarding   Musculoskeletal:  Moves all extremities, no deformities, 2+ pitting edema  Skin:  Warm, dry  Neurologic:  Alert and oriented x3, no focal " deficits noted       ================================================================  LAB       All pertinent labs reviewed and interpreted.   Labs Ordered and Resulted from Time of ED Arrival to Time of ED Departure   BASIC METABOLIC PANEL - Abnormal       Result Value    Sodium 141      Potassium 4.9      Chloride 105      Carbon Dioxide (CO2) 29      Anion Gap 7      Urea Nitrogen 34.1 (*)     Creatinine 2.47 (*)     GFR Estimate 26 (*)     Calcium 9.1      Glucose 134 (*)    TROPONIN T, HIGH SENSITIVITY - Abnormal    Troponin T, High Sensitivity 35 (*)    NT PROBNP INPATIENT - Abnormal    N terminal Pro BNP Inpatient 12,893 (*)    CBC WITH PLATELETS AND DIFFERENTIAL - Abnormal    WBC Count 5.1      RBC Count 3.14 (*)     Hemoglobin 9.1 (*)     Hematocrit 29.9 (*)     MCV 95      MCH 29.0      MCHC 30.4 (*)     RDW 14.8      Platelet Count 230      % Neutrophils 62      % Lymphocytes 23      % Monocytes 11      % Eosinophils 3      % Basophils 1      % Immature Granulocytes 0      NRBCs per 100 WBC 0      Absolute Neutrophils 3.2      Absolute Lymphocytes 1.2      Absolute Monocytes 0.6      Absolute Eosinophils 0.1      Absolute Basophils 0.0      Absolute Immature Granulocytes 0.0      Absolute NRBCs 0.0     INFLUENZA A/B, RSV, & SARS-COV2 PCR - Normal    Influenza A PCR Negative      Influenza B PCR Negative      RSV PCR Negative      SARS CoV2 PCR Negative          ===============================================================  RADIOLOGY       Reviewed all pertinent imaging. Please see official radiology report.   XR Chest 2 Views   Final Result   IMPRESSION: Small bilateral effusions with associated atelectasis. Cardiomegaly without overt pulmonary edema. CABG changes and sternotomy wires. No pneumothorax. Degenerative change of the spine.            ================================================================  EKG     EKG reviewed interpreted by me shows an irregularly irregular rhythm with some  motion artifact, rate of 78, left axis, QTc 497 with no acute ST or T wave changes since 5 November when A-fib was also present    I have independently reviewed and interpreted the EKG(s) documented above.     ================================================================  PROCEDURES         I, Paola Cuevas, am serving as a scribe to document services personally performed by Dr. Castelan based on my observation and the provider's statements to me. I, Tahira Castelan MD attest that Paola Cuevas is acting in a scribe capacity, has observed my performance of the services and has documented them in accordance with my direction.   Tahira Castelan M.D.   Emergency Medicine   [facility] EMERGENCY DEPARTMENT  [facility] [place] [address] [address]  [phone]  Dept: [phone]      Tahira Castelan MD  01/25/24 8317

## 2024-01-25 NOTE — TELEPHONE ENCOUNTER
"Noted patient presented to [facility] ED @ 0846.  mg    Copied from 11-15-23 phone note:  \"Per Dr. Hansen's 11-2-23 inpt consult note:  \"Primary cardiologist: [facility] cardiology\"     Per Dr. Gallardo's 11-10-23 inpt progress note \"Follow by Dr. Garcia in [facility]\".   "

## 2024-01-25 NOTE — TELEPHONE ENCOUNTER
"Gerardo called because he is SOB and feels like he has fluid buildup around his heart. Seen by his primary provider 1/23 who told him to see Cardiology in the next 2 weeks or go to ER if increased SOB. Patient isn't sure when the SOB started but he couldn't sleep last night due to SOB. Can hear him having SOB with just talking. I told him he needs to go to the ER for evaluation. He will have his wife take him to the ER at [facility].     Reason for Disposition   MODERATE difficulty breathing (e.g., speaks in phrases, SOB even at rest, pulse 100-120) of new-onset or worse than normal   Patient sounds very sick or weak to the triager    Additional Information   Negative: SEVERE difficulty breathing (e.g., struggling for each breath, speaks in single words, pulse > 120)   Negative: Breathing stopped and hasn't returned   Negative: Choking on something   Negative: Bluish (or gray) lips or face   Negative: Difficult to awaken or acting confused (e.g., disoriented, slurred speech)   Negative: Passed out (i.e., fainted, collapsed and was not responding)   Negative: Wheezing started suddenly after medicine, an allergic food, or bee sting   Negative: Stridor (harsh sound while breathing in)   Negative: Slow, shallow and weak breathing   Negative: Sounds like a life-threatening emergency to the triager   Negative: Chest pain   Negative: Wheezing (high pitched whistling sound) and previous asthma attacks or use of asthma medicines   Negative: Difficulty breathing and within 14 days of COVID-19 Exposure   Negative: Difficulty breathing and only present when coughing   Negative: Difficulty breathing and only from stuffy nose   Negative: Difficulty breathing and only from stuffy nose or runny nose from common cold   Negative: Oxygen level (e.g., pulse oximetry) 90% or lower   Negative: Wheezing can be heard across the room   Negative: Drooling or spitting out saliva (because can't swallow)   Negative: Any history of prior \"blood " "clot\" in leg or lungs   Negative: Illness requiring prolonged bedrest in past month (e.g., immobilization, long hospital stay)   Negative: Hip or leg fracture (broken bone) in past month (or had cast on leg or ankle in past month)   Negative: Major surgery in the past month   Negative: Long-distance travel in past month (e.g., car, bus, train, plane; with trip lasting 6 or more hours)   Negative: Cancer treatment in past six months (or has cancer now)   Negative: Extra heartbeats, irregular heart beating, or heart is beating very fast (i.e., 'palpitations')   Negative: Fever > 103 F (39.4 C)   Negative: Fever > 101 F (38.3 C) and over 60 years of age   Negative: Fever > 100.0 F (37.8 C) and bedridden (e.g., nursing home patient, stroke, chronic illness, recovering from surgery)   Negative: Fever > 100.0 F (37.8 C) and diabetes mellitus or weak immune system (e.g., HIV positive, cancer chemo, splenectomy, organ transplant, chronic steroids)   Negative: Periods where breathing stops and then resumes normally and bedridden (e.g., nursing home patient, CVA)   Negative: Pregnant or postpartum (from 0 to 6 weeks after delivery)    Protocols used: Breathing Difficulty-A-OH    "

## 2024-01-25 NOTE — H&P
[facility]    History and Physical - Hospitalist Service       Date of Admission:  1/25/2024    Assessment & Plan    Gerardo Al is a 78 year old male admitted on 1/25/2024. He presented with worsening of shortness of breath of 2 days duration.  Past medical history significant for CAD s/p CABG, atrial fibrillation, type 2 diabetes mellitus, CKD    Acute on chronic congestive heart failure   CAD s/p CABG in 09/2023  Presented with worsening of shortness of breath and bilateral leg swelling, orthopnea and PND.  He was taken off torsemide due to kidney function worsening.  Reinstated torsemide few days ago,  nevertheless patient continues to have worsening of symptoms.  BNP significantly elevated  Chest x-ray with evidence of pulmonary congestion  Estimated left ventricular ejection fraction in 11/2023 is 45 to 50%, mildly reduced  Repeat echocardiogram  Cardiology consult  Patient required pericardial window on 11/05/23  during previous admission for pericardial effusion  Patient was also intubated due to acute hypoxic respiratory failure during previous admission.  Recently admitted for septic shock secondary to purulent left lower extremity cellulitis  Continue Lasix 60 stable IV 3 times daily  Strict input and output monitoring  Daily weight  Electrolyte monitoring and replacement as needed    Atrial fibrillation  -Controlled ventricular rate  -PTA carvedilol 25 mg oral twice daily  -PTA Eliquis 5 mg oral twice daily    Type 2 diabetes mellitus  Recent hemoglobin A1c 6.7  -Moderate intensity insulin sliding scale and mealtime carb coverage  -Accu-Cheks  -Hypoglycemia protocol    CKD  -Patient developed oliguric CRISTAL in September 2023  -Now creatinine is at baseline  -Close monitoring of kidney function  -Consider nephrology consult  -Is seeing associated nephrology consultants as an outpatient        Diet: 2 Gram Sodium Diet    DVT Prophylaxis: DOAC  Cano Catheter: Not present  Lines:  "None     Cardiac Monitoring: None  Code Status:  Full code    Clinically Significant Risk Factors Present on Admission               # Drug Induced Coagulation Defect: home medication list includes an anticoagulant medication  # Drug Induced Platelet Defect: home medication list includes an antiplatelet medication   # Hypertension: Noted on problem list  # Heart failure, NOS: heart failure noted on the problem list and last echo with EF 40-50%    # DMII: A1C = 6.7 % (Ref range: <5.7 %) within past 6 months    # Overweight: Estimated body mass index is 29.29 kg/m  as calculated from the following:    Height as of this encounter: 1.803 m (5' 11\").    Weight as of this encounter: 95.3 kg (210 lb).              Disposition Plan      Expected Discharge Date: 01/27/2024                  Lalita Simeon MD  Hospitalist Service  [facility]  Securely message with [employer] (more info)  Text page via [employer] Paging/Directory     ______________________________________________________________________    Chief Complaint   Worsening of shortness of breath/2 days    History is obtained from the patient, ER provider and chart    History of Present Illness   Gerardo Al is a 78 year old male who presented with the above complaint.Past medical history significant for CAD s/p CABG, atrial fibrillation, type 2 diabetes mellitus, CKD.  Patient was taken off torsemide due to worsening of kidney function.  It was reinstated few days ago nevertheless patient continued to have worsening of shortness of breath, orthopnea and PND.  He also reports having dry cough.  He denies having fever chills or rigors.  He denies having chest pain, palpitation.  Labs are significant for elevated BNP, mildly elevated troponin, creatinine appears to be at baseline.  Chest x-ray with evidence of pulmonary congestion.  Patient will be admitted for management of CHF exacerbation.      Past Medical History    Past Medical History: "   Diagnosis Date    Hx of CABG     Sept 2023 at [facility]       Past Surgical History   Past Surgical History:   Procedure Laterality Date    CARDIAC SURGERY      CREATION PERICARDIAL WINDOW N/A 11/5/2023    Procedure: CREATION, PERICARDIAL WINDOW; EPIAORTIC ULTRASOUND;TRANSESPHAGEAL ECHOCARDIOGRAPHY BY ANESTHESIA;  Surgeon: Ruchi Singleton MD;  Location: [facility] Main OR    CV RIGHT HEART CATH MEASUREMENTS RECORDED N/A 11/2/2023    Procedure: Right Heart Catheterization;  Surgeon: Adam Busby MD;  Location: [facility] CATH LAB CV    CV SWAN ANEL PROCEDURE N/A 11/2/2023    Procedure: Onley Anel Procedure;  Surgeon: Adam Busby MD;  Location: [facility] CATH LAB CV    IR CVC TUNNEL PLACEMENT > 5 YRS OF AGE  11/3/2023    IR CVC TUNNEL REMOVAL LEFT  11/13/2023    PICC TRIPLE LUMEN PLACEMENT  11/2/2023       Prior to Admission Medications   Prior to Admission Medications   Prescriptions Last Dose Informant Patient Reported? Taking?   LORazepam (ATIVAN) 0.5 MG tablet Unknown at PRN  No Yes   Sig: Take 1 tablet (0.5 mg) by mouth nightly as needed for anxiety or muscle spasms   acetaminophen (TYLENOL) 500 MG tablet Unknown at PRN Spouse/Significant Other Yes Yes   Sig: Take 1,000 mg by mouth every 6 hours as needed for mild pain or pain   apixaban ANTICOAGULANT (ELIQUIS) 5 MG tablet 1/25/2024 at am  No Yes   Sig: Take 1 tablet (5 mg) by mouth 2 times daily   aspirin 81 MG EC tablet 1/25/2024 at am  No Yes   Sig: Take 1 tablet (81 mg) by mouth daily   atorvastatin (LIPITOR) 80 MG tablet 1/24/2024 at PM  Yes Yes   Sig: Take 80 mg by mouth at bedtime   carvedilol (COREG) 25 MG tablet 1/25/2024 at am  No Yes   Sig: Take 1 tablet (25 mg) by mouth 2 times daily (with meals)   metFORMIN (GLUCOPHAGE XR) 500 MG 24 hr tablet 1/24/2024 at PM  Yes Yes   Sig: Take 500 mg by mouth daily (with dinner)   sertraline (ZOLOFT) 25 MG tablet 1/25/2024 at am  No Yes   Sig: Take 1 tablet (25 mg) by mouth daily   torsemide  (DEMADEX) 5 MG tablet 1/25/2024 at am  Yes Yes   Sig: Take 5 mg by mouth daily      Facility-Administered Medications: None           Physical Exam   Vital Signs: Temp: 97.5  F (36.4  C) Temp src: Oral BP: (!) 142/88 Pulse: 80   Resp: 17 SpO2: 94 %      Weight: 210 lbs 0 oz    General Appearance: No distress noted  Respiratory: Diminished air entry bilaterally basally  Cardiovascular: S1 and S2 well heard, no murmur or gallop  GI: Soft abdomen, mildly distended, no tenderness, normoactive bowel sounds  Skin: Intact and warm       Medical Decision Making       70 MINUTES SPENT BY ME on the date of service doing chart review, history, exam, documentation & further activities per the note.      Data

## 2024-01-25 NOTE — ED TRIAGE NOTES
Patient presents here with shortness of breath. He is post cardiac bypass surgery on Sept 26 with vein harvesting from left leg. He notes complications following, including infection at harvest site, sepsis and acute kidney failure. He notes that the shortness of breath became worse last night. No chest pain. He is pale and adds that his hgb has been 9.0 following procedure and discharge with follow up with primary providers.

## 2024-01-26 ENCOUNTER — TELEPHONE (OUTPATIENT)
Dept: CARDIOLOGY | Facility: CLINIC | Age: 79
End: 2024-01-26
Payer: COMMERCIAL

## 2024-01-26 ENCOUNTER — APPOINTMENT (OUTPATIENT)
Dept: CARDIOLOGY | Facility: HOSPITAL | Age: 79
DRG: 291 | End: 2024-01-26
Attending: STUDENT IN AN ORGANIZED HEALTH CARE EDUCATION/TRAINING PROGRAM
Payer: COMMERCIAL

## 2024-01-26 ENCOUNTER — APPOINTMENT (OUTPATIENT)
Dept: OCCUPATIONAL THERAPY | Facility: HOSPITAL | Age: 79
DRG: 291 | End: 2024-01-26
Attending: STUDENT IN AN ORGANIZED HEALTH CARE EDUCATION/TRAINING PROGRAM
Payer: COMMERCIAL

## 2024-01-26 DIAGNOSIS — I50.9 ACUTE DECOMPENSATED HEART FAILURE (H): Primary | ICD-10-CM

## 2024-01-26 LAB
ANION GAP SERPL CALCULATED.3IONS-SCNC: 8 MMOL/L (ref 7–15)
BUN SERPL-MCNC: 37.4 MG/DL (ref 8–23)
CALCIUM SERPL-MCNC: 9.3 MG/DL (ref 8.8–10.2)
CHLORIDE SERPL-SCNC: 102 MMOL/L (ref 98–107)
CREAT SERPL-MCNC: 2.7 MG/DL (ref 0.67–1.17)
DEPRECATED HCO3 PLAS-SCNC: 33 MMOL/L (ref 22–29)
EGFRCR SERPLBLD CKD-EPI 2021: 23 ML/MIN/1.73M2
ERYTHROCYTE [DISTWIDTH] IN BLOOD BY AUTOMATED COUNT: 14.5 % (ref 10–15)
GLUCOSE BLDC GLUCOMTR-MCNC: 115 MG/DL (ref 70–99)
GLUCOSE BLDC GLUCOMTR-MCNC: 120 MG/DL (ref 70–99)
GLUCOSE BLDC GLUCOMTR-MCNC: 93 MG/DL (ref 70–99)
GLUCOSE BLDC GLUCOMTR-MCNC: 94 MG/DL (ref 70–99)
GLUCOSE SERPL-MCNC: 114 MG/DL (ref 70–99)
HCT VFR BLD AUTO: 29.8 % (ref 40–53)
HGB BLD-MCNC: 9.2 G/DL (ref 13.3–17.7)
LVEF ECHO: NORMAL
MCH RBC QN AUTO: 28.7 PG (ref 26.5–33)
MCHC RBC AUTO-ENTMCNC: 30.9 G/DL (ref 31.5–36.5)
MCV RBC AUTO: 93 FL (ref 78–100)
PLATELET # BLD AUTO: 212 10E3/UL (ref 150–450)
POTASSIUM SERPL-SCNC: 4.2 MMOL/L (ref 3.4–5.3)
RBC # BLD AUTO: 3.21 10E6/UL (ref 4.4–5.9)
SODIUM SERPL-SCNC: 143 MMOL/L (ref 135–145)
WBC # BLD AUTO: 4.9 10E3/UL (ref 4–11)

## 2024-01-26 PROCEDURE — P9047 ALBUMIN (HUMAN), 25%, 50ML: HCPCS | Performed by: INTERNAL MEDICINE

## 2024-01-26 PROCEDURE — 93306 TTE W/DOPPLER COMPLETE: CPT | Mod: 26 | Performed by: INTERNAL MEDICINE

## 2024-01-26 PROCEDURE — 99223 1ST HOSP IP/OBS HIGH 75: CPT | Performed by: INTERNAL MEDICINE

## 2024-01-26 PROCEDURE — 93306 TTE W/DOPPLER COMPLETE: CPT

## 2024-01-26 PROCEDURE — 85027 COMPLETE CBC AUTOMATED: CPT | Performed by: STUDENT IN AN ORGANIZED HEALTH CARE EDUCATION/TRAINING PROGRAM

## 2024-01-26 PROCEDURE — 36415 COLL VENOUS BLD VENIPUNCTURE: CPT | Performed by: STUDENT IN AN ORGANIZED HEALTH CARE EDUCATION/TRAINING PROGRAM

## 2024-01-26 PROCEDURE — 80048 BASIC METABOLIC PNL TOTAL CA: CPT | Performed by: STUDENT IN AN ORGANIZED HEALTH CARE EDUCATION/TRAINING PROGRAM

## 2024-01-26 PROCEDURE — 250N000013 HC RX MED GY IP 250 OP 250 PS 637: Performed by: STUDENT IN AN ORGANIZED HEALTH CARE EDUCATION/TRAINING PROGRAM

## 2024-01-26 PROCEDURE — 250N000011 HC RX IP 250 OP 636: Performed by: STUDENT IN AN ORGANIZED HEALTH CARE EDUCATION/TRAINING PROGRAM

## 2024-01-26 PROCEDURE — 97535 SELF CARE MNGMENT TRAINING: CPT | Mod: GO

## 2024-01-26 PROCEDURE — 97165 OT EVAL LOW COMPLEX 30 MIN: CPT | Mod: GO

## 2024-01-26 PROCEDURE — 99233 SBSQ HOSP IP/OBS HIGH 50: CPT | Performed by: STUDENT IN AN ORGANIZED HEALTH CARE EDUCATION/TRAINING PROGRAM

## 2024-01-26 PROCEDURE — 210N000001 HC R&B IMCU HEART CARE

## 2024-01-26 PROCEDURE — 250N000011 HC RX IP 250 OP 636: Performed by: INTERNAL MEDICINE

## 2024-01-26 RX ORDER — ALBUMIN (HUMAN) 12.5 G/50ML
50 SOLUTION INTRAVENOUS ONCE
Status: COMPLETED | OUTPATIENT
Start: 2024-01-26 | End: 2024-01-26

## 2024-01-26 RX ADMIN — ALBUMIN HUMAN 50 G: 0.25 SOLUTION INTRAVENOUS at 13:40

## 2024-01-26 RX ADMIN — Medication 81 MG: at 08:28

## 2024-01-26 RX ADMIN — INSULIN ASPART 5 UNITS: 100 INJECTION, SOLUTION INTRAVENOUS; SUBCUTANEOUS at 12:38

## 2024-01-26 RX ADMIN — INSULIN ASPART 6 UNITS: 100 INJECTION, SOLUTION INTRAVENOUS; SUBCUTANEOUS at 08:37

## 2024-01-26 RX ADMIN — SERTRALINE HYDROCHLORIDE 25 MG: 25 TABLET ORAL at 08:28

## 2024-01-26 RX ADMIN — FUROSEMIDE 60 MG: 10 INJECTION, SOLUTION INTRAMUSCULAR; INTRAVENOUS at 08:28

## 2024-01-26 RX ADMIN — ATORVASTATIN CALCIUM 80 MG: 40 TABLET, FILM COATED ORAL at 21:05

## 2024-01-26 RX ADMIN — CARVEDILOL 25 MG: 12.5 TABLET, FILM COATED ORAL at 08:27

## 2024-01-26 RX ADMIN — APIXABAN 5 MG: 5 TABLET, FILM COATED ORAL at 08:28

## 2024-01-26 RX ADMIN — APIXABAN 5 MG: 5 TABLET, FILM COATED ORAL at 21:05

## 2024-01-26 RX ADMIN — CARVEDILOL 25 MG: 12.5 TABLET, FILM COATED ORAL at 17:49

## 2024-01-26 ASSESSMENT — ACTIVITIES OF DAILY LIVING (ADL)
ADLS_ACUITY_SCORE: 22
ADLS_ACUITY_SCORE: 21
ADLS_ACUITY_SCORE: 21
ADLS_ACUITY_SCORE: 22
ADLS_ACUITY_SCORE: 22
ADLS_ACUITY_SCORE: 21
ADLS_ACUITY_SCORE: 22
ADLS_ACUITY_SCORE: 21
ADLS_ACUITY_SCORE: 22

## 2024-01-26 NOTE — PROGRESS NOTES
01/26/24 0900   Appointment Info   Signing Clinician's Name / Credentials (OT) Evelyn Banda OTR/L   Living Environment   People in Home spouse   Current Living Arrangements house   Home Accessibility stairs within home   Stair Railings, Within Home, Primary railings safe and in good condition   Transportation Anticipated family or friend will provide   Self-Care   Usual Activity Tolerance good   Current Activity Tolerance moderate   Regular Exercise Yes   Activity/Exercise Type other (see comments)  (attends outpt cardiac rehab currently)   Exercise Amount/Frequency 3-5 times/wk   Equipment Currently Used at Home none   Fall history within last six months no   Activity/Exercise/Self-Care Comment pt I with all ADL's prior to admission   Instrumental Activities of Daily Living (IADL)   IADL Comments pt and spouse share household tasks, pt primarily does all the grocery shopping and cooking, drives and self manages meds   General Information   Onset of Illness/Injury or Date of Surgery 01/25/24   Referring Physician Dr. Simeon   Patient/Family Therapy Goal Statement (OT) go home today   Additional Occupational Profile Info/Pertinent History of Current Problem Presented with worsening of shortness of breath and bilateral leg swelling, orthopnea and PND.  He was taken off torsemide due to kidney function worsening.  Reinstated torsemide few days ago,  nevertheless patient continues to have worsening of symptoms.   Cognitive Status Examination   Orientation Status orientation to person, place and time   Affect/Mental Status (Cognitive) WNL   Follows Commands WNL   Pain Assessment   Patient Currently in Pain No   Range of Motion Comprehensive   General Range of Motion no range of motion deficits identified   Strength Comprehensive (MMT)   General Manual Muscle Testing (MMT) Assessment no strength deficits identified   Bed Mobility   Bed Mobility supine-sit;sit-supine   Supine-Sit [place] (Bed  Mobility) independent   Sit-Supine [place] (Bed Mobility) independent   Transfers   Transfers bed-chair transfer;sit-stand transfer;toilet transfer   Transfer Skill: Bed to Chair/Chair to Bed   Bed-Chair [place] (Transfers) independent   Sit-Stand Transfer   Sit-Stand [place] (Transfers) independent   Toilet Transfer   Type (Toilet Transfer) sit-stand;stand-sit   [place] Level (Toilet Transfer) independent   Activities of Daily Living   BADL Assessment/Intervention lower body dressing   Lower Body Dressing Assessment/Training   Position (Lower Body Dressing) unsupported sitting   [place] Level (Lower Body Dressing) doff;don;socks;independent   Clinical Impression   Criteria for Skilled Therapeutic Interventions Met (OT) Yes, treatment indicated   OT Diagnosis education for CHF diagnosis   Influenced by the following impairments SOB   OT Problem List-Impairments impacting ADL activity tolerance impaired   Assessment of Occupational Performance 1-3 Performance Deficits   Identified Performance Deficits endurance for ADL completion   Planned Therapy Interventions (OT) home program guidelines   Clinical Decision Making Complexity (OT) problem focused assessment/low complexity   Risk & Benefits of therapy have been explained evaluation/treatment results reviewed;patient   OT Total Evaluation Time   OT Eval, Low Complexity Minutes (09123) 13   OT Goals   Therapy Frequency (OT) One time eval and treatment   OT Predicted Duration/Target Date for Goal Attainment 01/26/24   OT: Understanding of cardiac education to maximize quality of life, condition management, and health outcomes Patient;Verbalize   OT: Functional/aerobic ambulation tolerance with stable cardiovascular response in order to return to home and community environment Independent;50 feet   Self-Care/Home Management   Self-Care/Home Mgmt/ADL, Compensatory, Meal Prep Minutes (75907) 15   Ambulation   Duration (minutes) 8 minutes   Effort  Scale 4   Symptoms Denies symptoms   Cardiovascular Response Normal   Exercise Details Eval completed, treatment intiated. Ambulation without AD in hallway and room with I'ence, no LOB, pt able to hold conversation while ambulating and does not endorse SOB.  Endorses some fatigue after ambulation but states would be able to ambulate further. Completed education about CHF and reviewed CHF booklet with patient in detail. Answered questions and pt verbalized and understanding, states he has been given some of this education in outpt therapy.  Pt left in chair with call light.  No further OT indicated at this time.   Cardiac Education   Education Provided Daily weights;Diagnosis;Diet;Precautions;Signs and symptoms;Stop light tool   Education Packet Given to Patient Yes   All Patient Education Handouts Reviewed with Patient and/or Family Yes   OT Discharge Planning   OT Discharge Recommendation (DC Rec) home with assist   OT Rationale for DC Rec Pt okay to discharge home with spouse assist as needed, no further OT needs at this time.  Educated on CHF information and booklet given, pt verbalized an understanding   OT Brief overview of current status I with trsfs and mobility   Total Session Time   Timed Code Treatment Minutes 15   Total Session Time (sum of timed and untimed services) 28

## 2024-01-26 NOTE — CONSULTS
CLINICAL NUTRITION SERVICES - EDUCATION NOTE    Received consult for 2 gm Na diet education.     NUTRITION HISTORY:  Information obtained from patient:    Diet:  Pt reports he has cut back on fats and just uses a little salt.  ?  Living situation:   With spouse  ?  Grocery shopping:  patient  ?  Meal preparation:  patient    Pt occasionally has a prepared pot pie for dinner, a couple times per month.  He does generally eat microwave meals.  He does snack.?  ?  Previous diet instructions:  Familiar to this RD from diet consult 1/18/24 Outpatient Cardiac Rehab    ?  NUTRITION DIAGNOSIS:  Food- and nutrition-related knowledge deficit related to low sodium diet as evidenced by acute on chronic heart failure.    INTERVENTIONS:  Provided instruction on 2 gm Sodium diet. Pt pointed out the folder from the Heart Failure nurse.  Writer reviewed the Nutrition section with the patient and explained the reasoning behind the limit on sodium and fluids. Informed pt of his sodium limit and current fluid restriction (1800 mL) which is 7.5 cups daily  -all fluids included.  Encouraged the patient to discontinue salting foods. Encouraged low sodium items  and reviewed label reading.  Pt is wondering about the salt substitute that looks like salt -left message on pt's white board for Provider asking if he can use No Salt (Kcl) instead of salt.  Reviewed salt free seasonings already made - showed him the recipe to make his own.    Goals:  Patient verbalizes understanding of diet by asking pertinent questions.     Follow Up:  Patient to ask any further nutrition-related questions before discharge.

## 2024-01-26 NOTE — PLAN OF CARE
Heart Failure Care Map  GOALS TO BE MET BEFORE DISCHARGE:    1. Decrease congestion and/or edema with diuretic therapy to achieve near optimal volume status.     Dyspnea improved: Yes, satisfactory for discharge.   Edema improved: No, further care required to meet this goal. Please explain Pt. still has BLE . Compression stockings on.        Net I/O and Weights since admission:   12/26 2300 - 01/25 2259  In: 580 [P.O.:580]  Out: 1125 [Urine:1125]  Net: -545     Vitals:    01/25/24 0852   Weight: 95.3 kg (210 lb)       2.  O2 sats > 92% on room air, or at prior home O2 therapy level.      Able to wean O2 this shift to keep sats above 92%?: Yes, satisfactory for discharge.   Does patient use Home O2? No          Current oxygenation status:   SpO2: 95 %     O2 Device: None (Room air),      3.  Tolerates ambulation and mobility near baseline.     Ambulation: Yes, satisfactory for discharge. Pt is independent in room.   Times patient ambulated with staff this shift: 0    Please review the Heart Failure Care Map for additional HF goal outcomes.    Darlin Avila RN  1/25/2024 Goal Outcome Evaluation:

## 2024-01-26 NOTE — PLAN OF CARE
Heart Failure Care Map  GOALS TO BE MET BEFORE DISCHARGE:    1. Decrease congestion and/or edema with diuretic therapy to achieve near optimal volume status.     Dyspnea improved: Yes, satisfactory for discharge.   Edema improved: No, further care required to meet this goal. Please explain Still has BLE. Compression stockings on        Net I/O and Weights since admission:   12/27 0700 - 01/26 0659  In: 980 [P.O.:980]  Out: 4725 [Urine:4725]  Net: -3745     Vitals:    01/25/24 0852 01/26/24 0442   Weight: 95.3 kg (210 lb) 89.7 kg (197 lb 12.8 oz)       2.  O2 sats > 92% on room air, or at prior home O2 therapy level.      Able to wean O2 this shift to keep sats above 92%?: Yes, satisfactory for discharge.   Does patient use Home O2? No          Current oxygenation status:   SpO2: 95 %     O2 Device: None (Room air),      3.  Tolerates ambulation and mobility near baseline.     Ambulation: Yes, satisfactory for discharge.   Times patient ambulated with staff this shift: 0    Please review the Heart Failure Care Map for additional HF goal outcomes.    Darlin Avila RN  1/26/2024 Goal Outcome Evaluation:       A & O X4. Make needs known appropriately. VSS on RA. Denies pain, chest pain or SOB. Sinus rhythm on tele. Uses urinal. Independent in room. Continues on 1800ml fluid restriction.

## 2024-01-26 NOTE — PLAN OF CARE
Goal Outcome Evaluation:       [facility]     RN Progress Note:            Pertinent Assessments:      Please refer to flowsheet rows for full assessment     Patient independent in room, denies any pain or shortness of breath. Pt in good spirits/mood upon admission.           Key Events - This Shift:       BM, adequate urine output following Lasix administration.             Barriers to Discharge / Downgrade:     Pending lasix treatment q8hrs/fluid excretions and morning labs.         Point of Contact Update YES-OR-NO: Yes  If No, reason:   Name:Francine  Phone Number: in chart, present at bedside  Summary of Conversation:

## 2024-01-26 NOTE — CONSULTS
HEART CARE NOTE        Thank you, Dr. Simeon, for asking the [facility] team to see Gerardo Al to evaluate ADHF.    Assessment/Recommendations     1. HFmrEF c/b severe ADHF  Assessment / Plan  CRISTAL on am labs - will hold further diuresis for now; add IV albumin bolus and continue to monitor UOP and renal function closely   Patient is high risk of adverse cardiac events 2/2 advanced age, frailty, CAD, DM2  GDMT as detailed below; mainstay of treatment for HFmrEF includes diuretics (class I) and SGLT2-I (class 2a); additional medical therapy demonstrated with less robust evidence for indication but should be considered per guideline recommendations (2b)    Current Pharmacotherapy AHA Guideline-Directed Medical Therapy   Lisinopril - not started 2/2 to CRISTAL Lisinopril 20 mg twice daily   Carvedilol 25 mg daily Carvedilol 25 mg twice daily   Spironolactone - not started Spironolactone 25 mg once daily   Hydralazine NA Hydralazine 100 mg three times daily   Isosorbide dinitrate NA Isosorbide dinitrate 40 mg three times daily   SGLT2 inhibitor:Dapagliflozin/Empagliflozin - not started Dapagliflozin or Empagliflozin 10 mg daily     2. Atrial fibrillation  Assessment / Plan  Rate controlled; currently on apixaban and carvedilol    3. CAD  Assessment / Plan  Hx of CABG 9/23  Denies chest pain and anginal equivalents   Continue ASA, high intensity atorvastatin, carvedilol    4. DM2  Assessment / Plan  Management per primary team  Currently on ISS    5. CRISTAL on CKD  Assessment / Plan  Continue to monitor UOP and renal function closely    Clinically Significant Risk Factors Present on Admission               # Drug Induced Coagulation Defect: home medication list includes an anticoagulant medication  # Drug Induced Platelet Defect: home medication list includes an antiplatelet medication   # Hypertension: Noted on problem list  # Heart failure, NOS: heart failure noted on the problem list and last  "echo with EF 40-50%    # DMII: A1C = 6.7 % (Ref range: <5.7 %) within past 6 months    # Overweight: Estimated body mass index is 27.59 kg/m  as calculated from the following:    Height as of this encounter: 1.803 m (5' 11\").    Weight as of this encounter: 89.7 kg (197 lb 12.8 oz).       # Financial/Environmental Concerns: none        Cardiac Arrhythmia: Atrial fibrillation: Unspecified  Cardiomyopathy  Systolic acute    Fluid overload, unspecified, Other fluid overload, and Other disorders of electrolyte and fluid balance, not elsewhere classified    Acute kidney failure, unspecified  CKD POA List: Stage 4 (GFR 15-29)    85 minutes spent reviewing prior records (including documentation, laboratory studies, cardiac testing/imaging), history and physical exam, planning, and subsequent documentation.      History of Present Illness/Subjective    Mr. Gerardo Al is a 78 year old male with a PMHx significant for (per Epic notation) CAD s/p CABG, atrial fibrillation, type 2 diabetes mellitus, CKD who presents in ADHF    Today, Mr. Al reports significant improvement in presenting symptoms of dyspnea and orthopnea; Management plan as detailed above    ECG: Personally reviewed. atrial fibrillation, rate controlled.    ECHO (personnaly Reviewed on 1/26/24): repeat study pending  Limited study.  The left ventricle is normal in size.  Left ventricular function is decreased. The ejection fraction is 45-50%  (mildly reduced).  The left atrium is mildly dilated.  Small pericardial effusion  Pleural effusion.    Telemetry: personally reviewed January 26, 2024; notable for atrial fibrillation     Lab results: personally reviewed January 26, 2024; notable for CRISTAL on CKD    Medical history and pertinent documents reviewed in Care Everywhere please where applicable see details above          Physical Examination Review of Systems   /73 (BP Location: Left arm)   Pulse 72   Temp 97.9  F (36.6  C) (Oral)   Resp 16   Ht " "1.803 m (5' 11\")   Wt 89.7 kg (197 lb 12.8 oz)   SpO2 95%   BMI 27.59 kg/m    Body mass index is 27.59 kg/m .  Wt Readings from Last 3 Encounters:   01/26/24 89.7 kg (197 lb 12.8 oz)   11/21/23 93.4 kg (206 lb)   11/17/23 94.3 kg (207 lb 12.8 oz)     General Appearance:   no distress, normal body habitus   ENT/Mouth: membranes moist, no oral lesions or bleeding gums.      EYES:  no scleral icterus, normal conjunctivae   Neck: no carotid bruits or thyromegaly   Chest/Lungs:   lungs are clear to auscultation, no rales or wheezing, equal chest wall expansion    Cardiovascular:   Irregular. Normal first and second heart sounds with no murmurs, rubs, or gallops; the carotid, radial and posterior tibial pulses are intact, no JVD and trace LE edema bilaterally    Abdomen:  no organomegaly, masses, bruits, or tenderness; bowel sounds are present   Extremities: no cyanosis or clubbing   Skin: no xanthelasma, warm.    Neurologic: NAD     Psychiatric: alert and oriented x3, calm     A complete 10 systems ROS was reviewed  And is negative except what is listed in the HPI.          Medical History  Surgical History Family History Social History   Past Medical History:   Diagnosis Date    Hx of CABG     Sept 2023 at [facility]    Past Surgical History:   Procedure Laterality Date    CARDIAC SURGERY      CREATION PERICARDIAL WINDOW N/A 11/5/2023    Procedure: CREATION, PERICARDIAL WINDOW; EPIAORTIC ULTRASOUND;TRANSESPHAGEAL ECHOCARDIOGRAPHY BY ANESTHESIA;  Surgeon: Ruchi Singleton MD;  Location: [facility] OR    CV RIGHT HEART CATH MEASUREMENTS RECORDED N/A 11/2/2023    Procedure: Right Heart Catheterization;  Surgeon: Adam Busby MD;  Location: [facility] CATH LAB CV    CV SWAN ANEL PROCEDURE N/A 11/2/2023    Procedure: [place] Anel Procedure;  Surgeon: Adam Busby MD;  Location: [facility] CATH LAB CV    IR CVC TUNNEL PLACEMENT > 5 YRS OF AGE  11/3/2023    IR CVC TUNNEL REMOVAL LEFT  11/13/2023    PICC " "TRIPLE LUMEN PLACEMENT  11/2/2023    no family history of premature coronary artery disease Social History     Socioeconomic History    Marital status:      Spouse name: Not on file    Number of children: Not on file    Years of education: Not on file    Highest education level: Not on file   Occupational History    Not on file   Tobacco Use    Smoking status: Never    Smokeless tobacco: Never   Substance and Sexual Activity    Alcohol use: Not on file    Drug use: Not on file    Sexual activity: Not on file   Other Topics Concern    Not on file   Social History Narrative    Not on file     Social Determinants of Health     Financial Resource Strain: Not on file   Food Insecurity: Not on file   Transportation Needs: Not on file   Physical Activity: Not on file   Stress: Not on file   Social Connections: Not on file   Interpersonal Safety: Not on file   Housing Stability: Not on file           Lab Results    Chemistry/lipid CBC Cardiac Enzymes/BNP/TSH/INR   Lab Results   Component Value Date    TRIG 172 (H) 11/07/2023    BUN 37.4 (H) 01/26/2024     01/26/2024    CO2 33 (H) 01/26/2024    Lab Results   Component Value Date    WBC 4.9 01/26/2024    HGB 9.2 (L) 01/26/2024    HCT 29.8 (L) 01/26/2024    MCV 93 01/26/2024     01/26/2024    Lab Results   Component Value Date    TSH 2.85 11/02/2023    INR 1.80 (H) 11/10/2023     No results found for: \"CKTOTAL\", \"CKMB\", \"TROPONINI\"       Weight:    Wt Readings from Last 3 Encounters:   01/26/24 89.7 kg (197 lb 12.8 oz)   11/21/23 93.4 kg (206 lb)   11/17/23 94.3 kg (207 lb 12.8 oz)       Allergies  No Known Allergies      Surgical History  Past Surgical History:   Procedure Laterality Date    CARDIAC SURGERY      CREATION PERICARDIAL WINDOW N/A 11/5/2023    Procedure: CREATION, PERICARDIAL WINDOW; EPIAORTIC ULTRASOUND;TRANSESPHAGEAL ECHOCARDIOGRAPHY BY ANESTHESIA;  Surgeon: Ruchi Singleton MD;  Location: [facility] Main OR    CV RIGHT HEART CATH MEASUREMENTS " RECORDED N/A 11/2/2023    Procedure: Right Heart Catheterization;  Surgeon: Adam Busby MD;  Location: [facility] CATH LAB CV    CV SWAN DIALLO PROCEDURE N/A 11/2/2023    Procedure: [place] Procedure;  Surgeon: Adam Busby MD;  Location: [facility] CATH LAB CV    IR CVC TUNNEL PLACEMENT > 5 YRS OF AGE  11/3/2023    IR CVC TUNNEL REMOVAL LEFT  11/13/2023    PICC TRIPLE LUMEN PLACEMENT  11/2/2023       Social History  Tobacco:   History   Smoking Status    Never   Smokeless Tobacco    Never    Alcohol:   Social History    Substance and Sexual Activity      Alcohol use: Not on file   Illicit Drugs:   History   Drug Use Not on file       Family History  History reviewed. No pertinent family history.       Antonette Del Real MD on 1/26/2024      cc: Frank Chávez,

## 2024-01-26 NOTE — PLAN OF CARE
Occupational Therapy Discharge Summary    Reason for therapy discharge:    All goals and outcomes met, no further needs identified.    Progress towards therapy goal(s). See goals on Care Plan in [facility] electronic health record for goal details.  Goals met    Therapy recommendation(s):    Continued therapy is recommended.  Rationale/Recommendations:  Recommend pt continue outpatient cardiac rehab as previously was completing, discharge per their recommendations.  No further OT warranted while hospitalized.  Pt verbalized good understanding of CHF education/materials.

## 2024-01-26 NOTE — PLAN OF CARE
[id number]  Problem: Adult Inpatient Plan of Care  Goal: Plan of Care Review  Description: The Plan of Care Review/Shift note should be completed every shift.  The Outcome Evaluation is a brief statement about your assessment that the patient is improving, declining, or no change.  This information will be displayed automatically on your shift  note.  Outcome: Progressing   Goal Outcome Evaluation:               Pt denies pain.  Up ambulating independently in room.   IV Lasix on hold. Starting po Lasix.  Albumin 50 mg given.  Uneventful shift.

## 2024-01-26 NOTE — PROGRESS NOTES
[facility]    Medicine Progress Note - Hospitalist Service    Date of Admission:  1/25/2024    Assessment & Plan    Gerardo Al is a 78 year old male admitted on 1/25/2024. He presented with worsening of shortness of breath of 2 days duration.  Past medical history significant for CAD s/p CABG, atrial fibrillation, type 2 diabetes mellitus, CKD    Acute on chronic congestive heart failure, HFmrEF  Presented with worsening of shortness of breath and bilateral leg swelling, orthopnea and PND.  He was taken off torsemide due to kidney function worsening.  Reinstated torsemide few days ago,  nevertheless patient continues to have worsening of symptoms.  BNP significantly elevated  Chest x-ray with evidence of pulmonary congestion  Estimated left ventricular ejection fraction in 11/2023 is 45 to 50%, mildly reduced  Repeat echocardiogram  Cardiology consult appreciated  Patient required pericardial window on 11/05/23  during previous admission for pericardial effusion  Patient was also intubated due to acute hypoxic respiratory failure during previous admission.  Recently admitted for septic shock secondary to purulent left lower extremity cellulitis  Continue Lasix 60 stable IV 3 times daily  Strict input and output monitoring  Daily weight  Electrolyte monitoring and replacement as needed  Hold diuresis today due to CRISTAL.  Albumin infusion given today    CAD s/p CABG in 09/2023  Denies chest pain currently  Continue aspirin, statin and beta-blocker    Atrial fibrillation  -Controlled ventricular rate  -PTA carvedilol 25 mg oral twice daily  -PTA Eliquis 5 mg oral twice daily    Type 2 diabetes mellitus  Recent hemoglobin A1c 6.7  -Moderate intensity insulin sliding scale and mealtime carb coverage  -Accu-Cheks  -Hypoglycemia protocol    CKD  -Patient developed oliguric CRISTAL in September 2023  -Slight increment of creatinine from its baseline.  Hold diuresis today per cardiology  -Close  "monitoring of kidney function  -Consider nephrology consult  -Is seeing associated nephrology consultants as an outpatient        Diet: Fluid restriction 1800 ML FLUID (and additional linked orders)  Combination Diet Regular Diet Adult; 2 gm NA Diet    DVT Prophylaxis: DOAC  Cano Catheter: Not present  Lines: None     Cardiac Monitoring: ACTIVE order. Indication: Acute decompensated heart failure (48 hours)  Code Status: Full Code      Clinically Significant Risk Factors                  # Hypertension: Noted on problem list  # Heart failure, NOS: heart failure noted on the problem list and last echo with EF 40-50%      # DMII: A1C = 6.7 % (Ref range: <5.7 %) within past 6 months, PRESENT ON ADMISSION  # Overweight: Estimated body mass index is 27.59 kg/m  as calculated from the following:    Height as of this encounter: 1.803 m (5' 11\").    Weight as of this encounter: 89.7 kg (197 lb 12.8 oz)., PRESENT ON ADMISSION     # Financial/Environmental Concerns: none         Disposition Plan     Expected Discharge Date: 01/27/2024                    Lalita Simeon MD  Hospitalist Service  [facility]  Securely message with [employer] (more info)  Text page via [facility] Paging/Directory   ______________________________________________________________________    Interval History   No distress noted.  Patient is on room air.  Ambulating in his room freely denies having chest pain or palpitation.  Management plan discussed with the patient and he expressed understanding.    Physical Exam   Vital Signs: Temp: 97.9  F (36.6  C) Temp src: Oral BP: 106/62 Pulse: 77   Resp: 16 SpO2: 94 % O2 Device: None (Room air)    Weight: 197 lbs 12.8 oz    General Appearance: No distress noted  Respiratory: Good air entry bilaterally  Cardiovascular: S1 and S2 well heard, no murmur workup  GI: Soft abdomen, no tenderness, normoactive bowel sound  Skin: Intact and warm       Medical Decision Making       52 MINUTES " SPENT BY ME on the date of service doing chart review, history, exam, documentation & further activities per the note.      Data

## 2024-01-26 NOTE — UTILIZATION REVIEW
Admission Status; Secondary Review Determination   Under the authority of the Utilization Management Committee, the utilization review process indicated a secondary review on Gerardo Al. The review outcome is based on review of the medical records, discussions with staff, and applying clinical experience noted on the date of the review.   (x) Inpatient Status Appropriate - This patient's medical care is consistent with medical management for inpatient care and reasonable inpatient medical practice.     RATIONALE FOR DETERMINATION   Gerardo Al is a 78 yr old male with CHF, CAD, CKD and Afib who presented with SOB x 2 days.  Noted acute on chronic CHF exacerbation.  Reports LE edema, orthopnea and PND also.  Started his PO diuretics outpatient and failed.  Now in hospital on IV diuretics overnight but this AM Cr elevated to 2.7 with ongoing volume overload.  Diuretics held and albumin being given with close trend of Cr.      At the time of admission with the information available to the attending physician more than 2 nights Hospital complex care was anticipated, based on patient risk of adverse outcome if treated as outpatient and complex care required. Inpatient admission is appropriate based on the Medicare guidelines.   The information on this document is developed by the utilization review team in order for the business office to ensure compliance. This only denotes the appropriateness of proper admission status and does not reflect the quality of care rendered.   The definitions of Inpatient Status and Observation Status used in making the determination above are those provided in the CMS Coverage Manual, Chapter 1 and Chapter 6, section 70.4.   Sincerely,   Vanita Cardona MD  Utilization Review  Physician Advisor  [facility]

## 2024-01-27 VITALS
HEART RATE: 70 BPM | RESPIRATION RATE: 18 BRPM | BODY MASS INDEX: 26.82 KG/M2 | TEMPERATURE: 98 F | SYSTOLIC BLOOD PRESSURE: 119 MMHG | WEIGHT: 191.6 LBS | OXYGEN SATURATION: 98 % | DIASTOLIC BLOOD PRESSURE: 66 MMHG | HEIGHT: 71 IN

## 2024-01-27 LAB
ANION GAP SERPL CALCULATED.3IONS-SCNC: 14 MMOL/L (ref 7–15)
BUN SERPL-MCNC: 37 MG/DL (ref 8–23)
CALCIUM SERPL-MCNC: 9.1 MG/DL (ref 8.8–10.2)
CHLORIDE SERPL-SCNC: 97 MMOL/L (ref 98–107)
CREAT SERPL-MCNC: 2.72 MG/DL (ref 0.67–1.17)
DEPRECATED HCO3 PLAS-SCNC: 29 MMOL/L (ref 22–29)
EGFRCR SERPLBLD CKD-EPI 2021: 23 ML/MIN/1.73M2
ERYTHROCYTE [DISTWIDTH] IN BLOOD BY AUTOMATED COUNT: 14.4 % (ref 10–15)
GLUCOSE BLDC GLUCOMTR-MCNC: 222 MG/DL (ref 70–99)
GLUCOSE BLDC GLUCOMTR-MCNC: 99 MG/DL (ref 70–99)
GLUCOSE SERPL-MCNC: 98 MG/DL (ref 70–99)
HCT VFR BLD AUTO: 27.7 % (ref 40–53)
HGB BLD-MCNC: 8.7 G/DL (ref 13.3–17.7)
MCH RBC QN AUTO: 28.8 PG (ref 26.5–33)
MCHC RBC AUTO-ENTMCNC: 31.4 G/DL (ref 31.5–36.5)
MCV RBC AUTO: 92 FL (ref 78–100)
PLATELET # BLD AUTO: 202 10E3/UL (ref 150–450)
POTASSIUM SERPL-SCNC: 3.8 MMOL/L (ref 3.4–5.3)
RBC # BLD AUTO: 3.02 10E6/UL (ref 4.4–5.9)
SODIUM SERPL-SCNC: 140 MMOL/L (ref 135–145)
WBC # BLD AUTO: 5.3 10E3/UL (ref 4–11)

## 2024-01-27 PROCEDURE — 250N000013 HC RX MED GY IP 250 OP 250 PS 637: Performed by: STUDENT IN AN ORGANIZED HEALTH CARE EDUCATION/TRAINING PROGRAM

## 2024-01-27 PROCEDURE — 250N000011 HC RX IP 250 OP 636: Performed by: INTERNAL MEDICINE

## 2024-01-27 PROCEDURE — 99239 HOSP IP/OBS DSCHRG MGMT >30: CPT | Performed by: STUDENT IN AN ORGANIZED HEALTH CARE EDUCATION/TRAINING PROGRAM

## 2024-01-27 PROCEDURE — 99207 PR NO BILLABLE SERVICE THIS VISIT: CPT | Performed by: STUDENT IN AN ORGANIZED HEALTH CARE EDUCATION/TRAINING PROGRAM

## 2024-01-27 PROCEDURE — 80048 BASIC METABOLIC PNL TOTAL CA: CPT | Performed by: STUDENT IN AN ORGANIZED HEALTH CARE EDUCATION/TRAINING PROGRAM

## 2024-01-27 PROCEDURE — 99233 SBSQ HOSP IP/OBS HIGH 50: CPT | Performed by: INTERNAL MEDICINE

## 2024-01-27 PROCEDURE — 85014 HEMATOCRIT: CPT | Performed by: STUDENT IN AN ORGANIZED HEALTH CARE EDUCATION/TRAINING PROGRAM

## 2024-01-27 PROCEDURE — 36415 COLL VENOUS BLD VENIPUNCTURE: CPT | Performed by: STUDENT IN AN ORGANIZED HEALTH CARE EDUCATION/TRAINING PROGRAM

## 2024-01-27 PROCEDURE — P9047 ALBUMIN (HUMAN), 25%, 50ML: HCPCS | Performed by: INTERNAL MEDICINE

## 2024-01-27 RX ORDER — TORSEMIDE 20 MG/1
20 TABLET ORAL DAILY
Qty: 30 TABLET | Refills: 1 | Status: SHIPPED | OUTPATIENT
Start: 2024-01-28

## 2024-01-27 RX ORDER — TORSEMIDE 20 MG/1
20 TABLET ORAL DAILY
Status: DISCONTINUED | OUTPATIENT
Start: 2024-01-28 | End: 2024-01-27 | Stop reason: HOSPADM

## 2024-01-27 RX ORDER — TORSEMIDE 20 MG/1
20 TABLET ORAL DAILY
Status: DISCONTINUED | OUTPATIENT
Start: 2024-01-27 | End: 2024-01-27

## 2024-01-27 RX ORDER — ALBUMIN (HUMAN) 12.5 G/50ML
50 SOLUTION INTRAVENOUS ONCE
Status: COMPLETED | OUTPATIENT
Start: 2024-01-27 | End: 2024-01-27

## 2024-01-27 RX ADMIN — SERTRALINE HYDROCHLORIDE 25 MG: 25 TABLET ORAL at 09:42

## 2024-01-27 RX ADMIN — CARVEDILOL 25 MG: 12.5 TABLET, FILM COATED ORAL at 09:42

## 2024-01-27 RX ADMIN — Medication 81 MG: at 09:41

## 2024-01-27 RX ADMIN — ACETAMINOPHEN 1000 MG: 500 TABLET ORAL at 03:59

## 2024-01-27 RX ADMIN — APIXABAN 5 MG: 5 TABLET, FILM COATED ORAL at 09:42

## 2024-01-27 RX ADMIN — ALBUMIN HUMAN 50 G: 0.25 SOLUTION INTRAVENOUS at 07:07

## 2024-01-27 ASSESSMENT — ACTIVITIES OF DAILY LIVING (ADL)
ADLS_ACUITY_SCORE: 22

## 2024-01-27 NOTE — PROGRESS NOTES
Care Management Discharge Note    Discharge Date: 01/27/2024       Discharge Disposition: Home    Discharge Services: None    Discharge DME: None    Discharge Transportation: family or friend will provide    Private pay costs discussed: Not applicable    Does the patient's insurance plan have a 3 day qualifying hospital stay waiver?  No    PAS Confirmation Code:  NA  Patient/family educated on Medicare website which has current facility and service quality ratings: no    Education Provided on the Discharge Plan: Yes per team  Persons Notified of Discharge Plans: Patient per team  Patient/Family in Agreement with the Plan: yes    Handoff Referral Completed: Yes    Additional Information:  Patient discharge home. Family will transport.    Alexandra Cameron RN

## 2024-01-27 NOTE — DISCHARGE SUMMARY
"[facility]  Hospitalist Discharge Summary      Date of Admission:  1/25/2024  Date of Discharge:  1/27/2024  3:45 PM  Discharging Provider: Lalita Simeon MD  Discharge Service: Hospitalist Service    Discharge Diagnoses   Acute on chronic HFrEF  CAD s/p CABG  Atrial fibrillation  Type 2 diabetes mellitus  CRISTAL on CKD    Clinically Significant Risk Factors     # DMII: A1C = 6.7 % (Ref range: <5.7 %) within past 6 months  # Overweight: Estimated body mass index is 26.72 kg/m  as calculated from the following:    Height as of this encounter: 1.803 m (5' 11\").    Weight as of this encounter: 86.9 kg (191 lb 9.6 oz).       Follow-ups Needed After Discharge   Follow-up Appointments     Follow-up and recommended labs and tests       Follow up with primary care provider, Frank Chávez, within 7 days for   hospital follow- up.  The following labs/tests are recommended: BMP with   in a week.            Unresulted Labs Ordered in the Past 30 Days of this Admission       No orders found from 12/26/2023 to 1/26/2024.        These results will be followed up by     Discharge Disposition   Discharged to home  Condition at discharge: Stable    Hospital Course   Gerardo Al is a 78 year old male admitted on 1/25/2024. He presented with worsening of shortness of breath of 2 days duration.  Past medical history significant for CAD s/p CABG, atrial fibrillation, type 2 diabetes mellitus, CKD.   Acute on chronic congestive heart failure, HFrEF  Presented with worsening of shortness of breath and bilateral leg swelling, orthopnea and PND.  He was taken off torsemide due to kidney function worsening.  Reinstated torsemide few days ago,  nevertheless patient continues to have worsening of symptoms.  Chest x-ray with evidence of pulmonary congestion  Estimated left ventricular ejection fraction in 11/2023 is 45 to 50%, mildly reduced  Repeat echocardiogram with 35-40% EF   Cardiology consult " appreciated  Patient required pericardial window on 11/05/23 during previous admission for pericardial effusion. Patient was intubated due to acute hypoxic respiratory failure during previous admission.  Recently admitted for septic shock secondary to purulent left lower extremity cellulitis  Hold lasix due to CRISTAL   Strict input and output monitoring  Daily weight (lost 9 kg since admission)  Electrolyte monitoring and replacement as needed  Continue torsemide 20 mg oral daily   CAD s/p CABG in 09/2023  Denies chest pain currently  Continue aspirin, statin and beta-blocker  Atrial fibrillation  -Controlled ventricular rate  -PTA carvedilol 25 mg oral twice daily  -PTA Eliquis 5 mg oral twice daily   Type 2 diabetes mellitus  Recent hemoglobin A1c 6.7  -Moderate intensity insulin sliding scale and mealtime carb coverage  -Accu-Cheks  -Hypoglycemia protocol  -Patient should discontinue metformin as GFR is less than 30  CRISTAL on CKD  -Patient developed oliguric CRISTAL in September 2023  -Slight increment of creatinine from its baseline.  Hold diuresis today per cardiology  -Close monitoring of kidney function  -Consider nephrology consult  -Is seeing associated nephrology consultants as an outpatient    Patient is clinically stable and will be discharged home today.  Cleared by cardiology for discharge.  medication sent to pharmacy.     Consultations This Hospital Stay   CORE CLINIC EVALUATION IP CONSULT  OCCUPATIONAL THERAPY ADULT IP CONSULT  NUTRITION SERVICES ADULT IP CONSULT  CARE MANAGEMENT / SOCIAL WORK IP CONSULT  CARDIOLOGY IP CONSULT    Code Status   Full Code    Time Spent on this Encounter   I, Lalita Simeon MD, personally saw the patient today and spent greater than 30 minutes discharging this patient.       Lalita Simeon MD  [facility]ARE  [address] [place] [id number]  Phone: [phone]  Fax:  [phone]  ______________________________________________________________________    Physical Exam   Vital Signs: Temp: 98  F (36.7  C) Temp src: Oral BP: 119/66 Pulse: 70   Resp: 18 SpO2: 98 % O2 Device: None (Room air)    Weight: 191 lbs 9.6 oz    General Appearance:  No distress noted  Respiratory: Good air entry bilaterally  Cardiovascular: S1 and S2 well heard, no murmur workup  GI: Soft abdomen, no tenderness, normoactive bowel sound  Skin: Intact and warm          Primary Care Physician   Frank Chávez    Discharge Orders      Reason for your hospital stay    Decompensated heart failure     Follow-up and recommended labs and tests     Follow up with primary care provider, Frank Chávez, within 7 days for hospital follow- up.  The following labs/tests are recommended: BMP with in a week.     Activity    Your activity upon discharge: activity as tolerated     Diet    Follow this diet upon discharge: Orders Placed This Encounter      Fluid restriction 1800 ML FLUID      Combination Diet Regular Diet Adult; 2 gm NA Diet       Significant Results and Procedures       Discharge Medications   Current Discharge Medication List        CONTINUE these medications which have CHANGED    Details   torsemide (DEMADEX) 20 MG tablet Take 1 tablet (20 mg) by mouth daily  Qty: 30 tablet, Refills: 1    Associated Diagnoses: Acute on chronic congestive heart failure, unspecified heart failure type (H)           CONTINUE these medications which have NOT CHANGED    Details   acetaminophen (TYLENOL) 500 MG tablet Take 1,000 mg by mouth every 6 hours as needed for mild pain or pain      apixaban ANTICOAGULANT (ELIQUIS) 5 MG tablet Take 1 tablet (5 mg) by mouth 2 times daily  Qty: 60 tablet, Refills: 0    Associated Diagnoses: Paroxysmal atrial fibrillation (H)      aspirin 81 MG EC tablet Take 1 tablet (81 mg) by mouth daily  Qty: 30 tablet, Refills: 0    Associated Diagnoses: Coronary artery disease involving native coronary artery  of native heart without angina pectoris      atorvastatin (LIPITOR) 80 MG tablet Take 80 mg by mouth at bedtime      carvedilol (COREG) 25 MG tablet Take 1 tablet (25 mg) by mouth 2 times daily (with meals)  Qty: 30 tablet, Refills: 0    Associated Diagnoses: Benign hypertensive kidney disease with chronic kidney disease stage I through stage IV, or unspecified      LORazepam (ATIVAN) 0.5 MG tablet Take 1 tablet (0.5 mg) by mouth nightly as needed for anxiety or muscle spasms  Qty: 5 tablet, Refills: 0    Associated Diagnoses: Anxiety      sertraline (ZOLOFT) 25 MG tablet Take 1 tablet (25 mg) by mouth daily  Qty: 30 tablet, Refills: 0    Associated Diagnoses: Mild recurrent major depression (H24)           STOP taking these medications       metFORMIN (GLUCOPHAGE XR) 500 MG 24 hr tablet Comments:   Reason for Stopping:             Allergies   No Known Allergies

## 2024-01-27 NOTE — PROGRESS NOTES
HEART CARE NOTE          Assessment/Recommendations   1. HFmrEF c/b severe ADHF  Assessment / Plan  Renal function appears to have reached plateau - continue to hold further diuresis for now for today; initiate torsemide 20 mg daily starting tomorrow; continue to monitor UOP and renal function closely   Patient is high risk of adverse cardiac events 2/2 advanced age, frailty, CAD, DM2  GDMT as detailed below; mainstay of treatment for HFmrEF includes diuretics (class I) and SGLT2-I (class 2a); additional medical therapy demonstrated with less robust evidence for indication but should be considered per guideline recommendations (2b)     Current Pharmacotherapy AHA Guideline-Directed Medical Therapy   Lisinopril - not started 2/2 to CRISTAL Lisinopril 20 mg twice daily   Carvedilol 25 mg daily Carvedilol 25 mg twice daily   Spironolactone - not started Spironolactone 25 mg once daily   Hydralazine NA Hydralazine 100 mg three times daily   Isosorbide dinitrate NA Isosorbide dinitrate 40 mg three times daily   SGLT2 inhibitor:Dapagliflozin/Empagliflozin - not started Dapagliflozin or Empagliflozin 10 mg daily      2. Atrial fibrillation  Assessment / Plan  Rate controlled; currently on apixaban and carvedilol     3. CAD  Assessment / Plan  Hx of CABG 9/23  Denies chest pain and anginal equivalents   Continue ASA, high intensity atorvastatin, carvedilol     4. DM2  Assessment / Plan  Management per primary team  Currently on ISS     5. CRISTAL on CKD  Assessment / Plan  Continue to monitor UOP and renal function closely    Plan of care discussed on January 27, 2024 with patient and family  at bedside, and primary team overseeing patient's care    50 minutes spent reviewing prior records (including documentation, laboratory studies, cardiac testing/imaging), history and physical exam, planning, and subsequent documentation.        History of Present Illness/Subjective    Mr. Gerardo Al is a 78 year old male with a PMHx  "significant for (per Epic notation) CAD s/p CABG, atrial fibrillation, type 2 diabetes mellitus, CKD who presents in ADHF     Today, Mr. Al denies acute cardiac events or complaints; Management plan as detailed above     ECG: Personally reviewed. atrial fibrillation, rate controlled.     ECHO (personnaly Reviewed on 1/26/24): repeat study pending  Limited study.  The left ventricle is normal in size.  Left ventricular function is decreased. The ejection fraction is 45-50%  (mildly reduced).  The left atrium is mildly dilated.  Small pericardial effusion  Pleural effusion.    Lab results: personally reviewed January 27, 2024; notable for stable renal function    Medical history and pertinent documents reviewed in Care Everywhere please where applicable see details above        Physical Examination Review of Systems   /85 (BP Location: Right arm)   Pulse 87   Temp 97.8  F (36.6  C) (Oral)   Resp 18   Ht 1.803 m (5' 11\")   Wt 86.9 kg (191 lb 9.6 oz)   SpO2 93%   BMI 26.72 kg/m    Body mass index is 26.72 kg/m .  Wt Readings from Last 3 Encounters:   01/27/24 86.9 kg (191 lb 9.6 oz)   11/21/23 93.4 kg (206 lb)   11/17/23 94.3 kg (207 lb 12.8 oz)     General Appearance:   no distress, normal body habitus   ENT/Mouth: membranes moist, no oral lesions or bleeding gums.      EYES:  no scleral icterus, normal conjunctivae   Neck: no carotid bruits or thyromegaly   Chest/Lungs:   lungs are clear to auscultation, no rales or wheezing, equal chest wall expansion    Cardiovascular:   Regular. Normal first and second heart sounds with no murmurs, rubs, or gallops; the carotid, radial and posterior tibial pulses are intact, no JVD or LE edema bilaterally    Abdomen:  no organomegaly, masses, bruits, or tenderness; bowel sounds are present   Extremities: no cyanosis or clubbing   Skin: no xanthelasma, warm.    Neurologic: NAD     Psychiatric: alert and oriented x3, calm     A complete 10 systems ROS was reviewed  " And is negative except what is listed in the HPI.          Medical History  Surgical History Family History Social History   Past Medical History:   Diagnosis Date    Hx of CABG     Sept 2023 at [facility]    Past Surgical History:   Procedure Laterality Date    CARDIAC SURGERY      CREATION PERICARDIAL WINDOW N/A 11/5/2023    Procedure: CREATION, PERICARDIAL WINDOW; EPIAORTIC ULTRASOUND;TRANSESPHAGEAL ECHOCARDIOGRAPHY BY ANESTHESIA;  Surgeon: Ruchi Singleton MD;  Location: [facility] Main OR    CV RIGHT HEART CATH MEASUREMENTS RECORDED N/A 11/2/2023    Procedure: Right Heart Catheterization;  Surgeon: Adam Busby MD;  Location: [facility] CATH LAB CV    CV SWAN ANEL PROCEDURE N/A 11/2/2023    Procedure: [place] Anel Procedure;  Surgeon: Adam Busby MD;  Location: [facility] CATH LAB CV    IR CVC TUNNEL PLACEMENT > 5 YRS OF AGE  11/3/2023    IR CVC TUNNEL REMOVAL LEFT  11/13/2023    PICC TRIPLE LUMEN PLACEMENT  11/2/2023    no family history of premature coronary artery disease Social History     Socioeconomic History    Marital status:      Spouse name: Not on file    Number of children: Not on file    Years of education: Not on file    Highest education level: Not on file   Occupational History    Not on file   Tobacco Use    Smoking status: Never    Smokeless tobacco: Never   Substance and Sexual Activity    Alcohol use: Not on file    Drug use: Not on file    Sexual activity: Not on file   Other Topics Concern    Not on file   Social History Narrative    Not on file     Social Determinants of Health     Financial Resource Strain: Not on file   Food Insecurity: Not on file   Transportation Needs: Not on file   Physical Activity: Not on file   Stress: Not on file   Social Connections: Not on file   Interpersonal Safety: Not on file   Housing Stability: Not on file           Lab Results    Chemistry/lipid CBC Cardiac Enzymes/BNP/TSH/INR   Lab Results   Component Value Date    TRIG 172 (H)  "11/07/2023    BUN 37.0 (H) 01/27/2024     01/27/2024    CO2 29 01/27/2024    Lab Results   Component Value Date    WBC 5.3 01/27/2024    HGB 8.7 (L) 01/27/2024    HCT 27.7 (L) 01/27/2024    MCV 92 01/27/2024     01/27/2024    Lab Results   Component Value Date    TSH 2.85 11/02/2023    INR 1.80 (H) 11/10/2023     No results found for: \"CKTOTAL\", \"CKMB\", \"TROPONINI\"       Weight:    Wt Readings from Last 3 Encounters:   01/27/24 86.9 kg (191 lb 9.6 oz)   11/21/23 93.4 kg (206 lb)   11/17/23 94.3 kg (207 lb 12.8 oz)       Allergies  No Known Allergies      Surgical History  Past Surgical History:   Procedure Laterality Date    CARDIAC SURGERY      CREATION PERICARDIAL WINDOW N/A 11/5/2023    Procedure: CREATION, PERICARDIAL WINDOW; EPIAORTIC ULTRASOUND;TRANSESPHAGEAL ECHOCARDIOGRAPHY BY ANESTHESIA;  Surgeon: Ruchi Singleton MD;  Location: [facility] Main OR    CV RIGHT HEART CATH MEASUREMENTS RECORDED N/A 11/2/2023    Procedure: Right Heart Catheterization;  Surgeon: Adam Busby MD;  Location: [facility] CATH LAB CV    CV SWAN DIALLO PROCEDURE N/A 11/2/2023    Procedure: [place] Procedure;  Surgeon: Adam Busby MD;  Location: [facility] CATH LAB CV    IR CVC TUNNEL PLACEMENT > 5 YRS OF AGE  11/3/2023    IR CVC TUNNEL REMOVAL LEFT  11/13/2023    PICC TRIPLE LUMEN PLACEMENT  11/2/2023       Social History  Tobacco:   History   Smoking Status    Never   Smokeless Tobacco    Never    Alcohol:   Social History    Substance and Sexual Activity      Alcohol use: Not on file   Illicit Drugs:   History   Drug Use Not on file       Family History  History reviewed. No pertinent family history.       Antonette Del Real MD on 1/27/2024      cc: Frank Chávez    "

## 2024-01-27 NOTE — PLAN OF CARE
[id number]  Problem: Adult Inpatient Plan of Care  Goal: Plan of Care Review  Description: The Plan of Care Review/Shift note should be completed every shift.  The Outcome Evaluation is a brief statement about your assessment that the patient is improving, declining, or no change.  This information will be displayed automatically on your shift  note.  Outcome: Progressing   Goal Outcome Evaluation:   Pt denies pain.  Gave Albumin this am.  Continue [id number]/day.  Diuretics are on hold.  Monitoring I &0 and daily weights.     Went over discharge paperwork with pt.  IV taken out. Had to put a pressure dressing over IV site  x2 d/t bleeding.  Ice pack applied.  Bleeding looks like it has stopped. Told pt to take off pressure dressing in a few hours after he gets home. Discussed with Pt how to hand IV site if it starts bleeding again.  Pt states he has all his belongings. Wife transported home.

## 2024-01-27 NOTE — PLAN OF CARE
Problem: Adult Inpatient Plan of Care  Goal: Plan of Care Review  Description: The Plan of Care Review/Shift note should be completed every shift.  The Outcome Evaluation is a brief statement about your assessment that the patient is improving, declining, or no change.  This information will be displayed automatically on your shift  note.  Outcome: Progressing  Flowsheets (Taken 1/27/2024 4805)  Plan of Care Reviewed With: patient  Overall Patient Progress: improving   Goal Outcome Evaluation:      Plan of Care Reviewed With: patient    Overall Patient Progress: improvingOverall Patient Progress: improving       A & O X4. VSS on RA. Atrial flutter on tele. C/o sharp, stabbing pain on right leg. Right leg tender to touch. Compression stockings were taken off, Tylenol given as well- effective per patient. Independent in room.    Creatinine this morning is 2.72. Albumin given.

## 2024-01-27 NOTE — PROGRESS NOTES
[facility]    Medicine Progress Note - Hospitalist Service    Date of Admission:  1/25/2024    Assessment & Plan    Gerardo Al is a 78 year old male admitted on 1/25/2024. He presented with worsening of shortness of breath of 2 days duration.  Past medical history significant for CAD s/p CABG, atrial fibrillation, type 2 diabetes mellitus, CKD    Acute on chronic congestive heart failure, HFrEF  Presented with worsening of shortness of breath and bilateral leg swelling, orthopnea and PND.  He was taken off torsemide due to kidney function worsening.  Reinstated torsemide few days ago,  nevertheless patient continues to have worsening of symptoms.  Chest x-ray with evidence of pulmonary congestion  Estimated left ventricular ejection fraction in 11/2023 is 45 to 50%, mildly reduced  Repeat echocardiogram with 35-40% EF   Cardiology consult appreciated  Patient required pericardial window on 11/05/23 during previous admission for pericardial effusion. Patient was intubated due to acute hypoxic respiratory failure during previous admission.  Recently admitted for septic shock secondary to purulent left lower extremity cellulitis  Hold lasix due to CRISTAL   Strict input and output monitoring  Daily weight (lost 9 kg since admission)  Electrolyte monitoring and replacement as needed  Hold diuresis today due to CRISTAL. Albumin infusion given today    CAD s/p CABG in 09/2023  Denies chest pain currently  Continue aspirin, statin and beta-blocker    Atrial fibrillation  -Controlled ventricular rate  -PTA carvedilol 25 mg oral twice daily  -PTA Eliquis 5 mg oral twice daily    Type 2 diabetes mellitus  Recent hemoglobin A1c 6.7  -Moderate intensity insulin sliding scale and mealtime carb coverage  -Accu-Cheks  -Hypoglycemia protocol    CRISTAL on CKD  -Patient developed oliguric CRISTAL in September 2023  -Slight increment of creatinine from its baseline.  Hold diuresis today per cardiology  -Close monitoring  "of kidney function  -Consider nephrology consult  -Is seeing associated nephrology consultants as an outpatient        Diet: Fluid restriction 1800 ML FLUID (and additional linked orders)  Combination Diet Regular Diet Adult; 2 gm NA Diet    DVT Prophylaxis: DOAC  Cano Catheter: Not present  Lines: None     Cardiac Monitoring: ACTIVE order. Indication: Acute decompensated heart failure (48 hours)  Code Status: Full Code      Clinically Significant Risk Factors                  # Hypertension: Noted on problem list  # Acute heart failure with reduced ejection fraction: last echo with EF <40% and receiving IV diuretics      # DMII: A1C = 6.7 % (Ref range: <5.7 %) within past 6 months, PRESENT ON ADMISSION  # Overweight: Estimated body mass index is 26.72 kg/m  as calculated from the following:    Height as of this encounter: 1.803 m (5' 11\").    Weight as of this encounter: 86.9 kg (191 lb 9.6 oz)., PRESENT ON ADMISSION       # Financial/Environmental Concerns: none         Disposition Plan      Expected Discharge Date: 01/28/2024        Discharge Comments: IV lasix held d/t CRISTAL.            Llaita Simeon MD  Hospitalist Service  [facility]  Securely message with [employer] (more info)  Text page via [facility] Paging/Directory   ______________________________________________________________________    Interval History   On room air.  Patient stated having pain on his left shin area.  Edema has significantly improved.  denies having calf pain.  Management plan discussed with the patient and he expressed understanding    Physical Exam   Vital Signs: Temp: 98  F (36.7  C) Temp src: Oral BP: 119/66 Pulse: 70   Resp: 18 SpO2: 98 % O2 Device: None (Room air)    Weight: 191 lbs 9.6 oz    General Appearance: No distress noted  Respiratory: Good air entry bilaterally  Cardiovascular: S1 and S2 well heard, no murmur workup  GI: Soft abdomen, no tenderness, normoactive bowel sound  Skin: Intact and " warm       Medical Decision Making       50 MINUTES SPENT BY ME on the date of service doing chart review, history, exam, documentation & further activities per the note.      Data

## 2024-01-30 ENCOUNTER — HOSPITAL ENCOUNTER (OUTPATIENT)
Dept: CARDIAC REHAB | Facility: HOSPITAL | Age: 79
Discharge: HOME | End: 2024-01-30
Attending: INTERNAL MEDICINE
Payer: COMMERCIAL

## 2024-01-30 PROCEDURE — 93798 PHYS/QHP OP CAR RHAB W/ECG: CPT

## 2024-02-01 ENCOUNTER — HOSPITAL ENCOUNTER (OUTPATIENT)
Dept: CARDIAC REHAB | Facility: HOSPITAL | Age: 79
Discharge: HOME | End: 2024-02-01
Attending: INTERNAL MEDICINE
Payer: COMMERCIAL

## 2024-02-01 PROCEDURE — 93798 PHYS/QHP OP CAR RHAB W/ECG: CPT

## 2024-02-03 LAB
ATRIAL RATE - MUSE: 83 BPM
DIASTOLIC BLOOD PRESSURE - MUSE: 83 MMHG
INTERPRETATION ECG - MUSE: NORMAL
P AXIS - MUSE: NORMAL DEGREES
PR INTERVAL - MUSE: NORMAL MS
QRS DURATION - MUSE: 104 MS
QT - MUSE: 436 MS
QTC - MUSE: 497 MS
R AXIS - MUSE: -33 DEGREES
SYSTOLIC BLOOD PRESSURE - MUSE: 140 MMHG
T AXIS - MUSE: 82 DEGREES
VENTRICULAR RATE- MUSE: 78 BPM

## 2024-02-06 ENCOUNTER — HOSPITAL ENCOUNTER (OUTPATIENT)
Dept: CARDIAC REHAB | Facility: HOSPITAL | Age: 79
Discharge: HOME | End: 2024-02-06
Attending: INTERNAL MEDICINE
Payer: COMMERCIAL

## 2024-02-06 PROCEDURE — 93798 PHYS/QHP OP CAR RHAB W/ECG: CPT

## 2024-02-08 ENCOUNTER — HOSPITAL ENCOUNTER (OUTPATIENT)
Dept: CARDIAC REHAB | Facility: HOSPITAL | Age: 79
Discharge: HOME | End: 2024-02-08
Attending: INTERNAL MEDICINE
Payer: COMMERCIAL

## 2024-02-08 PROCEDURE — 93798 PHYS/QHP OP CAR RHAB W/ECG: CPT

## 2024-02-13 ENCOUNTER — HOSPITAL ENCOUNTER (OUTPATIENT)
Dept: CARDIAC REHAB | Facility: HOSPITAL | Age: 79
Discharge: HOME | End: 2024-02-13
Attending: INTERNAL MEDICINE
Payer: COMMERCIAL

## 2024-02-13 PROCEDURE — 93798 PHYS/QHP OP CAR RHAB W/ECG: CPT

## 2024-02-15 ENCOUNTER — HOSPITAL ENCOUNTER (OUTPATIENT)
Dept: CARDIAC REHAB | Facility: HOSPITAL | Age: 79
Discharge: HOME | End: 2024-02-15
Attending: INTERNAL MEDICINE
Payer: COMMERCIAL

## 2024-02-15 PROCEDURE — 93798 PHYS/QHP OP CAR RHAB W/ECG: CPT

## 2024-02-20 ENCOUNTER — HOSPITAL ENCOUNTER (OUTPATIENT)
Dept: CARDIAC REHAB | Facility: HOSPITAL | Age: 79
Discharge: HOME | End: 2024-02-20
Attending: INTERNAL MEDICINE
Payer: COMMERCIAL

## 2024-02-20 PROCEDURE — 93798 PHYS/QHP OP CAR RHAB W/ECG: CPT

## 2024-02-22 ENCOUNTER — HOSPITAL ENCOUNTER (OUTPATIENT)
Dept: CARDIAC REHAB | Facility: HOSPITAL | Age: 79
Discharge: HOME | End: 2024-02-22
Attending: INTERNAL MEDICINE
Payer: COMMERCIAL

## 2024-02-22 PROCEDURE — 93798 PHYS/QHP OP CAR RHAB W/ECG: CPT

## 2024-02-27 ENCOUNTER — HOSPITAL ENCOUNTER (OUTPATIENT)
Dept: CARDIAC REHAB | Facility: HOSPITAL | Age: 79
Discharge: HOME | End: 2024-02-27
Attending: INTERNAL MEDICINE
Payer: COMMERCIAL

## 2024-02-27 PROCEDURE — 93798 PHYS/QHP OP CAR RHAB W/ECG: CPT

## 2024-02-29 ENCOUNTER — HOSPITAL ENCOUNTER (OUTPATIENT)
Dept: CARDIAC REHAB | Facility: HOSPITAL | Age: 79
Discharge: HOME | End: 2024-02-29
Attending: INTERNAL MEDICINE
Payer: COMMERCIAL

## 2024-02-29 PROCEDURE — 93798 PHYS/QHP OP CAR RHAB W/ECG: CPT

## 2024-03-03 DIAGNOSIS — I48.0 PAROXYSMAL ATRIAL FIBRILLATION (H): ICD-10-CM

## 2024-03-03 DIAGNOSIS — I12.9 BENIGN HYPERTENSIVE KIDNEY DISEASE WITH CHRONIC KIDNEY DISEASE STAGE I THROUGH STAGE IV, OR UNSPECIFIED: ICD-10-CM

## 2024-03-03 DIAGNOSIS — F33.0 MILD RECURRENT MAJOR DEPRESSION (H): ICD-10-CM

## 2024-03-04 RX ORDER — SERTRALINE HYDROCHLORIDE 25 MG/1
25 TABLET, FILM COATED ORAL DAILY
Qty: 30 TABLET | Refills: 0 | OUTPATIENT
Start: 2024-03-04

## 2024-03-04 RX ORDER — APIXABAN 5 MG/1
5 TABLET, FILM COATED ORAL 2 TIMES DAILY
Qty: 60 TABLET | Refills: 0 | OUTPATIENT
Start: 2024-03-04

## 2024-03-04 RX ORDER — CARVEDILOL 25 MG/1
25 TABLET ORAL 2 TIMES DAILY WITH MEALS
Qty: 30 TABLET | Refills: 0 | OUTPATIENT
Start: 2024-03-04

## 2024-03-04 RX ORDER — ATORVASTATIN CALCIUM 80 MG/1
TABLET, FILM COATED ORAL
Qty: 30 TABLET | Refills: 0 | OUTPATIENT
Start: 2024-03-04

## 2024-03-05 ENCOUNTER — HOSPITAL ENCOUNTER (OUTPATIENT)
Dept: CARDIAC REHAB | Facility: HOSPITAL | Age: 79
Discharge: HOME | End: 2024-03-05
Attending: INTERNAL MEDICINE
Payer: COMMERCIAL

## 2024-03-05 PROCEDURE — 93798 PHYS/QHP OP CAR RHAB W/ECG: CPT

## 2024-03-07 ENCOUNTER — HOSPITAL ENCOUNTER (OUTPATIENT)
Dept: CARDIAC REHAB | Facility: HOSPITAL | Age: 79
Discharge: HOME | End: 2024-03-07
Attending: INTERNAL MEDICINE
Payer: COMMERCIAL

## 2024-03-07 PROCEDURE — 93798 PHYS/QHP OP CAR RHAB W/ECG: CPT

## 2024-03-12 ENCOUNTER — HOSPITAL ENCOUNTER (OUTPATIENT)
Dept: CARDIAC REHAB | Facility: HOSPITAL | Age: 79
Discharge: HOME | End: 2024-03-12
Attending: INTERNAL MEDICINE
Payer: COMMERCIAL

## 2024-03-12 PROCEDURE — 93798 PHYS/QHP OP CAR RHAB W/ECG: CPT

## 2024-03-14 ENCOUNTER — HOSPITAL ENCOUNTER (OUTPATIENT)
Dept: CARDIAC REHAB | Facility: HOSPITAL | Age: 79
Discharge: HOME | End: 2024-03-14
Attending: INTERNAL MEDICINE
Payer: COMMERCIAL

## 2024-03-14 PROCEDURE — 93798 PHYS/QHP OP CAR RHAB W/ECG: CPT

## 2024-03-19 ENCOUNTER — HOSPITAL ENCOUNTER (OUTPATIENT)
Dept: CARDIAC REHAB | Facility: HOSPITAL | Age: 79
Discharge: HOME | End: 2024-03-19
Attending: INTERNAL MEDICINE
Payer: COMMERCIAL

## 2024-03-19 PROCEDURE — 93798 PHYS/QHP OP CAR RHAB W/ECG: CPT

## 2024-03-21 ENCOUNTER — HOSPITAL ENCOUNTER (OUTPATIENT)
Dept: CARDIAC REHAB | Facility: HOSPITAL | Age: 79
Discharge: HOME | End: 2024-03-21
Attending: INTERNAL MEDICINE
Payer: COMMERCIAL

## 2024-03-21 PROCEDURE — 93798 PHYS/QHP OP CAR RHAB W/ECG: CPT

## 2025-05-21 ENCOUNTER — LAB REQUISITION (OUTPATIENT)
Dept: LAB | Facility: CLINIC | Age: 80
End: 2025-05-21
Payer: COMMERCIAL

## 2025-05-21 DIAGNOSIS — N18.4 CHRONIC KIDNEY DISEASE, STAGE 4 (SEVERE) (H): ICD-10-CM

## 2025-05-21 DIAGNOSIS — D64.9 ANEMIA, UNSPECIFIED: ICD-10-CM

## 2025-05-23 LAB
ANION GAP SERPL CALCULATED.3IONS-SCNC: 13 MMOL/L (ref 7–15)
BUN SERPL-MCNC: 36.8 MG/DL (ref 8–23)
CALCIUM SERPL-MCNC: 8.8 MG/DL (ref 8.8–10.4)
CHLORIDE SERPL-SCNC: 107 MMOL/L (ref 98–107)
CREAT SERPL-MCNC: 1.47 MG/DL (ref 0.67–1.17)
EGFRCR SERPLBLD CKD-EPI 2021: 48 ML/MIN/1.73M2
ERYTHROCYTE [DISTWIDTH] IN BLOOD BY AUTOMATED COUNT: 15.1 % (ref 10–15)
GLUCOSE SERPL-MCNC: 116 MG/DL (ref 70–99)
HCO3 SERPL-SCNC: 21 MMOL/L (ref 22–29)
HCT VFR BLD AUTO: 24.7 % (ref 40–53)
HGB BLD-MCNC: 7.6 G/DL (ref 13.3–17.7)
MCH RBC QN AUTO: 28.7 PG (ref 26.5–33)
MCHC RBC AUTO-ENTMCNC: 30.8 G/DL (ref 31.5–36.5)
MCV RBC AUTO: 93 FL (ref 78–100)
PLATELET # BLD AUTO: 337 10E3/UL (ref 150–450)
POTASSIUM SERPL-SCNC: 4.2 MMOL/L (ref 3.4–5.3)
RBC # BLD AUTO: 2.65 10E6/UL (ref 4.4–5.9)
SODIUM SERPL-SCNC: 141 MMOL/L (ref 135–145)
WBC # BLD AUTO: 7.1 10E3/UL (ref 4–11)

## 2025-05-23 PROCEDURE — 85027 COMPLETE CBC AUTOMATED: CPT | Mod: ORL | Performed by: FAMILY MEDICINE

## 2025-05-23 PROCEDURE — 80048 BASIC METABOLIC PNL TOTAL CA: CPT | Mod: ORL | Performed by: FAMILY MEDICINE

## 2025-05-23 PROCEDURE — 36415 COLL VENOUS BLD VENIPUNCTURE: CPT | Mod: ORL | Performed by: FAMILY MEDICINE

## 2025-05-23 PROCEDURE — P9604 ONE-WAY ALLOW PRORATED TRIP: HCPCS | Mod: ORL | Performed by: FAMILY MEDICINE

## 2025-06-03 ENCOUNTER — LAB REQUISITION (OUTPATIENT)
Dept: LAB | Facility: CLINIC | Age: 80
End: 2025-06-03
Payer: COMMERCIAL

## 2025-06-03 DIAGNOSIS — D64.9 ANEMIA, UNSPECIFIED: ICD-10-CM

## 2025-06-04 LAB
ERYTHROCYTE [DISTWIDTH] IN BLOOD BY AUTOMATED COUNT: 16.3 % (ref 10–15)
FOLATE SERPL-MCNC: 19.9 NG/ML (ref 4.6–34.8)
HCT VFR BLD AUTO: 29.6 % (ref 40–53)
HGB BLD-MCNC: 8.3 G/DL (ref 13.3–17.7)
IRON BINDING CAPACITY (ROCHE): ABNORMAL
IRON SATN MFR SERPL: ABNORMAL %
IRON SERPL-MCNC: 25 UG/DL (ref 61–157)
MCH RBC QN AUTO: 28.8 PG (ref 26.5–33)
MCHC RBC AUTO-ENTMCNC: 28 G/DL (ref 31.5–36.5)
MCV RBC AUTO: 103 FL (ref 78–100)
PLATELET # BLD AUTO: 260 10E3/UL (ref 150–450)
RBC # BLD AUTO: 2.88 10E6/UL (ref 4.4–5.9)
VIT B12 SERPL-MCNC: 448 PG/ML (ref 232–1245)
WBC # BLD AUTO: 8.6 10E3/UL (ref 4–11)

## 2025-06-04 PROCEDURE — 82746 ASSAY OF FOLIC ACID SERUM: CPT | Mod: ORL | Performed by: FAMILY MEDICINE

## 2025-06-04 PROCEDURE — 83550 IRON BINDING TEST: CPT | Mod: ORL | Performed by: FAMILY MEDICINE

## 2025-06-04 PROCEDURE — 36415 COLL VENOUS BLD VENIPUNCTURE: CPT | Mod: ORL | Performed by: FAMILY MEDICINE

## 2025-06-04 PROCEDURE — 82607 VITAMIN B-12: CPT | Mod: ORL | Performed by: FAMILY MEDICINE

## 2025-06-04 PROCEDURE — 85027 COMPLETE CBC AUTOMATED: CPT | Mod: ORL | Performed by: FAMILY MEDICINE

## 2025-06-04 PROCEDURE — P9604 ONE-WAY ALLOW PRORATED TRIP: HCPCS | Mod: ORL | Performed by: FAMILY MEDICINE

## 2025-06-18 ENCOUNTER — LAB REQUISITION (OUTPATIENT)
Dept: LAB | Facility: CLINIC | Age: 80
End: 2025-06-18
Payer: COMMERCIAL

## 2025-06-18 DIAGNOSIS — R31.9 HEMATURIA, UNSPECIFIED: ICD-10-CM

## 2025-06-18 LAB
ALBUMIN UR-MCNC: 100 MG/DL
APPEARANCE UR: ABNORMAL
BILIRUB UR QL STRIP: NEGATIVE
COLOR UR AUTO: ABNORMAL
GLUCOSE UR STRIP-MCNC: 500 MG/DL
HGB UR QL STRIP: ABNORMAL
KETONES UR STRIP-MCNC: NEGATIVE MG/DL
LEUKOCYTE ESTERASE UR QL STRIP: ABNORMAL
NITRATE UR QL: NEGATIVE
PH UR STRIP: 5.5 [PH] (ref 5–7)
RBC URINE: >182 /HPF
SP GR UR STRIP: 1.02 (ref 1–1.03)
UROBILINOGEN UR STRIP-MCNC: NORMAL MG/DL
WBC CLUMPS #/AREA URNS HPF: PRESENT /HPF
WBC URINE: >182 /HPF

## 2025-06-18 PROCEDURE — 81001 URINALYSIS AUTO W/SCOPE: CPT | Mod: ORL | Performed by: FAMILY MEDICINE

## 2025-06-18 PROCEDURE — 87088 URINE BACTERIA CULTURE: CPT | Mod: ORL | Performed by: FAMILY MEDICINE

## 2025-06-19 LAB
ANION GAP SERPL CALCULATED.3IONS-SCNC: 16 MMOL/L (ref 7–15)
BACTERIA UR CULT: ABNORMAL
BASOPHILS # BLD AUTO: 0 10E3/UL (ref 0–0.2)
BASOPHILS NFR BLD AUTO: 0 %
BUN SERPL-MCNC: 58 MG/DL (ref 8–23)
CALCIUM SERPL-MCNC: 9.1 MG/DL (ref 8.8–10.4)
CHLORIDE SERPL-SCNC: 108 MMOL/L (ref 98–107)
CREAT SERPL-MCNC: 1.63 MG/DL (ref 0.67–1.17)
EGFRCR SERPLBLD CKD-EPI 2021: 43 ML/MIN/1.73M2
EOSINOPHIL # BLD AUTO: 0.1 10E3/UL (ref 0–0.7)
EOSINOPHIL NFR BLD AUTO: 1 %
ERYTHROCYTE [DISTWIDTH] IN BLOOD BY AUTOMATED COUNT: 18.2 % (ref 10–15)
GLUCOSE SERPL-MCNC: 103 MG/DL (ref 70–99)
HCO3 SERPL-SCNC: 16 MMOL/L (ref 22–29)
HCT VFR BLD AUTO: 28.7 % (ref 40–53)
HGB BLD-MCNC: 8.7 G/DL (ref 13.3–17.7)
IMM GRANULOCYTES # BLD: 0.1 10E3/UL
IMM GRANULOCYTES NFR BLD: 0 %
LYMPHOCYTES # BLD AUTO: 1.7 10E3/UL (ref 0.8–5.3)
LYMPHOCYTES NFR BLD AUTO: 13 %
MCH RBC QN AUTO: 29.7 PG (ref 26.5–33)
MCHC RBC AUTO-ENTMCNC: 30.3 G/DL (ref 31.5–36.5)
MCV RBC AUTO: 98 FL (ref 78–100)
MONOCYTES # BLD AUTO: 1.4 10E3/UL (ref 0–1.3)
MONOCYTES NFR BLD AUTO: 10 %
NEUTROPHILS # BLD AUTO: 10.3 10E3/UL (ref 1.6–8.3)
NEUTROPHILS NFR BLD AUTO: 76 %
NRBC # BLD AUTO: 0 10E3/UL
NRBC BLD AUTO-RTO: 0 /100
PLATELET # BLD AUTO: 261 10E3/UL (ref 150–450)
POTASSIUM SERPL-SCNC: 4 MMOL/L (ref 3.4–5.3)
RBC # BLD AUTO: 2.93 10E6/UL (ref 4.4–5.9)
SODIUM SERPL-SCNC: 140 MMOL/L (ref 135–145)
WBC # BLD AUTO: 13.6 10E3/UL (ref 4–11)

## 2025-06-19 PROCEDURE — 36415 COLL VENOUS BLD VENIPUNCTURE: CPT | Mod: ORL | Performed by: FAMILY MEDICINE

## 2025-06-19 PROCEDURE — 85025 COMPLETE CBC W/AUTO DIFF WBC: CPT | Mod: ORL | Performed by: FAMILY MEDICINE

## 2025-06-19 PROCEDURE — 80048 BASIC METABOLIC PNL TOTAL CA: CPT | Mod: ORL | Performed by: FAMILY MEDICINE

## 2025-06-19 PROCEDURE — P9604 ONE-WAY ALLOW PRORATED TRIP: HCPCS | Mod: ORL | Performed by: FAMILY MEDICINE

## 2025-06-20 ENCOUNTER — LAB REQUISITION (OUTPATIENT)
Dept: LAB | Facility: CLINIC | Age: 80
End: 2025-06-20
Payer: COMMERCIAL

## 2025-06-20 DIAGNOSIS — N39.0 URINARY TRACT INFECTION, SITE NOT SPECIFIED: ICD-10-CM

## 2025-06-20 DIAGNOSIS — D64.9 ANEMIA, UNSPECIFIED: ICD-10-CM

## 2025-06-20 LAB
BACTERIA UR CULT: ABNORMAL
BACTERIA UR CULT: ABNORMAL

## 2025-07-07 ENCOUNTER — LAB REQUISITION (OUTPATIENT)
Dept: LAB | Facility: CLINIC | Age: 80
End: 2025-07-07
Payer: COMMERCIAL

## 2025-07-07 DIAGNOSIS — I48.91 UNSPECIFIED ATRIAL FIBRILLATION (H): ICD-10-CM

## 2025-07-08 ENCOUNTER — LAB REQUISITION (OUTPATIENT)
Dept: LAB | Facility: CLINIC | Age: 80
End: 2025-07-08
Payer: COMMERCIAL

## 2025-07-08 DIAGNOSIS — M86.9 OSTEOMYELITIS, UNSPECIFIED (H): ICD-10-CM

## 2025-07-09 ENCOUNTER — LAB REQUISITION (OUTPATIENT)
Dept: LAB | Facility: CLINIC | Age: 80
End: 2025-07-09
Payer: COMMERCIAL

## 2025-07-09 DIAGNOSIS — T81.42XA INFECTION FOLLOWING A PROCEDURE, DEEP INCISIONAL SURGICAL SITE, INITIAL ENCOUNTER: ICD-10-CM

## 2025-07-09 LAB
INR PPP: 1.34 (ref 0.85–1.15)
PROTHROMBIN TIME: 16.5 SECONDS (ref 11.8–14.8)

## 2025-07-09 PROCEDURE — P9604 ONE-WAY ALLOW PRORATED TRIP: HCPCS | Mod: ORL | Performed by: FAMILY MEDICINE

## 2025-07-09 PROCEDURE — 85610 PROTHROMBIN TIME: CPT | Mod: ORL | Performed by: FAMILY MEDICINE

## 2025-07-09 PROCEDURE — 36415 COLL VENOUS BLD VENIPUNCTURE: CPT | Mod: ORL | Performed by: FAMILY MEDICINE

## 2025-07-12 ENCOUNTER — LAB REQUISITION (OUTPATIENT)
Dept: LAB | Facility: CLINIC | Age: 80
End: 2025-07-12
Payer: COMMERCIAL

## 2025-07-12 DIAGNOSIS — I82.409 ACUTE EMBOLISM AND THROMBOSIS OF UNSPECIFIED DEEP VEINS OF UNSPECIFIED LOWER EXTREMITY (H): ICD-10-CM

## 2025-07-12 LAB
INR PPP: 1.32 (ref 0.85–1.15)
PROTHROMBIN TIME: 16.7 SECONDS (ref 11.8–14.8)

## 2025-07-12 PROCEDURE — P9603 ONE-WAY ALLOW PRORATED MILES: HCPCS | Mod: ORL | Performed by: FAMILY MEDICINE

## 2025-07-12 PROCEDURE — 85610 PROTHROMBIN TIME: CPT | Mod: ORL | Performed by: FAMILY MEDICINE

## 2025-07-12 PROCEDURE — 36415 COLL VENOUS BLD VENIPUNCTURE: CPT | Mod: ORL | Performed by: FAMILY MEDICINE

## 2025-07-14 ENCOUNTER — LAB REQUISITION (OUTPATIENT)
Dept: LAB | Facility: CLINIC | Age: 80
End: 2025-07-14
Payer: COMMERCIAL

## 2025-07-14 DIAGNOSIS — I48.0 PAROXYSMAL ATRIAL FIBRILLATION (H): ICD-10-CM

## 2025-07-14 DIAGNOSIS — I82.409 ACUTE EMBOLISM AND THROMBOSIS OF UNSPECIFIED DEEP VEINS OF UNSPECIFIED LOWER EXTREMITY (H): ICD-10-CM

## 2025-07-14 LAB
ALP SERPL-CCNC: 59 U/L (ref 40–150)
ALT SERPL W P-5'-P-CCNC: <5 U/L (ref 0–70)
AST SERPL W P-5'-P-CCNC: 18 U/L (ref 0–45)
BASOPHILS # BLD AUTO: 0 10E3/UL (ref 0–0.2)
BASOPHILS NFR BLD AUTO: 1 %
BILIRUB SERPL-MCNC: 0.2 MG/DL
BUN SERPL-MCNC: 12.3 MG/DL (ref 8–23)
CK SERPL-CCNC: 38 U/L (ref 39–308)
CREAT SERPL-MCNC: 1.3 MG/DL (ref 0.67–1.17)
EGFRCR SERPLBLD CKD-EPI 2021: 56 ML/MIN/1.73M2
EOSINOPHIL # BLD AUTO: 0.7 10E3/UL (ref 0–0.7)
EOSINOPHIL NFR BLD AUTO: 15 %
ERYTHROCYTE [DISTWIDTH] IN BLOOD BY AUTOMATED COUNT: 17 % (ref 10–15)
HCT VFR BLD AUTO: 29.8 % (ref 40–53)
HGB BLD-MCNC: 9.3 G/DL (ref 13.3–17.7)
IMM GRANULOCYTES # BLD: 0 10E3/UL
IMM GRANULOCYTES NFR BLD: 0 %
INR PPP: 1.47 (ref 0.85–1.15)
LYMPHOCYTES # BLD AUTO: 1.6 10E3/UL (ref 0.8–5.3)
LYMPHOCYTES NFR BLD AUTO: 37 %
MCH RBC QN AUTO: 30.2 PG (ref 26.5–33)
MCHC RBC AUTO-ENTMCNC: 31.2 G/DL (ref 31.5–36.5)
MCV RBC AUTO: 97 FL (ref 78–100)
MONOCYTES # BLD AUTO: 0.5 10E3/UL (ref 0–1.3)
MONOCYTES NFR BLD AUTO: 12 %
NEUTROPHILS # BLD AUTO: 1.5 10E3/UL (ref 1.6–8.3)
NEUTROPHILS NFR BLD AUTO: 35 %
NRBC # BLD AUTO: 0 10E3/UL
NRBC BLD AUTO-RTO: 0 /100
PLATELET # BLD AUTO: 194 10E3/UL (ref 150–450)
POTASSIUM SERPL-SCNC: 4.2 MMOL/L (ref 3.4–5.3)
PROTHROMBIN TIME: 17.7 SECONDS (ref 11.8–14.8)
RBC # BLD AUTO: 3.08 10E6/UL (ref 4.4–5.9)
WBC # BLD AUTO: 4.4 10E3/UL (ref 4–11)

## 2025-07-14 PROCEDURE — 84132 ASSAY OF SERUM POTASSIUM: CPT | Mod: ORL | Performed by: FAMILY MEDICINE

## 2025-07-14 PROCEDURE — 85610 PROTHROMBIN TIME: CPT | Mod: ORL | Performed by: FAMILY MEDICINE

## 2025-07-14 PROCEDURE — 84450 TRANSFERASE (AST) (SGOT): CPT | Mod: ORL | Performed by: FAMILY MEDICINE

## 2025-07-14 PROCEDURE — 36415 COLL VENOUS BLD VENIPUNCTURE: CPT | Mod: ORL | Performed by: FAMILY MEDICINE

## 2025-07-14 PROCEDURE — 84075 ASSAY ALKALINE PHOSPHATASE: CPT | Mod: ORL | Performed by: FAMILY MEDICINE

## 2025-07-14 PROCEDURE — 84520 ASSAY OF UREA NITROGEN: CPT | Mod: ORL | Performed by: FAMILY MEDICINE

## 2025-07-14 PROCEDURE — 84460 ALANINE AMINO (ALT) (SGPT): CPT | Mod: ORL | Performed by: FAMILY MEDICINE

## 2025-07-14 PROCEDURE — 82247 BILIRUBIN TOTAL: CPT | Mod: ORL | Performed by: FAMILY MEDICINE

## 2025-07-14 PROCEDURE — 82550 ASSAY OF CK (CPK): CPT | Mod: ORL | Performed by: FAMILY MEDICINE

## 2025-07-14 PROCEDURE — P9604 ONE-WAY ALLOW PRORATED TRIP: HCPCS | Mod: ORL | Performed by: FAMILY MEDICINE

## 2025-07-14 PROCEDURE — 82565 ASSAY OF CREATININE: CPT | Mod: ORL | Performed by: FAMILY MEDICINE

## 2025-07-14 PROCEDURE — 85025 COMPLETE CBC W/AUTO DIFF WBC: CPT | Mod: ORL | Performed by: FAMILY MEDICINE

## 2025-07-15 ENCOUNTER — LAB REQUISITION (OUTPATIENT)
Dept: LAB | Facility: CLINIC | Age: 80
End: 2025-07-15
Payer: COMMERCIAL

## 2025-07-15 DIAGNOSIS — I48.0 PAROXYSMAL ATRIAL FIBRILLATION (H): ICD-10-CM

## 2025-07-15 LAB
INR PPP: 1.39 (ref 0.85–1.15)
PROTHROMBIN TIME: 17.4 SECONDS (ref 11.8–14.8)

## 2025-07-16 ENCOUNTER — LAB REQUISITION (OUTPATIENT)
Dept: LAB | Facility: CLINIC | Age: 80
End: 2025-07-16
Payer: COMMERCIAL

## 2025-07-16 DIAGNOSIS — D64.89 OTHER SPECIFIED ANEMIAS: ICD-10-CM

## 2025-07-16 DIAGNOSIS — T81.49XD INFECTION FOLLOWING A PROCEDURE, OTHER SURGICAL SITE, SUBSEQUENT ENCOUNTER: ICD-10-CM

## 2025-07-16 DIAGNOSIS — L08.9 LOCAL INFECTION OF THE SKIN AND SUBCUTANEOUS TISSUE, UNSPECIFIED: ICD-10-CM

## 2025-07-16 DIAGNOSIS — T14.8XXD OTHER INJURY OF UNSPECIFIED BODY REGION, SUBSEQUENT ENCOUNTER: ICD-10-CM

## 2025-07-17 LAB
INR PPP: 1.28 (ref 0.85–1.15)
PROTHROMBIN TIME: 15.9 SECONDS (ref 11.8–14.8)

## 2025-07-17 PROCEDURE — P9604 ONE-WAY ALLOW PRORATED TRIP: HCPCS | Mod: ORL | Performed by: FAMILY MEDICINE

## 2025-07-17 PROCEDURE — 85610 PROTHROMBIN TIME: CPT | Mod: ORL | Performed by: FAMILY MEDICINE

## 2025-07-17 PROCEDURE — 36415 COLL VENOUS BLD VENIPUNCTURE: CPT | Mod: ORL | Performed by: FAMILY MEDICINE

## 2025-07-18 ENCOUNTER — LAB REQUISITION (OUTPATIENT)
Dept: LAB | Facility: CLINIC | Age: 80
End: 2025-07-18
Payer: COMMERCIAL

## 2025-07-18 DIAGNOSIS — I48.0 PAROXYSMAL ATRIAL FIBRILLATION (H): ICD-10-CM

## 2025-07-21 LAB
ALP SERPL-CCNC: 70 U/L (ref 40–150)
ALT SERPL W P-5'-P-CCNC: <5 U/L (ref 0–70)
AST SERPL W P-5'-P-CCNC: 18 U/L (ref 0–45)
BASOPHILS # BLD AUTO: 0.1 10E3/UL (ref 0–0.2)
BASOPHILS NFR BLD AUTO: 1 %
BILIRUB SERPL-MCNC: 0.2 MG/DL
BUN SERPL-MCNC: 36.1 MG/DL (ref 8–23)
CK SERPL-CCNC: 15 U/L (ref 39–308)
CREAT SERPL-MCNC: 1.49 MG/DL (ref 0.67–1.17)
EGFRCR SERPLBLD CKD-EPI 2021: 47 ML/MIN/1.73M2
EOSINOPHIL # BLD AUTO: 0.6 10E3/UL (ref 0–0.7)
EOSINOPHIL NFR BLD AUTO: 8 %
ERYTHROCYTE [DISTWIDTH] IN BLOOD BY AUTOMATED COUNT: 16.9 % (ref 10–15)
HCT VFR BLD AUTO: 30.2 % (ref 40–53)
HGB BLD-MCNC: 9.2 G/DL (ref 13.3–17.7)
IMM GRANULOCYTES # BLD: 0 10E3/UL
IMM GRANULOCYTES NFR BLD: 0 %
INR PPP: 1.86 (ref 0.85–1.15)
LYMPHOCYTES # BLD AUTO: 1.9 10E3/UL (ref 0.8–5.3)
LYMPHOCYTES NFR BLD AUTO: 23 %
MCH RBC QN AUTO: 30.1 PG (ref 26.5–33)
MCHC RBC AUTO-ENTMCNC: 30.5 G/DL (ref 31.5–36.5)
MCV RBC AUTO: 99 FL (ref 78–100)
MONOCYTES # BLD AUTO: 0.9 10E3/UL (ref 0–1.3)
MONOCYTES NFR BLD AUTO: 11 %
NEUTROPHILS # BLD AUTO: 4.7 10E3/UL (ref 1.6–8.3)
NEUTROPHILS NFR BLD AUTO: 57 %
NRBC # BLD AUTO: 0 10E3/UL
NRBC BLD AUTO-RTO: 0 /100
PLATELET # BLD AUTO: 248 10E3/UL (ref 150–450)
PROTHROMBIN TIME: 21.1 SECONDS (ref 11.8–14.8)
RBC # BLD AUTO: 3.06 10E6/UL (ref 4.4–5.9)
WBC # BLD AUTO: 8.1 10E3/UL (ref 4–11)

## 2025-07-21 PROCEDURE — 82550 ASSAY OF CK (CPK): CPT | Mod: ORL | Performed by: FAMILY MEDICINE

## 2025-07-21 PROCEDURE — 82247 BILIRUBIN TOTAL: CPT | Mod: ORL | Performed by: FAMILY MEDICINE

## 2025-07-21 PROCEDURE — 36415 COLL VENOUS BLD VENIPUNCTURE: CPT | Mod: ORL | Performed by: FAMILY MEDICINE

## 2025-07-21 PROCEDURE — P9604 ONE-WAY ALLOW PRORATED TRIP: HCPCS | Mod: ORL | Performed by: FAMILY MEDICINE

## 2025-07-21 PROCEDURE — 85025 COMPLETE CBC W/AUTO DIFF WBC: CPT | Mod: ORL | Performed by: FAMILY MEDICINE

## 2025-07-21 PROCEDURE — 84460 ALANINE AMINO (ALT) (SGPT): CPT | Mod: ORL | Performed by: FAMILY MEDICINE

## 2025-07-21 PROCEDURE — 84520 ASSAY OF UREA NITROGEN: CPT | Mod: ORL | Performed by: FAMILY MEDICINE

## 2025-07-21 PROCEDURE — 85610 PROTHROMBIN TIME: CPT | Mod: ORL | Performed by: FAMILY MEDICINE

## 2025-07-21 PROCEDURE — 84075 ASSAY ALKALINE PHOSPHATASE: CPT | Mod: ORL | Performed by: FAMILY MEDICINE

## 2025-07-21 PROCEDURE — 82565 ASSAY OF CREATININE: CPT | Mod: ORL | Performed by: FAMILY MEDICINE

## 2025-07-21 PROCEDURE — 84450 TRANSFERASE (AST) (SGOT): CPT | Mod: ORL | Performed by: FAMILY MEDICINE

## 2025-07-22 ENCOUNTER — LAB REQUISITION (OUTPATIENT)
Dept: LAB | Facility: CLINIC | Age: 80
End: 2025-07-22
Payer: COMMERCIAL

## 2025-07-22 DIAGNOSIS — I48.0 PAROXYSMAL ATRIAL FIBRILLATION (H): ICD-10-CM

## 2025-07-23 ENCOUNTER — LAB REQUISITION (OUTPATIENT)
Dept: LAB | Facility: CLINIC | Age: 80
End: 2025-07-23
Payer: COMMERCIAL

## 2025-07-23 DIAGNOSIS — L08.9 LOCAL INFECTION OF THE SKIN AND SUBCUTANEOUS TISSUE, UNSPECIFIED: ICD-10-CM

## 2025-07-23 DIAGNOSIS — T81.49XD INFECTION FOLLOWING A PROCEDURE, OTHER SURGICAL SITE, SUBSEQUENT ENCOUNTER: ICD-10-CM

## 2025-07-23 DIAGNOSIS — E40 KWASHIORKOR: ICD-10-CM

## 2025-07-23 DIAGNOSIS — T14.8XXD OTHER INJURY OF UNSPECIFIED BODY REGION, SUBSEQUENT ENCOUNTER: ICD-10-CM

## 2025-07-23 DIAGNOSIS — D64.89 OTHER SPECIFIED ANEMIAS: ICD-10-CM

## 2025-07-25 ENCOUNTER — LAB REQUISITION (OUTPATIENT)
Dept: LAB | Facility: CLINIC | Age: 80
End: 2025-07-25
Payer: COMMERCIAL

## 2025-07-25 DIAGNOSIS — I48.0 PAROXYSMAL ATRIAL FIBRILLATION (H): ICD-10-CM

## 2025-07-25 LAB
INR PPP: 2.1 (ref 0.85–1.15)
PROTHROMBIN TIME: 23.7 SECONDS (ref 11.8–14.8)

## 2025-07-25 PROCEDURE — 85610 PROTHROMBIN TIME: CPT | Mod: ORL | Performed by: FAMILY MEDICINE

## 2025-07-25 PROCEDURE — 36415 COLL VENOUS BLD VENIPUNCTURE: CPT | Mod: ORL | Performed by: FAMILY MEDICINE

## 2025-07-25 PROCEDURE — P9604 ONE-WAY ALLOW PRORATED TRIP: HCPCS | Mod: ORL | Performed by: FAMILY MEDICINE

## 2025-07-28 LAB
ALBUMIN SERPL BCG-MCNC: 2.1 G/DL (ref 3.5–5.2)
ALP SERPL-CCNC: 78 U/L (ref 40–150)
ALT SERPL W P-5'-P-CCNC: <5 U/L (ref 0–70)
AST SERPL W P-5'-P-CCNC: 20 U/L (ref 0–45)
BASOPHILS # BLD AUTO: 0.1 10E3/UL (ref 0–0.2)
BASOPHILS NFR BLD AUTO: 1 %
BILIRUB SERPL-MCNC: <0.2 MG/DL
BUN SERPL-MCNC: 30.1 MG/DL (ref 8–23)
CK SERPL-CCNC: 16 U/L (ref 39–308)
CREAT SERPL-MCNC: 1.42 MG/DL (ref 0.67–1.17)
EGFRCR SERPLBLD CKD-EPI 2021: 50 ML/MIN/1.73M2
EOSINOPHIL # BLD AUTO: 0.7 10E3/UL (ref 0–0.7)
EOSINOPHIL NFR BLD AUTO: 8 %
ERYTHROCYTE [DISTWIDTH] IN BLOOD BY AUTOMATED COUNT: 16.3 % (ref 10–15)
HCT VFR BLD AUTO: 29.1 % (ref 40–53)
HGB BLD-MCNC: 8.7 G/DL (ref 13.3–17.7)
IMM GRANULOCYTES # BLD: 0.1 10E3/UL
IMM GRANULOCYTES NFR BLD: 1 %
LYMPHOCYTES # BLD AUTO: 2.1 10E3/UL (ref 0.8–5.3)
LYMPHOCYTES NFR BLD AUTO: 22 %
MCH RBC QN AUTO: 30.3 PG (ref 26.5–33)
MCHC RBC AUTO-ENTMCNC: 29.9 G/DL (ref 31.5–36.5)
MCV RBC AUTO: 101 FL (ref 78–100)
MONOCYTES # BLD AUTO: 0.9 10E3/UL (ref 0–1.3)
MONOCYTES NFR BLD AUTO: 9 %
NEUTROPHILS # BLD AUTO: 5.7 10E3/UL (ref 1.6–8.3)
NEUTROPHILS NFR BLD AUTO: 60 %
NRBC # BLD AUTO: 0 10E3/UL
NRBC BLD AUTO-RTO: 0 /100
PLATELET # BLD AUTO: 312 10E3/UL (ref 150–450)
RBC # BLD AUTO: 2.87 10E6/UL (ref 4.4–5.9)
WBC # BLD AUTO: 9.5 10E3/UL (ref 4–11)

## 2025-07-28 PROCEDURE — 85025 COMPLETE CBC W/AUTO DIFF WBC: CPT | Mod: ORL | Performed by: FAMILY MEDICINE

## 2025-07-28 PROCEDURE — 36415 COLL VENOUS BLD VENIPUNCTURE: CPT | Mod: ORL | Performed by: FAMILY MEDICINE

## 2025-07-28 PROCEDURE — 84134 ASSAY OF PREALBUMIN: CPT | Mod: ORL | Performed by: FAMILY MEDICINE

## 2025-07-28 PROCEDURE — 84460 ALANINE AMINO (ALT) (SGPT): CPT | Mod: ORL | Performed by: FAMILY MEDICINE

## 2025-07-28 PROCEDURE — 84075 ASSAY ALKALINE PHOSPHATASE: CPT | Mod: ORL | Performed by: FAMILY MEDICINE

## 2025-07-28 PROCEDURE — 82040 ASSAY OF SERUM ALBUMIN: CPT | Mod: ORL | Performed by: FAMILY MEDICINE

## 2025-07-28 PROCEDURE — 84450 TRANSFERASE (AST) (SGOT): CPT | Mod: ORL | Performed by: FAMILY MEDICINE

## 2025-07-28 PROCEDURE — 82247 BILIRUBIN TOTAL: CPT | Mod: ORL | Performed by: FAMILY MEDICINE

## 2025-07-28 PROCEDURE — P9604 ONE-WAY ALLOW PRORATED TRIP: HCPCS | Mod: ORL | Performed by: FAMILY MEDICINE

## 2025-07-28 PROCEDURE — 82565 ASSAY OF CREATININE: CPT | Mod: ORL | Performed by: FAMILY MEDICINE

## 2025-07-28 PROCEDURE — 84520 ASSAY OF UREA NITROGEN: CPT | Mod: ORL | Performed by: FAMILY MEDICINE

## 2025-07-28 PROCEDURE — 82550 ASSAY OF CK (CPK): CPT | Mod: ORL | Performed by: FAMILY MEDICINE

## 2025-07-29 LAB
INR PPP: 1.5 (ref 0.85–1.15)
PREALB SERPL-MCNC: 12.6 MG/DL (ref 20–40)
PROTHROMBIN TIME: 18 SECONDS (ref 11.8–14.8)

## 2025-07-30 ENCOUNTER — LAB REQUISITION (OUTPATIENT)
Dept: LAB | Facility: CLINIC | Age: 80
End: 2025-07-30
Payer: COMMERCIAL

## 2025-07-30 DIAGNOSIS — I48.0 PAROXYSMAL ATRIAL FIBRILLATION (H): ICD-10-CM

## 2025-07-30 DIAGNOSIS — L08.9 LOCAL INFECTION OF THE SKIN AND SUBCUTANEOUS TISSUE, UNSPECIFIED: ICD-10-CM

## 2025-07-30 DIAGNOSIS — T81.49XD INFECTION FOLLOWING A PROCEDURE, OTHER SURGICAL SITE, SUBSEQUENT ENCOUNTER: ICD-10-CM

## 2025-07-30 DIAGNOSIS — T14.8XXD OTHER INJURY OF UNSPECIFIED BODY REGION, SUBSEQUENT ENCOUNTER: ICD-10-CM

## 2025-07-30 DIAGNOSIS — D64.89 OTHER SPECIFIED ANEMIAS: ICD-10-CM

## 2025-07-31 ENCOUNTER — LAB REQUISITION (OUTPATIENT)
Dept: LAB | Facility: CLINIC | Age: 80
End: 2025-07-31
Payer: COMMERCIAL

## 2025-07-31 DIAGNOSIS — L89.90 PRESSURE ULCER OF UNSPECIFIED SITE, UNSPECIFIED STAGE: ICD-10-CM

## 2025-08-01 ENCOUNTER — LAB REQUISITION (OUTPATIENT)
Dept: LAB | Facility: CLINIC | Age: 80
End: 2025-08-01
Payer: COMMERCIAL

## 2025-08-01 DIAGNOSIS — I48.0 PAROXYSMAL ATRIAL FIBRILLATION (H): ICD-10-CM

## 2025-08-01 LAB
ALBUMIN SERPL BCG-MCNC: 2.2 G/DL (ref 3.5–5.2)
ALP SERPL-CCNC: 69 U/L (ref 40–150)
ALT SERPL W P-5'-P-CCNC: <5 U/L (ref 0–70)
ANION GAP SERPL CALCULATED.3IONS-SCNC: 15 MMOL/L (ref 7–15)
AST SERPL W P-5'-P-CCNC: 22 U/L (ref 0–45)
BILIRUB SERPL-MCNC: <0.2 MG/DL
BUN SERPL-MCNC: 41.2 MG/DL (ref 8–23)
CALCIUM SERPL-MCNC: 8.8 MG/DL (ref 8.8–10.4)
CHLORIDE SERPL-SCNC: 107 MMOL/L (ref 98–107)
CREAT SERPL-MCNC: 1.35 MG/DL (ref 0.67–1.17)
EGFRCR SERPLBLD CKD-EPI 2021: 53 ML/MIN/1.73M2
GLUCOSE SERPL-MCNC: 86 MG/DL (ref 70–99)
HCO3 SERPL-SCNC: 16 MMOL/L (ref 22–29)
INR PPP: 1.43 (ref 0.85–1.15)
POTASSIUM SERPL-SCNC: 4.4 MMOL/L (ref 3.4–5.3)
PROT SERPL-MCNC: 6 G/DL (ref 6.4–8.3)
PROTHROMBIN TIME: 17.7 SECONDS (ref 11.8–14.8)
SODIUM SERPL-SCNC: 138 MMOL/L (ref 135–145)

## 2025-08-01 PROCEDURE — 85610 PROTHROMBIN TIME: CPT | Mod: ORL | Performed by: FAMILY MEDICINE

## 2025-08-01 PROCEDURE — P9604 ONE-WAY ALLOW PRORATED TRIP: HCPCS | Mod: ORL | Performed by: FAMILY MEDICINE

## 2025-08-01 PROCEDURE — 80053 COMPREHEN METABOLIC PANEL: CPT | Mod: ORL | Performed by: FAMILY MEDICINE

## 2025-08-01 PROCEDURE — 36415 COLL VENOUS BLD VENIPUNCTURE: CPT | Mod: ORL | Performed by: FAMILY MEDICINE

## 2025-08-04 LAB
ALP SERPL-CCNC: 63 U/L (ref 40–150)
ALT SERPL W P-5'-P-CCNC: <5 U/L (ref 0–70)
AST SERPL W P-5'-P-CCNC: 25 U/L (ref 0–45)
BASOPHILS # BLD AUTO: 0.1 10E3/UL (ref 0–0.2)
BASOPHILS NFR BLD AUTO: 1 %
BILIRUB SERPL-MCNC: <0.2 MG/DL
BUN SERPL-MCNC: 41.1 MG/DL (ref 8–23)
CK SERPL-CCNC: 21 U/L (ref 39–308)
CREAT SERPL-MCNC: 1.42 MG/DL (ref 0.67–1.17)
EGFRCR SERPLBLD CKD-EPI 2021: 50 ML/MIN/1.73M2
EOSINOPHIL # BLD AUTO: 0.7 10E3/UL (ref 0–0.7)
EOSINOPHIL NFR BLD AUTO: 7 %
ERYTHROCYTE [DISTWIDTH] IN BLOOD BY AUTOMATED COUNT: 16.9 % (ref 10–15)
HCT VFR BLD AUTO: 32.6 % (ref 40–53)
HGB BLD-MCNC: 9.6 G/DL (ref 13.3–17.7)
IMM GRANULOCYTES # BLD: 0.1 10E3/UL
IMM GRANULOCYTES NFR BLD: 1 %
LYMPHOCYTES # BLD AUTO: 2.4 10E3/UL (ref 0.8–5.3)
LYMPHOCYTES NFR BLD AUTO: 22 %
MCH RBC QN AUTO: 30.5 PG (ref 26.5–33)
MCHC RBC AUTO-ENTMCNC: 29.4 G/DL (ref 31.5–36.5)
MCV RBC AUTO: 104 FL (ref 78–100)
MONOCYTES # BLD AUTO: 1.1 10E3/UL (ref 0–1.3)
MONOCYTES NFR BLD AUTO: 10 %
NEUTROPHILS # BLD AUTO: 6.5 10E3/UL (ref 1.6–8.3)
NEUTROPHILS NFR BLD AUTO: 60 %
NRBC # BLD AUTO: 0 10E3/UL
NRBC BLD AUTO-RTO: 0 /100
PLATELET # BLD AUTO: 359 10E3/UL (ref 150–450)
RBC # BLD AUTO: 3.15 10E6/UL (ref 4.4–5.9)
WBC # BLD AUTO: 10.8 10E3/UL (ref 4–11)

## 2025-08-04 PROCEDURE — 85025 COMPLETE CBC W/AUTO DIFF WBC: CPT | Mod: ORL | Performed by: FAMILY MEDICINE

## 2025-08-04 PROCEDURE — 36415 COLL VENOUS BLD VENIPUNCTURE: CPT | Mod: ORL | Performed by: FAMILY MEDICINE

## 2025-08-04 PROCEDURE — 84520 ASSAY OF UREA NITROGEN: CPT | Mod: ORL | Performed by: FAMILY MEDICINE

## 2025-08-04 PROCEDURE — 82565 ASSAY OF CREATININE: CPT | Mod: ORL | Performed by: FAMILY MEDICINE

## 2025-08-04 PROCEDURE — P9604 ONE-WAY ALLOW PRORATED TRIP: HCPCS | Mod: ORL | Performed by: FAMILY MEDICINE

## 2025-08-04 PROCEDURE — 82247 BILIRUBIN TOTAL: CPT | Mod: ORL | Performed by: FAMILY MEDICINE

## 2025-08-04 PROCEDURE — 84075 ASSAY ALKALINE PHOSPHATASE: CPT | Mod: ORL | Performed by: FAMILY MEDICINE

## 2025-08-04 PROCEDURE — 82550 ASSAY OF CK (CPK): CPT | Mod: ORL | Performed by: FAMILY MEDICINE

## 2025-08-04 PROCEDURE — 84450 TRANSFERASE (AST) (SGOT): CPT | Mod: ORL | Performed by: FAMILY MEDICINE

## 2025-08-04 PROCEDURE — 84460 ALANINE AMINO (ALT) (SGPT): CPT | Mod: ORL | Performed by: FAMILY MEDICINE

## 2025-08-05 ENCOUNTER — LAB REQUISITION (OUTPATIENT)
Dept: LAB | Facility: CLINIC | Age: 80
End: 2025-08-05
Payer: COMMERCIAL

## 2025-08-05 DIAGNOSIS — L08.9 LOCAL INFECTION OF THE SKIN AND SUBCUTANEOUS TISSUE, UNSPECIFIED: ICD-10-CM

## 2025-08-05 DIAGNOSIS — T14.8XXA OTHER INJURY OF UNSPECIFIED BODY REGION, INITIAL ENCOUNTER: ICD-10-CM

## 2025-08-05 DIAGNOSIS — D64.89 OTHER SPECIFIED ANEMIAS: ICD-10-CM

## 2025-08-05 DIAGNOSIS — I48.0 PAROXYSMAL ATRIAL FIBRILLATION (H): ICD-10-CM

## 2025-08-05 DIAGNOSIS — L89.90 PRESSURE ULCER OF UNSPECIFIED SITE, UNSPECIFIED STAGE: ICD-10-CM

## 2025-08-05 DIAGNOSIS — T81.49XA INFECTION FOLLOWING A PROCEDURE, OTHER SURGICAL SITE, INITIAL ENCOUNTER: ICD-10-CM

## 2025-08-05 LAB
INR PPP: 1.71 (ref 0.85–1.15)
PROTHROMBIN TIME: 19.9 SECONDS (ref 11.8–14.8)

## 2025-08-06 ENCOUNTER — LAB REQUISITION (OUTPATIENT)
Dept: LAB | Facility: CLINIC | Age: 80
End: 2025-08-06
Payer: COMMERCIAL

## 2025-08-06 DIAGNOSIS — T81.49XD INFECTION FOLLOWING A PROCEDURE, OTHER SURGICAL SITE, SUBSEQUENT ENCOUNTER: ICD-10-CM

## 2025-08-06 DIAGNOSIS — T14.8XXD OTHER INJURY OF UNSPECIFIED BODY REGION, SUBSEQUENT ENCOUNTER: ICD-10-CM

## 2025-08-06 DIAGNOSIS — D64.89 OTHER SPECIFIED ANEMIAS: ICD-10-CM

## 2025-08-06 DIAGNOSIS — L08.9 LOCAL INFECTION OF THE SKIN AND SUBCUTANEOUS TISSUE, UNSPECIFIED: ICD-10-CM

## 2025-08-07 ENCOUNTER — LAB REQUISITION (OUTPATIENT)
Dept: LAB | Facility: CLINIC | Age: 80
End: 2025-08-07
Payer: COMMERCIAL

## 2025-08-07 DIAGNOSIS — Z79.2 LONG TERM (CURRENT) USE OF ANTIBIOTICS: ICD-10-CM

## 2025-08-07 DIAGNOSIS — T81.49XD INFECTION FOLLOWING A PROCEDURE, OTHER SURGICAL SITE, SUBSEQUENT ENCOUNTER: ICD-10-CM

## 2025-08-07 DIAGNOSIS — D64.89 OTHER SPECIFIED ANEMIAS: ICD-10-CM

## 2025-08-07 DIAGNOSIS — T14.8XXD OTHER INJURY OF UNSPECIFIED BODY REGION, SUBSEQUENT ENCOUNTER: ICD-10-CM

## 2025-08-07 DIAGNOSIS — L08.9 LOCAL INFECTION OF THE SKIN AND SUBCUTANEOUS TISSUE, UNSPECIFIED: ICD-10-CM

## 2025-08-07 DIAGNOSIS — M86.9 OSTEOMYELITIS, UNSPECIFIED (H): ICD-10-CM

## 2025-08-07 LAB — CRP SERPL-MCNC: 202 MG/L

## 2025-08-07 PROCEDURE — 36415 COLL VENOUS BLD VENIPUNCTURE: CPT | Mod: ORL | Performed by: FAMILY MEDICINE

## 2025-08-07 PROCEDURE — P9604 ONE-WAY ALLOW PRORATED TRIP: HCPCS | Mod: ORL | Performed by: FAMILY MEDICINE

## 2025-08-07 PROCEDURE — 86140 C-REACTIVE PROTEIN: CPT | Mod: ORL | Performed by: FAMILY MEDICINE

## 2025-08-08 LAB
INR PPP: 1.98 (ref 0.85–1.15)
PREALB SERPL-MCNC: 9.2 MG/DL (ref 20–40)
PROTHROMBIN TIME: 22.1 SECONDS (ref 11.8–14.8)

## 2025-08-08 PROCEDURE — 84134 ASSAY OF PREALBUMIN: CPT | Mod: ORL | Performed by: FAMILY MEDICINE

## 2025-08-08 PROCEDURE — 85610 PROTHROMBIN TIME: CPT | Mod: ORL | Performed by: FAMILY MEDICINE

## 2025-08-08 PROCEDURE — P9604 ONE-WAY ALLOW PRORATED TRIP: HCPCS | Mod: ORL | Performed by: FAMILY MEDICINE

## 2025-08-08 PROCEDURE — 36415 COLL VENOUS BLD VENIPUNCTURE: CPT | Mod: ORL | Performed by: FAMILY MEDICINE

## 2025-08-09 ENCOUNTER — LAB REQUISITION (OUTPATIENT)
Dept: LAB | Facility: CLINIC | Age: 80
End: 2025-08-09
Payer: COMMERCIAL

## 2025-08-09 DIAGNOSIS — I48.0 PAROXYSMAL ATRIAL FIBRILLATION (H): ICD-10-CM

## 2025-08-11 ENCOUNTER — TRANSFERRED RECORDS (OUTPATIENT)
Dept: HEALTH INFORMATION MANAGEMENT | Facility: CLINIC | Age: 80
End: 2025-08-11

## 2025-08-11 LAB
ALP SERPL-CCNC: 65 U/L (ref 40–150)
ALT SERPL W P-5'-P-CCNC: <5 U/L (ref 0–70)
AST SERPL W P-5'-P-CCNC: 13 U/L (ref 0–45)
BASOPHILS # BLD AUTO: 0 10E3/UL (ref 0–0.2)
BASOPHILS NFR BLD AUTO: 1 %
BILIRUB SERPL-MCNC: <0.2 MG/DL
BUN SERPL-MCNC: 34.4 MG/DL (ref 8–23)
CK SERPL-CCNC: 14 U/L (ref 39–308)
CREAT SERPL-MCNC: 1.55 MG/DL (ref 0.67–1.17)
CRP SERPL-MCNC: 106 MG/L
EGFRCR SERPLBLD CKD-EPI 2021: 45 ML/MIN/1.73M2
EOSINOPHIL # BLD AUTO: 1 10E3/UL (ref 0–0.7)
EOSINOPHIL NFR BLD AUTO: 13 %
ERYTHROCYTE [DISTWIDTH] IN BLOOD BY AUTOMATED COUNT: 16.6 % (ref 10–15)
HCT VFR BLD AUTO: 28.6 % (ref 40–53)
HGB BLD-MCNC: 8.8 G/DL (ref 13.3–17.7)
IMM GRANULOCYTES # BLD: 0.1 10E3/UL
IMM GRANULOCYTES NFR BLD: 1 %
LYMPHOCYTES # BLD AUTO: 1.9 10E3/UL (ref 0.8–5.3)
LYMPHOCYTES NFR BLD AUTO: 23 %
MCH RBC QN AUTO: 30.2 PG (ref 26.5–33)
MCHC RBC AUTO-ENTMCNC: 30.8 G/DL (ref 31.5–36.5)
MCV RBC AUTO: 98 FL (ref 78–100)
MONOCYTES # BLD AUTO: 0.7 10E3/UL (ref 0–1.3)
MONOCYTES NFR BLD AUTO: 9 %
NEUTROPHILS # BLD AUTO: 4.4 10E3/UL (ref 1.6–8.3)
NEUTROPHILS NFR BLD AUTO: 54 %
NRBC # BLD AUTO: 0 10E3/UL
NRBC BLD AUTO-RTO: 0 /100
PLATELET # BLD AUTO: 315 10E3/UL (ref 150–450)
RBC # BLD AUTO: 2.91 10E6/UL (ref 4.4–5.9)
WBC # BLD AUTO: 8.1 10E3/UL (ref 4–11)

## 2025-08-11 PROCEDURE — 85025 COMPLETE CBC W/AUTO DIFF WBC: CPT | Mod: ORL | Performed by: FAMILY MEDICINE

## 2025-08-11 PROCEDURE — 84075 ASSAY ALKALINE PHOSPHATASE: CPT | Mod: ORL | Performed by: FAMILY MEDICINE

## 2025-08-11 PROCEDURE — P9604 ONE-WAY ALLOW PRORATED TRIP: HCPCS | Mod: ORL | Performed by: FAMILY MEDICINE

## 2025-08-11 PROCEDURE — 36415 COLL VENOUS BLD VENIPUNCTURE: CPT | Mod: ORL | Performed by: FAMILY MEDICINE

## 2025-08-11 PROCEDURE — 82550 ASSAY OF CK (CPK): CPT | Mod: ORL | Performed by: FAMILY MEDICINE

## 2025-08-11 PROCEDURE — 82247 BILIRUBIN TOTAL: CPT | Mod: ORL | Performed by: FAMILY MEDICINE

## 2025-08-11 PROCEDURE — 84460 ALANINE AMINO (ALT) (SGPT): CPT | Mod: ORL | Performed by: FAMILY MEDICINE

## 2025-08-11 PROCEDURE — 84520 ASSAY OF UREA NITROGEN: CPT | Mod: ORL | Performed by: FAMILY MEDICINE

## 2025-08-11 PROCEDURE — 84450 TRANSFERASE (AST) (SGOT): CPT | Mod: ORL | Performed by: FAMILY MEDICINE

## 2025-08-11 PROCEDURE — 86140 C-REACTIVE PROTEIN: CPT | Mod: ORL | Performed by: FAMILY MEDICINE

## 2025-08-11 PROCEDURE — 82565 ASSAY OF CREATININE: CPT | Mod: ORL | Performed by: FAMILY MEDICINE

## 2025-08-12 LAB
INR PPP: 3.14 (ref 0.85–1.15)
PROTHROMBIN TIME: 32.1 SECONDS (ref 11.8–14.8)

## 2025-08-13 ENCOUNTER — LAB REQUISITION (OUTPATIENT)
Dept: LAB | Facility: CLINIC | Age: 80
End: 2025-08-13
Payer: COMMERCIAL

## 2025-08-13 DIAGNOSIS — I48.0 PAROXYSMAL ATRIAL FIBRILLATION (H): ICD-10-CM

## 2025-08-14 ENCOUNTER — LAB REQUISITION (OUTPATIENT)
Dept: LAB | Facility: CLINIC | Age: 80
End: 2025-08-14
Payer: COMMERCIAL

## 2025-08-14 DIAGNOSIS — T14.8XXA OTHER INJURY OF UNSPECIFIED BODY REGION, INITIAL ENCOUNTER: ICD-10-CM

## 2025-08-14 DIAGNOSIS — T81.49XA INFECTION FOLLOWING A PROCEDURE, OTHER SURGICAL SITE, INITIAL ENCOUNTER: ICD-10-CM

## 2025-08-14 DIAGNOSIS — L08.9 LOCAL INFECTION OF THE SKIN AND SUBCUTANEOUS TISSUE, UNSPECIFIED: ICD-10-CM

## 2025-08-14 DIAGNOSIS — D64.89 OTHER SPECIFIED ANEMIAS: ICD-10-CM

## 2025-08-15 ENCOUNTER — LAB REQUISITION (OUTPATIENT)
Dept: LAB | Facility: CLINIC | Age: 80
End: 2025-08-15
Payer: COMMERCIAL

## 2025-08-15 LAB
INR PPP: 3.21 (ref 0.85–1.15)
PROTHROMBIN TIME: 32.7 SECONDS (ref 11.8–14.8)

## 2025-08-15 PROCEDURE — P9604 ONE-WAY ALLOW PRORATED TRIP: HCPCS | Mod: ORL | Performed by: FAMILY MEDICINE

## 2025-08-15 PROCEDURE — 85610 PROTHROMBIN TIME: CPT | Mod: ORL | Performed by: FAMILY MEDICINE

## 2025-08-15 PROCEDURE — 36415 COLL VENOUS BLD VENIPUNCTURE: CPT | Mod: ORL | Performed by: FAMILY MEDICINE

## 2025-08-16 ENCOUNTER — LAB REQUISITION (OUTPATIENT)
Dept: LAB | Facility: CLINIC | Age: 80
End: 2025-08-16
Payer: COMMERCIAL

## 2025-08-18 ENCOUNTER — LAB REQUISITION (OUTPATIENT)
Dept: LAB | Facility: CLINIC | Age: 80
End: 2025-08-18
Payer: COMMERCIAL

## 2025-08-18 DIAGNOSIS — T14.8XXA OTHER INJURY OF UNSPECIFIED BODY REGION, INITIAL ENCOUNTER: ICD-10-CM

## 2025-08-18 DIAGNOSIS — T81.49XA INFECTION FOLLOWING A PROCEDURE, OTHER SURGICAL SITE, INITIAL ENCOUNTER: ICD-10-CM

## 2025-08-18 DIAGNOSIS — M86.9 OSTEOMYELITIS, UNSPECIFIED (H): ICD-10-CM

## 2025-08-18 DIAGNOSIS — L08.9 LOCAL INFECTION OF THE SKIN AND SUBCUTANEOUS TISSUE, UNSPECIFIED: ICD-10-CM

## 2025-08-18 DIAGNOSIS — D64.89 OTHER SPECIFIED ANEMIAS: ICD-10-CM

## 2025-08-19 ENCOUNTER — LAB REQUISITION (OUTPATIENT)
Dept: LAB | Facility: CLINIC | Age: 80
End: 2025-08-19
Payer: COMMERCIAL

## 2025-08-19 DIAGNOSIS — L08.9 LOCAL INFECTION OF THE SKIN AND SUBCUTANEOUS TISSUE, UNSPECIFIED: ICD-10-CM

## 2025-08-19 DIAGNOSIS — D64.89 OTHER SPECIFIED ANEMIAS: ICD-10-CM

## 2025-08-19 DIAGNOSIS — T14.8XXA OTHER INJURY OF UNSPECIFIED BODY REGION, INITIAL ENCOUNTER: ICD-10-CM

## 2025-08-19 DIAGNOSIS — L89.90 PRESSURE ULCER OF UNSPECIFIED SITE, UNSPECIFIED STAGE: ICD-10-CM

## 2025-08-19 DIAGNOSIS — M86.9 OSTEOMYELITIS, UNSPECIFIED (H): ICD-10-CM

## 2025-08-19 DIAGNOSIS — I48.0 PAROXYSMAL ATRIAL FIBRILLATION (H): ICD-10-CM

## 2025-08-21 LAB
INR PPP: 4.02 (ref 0.85–1.15)
PROTHROMBIN TIME: 38.8 SECONDS (ref 11.8–14.8)

## 2025-08-29 ENCOUNTER — LAB REQUISITION (OUTPATIENT)
Dept: LAB | Facility: CLINIC | Age: 80
End: 2025-08-29
Payer: COMMERCIAL

## 2025-08-29 DIAGNOSIS — I48.0 PAROXYSMAL ATRIAL FIBRILLATION (H): ICD-10-CM

## 2025-09-02 LAB
INR PPP: 1.89 (ref 0.85–1.15)
PROTHROMBIN TIME: 21.9 SECONDS (ref 11.8–14.8)

## (undated) DEVICE — CONTAINER URINE SPEC 4OZ STRL 1053

## (undated) DEVICE — TAPE ADH MEDIPORE 6IN SOFT CLOTH 2866

## (undated) DEVICE — CATH PULM ART 7FR X 110CM, SWA

## (undated) DEVICE — SU DERMABOND ADVANCED .7ML DNX12

## (undated) DEVICE — SUCTION TIP HEART CASES 003497

## (undated) DEVICE — COVER ULTRASOUND PROBE STRL 9001C0197

## (undated) DEVICE — CLIP HORIZON MULTI MED BLUE 002204

## (undated) DEVICE — INTRO MICRO MINI STICK 4FR STD NITINOL

## (undated) DEVICE — KIT HAND CONTROL ACIST 014644 AR-P54

## (undated) DEVICE — CUSTOM PACK GEN MAJOR SBA5BGMHEA

## (undated) DEVICE — INTRO SHEATH 7FRX10CM PINNACLE RSS702

## (undated) DEVICE — CELL SAVER

## (undated) DEVICE — SYR ANGIOGRAPHY MULTIUSE KIT ACIST 014612

## (undated) DEVICE — SUTURE PDS 2-0 36IN CT PLUS PDP357H

## (undated) DEVICE — SU PLEDGET SOFT TFE 3/8"X3/26"X1/16" PCP40

## (undated) DEVICE — Device

## (undated) DEVICE — SUTURE MONOCRYL 3-0 18 PS2 UND MCP497G

## (undated) DEVICE — DRSG DRAIN 4X4" 7086

## (undated) DEVICE — SU PROLENE 4-0 RB-1DA 36" 8557H

## (undated) DEVICE — ESU GROUND PAD ADULT REM W/15' CORD E7507DB

## (undated) DEVICE — DRAPE IOBAN INCISE 23X17" 6650EZ

## (undated) DEVICE — SUTURE VICRYL+ 1 27IN CT-1 UND VCP261H

## (undated) DEVICE — BLADE KNIFE SURG 15 371115

## (undated) DEVICE — SU PROLENE 4-0 SHDA 36" 8521H

## (undated) DEVICE — SYR 10ML LL W/O NDL 302995

## (undated) DEVICE — SOL WATER IRRIG 1000ML BOTTLE 2F7114

## (undated) DEVICE — SUCTION CANISTER MEDIVAC LINER 3000ML W/LID 65651-530

## (undated) DEVICE — SU PDS II 0 CT-1 27" Z340H

## (undated) DEVICE — GLOVE BIOGEL 6 LATEX

## (undated) DEVICE — CUSTOM PACK CORONARY SAN5BCRHEA

## (undated) DEVICE — PLATE GROUNDING ADULT W/CORD 9165L

## (undated) DEVICE — PREP CHLORAPREP 26ML TINTED HI-LITE ORANGE 930815

## (undated) DEVICE — DRAPE WARMER 66X44" ORS-300

## (undated) DEVICE — SU ETHIBOND 0 CT-1 CR 8X18" CX21D

## (undated) DEVICE — MANIFOLD KIT ANGIO AUTOMATED 014613

## (undated) DEVICE — SPONGE KITNER DISSECTING 7102*

## (undated) DEVICE — DRAPE OR LAPAROTOMY KC 89228*

## (undated) DEVICE — GLOVE BIOGEL PI SZ 8.0 40880

## (undated) DEVICE — ELECTRODE DEFIB CADENCE 22550R

## (undated) DEVICE — SOL NACL 0.9% IRRIG 1000ML BOTTLE 2F7124

## (undated) RX ORDER — VANCOMYCIN HYDROCHLORIDE 1 G/20ML
INJECTION, POWDER, LYOPHILIZED, FOR SOLUTION INTRAVENOUS
Status: DISPENSED
Start: 2023-11-05

## (undated) RX ORDER — PROPOFOL 10 MG/ML
INJECTION, EMULSION INTRAVENOUS
Status: DISPENSED
Start: 2023-11-05

## (undated) RX ORDER — FENTANYL CITRATE 50 UG/ML
INJECTION, SOLUTION INTRAMUSCULAR; INTRAVENOUS
Status: DISPENSED
Start: 2023-11-02

## (undated) RX ORDER — LIDOCAINE HYDROCHLORIDE AND EPINEPHRINE 10; 10 MG/ML; UG/ML
INJECTION, SOLUTION INFILTRATION; PERINEURAL
Status: DISPENSED
Start: 2023-11-05

## (undated) RX ORDER — LIDOCAINE HYDROCHLORIDE 10 MG/ML
INJECTION, SOLUTION EPIDURAL; INFILTRATION; INTRACAUDAL; PERINEURAL
Status: DISPENSED
Start: 2023-11-05

## (undated) RX ORDER — LIDOCAINE HYDROCHLORIDE AND EPINEPHRINE 10; 10 MG/ML; UG/ML
INJECTION, SOLUTION INFILTRATION; PERINEURAL
Status: DISPENSED
Start: 2023-11-02

## (undated) RX ORDER — LIDOCAINE HYDROCHLORIDE AND EPINEPHRINE 10; 10 MG/ML; UG/ML
INJECTION, SOLUTION INFILTRATION; PERINEURAL
Status: DISPENSED
Start: 2023-11-03

## (undated) RX ORDER — LIDOCAINE HYDROCHLORIDE 10 MG/ML
INJECTION, SOLUTION INFILTRATION; PERINEURAL
Status: DISPENSED
Start: 2023-11-03

## (undated) RX ORDER — LIDOCAINE HYDROCHLORIDE 10 MG/ML
INJECTION, SOLUTION INFILTRATION; PERINEURAL
Status: DISPENSED
Start: 2023-11-13

## (undated) RX ORDER — HEPARIN SODIUM 1000 [USP'U]/ML
INJECTION, SOLUTION INTRAVENOUS; SUBCUTANEOUS
Status: DISPENSED
Start: 2023-11-03

## (undated) RX ORDER — FENTANYL CITRATE 50 UG/ML
INJECTION, SOLUTION INTRAMUSCULAR; INTRAVENOUS
Status: DISPENSED
Start: 2023-11-03